# Patient Record
Sex: MALE | Race: WHITE | Employment: OTHER | URBAN - METROPOLITAN AREA
[De-identification: names, ages, dates, MRNs, and addresses within clinical notes are randomized per-mention and may not be internally consistent; named-entity substitution may affect disease eponyms.]

---

## 2017-03-03 ENCOUNTER — OFFICE VISIT (OUTPATIENT)
Dept: FAMILY MEDICINE CLINIC | Age: 67
End: 2017-03-03

## 2017-03-03 VITALS
SYSTOLIC BLOOD PRESSURE: 110 MMHG | WEIGHT: 113 LBS | HEIGHT: 68 IN | TEMPERATURE: 98.1 F | DIASTOLIC BLOOD PRESSURE: 64 MMHG | OXYGEN SATURATION: 95 % | RESPIRATION RATE: 16 BRPM | HEART RATE: 62 BPM | BODY MASS INDEX: 17.13 KG/M2

## 2017-03-03 DIAGNOSIS — M54.41 CHRONIC MIDLINE LOW BACK PAIN WITH RIGHT-SIDED SCIATICA: ICD-10-CM

## 2017-03-03 DIAGNOSIS — G89.29 CHRONIC MIDLINE LOW BACK PAIN WITH RIGHT-SIDED SCIATICA: ICD-10-CM

## 2017-03-03 DIAGNOSIS — M54.2 NECK PAIN: Primary | ICD-10-CM

## 2017-03-03 PROCEDURE — 99213 OFFICE O/P EST LOW 20 MIN: CPT | Performed by: FAMILY MEDICINE

## 2017-03-03 PROCEDURE — 3288F FALL RISK ASSESSMENT DOCD: CPT | Performed by: FAMILY MEDICINE

## 2017-03-03 PROCEDURE — G8510 SCR DEP NEG, NO PLAN REQD: HCPCS | Performed by: FAMILY MEDICINE

## 2017-03-03 RX ORDER — DICLOFENAC SODIUM 75 MG/1
75 TABLET, DELAYED RELEASE ORAL 2 TIMES DAILY WITH MEALS
Qty: 60 TABLET | Refills: 1 | Status: SHIPPED | OUTPATIENT
Start: 2017-03-03 | End: 2017-11-29

## 2017-03-03 RX ORDER — M-VIT,TX,IRON,MINS/CALC/FOLIC 27MG-0.4MG
1 TABLET ORAL DAILY
COMMUNITY

## 2017-03-03 RX ORDER — TIZANIDINE HYDROCHLORIDE 4 MG/1
4 CAPSULE, GELATIN COATED ORAL 3 TIMES DAILY PRN
Qty: 30 CAPSULE | Refills: 2 | Status: SHIPPED | OUTPATIENT
Start: 2017-03-03 | End: 2017-03-06 | Stop reason: ALTCHOICE

## 2017-03-03 ASSESSMENT — PATIENT HEALTH QUESTIONNAIRE - PHQ9
SUM OF ALL RESPONSES TO PHQ9 QUESTIONS 1 & 2: 0
2. FEELING DOWN, DEPRESSED OR HOPELESS: 0
1. LITTLE INTEREST OR PLEASURE IN DOING THINGS: 0
SUM OF ALL RESPONSES TO PHQ QUESTIONS 1-9: 0

## 2017-03-04 ENCOUNTER — HOSPITAL ENCOUNTER (OUTPATIENT)
Dept: OTHER | Age: 67
Discharge: HOME | End: 2017-03-04
Attending: FAMILY MEDICINE | Admitting: FAMILY MEDICINE

## 2017-03-04 DIAGNOSIS — M54.41 CHRONIC MIDLINE LOW BACK PAIN WITH RIGHT-SIDED SCIATICA: ICD-10-CM

## 2017-03-04 DIAGNOSIS — M54.2 NECK PAIN: ICD-10-CM

## 2017-03-04 DIAGNOSIS — G89.29 CHRONIC MIDLINE LOW BACK PAIN WITH RIGHT-SIDED SCIATICA: ICD-10-CM

## 2017-03-06 ENCOUNTER — TELEPHONE (OUTPATIENT)
Dept: FAMILY MEDICINE CLINIC | Age: 67
End: 2017-03-06

## 2017-03-06 RX ORDER — TIZANIDINE 4 MG/1
4 TABLET ORAL EVERY 8 HOURS PRN
Qty: 40 TABLET | Refills: 2 | Status: SHIPPED | OUTPATIENT
Start: 2017-03-06 | End: 2017-11-29

## 2017-04-27 ENCOUNTER — TELEPHONE (OUTPATIENT)
Dept: FAMILY MEDICINE CLINIC | Age: 67
End: 2017-04-27

## 2017-11-29 ENCOUNTER — OFFICE VISIT (OUTPATIENT)
Dept: FAMILY MEDICINE CLINIC | Age: 67
End: 2017-11-29

## 2017-11-29 ENCOUNTER — TELEPHONE (OUTPATIENT)
Dept: FAMILY MEDICINE CLINIC | Age: 67
End: 2017-11-29

## 2017-11-29 VITALS
OXYGEN SATURATION: 93 % | SYSTOLIC BLOOD PRESSURE: 102 MMHG | RESPIRATION RATE: 16 BRPM | HEART RATE: 74 BPM | BODY MASS INDEX: 17.87 KG/M2 | DIASTOLIC BLOOD PRESSURE: 80 MMHG | WEIGHT: 111.2 LBS | HEIGHT: 66 IN

## 2017-11-29 DIAGNOSIS — Z23 FLU VACCINE NEED: ICD-10-CM

## 2017-11-29 DIAGNOSIS — Z72.0 TOBACCO USE: ICD-10-CM

## 2017-11-29 DIAGNOSIS — R35.1 NOCTURIA: ICD-10-CM

## 2017-11-29 DIAGNOSIS — Z12.11 COLON CANCER SCREENING: ICD-10-CM

## 2017-11-29 DIAGNOSIS — D23.30 DERMOID CYST OF FACE: Primary | ICD-10-CM

## 2017-11-29 DIAGNOSIS — L08.89 OTHER SPECIFIED LOCAL INFECTIONS OF THE SKIN AND SUBCUTANEOUS TISSUE: ICD-10-CM

## 2017-11-29 DIAGNOSIS — Z23 NEED FOR PNEUMOCOCCAL VACCINE: ICD-10-CM

## 2017-11-29 DIAGNOSIS — Z12.5 PROSTATE CANCER SCREENING: ICD-10-CM

## 2017-11-29 DIAGNOSIS — J44.1 OBSTRUCTIVE CHRONIC BRONCHITIS WITH EXACERBATION (HCC): ICD-10-CM

## 2017-11-29 DIAGNOSIS — J01.90 ACUTE SINUSITIS, RECURRENCE NOT SPECIFIED, UNSPECIFIED LOCATION: ICD-10-CM

## 2017-11-29 DIAGNOSIS — Z13.29 THYROID DISORDER SCREENING: ICD-10-CM

## 2017-11-29 DIAGNOSIS — Z13.220 LIPID SCREENING: ICD-10-CM

## 2017-11-29 DIAGNOSIS — J44.9 CHRONIC OBSTRUCTIVE PULMONARY DISEASE, UNSPECIFIED COPD TYPE (HCC): ICD-10-CM

## 2017-11-29 LAB
A/G RATIO: 2.1 (ref 1.1–2.2)
ALBUMIN SERPL-MCNC: 4.6 G/DL (ref 3.4–5)
ALP BLD-CCNC: 55 U/L (ref 40–129)
ALT SERPL-CCNC: 15 U/L (ref 10–40)
ANION GAP SERPL CALCULATED.3IONS-SCNC: 16 MMOL/L (ref 3–16)
AST SERPL-CCNC: 29 U/L (ref 15–37)
BASOPHILS ABSOLUTE: 0.1 K/UL (ref 0–0.2)
BASOPHILS RELATIVE PERCENT: 1 %
BILIRUB SERPL-MCNC: 0.4 MG/DL (ref 0–1)
BUN BLDV-MCNC: 8 MG/DL (ref 7–20)
CALCIUM SERPL-MCNC: 9.8 MG/DL (ref 8.3–10.6)
CHLORIDE BLD-SCNC: 94 MMOL/L (ref 99–110)
CHOLESTEROL, TOTAL: 166 MG/DL (ref 0–199)
CO2: 29 MMOL/L (ref 21–32)
CREAT SERPL-MCNC: <0.5 MG/DL (ref 0.8–1.3)
EOSINOPHILS ABSOLUTE: 0.2 K/UL (ref 0–0.6)
EOSINOPHILS RELATIVE PERCENT: 3.5 %
GFR AFRICAN AMERICAN: >60
GFR NON-AFRICAN AMERICAN: >60
GLOBULIN: 2.2 G/DL
GLUCOSE BLD-MCNC: 69 MG/DL (ref 70–99)
HCT VFR BLD CALC: 41.2 % (ref 40.5–52.5)
HDLC SERPL-MCNC: 87 MG/DL (ref 40–60)
HEMOGLOBIN: 13.8 G/DL (ref 13.5–17.5)
LDL CHOLESTEROL CALCULATED: 69 MG/DL
LYMPHOCYTES ABSOLUTE: 1.5 K/UL (ref 1–5.1)
LYMPHOCYTES RELATIVE PERCENT: 23.5 %
MCH RBC QN AUTO: 33.1 PG (ref 26–34)
MCHC RBC AUTO-ENTMCNC: 33.5 G/DL (ref 31–36)
MCV RBC AUTO: 98.8 FL (ref 80–100)
MONOCYTES ABSOLUTE: 0.5 K/UL (ref 0–1.3)
MONOCYTES RELATIVE PERCENT: 7.3 %
NEUTROPHILS ABSOLUTE: 4.2 K/UL (ref 1.7–7.7)
NEUTROPHILS RELATIVE PERCENT: 64.7 %
PDW BLD-RTO: 14.9 % (ref 12.4–15.4)
PLATELET # BLD: 495 K/UL (ref 135–450)
PMV BLD AUTO: 8.9 FL (ref 5–10.5)
POTASSIUM SERPL-SCNC: 4.8 MMOL/L (ref 3.5–5.1)
PROSTATE SPECIFIC ANTIGEN: 0.21 NG/ML (ref 0–4)
RBC # BLD: 4.17 M/UL (ref 4.2–5.9)
SODIUM BLD-SCNC: 139 MMOL/L (ref 136–145)
TOTAL PROTEIN: 6.8 G/DL (ref 6.4–8.2)
TRIGL SERPL-MCNC: 49 MG/DL (ref 0–150)
TSH REFLEX: 2.41 UIU/ML (ref 0.27–4.2)
VLDLC SERPL CALC-MCNC: 10 MG/DL
WBC # BLD: 6.5 K/UL (ref 4–11)

## 2017-11-29 PROCEDURE — G8926 SPIRO NO PERF OR DOC: HCPCS | Performed by: NURSE PRACTITIONER

## 2017-11-29 PROCEDURE — G8484 FLU IMMUNIZE NO ADMIN: HCPCS | Performed by: NURSE PRACTITIONER

## 2017-11-29 PROCEDURE — G8419 CALC BMI OUT NRM PARAM NOF/U: HCPCS | Performed by: NURSE PRACTITIONER

## 2017-11-29 PROCEDURE — 90662 IIV NO PRSV INCREASED AG IM: CPT | Performed by: NURSE PRACTITIONER

## 2017-11-29 PROCEDURE — 3023F SPIROM DOC REV: CPT | Performed by: NURSE PRACTITIONER

## 2017-11-29 PROCEDURE — G0008 ADMIN INFLUENZA VIRUS VAC: HCPCS | Performed by: NURSE PRACTITIONER

## 2017-11-29 PROCEDURE — G8427 DOCREV CUR MEDS BY ELIG CLIN: HCPCS | Performed by: NURSE PRACTITIONER

## 2017-11-29 PROCEDURE — 3017F COLORECTAL CA SCREEN DOC REV: CPT | Performed by: NURSE PRACTITIONER

## 2017-11-29 PROCEDURE — 1123F ACP DISCUSS/DSCN MKR DOCD: CPT | Performed by: NURSE PRACTITIONER

## 2017-11-29 PROCEDURE — 4040F PNEUMOC VAC/ADMIN/RCVD: CPT | Performed by: NURSE PRACTITIONER

## 2017-11-29 PROCEDURE — G0009 ADMIN PNEUMOCOCCAL VACCINE: HCPCS | Performed by: NURSE PRACTITIONER

## 2017-11-29 PROCEDURE — 4004F PT TOBACCO SCREEN RCVD TLK: CPT | Performed by: NURSE PRACTITIONER

## 2017-11-29 PROCEDURE — 90670 PCV13 VACCINE IM: CPT | Performed by: NURSE PRACTITIONER

## 2017-11-29 PROCEDURE — 99213 OFFICE O/P EST LOW 20 MIN: CPT | Performed by: NURSE PRACTITIONER

## 2017-11-29 ASSESSMENT — ENCOUNTER SYMPTOMS
BACK PAIN: 0
CHEST TIGHTNESS: 0
RHINORRHEA: 1
SHORTNESS OF BREATH: 0
NAUSEA: 0
CONSTIPATION: 0

## 2017-11-29 NOTE — TELEPHONE ENCOUNTER
[phone] (home)    Patient left office before his ear irrigation was done. Called patient to let him know that he could come back to office today or per Hayden Primes any other day to have it completed.

## 2017-11-29 NOTE — PROGRESS NOTES
Subjective:      Patient ID: Carlota Landa is a 77 y.o. male. HPI     To establish care at this practice. Previous PCP Dr. Alfredo Braun, retired. Having ongoing nasal congestion during cold weather. Lessens when lies down. Clear. Mass left face draining foul substance at times. Review of Systems   Constitutional: Negative for appetite change, chills and fever. HENT: Positive for rhinorrhea. Respiratory: Negative for chest tightness and shortness of breath. Cardiovascular: Negative for chest pain. Gastrointestinal: Negative for constipation and nausea. Musculoskeletal: Negative for arthralgias, back pain and gait problem. Neurological: Negative for dizziness and headaches. Psychiatric/Behavioral: Negative. Objective:   Physical Exam   Constitutional: He is oriented to person, place, and time. He appears well-developed and well-nourished. No distress. HENT:   Head: Normocephalic and atraumatic. Nose: Mucosal edema and rhinorrhea present. Right ear cerumen impaction. Large soft mass left pre auricular area, mild redness but not appearance of infection. Neck: Normal range of motion. Neck supple. Cardiovascular: Normal rate, regular rhythm and normal heart sounds. Pulmonary/Chest: Effort normal and breath sounds normal. No respiratory distress. Abdominal: Soft. Bowel sounds are normal.   Musculoskeletal: Normal range of motion. Lymphadenopathy:     He has no cervical adenopathy. Neurological: He is alert and oriented to person, place, and time. Skin: Skin is warm and dry. Psychiatric: He has a normal mood and affect. Assessment:      1. Allergic rhinitis  2. Cyst left face  3. Cerumen impaction right  4. Tobacco abuse  5. COPD-stable      Plan:      1. Sandra Prescott was seen today for new patient.     Diagnoses and all orders for this visit:    Screening for AAA (abdominal aortic aneurysm)    Colon cancer screening  -     POCT Fecal Immunochemical Test (FIT);

## 2017-11-30 ENCOUNTER — TELEPHONE (OUTPATIENT)
Dept: FAMILY MEDICINE CLINIC | Age: 67
End: 2017-11-30

## 2017-12-01 NOTE — TELEPHONE ENCOUNTER
[facility] Authorization # [id number]  Valid 12/1/17  Expires 12/31/17  25 Visits    Scanned to chart  Copy mailed to patient

## 2017-12-19 ENCOUNTER — SURG/PROC ORDERS (OUTPATIENT)
Dept: SURGERY | Age: 67
End: 2017-12-19

## 2017-12-19 ENCOUNTER — INITIAL CONSULT (OUTPATIENT)
Dept: SURGERY | Age: 67
End: 2017-12-19

## 2017-12-19 VITALS
HEIGHT: 66 IN | DIASTOLIC BLOOD PRESSURE: 70 MMHG | WEIGHT: 113 LBS | SYSTOLIC BLOOD PRESSURE: 120 MMHG | BODY MASS INDEX: 18.16 KG/M2

## 2017-12-19 DIAGNOSIS — L72.3 SEBACEOUS CYST: Primary | ICD-10-CM

## 2017-12-19 PROCEDURE — G8484 FLU IMMUNIZE NO ADMIN: HCPCS | Performed by: SURGERY

## 2017-12-19 PROCEDURE — G8419 CALC BMI OUT NRM PARAM NOF/U: HCPCS | Performed by: SURGERY

## 2017-12-19 PROCEDURE — 1123F ACP DISCUSS/DSCN MKR DOCD: CPT | Performed by: SURGERY

## 2017-12-19 PROCEDURE — 4040F PNEUMOC VAC/ADMIN/RCVD: CPT | Performed by: SURGERY

## 2017-12-19 PROCEDURE — 3017F COLORECTAL CA SCREEN DOC REV: CPT | Performed by: SURGERY

## 2017-12-19 PROCEDURE — 4004F PT TOBACCO SCREEN RCVD TLK: CPT | Performed by: SURGERY

## 2017-12-19 PROCEDURE — 99202 OFFICE O/P NEW SF 15 MIN: CPT | Performed by: SURGERY

## 2017-12-19 PROCEDURE — G8427 DOCREV CUR MEDS BY ELIG CLIN: HCPCS | Performed by: SURGERY

## 2017-12-19 RX ORDER — SODIUM CHLORIDE 0.9 % (FLUSH) 0.9 %
10 SYRINGE (ML) INJECTION PRN
Status: CANCELLED | OUTPATIENT
Start: 2017-12-19

## 2017-12-19 RX ORDER — SODIUM CHLORIDE 0.9 % (FLUSH) 0.9 %
10 SYRINGE (ML) INJECTION EVERY 12 HOURS SCHEDULED
Status: CANCELLED | OUTPATIENT
Start: 2017-12-19

## 2017-12-19 NOTE — PROGRESS NOTES
New Patient Via Tony Wagner MD    [address], [address] [facility]  [place] [address] Toshia Bee   YOB: 1950    Date of Visit:  12/19/2017    Zehra Patel CNP    Chief Complaint: Cyst left face    HPI:  Patient presents for evaluation of a cyst on his left face. He states he's had it for years. It has been slowly growing. It has not been infected, but will occasionally drain some foul-smelling material.  It does not hurt    Allergies   Allergen Reactions    Vicodin [Hydrocodone-Acetaminophen] Other (See Comments)     Hallucinations. Outpatient Prescriptions Marked as Taking for the 12/19/17 encounter (Initial consult) with Garry Alexandra MD   Medication Sig Dispense Refill    Multiple Vitamins-Minerals (THERAPEUTIC MULTIVITAMIN-MINERALS) tablet Take 1 tablet by mouth daily      aspirin 81 MG tablet          Past Medical History:   Diagnosis Date    Arthritis     Chronic back pain     Wears glasses      Past Surgical History:   Procedure Laterality Date    CARPAL TUNNEL RELEASE Right 2007    DENTAL SURGERY  1997    ALL TEETH PULLED    EYE SURGERY Right 1995    detached retina    FINGER SURGERY  5/24/11    duputrens release right ring finger inclluding proxinal interphanageal open CTR    FINGER SURGERY Right 1957    contusion finger # 3     Family History   Problem Relation Age of Onset    Diabetes Mother     Cancer Sister      pancreatic    Emphysema Father      Social History     Social History    Marital status:      Spouse name: Rui Antoine Number of children: 2    Years of education: 15     Occupational History     [address]     Social History Main Topics    Smoking status: Current Every Day Smoker     Packs/day: 1.00     Types: Cigarettes     Start date: 11/29/1976    Smokeless tobacco: Never Used      Comment: Advised to quit.  Had one cig for the past 5 days.  Alcohol use 7.2 oz/week     12 Cans of beer per week    Drug use: No    Sexual activity: Not Currently     Other Topics Concern    Not on file     Social History Narrative    No narrative on file          Vitals:    12/19/17 0852   BP: 120/70   Weight: 113 lb (51.3 kg)   Height: 5' 5.75\" (1.67 m)     Body mass index is 18.38 kg/m². Wt Readings from Last 3 Encounters:   12/19/17 113 lb (51.3 kg)   11/29/17 111 lb 3.2 oz (50.4 kg)   03/03/17 113 lb (51.3 kg)     BP Readings from Last 3 Encounters:   12/19/17 120/70   11/29/17 102/80   03/03/17 110/64        ROS:  As per HPI, otherwise reviewed ×10 systems and negative    PE:  Constitutional:  Well developed, well nourished, no acute distress, non-toxic appearance   Eyes:  PERRL, conjunctiva normal   HENT:  Atraumatic, external ears normal, nose normal. Neck- normal range of motion, no tenderness, supple   Respiratory:  No respiratory distress, normal breath sounds, no rales, no wheezing   Cardiovascular:  Normal rate, normal rhythm  Integument:  Left face with a 3 cm soft mobile mass just anterior to the left ear consistent with a sebaceous cyst  Lymphatic:  No lymphadenopathy noted   Neurologic:  Alert & oriented x 3, no focal deficits noted   Psychiatric:  Speech and behavior appropriate       DATA:  Not applicable      Assessment:  1. Sebaceous cyst        Plan: Excision of sebaceous cyst. I explained the procedure including risks, benefits, and alternatives. Questions were answered and the patient agrees to proceed.

## 2017-12-26 NOTE — PRE-PROCEDURE INSTRUCTIONS

## 2017-12-29 ENCOUNTER — HOSPITAL ENCOUNTER (OUTPATIENT)
Dept: SURGERY | Age: 67
Discharge: HOME | End: 2017-12-29
Attending: SURGERY | Admitting: SURGERY

## 2017-12-29 VITALS
WEIGHT: 115 LBS | TEMPERATURE: 98.7 F | HEART RATE: 71 BPM | DIASTOLIC BLOOD PRESSURE: 69 MMHG | SYSTOLIC BLOOD PRESSURE: 111 MMHG | OXYGEN SATURATION: 96 % | RESPIRATION RATE: 18 BRPM | HEIGHT: 68 IN | BODY MASS INDEX: 17.43 KG/M2

## 2017-12-29 DIAGNOSIS — L72.3 SEBACEOUS CYST: Primary | ICD-10-CM

## 2017-12-29 PROCEDURE — 12052 INTMD RPR FACE/MM 2.6-5.0 CM: CPT | Performed by: SURGERY

## 2017-12-29 PROCEDURE — 11444 EXC FACE-MM B9+MARG 3.1-4 CM: CPT | Performed by: SURGERY

## 2017-12-29 RX ORDER — OXYCODONE HYDROCHLORIDE AND ACETAMINOPHEN 5; 325 MG/1; MG/1
1 TABLET ORAL PRN
Status: ACTIVE | OUTPATIENT
Start: 2017-12-29 | End: 2017-12-29

## 2017-12-29 RX ORDER — LIDOCAINE HYDROCHLORIDE 10 MG/ML
1 INJECTION, SOLUTION EPIDURAL; INFILTRATION; INTRACAUDAL; PERINEURAL
Status: COMPLETED | OUTPATIENT
Start: 2017-12-29 | End: 2017-12-29

## 2017-12-29 RX ORDER — MEPERIDINE HYDROCHLORIDE 25 MG/ML
12.5 INJECTION INTRAMUSCULAR; INTRAVENOUS; SUBCUTANEOUS EVERY 5 MIN PRN
Status: DISCONTINUED | OUTPATIENT
Start: 2017-12-29 | End: 2017-12-30 | Stop reason: HOSPADM

## 2017-12-29 RX ORDER — OXYCODONE HYDROCHLORIDE AND ACETAMINOPHEN 5; 325 MG/1; MG/1
2 TABLET ORAL PRN
Status: ACTIVE | OUTPATIENT
Start: 2017-12-29 | End: 2017-12-29

## 2017-12-29 RX ORDER — ONDANSETRON 2 MG/ML
4 INJECTION INTRAMUSCULAR; INTRAVENOUS EVERY 30 MIN PRN
Status: DISCONTINUED | OUTPATIENT
Start: 2017-12-29 | End: 2017-12-30 | Stop reason: HOSPADM

## 2017-12-29 RX ORDER — SODIUM CHLORIDE 0.9 % (FLUSH) 0.9 %
10 SYRINGE (ML) INJECTION PRN
Status: DISCONTINUED | OUTPATIENT
Start: 2017-12-29 | End: 2017-12-30 | Stop reason: HOSPADM

## 2017-12-29 RX ORDER — SODIUM CHLORIDE 0.9 % (FLUSH) 0.9 %
10 SYRINGE (ML) INJECTION EVERY 12 HOURS SCHEDULED
Status: DISCONTINUED | OUTPATIENT
Start: 2017-12-29 | End: 2017-12-29 | Stop reason: SDUPTHER

## 2017-12-29 RX ORDER — HYDROMORPHONE HCL 110MG/55ML
0.5 PATIENT CONTROLLED ANALGESIA SYRINGE INTRAVENOUS EVERY 10 MIN PRN
Status: DISCONTINUED | OUTPATIENT
Start: 2017-12-29 | End: 2017-12-30 | Stop reason: HOSPADM

## 2017-12-29 RX ORDER — IPRATROPIUM BROMIDE AND ALBUTEROL SULFATE 2.5; .5 MG/3ML; MG/3ML
1 SOLUTION RESPIRATORY (INHALATION) ONCE
Status: COMPLETED | OUTPATIENT
Start: 2017-12-29 | End: 2017-12-29

## 2017-12-29 RX ORDER — SODIUM CHLORIDE, SODIUM LACTATE, POTASSIUM CHLORIDE, CALCIUM CHLORIDE 600; 310; 30; 20 MG/100ML; MG/100ML; MG/100ML; MG/100ML
INJECTION, SOLUTION INTRAVENOUS CONTINUOUS
Status: DISCONTINUED | OUTPATIENT
Start: 2017-12-29 | End: 2017-12-30 | Stop reason: HOSPADM

## 2017-12-29 RX ORDER — SODIUM CHLORIDE 0.9 % (FLUSH) 0.9 %
10 SYRINGE (ML) INJECTION PRN
Status: DISCONTINUED | OUTPATIENT
Start: 2017-12-29 | End: 2017-12-29 | Stop reason: SDUPTHER

## 2017-12-29 RX ORDER — HYDROMORPHONE HCL 110MG/55ML
0.5 PATIENT CONTROLLED ANALGESIA SYRINGE INTRAVENOUS EVERY 5 MIN PRN
Status: DISCONTINUED | OUTPATIENT
Start: 2017-12-29 | End: 2017-12-30 | Stop reason: HOSPADM

## 2017-12-29 RX ORDER — LABETALOL HYDROCHLORIDE 5 MG/ML
5 INJECTION, SOLUTION INTRAVENOUS
Status: DISCONTINUED | OUTPATIENT
Start: 2017-12-29 | End: 2017-12-30 | Stop reason: HOSPADM

## 2017-12-29 RX ORDER — HYDRALAZINE HYDROCHLORIDE 20 MG/ML
5 INJECTION INTRAMUSCULAR; INTRAVENOUS EVERY 30 MIN PRN
Status: DISCONTINUED | OUTPATIENT
Start: 2017-12-29 | End: 2017-12-30 | Stop reason: HOSPADM

## 2017-12-29 RX ORDER — OXYCODONE HYDROCHLORIDE 5 MG/1
5 TABLET ORAL EVERY 6 HOURS PRN
Qty: 20 TABLET | Refills: 0 | Status: SHIPPED | OUTPATIENT
Start: 2017-12-29 | End: 2018-01-03

## 2017-12-29 RX ORDER — DIPHENHYDRAMINE HYDROCHLORIDE 50 MG/ML
6.25 INJECTION INTRAMUSCULAR; INTRAVENOUS
Status: ACTIVE | OUTPATIENT
Start: 2017-12-29 | End: 2017-12-29

## 2017-12-29 RX ORDER — SODIUM CHLORIDE 0.9 % (FLUSH) 0.9 %
10 SYRINGE (ML) INJECTION EVERY 12 HOURS SCHEDULED
Status: DISCONTINUED | OUTPATIENT
Start: 2017-12-29 | End: 2017-12-30 | Stop reason: HOSPADM

## 2017-12-29 RX ADMIN — LIDOCAINE HYDROCHLORIDE 1 ML: 10 INJECTION, SOLUTION EPIDURAL; INFILTRATION; INTRACAUDAL; PERINEURAL at 09:56

## 2017-12-29 RX ADMIN — IPRATROPIUM BROMIDE AND ALBUTEROL SULFATE 1 AMPULE: 2.5; .5 SOLUTION RESPIRATORY (INHALATION) at 09:54

## 2017-12-29 RX ADMIN — SODIUM CHLORIDE, SODIUM LACTATE, POTASSIUM CHLORIDE, CALCIUM CHLORIDE: 600; 310; 30; 20 INJECTION, SOLUTION INTRAVENOUS at 09:56

## 2017-12-29 ASSESSMENT — PAIN SCALES - GENERAL: PAINLEVEL_OUTOF10: 0

## 2017-12-29 ASSESSMENT — LIFESTYLE VARIABLES: SMOKING_STATUS: 1

## 2017-12-29 NOTE — OP NOTE
[address]                  [address] [place] [address]                                 OPERATIVE REPORT    PATIENT NAME: Jannet Nash                      :        1950  MED REC NO:   [id number]                          ROOM:  ACCOUNT NO:   [de-identified]                          ADMIT DATE: 2017  PROVIDER:     Ashia Mckeon MD    DATE OF PROCEDURE:  2017    PREOPERATIVE DIAGNOSIS:  Large sebaceous cyst, left face. POSTOPERATIVE DIAGNOSIS:  Large sebaceous cyst, left face. PROCEDURE:  Excision of large sebaceous cyst, left face with layered  closure. ANESTHESIA:  Local with MAC. SURGEON:  Ashia Mckeon MD    INDICATIONS:  The patient is a 26-year-old gentleman with an enlarging  sebaceous cyst on his left face. He presents for excision. OPERATIVE SUMMARY:  After preoperative evaluation, the patient was brought  in the operating suite and placed in a comfortable supine position on the  operating room table. Monitoring equipment was attached and he was given  intravenous sedation per Anesthesia. His left face was sterilely prepped  and draped and anesthetized with local anesthetic. The 3.7 cm cyst was  excised via a 1.5 x 4.5 cm ellipse of skin. Bleeding was controlled with  electrocautery and the lesion was excised all the way down to the parotid gland. The incision was closed with interrupted sutures of 3-0 Vicryl in a  subcutaneous fascia. The skin was closed with a running subcuticular  suture of 4-0 Vicryl. Benzoin, Steri-Strips, and dry sterile dressings  were applied. All sponge, needle, and instrument counts were correct at  the end of the case. The patient tolerated the procedure well and was  taken to the recovery area in stable condition.         Wild Mayo MD    D: 2017 11:59:08       T: 2017 12:00:24     [email or web address]  Job#: [id number]     Doc#: [id number]    CC:  Joselin Aviles CNP

## 2017-12-29 NOTE — PROGRESS NOTES
Pt in bed. Pre admission completed. Side rails up x 2. IV started. Pt denies any needs. Call light within reach. Family at bedside.

## 2017-12-29 NOTE — PROGRESS NOTES
Pt is being discharged to home. Discharge instructions and scripts gone over and given to pt/wife. Pt/wife denies any questions or concerns at this time. IV removed. Pt is in stable condition.

## 2017-12-30 NOTE — ANESTHESIA POST-OP
Postoperative Anesthesia Note    Name:    Bharathi Wells  MRN:      [id number]    No data found. LABS:    CBC  Lab Results   Component Value Date/Time    WBC 6.5 11/29/2017 02:16 PM    HGB 13.8 11/29/2017 02:16 PM    HCT 41.2 11/29/2017 02:16 PM     (H) 11/29/2017 02:16 PM     RENAL  Lab Results   Component Value Date/Time     11/29/2017 02:16 PM    K 4.8 11/29/2017 02:16 PM    CL 94 (L) 11/29/2017 02:16 PM    CO2 29 11/29/2017 02:16 PM    BUN 8 11/29/2017 02:16 PM    CREATININE <0.5 (L) 11/29/2017 02:16 PM    GLUCOSE 69 (L) 11/29/2017 02:16 PM     COAGS  No results found for: PROTIME, INR, APTT    Intake & Output:  No intake/output data recorded. Nausea & Vomiting:  No    Level of Consciousness:  Awake    Pain Assessment:  Adequate analgesia    Anesthesia Complications:  No apparent anesthetic complications    SUMMARY      Vital signs stable  OK to discharge from Stage I post anesthesia care.   Care transferred from Anesthesiology department on discharge from perioperative area

## 2018-01-09 ENCOUNTER — OFFICE VISIT (OUTPATIENT)
Dept: SURGERY | Age: 68
End: 2018-01-09

## 2018-01-09 VITALS
BODY MASS INDEX: 16.82 KG/M2 | DIASTOLIC BLOOD PRESSURE: 70 MMHG | WEIGHT: 111 LBS | HEIGHT: 68 IN | SYSTOLIC BLOOD PRESSURE: 110 MMHG

## 2018-01-09 DIAGNOSIS — Z98.890 POST-OPERATIVE STATE: Primary | ICD-10-CM

## 2018-01-09 PROCEDURE — 99024 POSTOP FOLLOW-UP VISIT: CPT | Performed by: SURGERY

## 2018-01-09 NOTE — PROGRESS NOTES
[facility] GENERAL SURGERY      S:   Patient presents s/p left facial cyst excision. He reports doing well. O:   Comfortable         Incision site healing well. A:   S/P left facial cyst excision    P:   Follow up as needed.

## 2018-04-04 ENCOUNTER — OFFICE VISIT (OUTPATIENT)
Dept: FAMILY MEDICINE CLINIC | Age: 68
End: 2018-04-04

## 2018-04-04 VITALS
RESPIRATION RATE: 15 BRPM | DIASTOLIC BLOOD PRESSURE: 82 MMHG | HEART RATE: 84 BPM | HEIGHT: 68 IN | BODY MASS INDEX: 17.72 KG/M2 | OXYGEN SATURATION: 97 % | WEIGHT: 116.9 LBS | SYSTOLIC BLOOD PRESSURE: 128 MMHG

## 2018-04-04 DIAGNOSIS — J44.9 CHRONIC OBSTRUCTIVE PULMONARY DISEASE, UNSPECIFIED COPD TYPE (HCC): ICD-10-CM

## 2018-04-04 DIAGNOSIS — M89.9 DISORDER OF BONE: ICD-10-CM

## 2018-04-04 DIAGNOSIS — J44.9 OBSTRUCTIVE CHRONIC BRONCHITIS WITHOUT EXACERBATION (HCC): ICD-10-CM

## 2018-04-04 DIAGNOSIS — G89.29 CHRONIC MIDLINE LOW BACK PAIN WITH RIGHT-SIDED SCIATICA: Primary | ICD-10-CM

## 2018-04-04 DIAGNOSIS — M54.41 CHRONIC MIDLINE LOW BACK PAIN WITH RIGHT-SIDED SCIATICA: Primary | ICD-10-CM

## 2018-04-04 PROCEDURE — 99213 OFFICE O/P EST LOW 20 MIN: CPT | Performed by: NURSE PRACTITIONER

## 2018-04-04 PROCEDURE — G8419 CALC BMI OUT NRM PARAM NOF/U: HCPCS | Performed by: NURSE PRACTITIONER

## 2018-04-04 PROCEDURE — 4040F PNEUMOC VAC/ADMIN/RCVD: CPT | Performed by: NURSE PRACTITIONER

## 2018-04-04 PROCEDURE — 3023F SPIROM DOC REV: CPT | Performed by: NURSE PRACTITIONER

## 2018-04-04 PROCEDURE — G8427 DOCREV CUR MEDS BY ELIG CLIN: HCPCS | Performed by: NURSE PRACTITIONER

## 2018-04-04 PROCEDURE — 1123F ACP DISCUSS/DSCN MKR DOCD: CPT | Performed by: NURSE PRACTITIONER

## 2018-04-04 PROCEDURE — G8926 SPIRO NO PERF OR DOC: HCPCS | Performed by: NURSE PRACTITIONER

## 2018-04-04 PROCEDURE — 4004F PT TOBACCO SCREEN RCVD TLK: CPT | Performed by: NURSE PRACTITIONER

## 2018-04-04 PROCEDURE — 3017F COLORECTAL CA SCREEN DOC REV: CPT | Performed by: NURSE PRACTITIONER

## 2018-04-04 RX ORDER — METHYLPREDNISOLONE 4 MG/1
TABLET ORAL
Qty: 1 KIT | Refills: 0 | Status: SHIPPED | OUTPATIENT
Start: 2018-04-04 | End: 2018-04-10

## 2018-04-04 RX ORDER — DICLOFENAC SODIUM 75 MG/1
75 TABLET, DELAYED RELEASE ORAL 2 TIMES DAILY
Qty: 30 TABLET | Refills: 0 | Status: SHIPPED | OUTPATIENT
Start: 2018-04-04 | End: 2018-05-01

## 2018-04-04 ASSESSMENT — PATIENT HEALTH QUESTIONNAIRE - PHQ9
SUM OF ALL RESPONSES TO PHQ9 QUESTIONS 1 & 2: 0
SUM OF ALL RESPONSES TO PHQ QUESTIONS 1-9: 0
2. FEELING DOWN, DEPRESSED OR HOPELESS: 0
1. LITTLE INTEREST OR PLEASURE IN DOING THINGS: 0

## 2018-04-04 ASSESSMENT — ENCOUNTER SYMPTOMS
BACK PAIN: 1
CHEST TIGHTNESS: 0
NAUSEA: 0
CONSTIPATION: 0

## 2018-04-08 PROBLEM — D23.30 DERMOID CYST OF FACE: Status: RESOLVED | Noted: 2017-11-29 | Resolved: 2018-04-08

## 2018-04-20 ENCOUNTER — HOSPITAL ENCOUNTER (OUTPATIENT)
Dept: GENERAL RADIOLOGY | Age: 68
Discharge: HOME | End: 2018-04-20
Attending: NURSE PRACTITIONER | Admitting: NURSE PRACTITIONER

## 2018-04-20 DIAGNOSIS — M54.41 CHRONIC MIDLINE LOW BACK PAIN WITH RIGHT-SIDED SCIATICA: ICD-10-CM

## 2018-04-20 DIAGNOSIS — M89.9 DISORDER OF BONE: ICD-10-CM

## 2018-04-20 DIAGNOSIS — G89.29 CHRONIC MIDLINE LOW BACK PAIN WITH RIGHT-SIDED SCIATICA: ICD-10-CM

## 2018-04-25 DIAGNOSIS — M81.0 AGE-RELATED OSTEOPOROSIS WITHOUT CURRENT PATHOLOGICAL FRACTURE: ICD-10-CM

## 2018-04-25 DIAGNOSIS — M81.0 AGE-RELATED OSTEOPOROSIS WITHOUT CURRENT PATHOLOGICAL FRACTURE: Primary | ICD-10-CM

## 2018-04-25 RX ORDER — ALENDRONATE SODIUM 70 MG/1
70 TABLET ORAL
Qty: 4 TABLET | Refills: 3 | Status: SHIPPED | OUTPATIENT
Start: 2018-04-25 | End: 2018-05-01 | Stop reason: SDUPTHER

## 2018-05-01 ENCOUNTER — OFFICE VISIT (OUTPATIENT)
Dept: FAMILY MEDICINE CLINIC | Age: 68
End: 2018-05-01

## 2018-05-01 VITALS
SYSTOLIC BLOOD PRESSURE: 116 MMHG | OXYGEN SATURATION: 94 % | HEART RATE: 84 BPM | RESPIRATION RATE: 18 BRPM | DIASTOLIC BLOOD PRESSURE: 74 MMHG | WEIGHT: 115 LBS | BODY MASS INDEX: 17.49 KG/M2

## 2018-05-01 DIAGNOSIS — J44.9 CHRONIC OBSTRUCTIVE PULMONARY DISEASE, UNSPECIFIED COPD TYPE (HCC): ICD-10-CM

## 2018-05-01 DIAGNOSIS — J30.9 ALLERGIC RHINITIS, UNSPECIFIED CHRONICITY, UNSPECIFIED SEASONALITY, UNSPECIFIED TRIGGER: ICD-10-CM

## 2018-05-01 DIAGNOSIS — G89.29 CHRONIC MIDLINE LOW BACK PAIN WITH RIGHT-SIDED SCIATICA: ICD-10-CM

## 2018-05-01 DIAGNOSIS — M54.41 CHRONIC MIDLINE LOW BACK PAIN WITH RIGHT-SIDED SCIATICA: ICD-10-CM

## 2018-05-01 DIAGNOSIS — M81.0 AGE-RELATED OSTEOPOROSIS WITHOUT CURRENT PATHOLOGICAL FRACTURE: ICD-10-CM

## 2018-05-01 DIAGNOSIS — F17.210 CIGARETTE NICOTINE DEPENDENCE WITHOUT COMPLICATION: ICD-10-CM

## 2018-05-01 PROCEDURE — 99214 OFFICE O/P EST MOD 30 MIN: CPT | Performed by: NURSE PRACTITIONER

## 2018-05-01 PROCEDURE — 4040F PNEUMOC VAC/ADMIN/RCVD: CPT | Performed by: NURSE PRACTITIONER

## 2018-05-01 PROCEDURE — 4004F PT TOBACCO SCREEN RCVD TLK: CPT | Performed by: NURSE PRACTITIONER

## 2018-05-01 PROCEDURE — G8926 SPIRO NO PERF OR DOC: HCPCS | Performed by: NURSE PRACTITIONER

## 2018-05-01 PROCEDURE — G8419 CALC BMI OUT NRM PARAM NOF/U: HCPCS | Performed by: NURSE PRACTITIONER

## 2018-05-01 PROCEDURE — 3017F COLORECTAL CA SCREEN DOC REV: CPT | Performed by: NURSE PRACTITIONER

## 2018-05-01 PROCEDURE — G8427 DOCREV CUR MEDS BY ELIG CLIN: HCPCS | Performed by: NURSE PRACTITIONER

## 2018-05-01 PROCEDURE — 1123F ACP DISCUSS/DSCN MKR DOCD: CPT | Performed by: NURSE PRACTITIONER

## 2018-05-01 PROCEDURE — 3023F SPIROM DOC REV: CPT | Performed by: NURSE PRACTITIONER

## 2018-05-01 RX ORDER — ALENDRONATE SODIUM 70 MG/1
70 TABLET ORAL
Qty: 4 TABLET | Refills: 3 | Status: SHIPPED | OUTPATIENT
Start: 2018-05-01 | End: 2018-12-06 | Stop reason: SDUPTHER

## 2018-05-01 RX ORDER — MELOXICAM 15 MG/1
15 TABLET ORAL DAILY
Qty: 30 TABLET | Refills: 3 | Status: SHIPPED | OUTPATIENT
Start: 2018-05-01 | End: 2019-03-27 | Stop reason: SDUPTHER

## 2018-05-01 RX ORDER — GABAPENTIN 300 MG/1
300 CAPSULE ORAL NIGHTLY
Qty: 30 CAPSULE | Refills: 0 | Status: SHIPPED | OUTPATIENT
Start: 2018-05-01 | End: 2018-12-06

## 2018-05-01 RX ORDER — ALBUTEROL SULFATE 90 UG/1
2 AEROSOL, METERED RESPIRATORY (INHALATION) EVERY 6 HOURS PRN
Qty: 1 INHALER | Refills: 5 | Status: SHIPPED | OUTPATIENT
Start: 2018-05-01 | End: 2018-12-06 | Stop reason: SDUPTHER

## 2018-05-04 ENCOUNTER — HOSPITAL ENCOUNTER (OUTPATIENT)
Dept: OTHER | Age: 68
Discharge: HOME | End: 2018-05-04
Attending: NURSE PRACTITIONER | Admitting: NURSE PRACTITIONER

## 2018-05-04 DIAGNOSIS — M54.41 CHRONIC MIDLINE LOW BACK PAIN WITH RIGHT-SIDED SCIATICA: ICD-10-CM

## 2018-05-04 DIAGNOSIS — G89.29 CHRONIC MIDLINE LOW BACK PAIN WITH RIGHT-SIDED SCIATICA: ICD-10-CM

## 2018-05-05 ASSESSMENT — ENCOUNTER SYMPTOMS
SHORTNESS OF BREATH: 0
DIARRHEA: 0
COUGH: 0
ABDOMINAL PAIN: 0
SORE THROAT: 0
SPUTUM PRODUCTION: 0
HEMOPTYSIS: 0
BACK PAIN: 1
CONSTIPATION: 0
WHEEZING: 0

## 2018-05-10 ENCOUNTER — TELEPHONE (OUTPATIENT)
Dept: FAMILY MEDICINE CLINIC | Age: 68
End: 2018-05-10

## 2018-05-10 DIAGNOSIS — G89.29 CHRONIC MIDLINE LOW BACK PAIN WITH RIGHT-SIDED SCIATICA: Primary | ICD-10-CM

## 2018-05-10 DIAGNOSIS — M54.41 CHRONIC MIDLINE LOW BACK PAIN WITH RIGHT-SIDED SCIATICA: Primary | ICD-10-CM

## 2018-05-31 ENCOUNTER — OFFICE VISIT (OUTPATIENT)
Dept: FAMILY MEDICINE CLINIC | Age: 68
End: 2018-05-31

## 2018-05-31 VITALS
SYSTOLIC BLOOD PRESSURE: 130 MMHG | OXYGEN SATURATION: 90 % | WEIGHT: 111 LBS | HEART RATE: 91 BPM | DIASTOLIC BLOOD PRESSURE: 90 MMHG | BODY MASS INDEX: 16.88 KG/M2 | TEMPERATURE: 98 F

## 2018-05-31 DIAGNOSIS — J44.1 COPD WITH ACUTE EXACERBATION (HCC): Primary | ICD-10-CM

## 2018-05-31 PROBLEM — J13 CAP (COMMUNITY ACQUIRED PNEUMONIA) DUE TO PNEUMOCOCCUS (HCC): Status: ACTIVE | Noted: 2018-05-31

## 2018-05-31 PROCEDURE — 99213 OFFICE O/P EST LOW 20 MIN: CPT | Performed by: PHYSICIAN ASSISTANT

## 2018-05-31 ASSESSMENT — ENCOUNTER SYMPTOMS
SINUS PAIN: 0
NAUSEA: 0
RHINORRHEA: 0
WHEEZING: 1
SHORTNESS OF BREATH: 1
VOMITING: 0
SORE THROAT: 0
EYE ITCHING: 0
SINUS PRESSURE: 0
EYE PAIN: 0
COUGH: 1
CHEST TIGHTNESS: 0

## 2018-06-01 PROBLEM — J44.1 COPD WITH ACUTE EXACERBATION (HCC): Status: ACTIVE | Noted: 2018-06-01

## 2018-06-01 PROBLEM — J96.01 ACUTE RESPIRATORY FAILURE WITH HYPOXIA (HCC): Status: ACTIVE | Noted: 2018-06-01

## 2018-06-04 ENCOUNTER — TELEPHONE (OUTPATIENT)
Dept: PULMONOLOGY | Age: 68
End: 2018-06-04

## 2018-06-25 ENCOUNTER — CARE COORDINATION (OUTPATIENT)
Dept: CASE MANAGEMENT | Age: 68
End: 2018-06-25

## 2018-06-25 DIAGNOSIS — J96.02 ACUTE RESPIRATORY FAILURE WITH HYPOXIA AND HYPERCAPNIA (HCC): Primary | ICD-10-CM

## 2018-06-25 DIAGNOSIS — J96.01 ACUTE RESPIRATORY FAILURE WITH HYPOXIA AND HYPERCAPNIA (HCC): Primary | ICD-10-CM

## 2018-06-25 PROCEDURE — 1111F DSCHRG MED/CURRENT MED MERGE: CPT | Performed by: NURSE PRACTITIONER

## 2018-06-27 ENCOUNTER — OFFICE VISIT (OUTPATIENT)
Dept: PULMONOLOGY | Age: 68
End: 2018-06-27

## 2018-06-27 VITALS
DIASTOLIC BLOOD PRESSURE: 84 MMHG | HEART RATE: 59 BPM | HEIGHT: 68 IN | TEMPERATURE: 98.6 F | BODY MASS INDEX: 16.82 KG/M2 | WEIGHT: 111 LBS | OXYGEN SATURATION: 97 % | SYSTOLIC BLOOD PRESSURE: 124 MMHG | RESPIRATION RATE: 18 BRPM

## 2018-06-27 DIAGNOSIS — R09.02 HYPOXEMIA REQUIRING SUPPLEMENTAL OXYGEN: ICD-10-CM

## 2018-06-27 DIAGNOSIS — Z99.81 HYPOXEMIA REQUIRING SUPPLEMENTAL OXYGEN: ICD-10-CM

## 2018-06-27 DIAGNOSIS — J44.9 COPD SUGGESTED BY INITIAL EVALUATION (HCC): Primary | ICD-10-CM

## 2018-06-27 DIAGNOSIS — Z72.0 TOBACCO USE: ICD-10-CM

## 2018-06-27 PROCEDURE — G8427 DOCREV CUR MEDS BY ELIG CLIN: HCPCS | Performed by: INTERNAL MEDICINE

## 2018-06-27 PROCEDURE — 1111F DSCHRG MED/CURRENT MED MERGE: CPT | Performed by: INTERNAL MEDICINE

## 2018-06-27 PROCEDURE — 1123F ACP DISCUSS/DSCN MKR DOCD: CPT | Performed by: INTERNAL MEDICINE

## 2018-06-27 PROCEDURE — 1036F TOBACCO NON-USER: CPT | Performed by: INTERNAL MEDICINE

## 2018-06-27 PROCEDURE — 99214 OFFICE O/P EST MOD 30 MIN: CPT | Performed by: INTERNAL MEDICINE

## 2018-06-27 PROCEDURE — G8419 CALC BMI OUT NRM PARAM NOF/U: HCPCS | Performed by: INTERNAL MEDICINE

## 2018-06-27 PROCEDURE — 3017F COLORECTAL CA SCREEN DOC REV: CPT | Performed by: INTERNAL MEDICINE

## 2018-06-27 PROCEDURE — G8926 SPIRO NO PERF OR DOC: HCPCS | Performed by: INTERNAL MEDICINE

## 2018-06-27 PROCEDURE — 3023F SPIROM DOC REV: CPT | Performed by: INTERNAL MEDICINE

## 2018-06-27 PROCEDURE — 4040F PNEUMOC VAC/ADMIN/RCVD: CPT | Performed by: INTERNAL MEDICINE

## 2018-07-18 ENCOUNTER — HOSPITAL ENCOUNTER (OUTPATIENT)
Dept: PULMONOLOGY | Age: 68
Discharge: HOME | End: 2018-07-18
Payer: MEDICARE

## 2018-07-18 DIAGNOSIS — J44.9 CHRONIC OBSTRUCTIVE PULMONARY DISEASE (HCC): ICD-10-CM

## 2018-07-18 PROCEDURE — 94664 DEMO&/EVAL PT USE INHALER: CPT

## 2018-07-18 PROCEDURE — 94010 BREATHING CAPACITY TEST: CPT

## 2018-07-18 PROCEDURE — 94618 PULMONARY STRESS TESTING: CPT

## 2018-07-18 PROCEDURE — 94726 PLETHYSMOGRAPHY LUNG VOLUMES: CPT

## 2018-07-18 PROCEDURE — 94640 AIRWAY INHALATION TREATMENT: CPT

## 2018-07-18 PROCEDURE — 6360000002 HC RX W HCPCS: Performed by: INTERNAL MEDICINE

## 2018-07-18 PROCEDURE — 94729 DIFFUSING CAPACITY: CPT

## 2018-07-18 RX ORDER — ALBUTEROL SULFATE 2.5 MG/3ML
2.5 SOLUTION RESPIRATORY (INHALATION) ONCE
Status: COMPLETED | OUTPATIENT
Start: 2018-07-18 | End: 2018-07-18

## 2018-07-18 RX ADMIN — ALBUTEROL SULFATE 2.5 MG: 2.5 SOLUTION RESPIRATORY (INHALATION) at 10:27

## 2018-07-23 ENCOUNTER — OFFICE VISIT (OUTPATIENT)
Dept: PULMONOLOGY | Age: 68
End: 2018-07-23

## 2018-07-23 VITALS
TEMPERATURE: 98.1 F | HEIGHT: 68 IN | BODY MASS INDEX: 15.91 KG/M2 | SYSTOLIC BLOOD PRESSURE: 112 MMHG | DIASTOLIC BLOOD PRESSURE: 70 MMHG | HEART RATE: 90 BPM | RESPIRATION RATE: 18 BRPM | OXYGEN SATURATION: 96 % | WEIGHT: 105 LBS

## 2018-07-23 DIAGNOSIS — R09.02 HYPOXEMIA REQUIRING SUPPLEMENTAL OXYGEN: ICD-10-CM

## 2018-07-23 DIAGNOSIS — Z72.0 TOBACCO ABUSE: ICD-10-CM

## 2018-07-23 DIAGNOSIS — J44.9 COPD, VERY SEVERE (HCC): Primary | ICD-10-CM

## 2018-07-23 DIAGNOSIS — Z99.81 HYPOXEMIA REQUIRING SUPPLEMENTAL OXYGEN: ICD-10-CM

## 2018-07-23 DIAGNOSIS — J44.1 COPD EXACERBATION (HCC): ICD-10-CM

## 2018-07-23 PROCEDURE — 99214 OFFICE O/P EST MOD 30 MIN: CPT | Performed by: INTERNAL MEDICINE

## 2018-07-23 PROCEDURE — 3017F COLORECTAL CA SCREEN DOC REV: CPT | Performed by: INTERNAL MEDICINE

## 2018-07-23 PROCEDURE — 4040F PNEUMOC VAC/ADMIN/RCVD: CPT | Performed by: INTERNAL MEDICINE

## 2018-07-23 PROCEDURE — G8926 SPIRO NO PERF OR DOC: HCPCS | Performed by: INTERNAL MEDICINE

## 2018-07-23 PROCEDURE — 1101F PT FALLS ASSESS-DOCD LE1/YR: CPT | Performed by: INTERNAL MEDICINE

## 2018-07-23 PROCEDURE — 3023F SPIROM DOC REV: CPT | Performed by: INTERNAL MEDICINE

## 2018-07-23 PROCEDURE — 1111F DSCHRG MED/CURRENT MED MERGE: CPT | Performed by: INTERNAL MEDICINE

## 2018-07-23 PROCEDURE — G8427 DOCREV CUR MEDS BY ELIG CLIN: HCPCS | Performed by: INTERNAL MEDICINE

## 2018-07-23 PROCEDURE — 1123F ACP DISCUSS/DSCN MKR DOCD: CPT | Performed by: INTERNAL MEDICINE

## 2018-07-23 PROCEDURE — 1036F TOBACCO NON-USER: CPT | Performed by: INTERNAL MEDICINE

## 2018-07-23 PROCEDURE — G8419 CALC BMI OUT NRM PARAM NOF/U: HCPCS | Performed by: INTERNAL MEDICINE

## 2018-07-23 RX ORDER — DOXYCYCLINE HYCLATE 100 MG
100 TABLET ORAL 2 TIMES DAILY
Qty: 20 TABLET | Refills: 0 | Status: SHIPPED | OUTPATIENT
Start: 2018-07-23 | End: 2018-08-02

## 2018-07-23 RX ORDER — PREDNISONE 10 MG/1
TABLET ORAL
Qty: 30 TABLET | Refills: 0 | Status: SHIPPED | OUTPATIENT
Start: 2018-07-23 | End: 2018-08-04

## 2018-07-23 NOTE — PROGRESS NOTES
daily, Disp: , Rfl:         Objective:   PHYSICAL EXAM:        VITALS:  /70   Pulse 90   Temp 98.1 °F (36.7 °C) (Oral)   Resp 18   Ht 5' 8\" (1.727 m)   Wt 105 lb (47.6 kg)   SpO2 96% Comment: 2LPM  BMI 15.97 kg/m²     Gen: In no acute distress. Alert. Cachectic. Eyes: PERRL. No sclera icterus. No conjunctival injection. ENT: No ocular or auricular discharge. Oropharynx clear. Neck: Trachea midline. Normal thyroid. Resp: No accessory muscle use. No crackles. Bilateral wheezes. No rhonchi. No dullness on percussion. CV: Regular rate. Regular rhythm. No murmur or rub. Normal S1 and S2. No edema  GI: Abdomen non-tender. Non-distended. No masses. No organomegaly. Normal bowel sounds. No hernia. Skin: Warm and dry. No nodules on exposed extremities. No rash on exposed extremities. Lymph: No cervical LAD. No supraclavicular LAD. M/S: No cyanosis. No synovitis or joint deformity. No clubbing. Neuro: Cranial nerves are grossly intact. Moving all extremities. Motor and sensation grossly intact. Psych: Oriented x 3. No anxiety or agitation. DATA:    LABS:        PFTs 7/18/18  FVC 0.93 (22%) FEV1 0.41 (13%) FEV1/FVC ratio  44%  TLC 8.50 (127%) DLCO 7.2 (25%) + BD  6mwt 560 feet, low sat 84%; required 5L with exertion       IMAGING:      I personally reviewed and interpreted the following :     Chest CT(dated 6/22/18): No evidence of a pulmonary embolus. Apical predominant emphysema.  Diffuse bronchial wall thickening consistent  with bronchitis.       Assessment:   - COPD- very severeWith acute exacerbation  - Tobacco abuse  - Hypoxemia requiring Supplemental oxygen  -Protein calorie malnutrition      Plan:   - Prednisone taper and doxycycline ×10 days  - Inhaled bronchodilators - duonebs and albuterol mdi for nowt  - supplemental oxygen with exertion 5L  - handicap placard letter  - Avoid tobacco use  - We discussed the poor prognosis associated with his advanced disease      The

## 2018-08-23 ENCOUNTER — TELEPHONE (OUTPATIENT)
Dept: PULMONOLOGY | Age: 68
End: 2018-08-23

## 2018-08-23 DIAGNOSIS — J44.9 CHRONIC OBSTRUCTIVE PULMONARY DISEASE, UNSPECIFIED COPD TYPE (HCC): Primary | ICD-10-CM

## 2018-08-23 NOTE — TELEPHONE ENCOUNTER
[facility] is requesting order for 5 Liters Oxygen. Okay to send order per request?  Order is pending for review. Assessment:OV 7/23/18   - COPD- very severeWith acute exacerbation  - Tobacco abuse  - Hypoxemia requiring Supplemental oxygen  -Protein calorie malnutrition        Plan:   - Prednisone taper and doxycycline ×10 days  - Inhaled bronchodilators - duonebs and albuterol mdi for nowt  - supplemental oxygen with exertion 5L  - handicap placard letter  - Avoid tobacco use  - We discussed the poor prognosis associated with his advanced disease        The information above included the review and summarization of old records. The history was obtained from these old records and from the patient directly. Risks, benefits and alternatives to the management plan were discussed with the patient.

## 2018-08-31 ENCOUNTER — TELEPHONE (OUTPATIENT)
Dept: PULMONOLOGY | Age: 68
End: 2018-08-31

## 2018-09-20 ENCOUNTER — OFFICE VISIT (OUTPATIENT)
Dept: PULMONOLOGY | Age: 68
End: 2018-09-20

## 2018-09-20 VITALS
HEART RATE: 88 BPM | RESPIRATION RATE: 16 BRPM | OXYGEN SATURATION: 96 % | SYSTOLIC BLOOD PRESSURE: 124 MMHG | DIASTOLIC BLOOD PRESSURE: 68 MMHG | HEIGHT: 67 IN | BODY MASS INDEX: 17.27 KG/M2 | WEIGHT: 110 LBS | TEMPERATURE: 98.7 F

## 2018-09-20 DIAGNOSIS — Z72.0 TOBACCO USE: ICD-10-CM

## 2018-09-20 DIAGNOSIS — J44.9 COPD, VERY SEVERE (HCC): Primary | ICD-10-CM

## 2018-09-20 DIAGNOSIS — J96.11 CHRONIC RESPIRATORY FAILURE WITH HYPOXIA (HCC): ICD-10-CM

## 2018-09-20 DIAGNOSIS — Z72.0 TOBACCO ABUSE: ICD-10-CM

## 2018-09-20 DIAGNOSIS — E43 SEVERE PROTEIN-CALORIE MALNUTRITION (HCC): ICD-10-CM

## 2018-09-20 PROBLEM — E46 PROTEIN CALORIE MALNUTRITION (HCC): Status: ACTIVE | Noted: 2018-09-20

## 2018-09-20 PROCEDURE — 1036F TOBACCO NON-USER: CPT | Performed by: INTERNAL MEDICINE

## 2018-09-20 PROCEDURE — G8427 DOCREV CUR MEDS BY ELIG CLIN: HCPCS | Performed by: INTERNAL MEDICINE

## 2018-09-20 PROCEDURE — 1101F PT FALLS ASSESS-DOCD LE1/YR: CPT | Performed by: INTERNAL MEDICINE

## 2018-09-20 PROCEDURE — G8419 CALC BMI OUT NRM PARAM NOF/U: HCPCS | Performed by: INTERNAL MEDICINE

## 2018-09-20 PROCEDURE — 1123F ACP DISCUSS/DSCN MKR DOCD: CPT | Performed by: INTERNAL MEDICINE

## 2018-09-20 PROCEDURE — 99214 OFFICE O/P EST MOD 30 MIN: CPT | Performed by: INTERNAL MEDICINE

## 2018-09-20 PROCEDURE — 4040F PNEUMOC VAC/ADMIN/RCVD: CPT | Performed by: INTERNAL MEDICINE

## 2018-09-20 PROCEDURE — G8926 SPIRO NO PERF OR DOC: HCPCS | Performed by: INTERNAL MEDICINE

## 2018-09-20 PROCEDURE — 3017F COLORECTAL CA SCREEN DOC REV: CPT | Performed by: INTERNAL MEDICINE

## 2018-09-20 PROCEDURE — 3023F SPIROM DOC REV: CPT | Performed by: INTERNAL MEDICINE

## 2018-09-20 RX ORDER — PREDNISONE 10 MG/1
10 TABLET ORAL DAILY
Qty: 30 TABLET | Refills: 0 | Status: SHIPPED | OUTPATIENT
Start: 2018-09-20 | End: 2018-10-20

## 2018-09-20 NOTE — PROGRESS NOTES
[facility]    Outpatient Follow Up Note    CHIEF COMPLAINT : shortness of breath     HPI:  The patient is a 68-year-old man with past medical history of tobacco abuse who is admitted with acute respiratory failure and a COPD exacerbation to [facility] on 6/18. Since last clinic visit, the patient reports that dyspnea persists. He improved with the last prednisone taper and antibiotics but continues to feel poorly. He says he has not smoked in the last several weeks. There are no changes to past medical history, family history, social history or review of systems(except as noted in the history section) since prior note (all reviewed with patient). Current Medications:    Current Outpatient Prescriptions:     guaiFENesin (MUCINEX) 600 MG extended release tablet, Take 1 tablet by mouth 2 times daily, Disp: 30 tablet, Rfl: 1    ipratropium-albuterol (DUONEB) 0.5-2.5 (3) MG/3ML SOLN nebulizer solution, Inhale 3 mLs into the lungs every 4 hours, Disp: 360 mL, Rfl: 0    ipratropium-albuterol (DUONEB) 0.5-2.5 (3) MG/3ML SOLN nebulizer solution, Inhale 3 mLs into the lungs every 4 hours as needed for Shortness of Breath, Disp: 360 mL, Rfl: 3    albuterol sulfate HFA (PROAIR HFA) 108 (90 Base) MCG/ACT inhaler, Inhale 2 puffs into the lungs every 6 hours as needed for Wheezing, Disp: 1 Inhaler, Rfl: 5    Multiple Vitamins-Minerals (THERAPEUTIC MULTIVITAMIN-MINERALS) tablet, Take 1 tablet by mouth daily, Disp: , Rfl:     aspirin 81 MG tablet, Take 81 mg by mouth daily , Disp: , Rfl:     meloxicam (MOBIC) 15 MG tablet, Take 1 tablet by mouth daily , take with food for pain, Disp: 30 tablet, Rfl: 3    gabapentin (NEURONTIN) 300 MG capsule, Take 1 capsule by mouth nightly for 30 days. ., Disp: 30 capsule, Rfl: 0    alendronate (FOSAMAX) 70 MG tablet, Take 1 tablet by mouth every 7 days Take 30 minutes before eating, other medication.  Remain upright after taking.,

## 2018-10-22 NOTE — PLAN OF CARE
Pre-Operative:  1. Patient/Caregiver identifies - states name and date of birth. 2. The patient is free from signs and symptoms of injury. 3. The patient receives appropriate medication(s), safely administered during the Perioperative period. 4. The patient is free from signs and symptoms of infection. 5. The patient has wound / tissue perfusion. 6. The patients's fluid, electrolyte, and acid-base balances are established preoperatively. 7. The patient's pulmonary function is established preoperatively. 8. The patient's cardiovascular status is established preoperatively. 9. The patient / caregiver demonstrates knowledge of nutritional management related to the operative or other invasive procedure. 10. The patient/caregiver demonstrates knowledge of medication management. 11. The patient/caregiver demonstrates knowledge of pain management. 12. The patient participates in the rehabilitation process as applicable. 13. The patient/caregiver participates in decisions affection his or her Perioperative plan of care. 14. The patient's care is consistent with the individualized Perioperative plan of care. 15. The patient's right to privacy is maintained. 16. The patient is the recipient of competent and ethical care within legal standards of practice. 17. The patient's value system, lifestyle, ethnicity, and culture are considered, respected, and incorporated in the Perioperative plan of care and understands special services available. 18. The patient demonstrates and/or reports adequate pain control throughout the the Perioperative period. 19. The patient's neurological status is established preoperatively. 20. The patient/caregiver demonstrates knowledge of the expected responses to the operative or invasive procedure. 21. Patient/Caregiver has reduced anxiety. Interventions- Familiarize with environment and equipment.   22. Patient/Caregiver verbalizes understanding of Phase I and Phase II process. 23. Patient pain level is established preoperatively using age appropriate pain scale. 24. The patient will move to fall risk upon sedation- during and through the recovery phase. Interventions- orient the patient to the environment, especially the location of the bathroom; provide treaded socks/non-skid footwear; demonstrate and teach back use of the nurse's call system; instruct the patient to call for help before getting out of bed; lock all movable equipment before transferring patient; keep bed in lowest position possible.  25. Other:  Yenifer Gilliland RN no

## 2018-10-26 ENCOUNTER — OFFICE VISIT (OUTPATIENT)
Dept: PULMONOLOGY | Age: 68
End: 2018-10-26
Payer: MEDICARE

## 2018-10-26 VITALS
HEIGHT: 68 IN | RESPIRATION RATE: 18 BRPM | DIASTOLIC BLOOD PRESSURE: 70 MMHG | SYSTOLIC BLOOD PRESSURE: 114 MMHG | WEIGHT: 112 LBS | TEMPERATURE: 98.9 F | HEART RATE: 67 BPM | OXYGEN SATURATION: 95 % | BODY MASS INDEX: 16.97 KG/M2

## 2018-10-26 DIAGNOSIS — J44.9 COPD, VERY SEVERE (HCC): Primary | ICD-10-CM

## 2018-10-26 DIAGNOSIS — Z23 NEED FOR INFLUENZA VACCINATION: ICD-10-CM

## 2018-10-26 DIAGNOSIS — E43 SEVERE PROTEIN-CALORIE MALNUTRITION (HCC): ICD-10-CM

## 2018-10-26 DIAGNOSIS — J96.11 CHRONIC RESPIRATORY FAILURE WITH HYPOXIA (HCC): ICD-10-CM

## 2018-10-26 PROCEDURE — G8419 CALC BMI OUT NRM PARAM NOF/U: HCPCS | Performed by: INTERNAL MEDICINE

## 2018-10-26 PROCEDURE — G0008 ADMIN INFLUENZA VIRUS VAC: HCPCS | Performed by: INTERNAL MEDICINE

## 2018-10-26 PROCEDURE — 1036F TOBACCO NON-USER: CPT | Performed by: INTERNAL MEDICINE

## 2018-10-26 PROCEDURE — 1123F ACP DISCUSS/DSCN MKR DOCD: CPT | Performed by: INTERNAL MEDICINE

## 2018-10-26 PROCEDURE — G8482 FLU IMMUNIZE ORDER/ADMIN: HCPCS | Performed by: INTERNAL MEDICINE

## 2018-10-26 PROCEDURE — 1101F PT FALLS ASSESS-DOCD LE1/YR: CPT | Performed by: INTERNAL MEDICINE

## 2018-10-26 PROCEDURE — 4040F PNEUMOC VAC/ADMIN/RCVD: CPT | Performed by: INTERNAL MEDICINE

## 2018-10-26 PROCEDURE — 90662 IIV NO PRSV INCREASED AG IM: CPT | Performed by: INTERNAL MEDICINE

## 2018-10-26 PROCEDURE — G8926 SPIRO NO PERF OR DOC: HCPCS | Performed by: INTERNAL MEDICINE

## 2018-10-26 PROCEDURE — 3017F COLORECTAL CA SCREEN DOC REV: CPT | Performed by: INTERNAL MEDICINE

## 2018-10-26 PROCEDURE — G8427 DOCREV CUR MEDS BY ELIG CLIN: HCPCS | Performed by: INTERNAL MEDICINE

## 2018-10-26 PROCEDURE — 3023F SPIROM DOC REV: CPT | Performed by: INTERNAL MEDICINE

## 2018-10-26 PROCEDURE — 99214 OFFICE O/P EST MOD 30 MIN: CPT | Performed by: INTERNAL MEDICINE

## 2018-10-26 NOTE — PATIENT INSTRUCTIONS
Vaccine Information Statement    Influenza (Flu) Vaccine (Inactivated or Recombinant): What you need to know    Many Vaccine Information Statements are available in Frisian and other languages. See www.immunize.org/vis  Hojas de Información Sobre Vacunas están disponibles en Español y en muchos otros idiomas. Visite www.immunize.org/vis    1. Why get vaccinated? Influenza (flu) is a contagious disease that spreads around the [place] every year, usually between October and May. Flu is caused by influenza viruses, and is spread mainly by coughing, sneezing, and close contact. Anyone can get flu. Flu strikes suddenly and can last several days. Symptoms vary by age, but can include:   fever/chills   sore throat   muscle aches   fatigue   cough   headache    runny or stuffy nose    Flu can also lead to pneumonia and blood infections, and cause diarrhea and seizures in children. If you have a medical condition, such as heart or lung disease, flu can make it worse. Flu is more dangerous for some people. Infants and young children, people 72years of age and older, pregnant women, and people with certain health conditions or a weakened immune system are at greatest risk. Each year thousands of people in the [facility] die from flu, and many more are hospitalized. Flu vaccine can:   keep you from getting flu,   make flu less severe if you do get it, and   keep you from spreading flu to your family and other people. 2. Inactivated and recombinant flu vaccines    A dose of flu vaccine is recommended every flu season. Children 6 months through 6years of age may need two doses during the same flu season. Everyone else needs only one dose each flu season.        Some inactivated flu vaccines contain a very small amount of a mercury-based preservative called thimerosal. Studies have not shown thimerosal in vaccines to be harmful, but flu vaccines that do not contain thimerosal are available. There is no live flu virus in flu shots. They cannot cause the flu. There are many flu viruses, and they are always changing. Each year a new flu vaccine is made to protect against three or four viruses that are likely to cause disease in the upcoming flu season. But even when the vaccine doesnt exactly match these viruses, it may still provide some protection    Flu vaccine cannot prevent:   flu that is caused by a virus not covered by the vaccine, or   illnesses that look like flu but are not. It takes about 2 weeks for protection to develop after vaccination, and protection lasts through the flu season. 3. Some people should not get this vaccine    Tell the person who is giving you the vaccine:     If you have any severe, life-threatening allergies. If you ever had a life-threatening allergic reaction after a dose of flu vaccine, or have a severe allergy to any part of this vaccine, you may be advised not to get vaccinated. Most, but not all, types of flu vaccine contain a small amount of egg protein.  If you ever had Guillain-Barré Syndrome (also called GBS). Some people with a history of GBS should not get this vaccine. This should be discussed with your doctor.  If you are not feeling well. It is usually okay to get flu vaccine when you have a mild illness, but you might be asked to come back when you feel better. 4. Risks of a vaccine reaction    With any medicine, including vaccines, there is a chance of reactions. These are usually mild and go away on their own, but serious reactions are also possible. Most people who get a flu shot do not have any problems with it.      Minor problems following a flu shot include:    soreness, redness, or swelling where the shot was given     hoarseness   sore, red or itchy eyes   cough   fever   aches   headache   itching   fatigue  If these problems occur, they usually begin soon after the shot and last 1 or 2 days. More serious problems following a flu shot can include the following:     There may be a small increased risk of Guillain-Barré Syndrome (GBS) after inactivated flu vaccine. This risk has been estimated at 1 or 2 additional cases per million people vaccinated. This is much lower than the risk of severe complications from flu, which can be prevented by flu vaccine.  Young children who get the flu shot along with pneumococcal vaccine (PCV13) and/or DTaP vaccine at the same time might be slightly more likely to have a seizure caused by fever. Ask your doctor for more information. Tell your doctor if a child who is getting flu vaccine has ever had a seizure. Problems that could happen after any injected vaccine:      People sometimes faint after a medical procedure, including vaccination. Sitting or lying down for about 15 minutes can help prevent fainting, and injuries caused by a fall. Tell your doctor if you feel dizzy, or have vision changes or ringing in the ears.  Some people get severe pain in the shoulder and have difficulty moving the arm where a shot was given. This happens very rarely.  Any medication can cause a severe allergic reaction. Such reactions from a vaccine are very rare, estimated at about 1 in a million doses, and would happen within a few minutes to a few hours after the vaccination. As with any medicine, there is a very remote chance of a vaccine causing a serious injury or death. The safety of vaccines is always being monitored. For more information, visit: www.cdc.gov/vaccinesafety/    5. What if there is a serious reaction? What should I look for?  Look for anything that concerns you, such as signs of a severe allergic reaction, very high fever, or unusual behavior.     Signs of a severe allergic reaction can include hives, swelling of the face and throat, difficulty breathing, a fast heartbeat, dizziness, and weakness - usually within a few

## 2018-11-27 ENCOUNTER — HOSPITAL ENCOUNTER (INPATIENT)
Age: 68
LOS: 3 days | Discharge: HOME | DRG: 193 | End: 2018-11-30
Attending: EMERGENCY MEDICINE | Admitting: INTERNAL MEDICINE
Payer: MEDICARE

## 2018-11-27 ENCOUNTER — APPOINTMENT (OUTPATIENT)
Dept: GENERAL RADIOLOGY | Age: 68
DRG: 193 | End: 2018-11-27
Payer: MEDICARE

## 2018-11-27 DIAGNOSIS — J96.21 ACUTE ON CHRONIC RESPIRATORY FAILURE WITH HYPOXIA AND HYPERCAPNIA (HCC): Primary | ICD-10-CM

## 2018-11-27 DIAGNOSIS — J44.1 CHRONIC OBSTRUCTIVE PULMONARY DISEASE WITH ACUTE EXACERBATION (HCC): ICD-10-CM

## 2018-11-27 DIAGNOSIS — J96.22 ACUTE ON CHRONIC RESPIRATORY FAILURE WITH HYPOXIA AND HYPERCAPNIA (HCC): Primary | ICD-10-CM

## 2018-11-27 PROBLEM — J96.00 ACUTE RESPIRATORY FAILURE (HCC): Status: ACTIVE | Noted: 2018-11-27

## 2018-11-27 LAB
ANION GAP SERPL CALCULATED.3IONS-SCNC: 8 MMOL/L (ref 3–16)
BASE EXCESS ARTERIAL: 5.6 MMOL/L (ref -3–3)
BASE EXCESS ARTERIAL: 6.2 MMOL/L (ref -3–3)
BASE EXCESS ARTERIAL: 7.4 MMOL/L (ref -3–3)
BASE EXCESS ARTERIAL: 7.9 MMOL/L (ref -3–3)
BASOPHILS ABSOLUTE: 0.1 K/UL (ref 0–0.2)
BASOPHILS RELATIVE PERCENT: 0.7 %
BUN BLDV-MCNC: 5 MG/DL (ref 7–20)
CALCIUM SERPL-MCNC: 9.8 MG/DL (ref 8.3–10.6)
CARBOXYHEMOGLOBIN ARTERIAL: 0.3 % (ref 0–1.5)
CARBOXYHEMOGLOBIN ARTERIAL: 0.6 % (ref 0–1.5)
CARBOXYHEMOGLOBIN ARTERIAL: 0.9 % (ref 0–1.5)
CARBOXYHEMOGLOBIN ARTERIAL: 1.1 % (ref 0–1.5)
CHLORIDE BLD-SCNC: 88 MMOL/L (ref 99–110)
CO2: 36 MMOL/L (ref 21–32)
CREAT SERPL-MCNC: <0.5 MG/DL (ref 0.8–1.3)
D DIMER: 449 NG/ML DDU (ref 0–229)
EKG ATRIAL RATE: 101 BPM
EKG ATRIAL RATE: 97 BPM
EKG DIAGNOSIS: NORMAL
EKG DIAGNOSIS: NORMAL
EKG P AXIS: 96 DEGREES
EKG P-R INTERVAL: 134 MS
EKG Q-T INTERVAL: 348 MS
EKG Q-T INTERVAL: 368 MS
EKG QRS DURATION: 82 MS
EKG QRS DURATION: 96 MS
EKG QTC CALCULATION (BAZETT): 432 MS
EKG QTC CALCULATION (BAZETT): 477 MS
EKG R AXIS: 86 DEGREES
EKG R AXIS: 91 DEGREES
EKG T AXIS: 84 DEGREES
EKG T AXIS: 90 DEGREES
EKG VENTRICULAR RATE: 101 BPM
EKG VENTRICULAR RATE: 93 BPM
EOSINOPHILS ABSOLUTE: 1.3 K/UL (ref 0–0.6)
EOSINOPHILS RELATIVE PERCENT: 9.8 %
GFR AFRICAN AMERICAN: >60
GFR NON-AFRICAN AMERICAN: >60
GLUCOSE BLD-MCNC: 152 MG/DL (ref 70–99)
GLUCOSE BLD-MCNC: 165 MG/DL (ref 70–99)
GLUCOSE BLD-MCNC: 293 MG/DL (ref 70–99)
HCO3 ARTERIAL: 35.6 MMOL/L (ref 21–29)
HCO3 ARTERIAL: 36.6 MMOL/L (ref 21–29)
HCO3 ARTERIAL: 37 MMOL/L (ref 21–29)
HCO3 ARTERIAL: 37.9 MMOL/L (ref 21–29)
HCT VFR BLD CALC: 39.3 % (ref 40.5–52.5)
HEMOGLOBIN, ART, EXTENDED: 11.1 G/DL (ref 13.5–17.5)
HEMOGLOBIN, ART, EXTENDED: 11.8 G/DL (ref 13.5–17.5)
HEMOGLOBIN, ART, EXTENDED: 11.9 G/DL (ref 13.5–17.5)
HEMOGLOBIN, ART, EXTENDED: 12.1 G/DL (ref 13.5–17.5)
HEMOGLOBIN: 12.4 G/DL (ref 13.5–17.5)
INR BLD: 1.09 (ref 0.86–1.14)
LACTIC ACID: 0.7 MMOL/L (ref 0.4–2)
LACTIC ACID: 3.3 MMOL/L (ref 0.4–2)
LYMPHOCYTES ABSOLUTE: 2.3 K/UL (ref 1–5.1)
LYMPHOCYTES RELATIVE PERCENT: 16.5 %
MCH RBC QN AUTO: 29.1 PG (ref 26–34)
MCHC RBC AUTO-ENTMCNC: 31.4 G/DL (ref 31–36)
MCV RBC AUTO: 92.7 FL (ref 80–100)
METHEMOGLOBIN ARTERIAL: 0.4 %
METHEMOGLOBIN ARTERIAL: 0.6 %
MONOCYTES ABSOLUTE: 0.9 K/UL (ref 0–1.3)
MONOCYTES RELATIVE PERCENT: 6.9 %
NEUTROPHILS ABSOLUTE: 9.1 K/UL (ref 1.7–7.7)
NEUTROPHILS RELATIVE PERCENT: 66.1 %
O2 CONTENT ARTERIAL: 15 ML/DL
O2 CONTENT ARTERIAL: 16 ML/DL
O2 CONTENT ARTERIAL: 16 ML/DL
O2 CONTENT ARTERIAL: 18 ML/DL
O2 SAT, ARTERIAL: 94.1 %
O2 SAT, ARTERIAL: 96.8 %
O2 SAT, ARTERIAL: 97.2 %
O2 SAT, ARTERIAL: 99.3 %
O2 THERAPY: ABNORMAL
PCO2 ARTERIAL: 100.4 MMHG (ref 35–45)
PCO2 ARTERIAL: 75.6 MMHG (ref 35–45)
PCO2 ARTERIAL: 82.3 MMHG (ref 35–45)
PCO2 ARTERIAL: 93 MMHG (ref 35–45)
PDW BLD-RTO: 16 % (ref 12.4–15.4)
PERFORMED ON: ABNORMAL
PERFORMED ON: ABNORMAL
PH ARTERIAL: 7.18 (ref 7.35–7.45)
PH ARTERIAL: 7.23 (ref 7.35–7.45)
PH ARTERIAL: 7.25 (ref 7.35–7.45)
PH ARTERIAL: 7.3 (ref 7.35–7.45)
PLATELET # BLD: 844 K/UL (ref 135–450)
PMV BLD AUTO: 8.4 FL (ref 5–10.5)
PO2 ARTERIAL: 101.7 MMHG (ref 75–108)
PO2 ARTERIAL: 112 MMHG (ref 75–108)
PO2 ARTERIAL: 248.3 MMHG (ref 75–108)
PO2 ARTERIAL: 86.8 MMHG (ref 75–108)
POTASSIUM SERPL-SCNC: 4.7 MMOL/L (ref 3.5–5.1)
PRO-BNP: 955 PG/ML (ref 0–124)
PROTHROMBIN TIME: 12.4 SEC (ref 9.8–13)
RBC # BLD: 4.24 M/UL (ref 4.2–5.9)
SODIUM BLD-SCNC: 132 MMOL/L (ref 136–145)
TCO2 ARTERIAL: 38.1 MMOL/L
TCO2 ARTERIAL: 38.9 MMOL/L
TCO2 ARTERIAL: 40.1 MMOL/L
TCO2 ARTERIAL: 40.8 MMOL/L
TROPONIN: <0.01 NG/ML
WBC # BLD: 13.7 K/UL (ref 4–11)

## 2018-11-27 PROCEDURE — C1751 CATH, INF, PER/CENT/MIDLINE: HCPCS

## 2018-11-27 PROCEDURE — 2580000003 HC RX 258

## 2018-11-27 PROCEDURE — 2700000000 HC OXYGEN THERAPY PER DAY

## 2018-11-27 PROCEDURE — 99291 CRITICAL CARE FIRST HOUR: CPT | Performed by: INTERNAL MEDICINE

## 2018-11-27 PROCEDURE — 2500000003 HC RX 250 WO HCPCS: Performed by: EMERGENCY MEDICINE

## 2018-11-27 PROCEDURE — 94664 DEMO&/EVAL PT USE INHALER: CPT

## 2018-11-27 PROCEDURE — 6360000002 HC RX W HCPCS: Performed by: INTERNAL MEDICINE

## 2018-11-27 PROCEDURE — 36592 COLLECT BLOOD FROM PICC: CPT

## 2018-11-27 PROCEDURE — 2580000003 HC RX 258: Performed by: EMERGENCY MEDICINE

## 2018-11-27 PROCEDURE — 6370000000 HC RX 637 (ALT 250 FOR IP)

## 2018-11-27 PROCEDURE — 87205 SMEAR GRAM STAIN: CPT

## 2018-11-27 PROCEDURE — 93005 ELECTROCARDIOGRAM TRACING: CPT | Performed by: EMERGENCY MEDICINE

## 2018-11-27 PROCEDURE — 83605 ASSAY OF LACTIC ACID: CPT

## 2018-11-27 PROCEDURE — 94640 AIRWAY INHALATION TREATMENT: CPT

## 2018-11-27 PROCEDURE — 85379 FIBRIN DEGRADATION QUANT: CPT

## 2018-11-27 PROCEDURE — 05H333Z INSERTION OF INFUSION DEVICE INTO RIGHT INNOMINATE VEIN, PERCUTANEOUS APPROACH: ICD-10-PCS | Performed by: INTERNAL MEDICINE

## 2018-11-27 PROCEDURE — 36415 COLL VENOUS BLD VENIPUNCTURE: CPT

## 2018-11-27 PROCEDURE — 4500000026 HC ED CRITICAL CARE PROCEDURE

## 2018-11-27 PROCEDURE — 2500000003 HC RX 250 WO HCPCS: Performed by: INTERNAL MEDICINE

## 2018-11-27 PROCEDURE — 2000000000 HC ICU R&B

## 2018-11-27 PROCEDURE — 85025 COMPLETE CBC W/AUTO DIFF WBC: CPT

## 2018-11-27 PROCEDURE — 93010 ELECTROCARDIOGRAM REPORT: CPT | Performed by: INTERNAL MEDICINE

## 2018-11-27 PROCEDURE — 85610 PROTHROMBIN TIME: CPT

## 2018-11-27 PROCEDURE — 36600 WITHDRAWAL OF ARTERIAL BLOOD: CPT

## 2018-11-27 PROCEDURE — 6370000000 HC RX 637 (ALT 250 FOR IP): Performed by: INTERNAL MEDICINE

## 2018-11-27 PROCEDURE — 82803 BLOOD GASES ANY COMBINATION: CPT

## 2018-11-27 PROCEDURE — 96361 HYDRATE IV INFUSION ADD-ON: CPT

## 2018-11-27 PROCEDURE — 83036 HEMOGLOBIN GLYCOSYLATED A1C: CPT

## 2018-11-27 PROCEDURE — 36569 INSJ PICC 5 YR+ W/O IMAGING: CPT

## 2018-11-27 PROCEDURE — 87070 CULTURE OTHR SPECIMN AEROBIC: CPT

## 2018-11-27 PROCEDURE — 94762 N-INVAS EAR/PLS OXIMTRY CONT: CPT

## 2018-11-27 PROCEDURE — 96374 THER/PROPH/DIAG INJ IV PUSH: CPT

## 2018-11-27 PROCEDURE — 76937 US GUIDE VASCULAR ACCESS: CPT

## 2018-11-27 PROCEDURE — 6370000000 HC RX 637 (ALT 250 FOR IP): Performed by: EMERGENCY MEDICINE

## 2018-11-27 PROCEDURE — 84484 ASSAY OF TROPONIN QUANT: CPT

## 2018-11-27 PROCEDURE — 2580000003 HC RX 258: Performed by: INTERNAL MEDICINE

## 2018-11-27 PROCEDURE — 71045 X-RAY EXAM CHEST 1 VIEW: CPT

## 2018-11-27 PROCEDURE — 83880 ASSAY OF NATRIURETIC PEPTIDE: CPT

## 2018-11-27 PROCEDURE — 80048 BASIC METABOLIC PNL TOTAL CA: CPT

## 2018-11-27 PROCEDURE — 99291 CRITICAL CARE FIRST HOUR: CPT

## 2018-11-27 RX ORDER — IPRATROPIUM BROMIDE AND ALBUTEROL SULFATE 2.5; .5 MG/3ML; MG/3ML
1 SOLUTION RESPIRATORY (INHALATION) EVERY 4 HOURS PRN
Status: DISCONTINUED | OUTPATIENT
Start: 2018-11-27 | End: 2018-11-27

## 2018-11-27 RX ORDER — GUAIFENESIN 600 MG/1
600 TABLET, EXTENDED RELEASE ORAL 2 TIMES DAILY
Status: DISCONTINUED | OUTPATIENT
Start: 2018-11-27 | End: 2018-11-30 | Stop reason: HOSPADM

## 2018-11-27 RX ORDER — LIDOCAINE HYDROCHLORIDE 10 MG/ML
5 INJECTION, SOLUTION EPIDURAL; INFILTRATION; INTRACAUDAL; PERINEURAL ONCE
Status: DISCONTINUED | OUTPATIENT
Start: 2018-11-27 | End: 2018-11-29

## 2018-11-27 RX ORDER — ONDANSETRON 2 MG/ML
4 INJECTION INTRAMUSCULAR; INTRAVENOUS EVERY 6 HOURS PRN
Status: DISCONTINUED | OUTPATIENT
Start: 2018-11-27 | End: 2018-11-30 | Stop reason: HOSPADM

## 2018-11-27 RX ORDER — METHYLPREDNISOLONE SODIUM SUCCINATE 40 MG/ML
40 INJECTION, POWDER, LYOPHILIZED, FOR SOLUTION INTRAMUSCULAR; INTRAVENOUS EVERY 8 HOURS
Status: DISCONTINUED | OUTPATIENT
Start: 2018-11-27 | End: 2018-11-28

## 2018-11-27 RX ORDER — GABAPENTIN 300 MG/1
300 CAPSULE ORAL NIGHTLY
Status: DISCONTINUED | OUTPATIENT
Start: 2018-11-27 | End: 2018-11-30 | Stop reason: HOSPADM

## 2018-11-27 RX ORDER — SODIUM CHLORIDE 0.9 % (FLUSH) 0.9 %
10 SYRINGE (ML) INJECTION EVERY 12 HOURS SCHEDULED
Status: DISCONTINUED | OUTPATIENT
Start: 2018-11-27 | End: 2018-11-30 | Stop reason: HOSPADM

## 2018-11-27 RX ORDER — M-VIT,TX,IRON,MINS/CALC/FOLIC 27MG-0.4MG
1 TABLET ORAL DAILY
Status: DISCONTINUED | OUTPATIENT
Start: 2018-11-27 | End: 2018-11-30 | Stop reason: HOSPADM

## 2018-11-27 RX ORDER — DEXTROSE MONOHYDRATE 50 MG/ML
100 INJECTION, SOLUTION INTRAVENOUS PRN
Status: DISCONTINUED | OUTPATIENT
Start: 2018-11-27 | End: 2018-11-30 | Stop reason: HOSPADM

## 2018-11-27 RX ORDER — NICOTINE POLACRILEX 4 MG
15 LOZENGE BUCCAL PRN
Status: DISCONTINUED | OUTPATIENT
Start: 2018-11-27 | End: 2018-11-30 | Stop reason: HOSPADM

## 2018-11-27 RX ORDER — IPRATROPIUM BROMIDE AND ALBUTEROL SULFATE 2.5; .5 MG/3ML; MG/3ML
1 SOLUTION RESPIRATORY (INHALATION) EVERY 4 HOURS
Status: DISCONTINUED | OUTPATIENT
Start: 2018-11-27 | End: 2018-11-30 | Stop reason: HOSPADM

## 2018-11-27 RX ORDER — SODIUM CHLORIDE 9 MG/ML
INJECTION, SOLUTION INTRAVENOUS
Status: COMPLETED
Start: 2018-11-27 | End: 2018-11-27

## 2018-11-27 RX ORDER — DIMETHICONE, OXYBENZONE, AND PADIMATE O 2; 2.5; 6.6 G/100G; G/100G; G/100G
STICK TOPICAL
Status: COMPLETED
Start: 2018-11-27 | End: 2018-11-27

## 2018-11-27 RX ORDER — SODIUM CHLORIDE 0.9 % (FLUSH) 0.9 %
10 SYRINGE (ML) INJECTION PRN
Status: DISCONTINUED | OUTPATIENT
Start: 2018-11-27 | End: 2018-11-30 | Stop reason: HOSPADM

## 2018-11-27 RX ORDER — DEXTROSE MONOHYDRATE 25 G/50ML
12.5 INJECTION, SOLUTION INTRAVENOUS PRN
Status: DISCONTINUED | OUTPATIENT
Start: 2018-11-27 | End: 2018-11-30 | Stop reason: HOSPADM

## 2018-11-27 RX ORDER — 0.9 % SODIUM CHLORIDE 0.9 %
30 INTRAVENOUS SOLUTION INTRAVENOUS ONCE
Status: COMPLETED | OUTPATIENT
Start: 2018-11-27 | End: 2018-11-27

## 2018-11-27 RX ORDER — LEVOFLOXACIN 5 MG/ML
500 INJECTION, SOLUTION INTRAVENOUS EVERY 24 HOURS
Status: DISCONTINUED | OUTPATIENT
Start: 2018-11-27 | End: 2018-11-29

## 2018-11-27 RX ORDER — ASPIRIN 81 MG/1
81 TABLET, CHEWABLE ORAL DAILY
Status: DISCONTINUED | OUTPATIENT
Start: 2018-11-27 | End: 2018-11-30 | Stop reason: HOSPADM

## 2018-11-27 RX ORDER — METHYLPREDNISOLONE SODIUM SUCCINATE 40 MG/ML
40 INJECTION, POWDER, LYOPHILIZED, FOR SOLUTION INTRAMUSCULAR; INTRAVENOUS EVERY 12 HOURS
Status: DISCONTINUED | OUTPATIENT
Start: 2018-11-28 | End: 2018-11-27

## 2018-11-27 RX ORDER — IPRATROPIUM BROMIDE AND ALBUTEROL SULFATE 2.5; .5 MG/3ML; MG/3ML
1 SOLUTION RESPIRATORY (INHALATION) ONCE
Status: COMPLETED | OUTPATIENT
Start: 2018-11-27 | End: 2018-11-27

## 2018-11-27 RX ADMIN — GUAIFENESIN 600 MG: 600 TABLET, EXTENDED RELEASE ORAL at 14:23

## 2018-11-27 RX ADMIN — LEVOFLOXACIN 500 MG: 5 INJECTION, SOLUTION INTRAVENOUS at 19:50

## 2018-11-27 RX ADMIN — IPRATROPIUM BROMIDE AND ALBUTEROL SULFATE 3 ML: .5; 3 SOLUTION RESPIRATORY (INHALATION) at 19:39

## 2018-11-27 RX ADMIN — Medication 10 ML: at 21:22

## 2018-11-27 RX ADMIN — Medication: at 20:33

## 2018-11-27 RX ADMIN — IPRATROPIUM BROMIDE AND ALBUTEROL SULFATE 3 ML: .5; 3 SOLUTION RESPIRATORY (INHALATION) at 14:30

## 2018-11-27 RX ADMIN — DOXYCYCLINE 100 MG: 100 INJECTION, POWDER, LYOPHILIZED, FOR SOLUTION INTRAVENOUS at 14:23

## 2018-11-27 RX ADMIN — MUPIROCIN: 20 OINTMENT TOPICAL at 21:24

## 2018-11-27 RX ADMIN — SODIUM CHLORIDE 250 ML: 9 INJECTION, SOLUTION INTRAVENOUS at 19:50

## 2018-11-27 RX ADMIN — ASPIRIN 81 MG 81 MG: 81 TABLET ORAL at 14:23

## 2018-11-27 RX ADMIN — SODIUM CHLORIDE 1566 ML: 9 INJECTION, SOLUTION INTRAVENOUS at 09:59

## 2018-11-27 RX ADMIN — METHYLPREDNISOLONE SODIUM SUCCINATE 40 MG: 40 INJECTION, POWDER, FOR SOLUTION INTRAMUSCULAR; INTRAVENOUS at 14:23

## 2018-11-27 RX ADMIN — IPRATROPIUM BROMIDE AND ALBUTEROL SULFATE 1 AMPULE: .5; 3 SOLUTION RESPIRATORY (INHALATION) at 09:12

## 2018-11-27 RX ADMIN — SODIUM BICARBONATE 50 MEQ: 84 INJECTION, SOLUTION INTRAVENOUS at 09:59

## 2018-11-27 RX ADMIN — MULTIPLE VITAMINS W/ MINERALS TAB 1 TABLET: TAB at 14:23

## 2018-11-27 RX ADMIN — GUAIFENESIN 600 MG: 600 TABLET, EXTENDED RELEASE ORAL at 21:22

## 2018-11-27 RX ADMIN — GABAPENTIN 300 MG: 300 CAPSULE ORAL at 21:22

## 2018-11-27 RX ADMIN — ENOXAPARIN SODIUM 40 MG: 40 INJECTION SUBCUTANEOUS at 14:24

## 2018-11-27 RX ADMIN — METHYLPREDNISOLONE SODIUM SUCCINATE 40 MG: 40 INJECTION, POWDER, FOR SOLUTION INTRAMUSCULAR; INTRAVENOUS at 21:22

## 2018-11-27 RX ADMIN — IPRATROPIUM BROMIDE AND ALBUTEROL SULFATE 3 ML: .5; 3 SOLUTION RESPIRATORY (INHALATION) at 23:20

## 2018-11-27 NOTE — PROGRESS NOTES
Prevention mepilex dressing applied to bridge of nose and other facial bony prominences upon BiPAP/CPAP initiation. Consulted RN regarding the assessment of skin integrity.

## 2018-11-27 NOTE — H&P
CARDIAC ENZYMES  Recent Labs      11/27/18   0857  11/27/18   1535   TROPONINI  <0.01  <0.01       U/A:  No results found for: NITRITE, COLORU, WBCUA, RBCUA, MUCUS, BACTERIA, CLARITYU, SPECGRAV, LEUKOCYTESUR, BLOODU, GLUCOSEU, AMORPHOUS    ABG    Lab Results   Component Value Date    JEB0IQG 37.9 11/27/2018    BEART 7.4 11/27/2018    X1URFVHG 94.1 11/27/2018    PHART 7.228 11/27/2018    QZN2UVE 93.0 11/27/2018    PO2ART 86.8 11/27/2018    NAW2SYK 40.8 11/27/2018           Active Hospital Problems    Diagnosis Date Noted    Acute respiratory failure ([address] [J96.00] 11/27/2018    Abnormal chest x-ray [R93.89]     Chronic hypoxemic respiratory failure (HCC) [J96.11]     Lactic acidosis [E87.2]     Hyperglycemia [R73.9]          PHYSICIANS CERTIFICATION:    I certify that Arun Kelley is expected to be hospitalized for more than 2 midnights based on the following assessment and plan:      ASSESSMENT/PLAN:      Acute on Chronic Hypercapnic Resp Failure in setting of severe and likely end stage COPD. Oxygenation was actually normal on ABG, but pt with severe hypercapnia   PH 7.184, pCO2 100.4 on admit. Cont BiPAP - pt requested to come off for a break - I do not think he will sustain on his own - if any signs of clinical decompensation - back on BiPAP. Pulm CC eval   May need vent support, albeit appears to be improved on BiPAP. Increase solumedrol to q8hrs dosing. Cont IBD nebulizer. Iv Levaquin. Left Lung PNA organism unspecified. ?let lung peripheral opacity   Levaquin. Pulm eval.   Consider additional imaging pending progress. Hyperglycemia due to steroid. A1C 7.1 - consistent with DM  Not on meds for this PTA. Cont Insulin coverage ISS. DVT Prophylaxis: lovenox  Diet: DIET GENERAL;  Code Status: Full Code    Dispo - ICU. >30min of cc time. Prognosis guarded.         Ainsley Rose MD    Thank you SILVER HUNTER APRN - MONAE for the opportunity to be

## 2018-11-27 NOTE — CONSULTS
40 mg Intravenous Q12H    insulin lispro  0-12 Units Subcutaneous TID WC    insulin lispro  0-6 Units Subcutaneous Nightly    doxycycline (VIBRAMYCIN) IV  100 mg Intravenous Q12H     Continuous Infusions:   dextrose       PRN Meds:  ipratropium-albuterol, sodium chloride flush, magnesium hydroxide, ondansetron, glucose, dextrose, glucagon (rDNA), dextrose    ALLERGIES:  Patient is allergic to vicodin [hydrocodone-acetaminophen]. REVIEW OF SYSTEMS:  Constitutional: Negative for fever  HENT: Negative for sore throat  Eyes: Negative for redness   Respiratory: + dyspnea, cough  Cardiovascular: + Chest tightness   Gastrointestinal: Negative for vomiting, diarrhea   Genitourinary: Negative for hematuria   Musculoskeletal: Negative for arthralgias   Skin: Negative for rash  Neurological: Negative for syncope  Hematological: Negative for adenopathy  Psychiatric/Behavorial: + anxiety    PHYSICAL EXAM:  Blood pressure (!) 149/104, pulse 99, temperature 96.7 °F (35.9 °C), temperature source Axillary, resp. rate 21, height 5' 7\" (1.702 m), weight 111 lb 1.8 oz (50.4 kg), SpO2 100 %.' on BiPAP 16/8 FiO2 40%  Gen: + distress. Ill-appearing  Eyes: PERRL. No sclera icterus. No conjunctival injection. ENT: No discharge. Pharynx clear. Neck: Trachea midline. No obvious mass. Resp: + accessory muscle use. No crackles. Bilateral wheezes. No rhonchi. No dullness on percussion. CV: Tachycardia. Regular rhythm. No murmur or rub. No edema. Peripheral pulses are 2+. Capillary refill is less than 3 seconds. GI: Non-tender. Non-distended. No hernia. Skin: Warm and dry. No nodule on exposed extremities. Lymph: No cervical LAD. No supraclavicular LAD. M/S: No cyanosis. No joint deformity. No clubbing. Neuro: Awake. Alert. Moves all four extremities. Psych: Oriented x 3.  + anxiety.      LABS:  CBC:   Recent Labs      11/27/18   0857   WBC  13.7*   HGB  12.4*   HCT  39.3*   MCV  92.7   PLT  844*     BMP:   Recent

## 2018-11-27 NOTE — ED PROVIDER NOTES
12 Cans of beer per week      Comment: weekly    Drug use: No    Sexual activity: Not Currently     Other Topics Concern    None     Social History Narrative    None       Medications/Allergies     Previous Medications    ALBUTEROL SULFATE HFA (PROAIR HFA) 108 (90 BASE) MCG/ACT INHALER    Inhale 2 puffs into the lungs every 6 hours as needed for Wheezing    ALENDRONATE (FOSAMAX) 70 MG TABLET    Take 1 tablet by mouth every 7 days Take 30 minutes before eating, other medication. Remain upright after taking. ASPIRIN 81 MG TABLET    Take 81 mg by mouth daily     GABAPENTIN (NEURONTIN) 300 MG CAPSULE    Take 1 capsule by mouth nightly for 30 days. .    GUAIFENESIN (MUCINEX) 600 MG EXTENDED RELEASE TABLET    Take 1 tablet by mouth 2 times daily    IPRATROPIUM-ALBUTEROL (DUONEB) 0.5-2.5 (3) MG/3ML SOLN NEBULIZER SOLUTION    Inhale 3 mLs into the lungs every 4 hours as needed for Shortness of Breath    MELOXICAM (MOBIC) 15 MG TABLET    Take 1 tablet by mouth daily , take with food for pain    MULTIPLE VITAMINS-MINERALS (THERAPEUTIC MULTIVITAMIN-MINERALS) TABLET    Take 1 tablet by mouth daily     Allergies   Allergen Reactions    Vicodin [Hydrocodone-Acetaminophen] Other (See Comments)     Hallucinations. Physical Exam       ED Triage Vitals   BP Temp Temp Source Pulse Resp SpO2 Height Weight   11/27/18 0910 11/27/18 0955 11/27/18 0955 11/27/18 0910 11/27/18 0910 11/27/18 0910 11/27/18 0906 11/27/18 0906   117/71 97.5 °F (36.4 °C) Oral 101 24 100 % 5' 7\" (1.702 m) 115 lb (52.2 kg)     GENERAL APPEARANCE: Awake , Speaking in 2-3 word sentences. O2 sats in the upper 80s on I percent nonrebreather. Color slightly gray throughout. HEAD: Normocephalic. Atraumatic. EYES: Sclera anicteric. ENT: Tolerates saliva. No trismus. NECK: Supple. Trachea midline. CARDIO: RRR. Radial pulse 2+. LUNGS: Respirations fever, his history inspiratory wheezes throughout with poor to fair air movement. ABDOMEN: Soft.

## 2018-11-27 NOTE — ED NOTES
1010- Consult placed to hospitalist Dr. Michael Bartholomew for Dr. Anthony Gibbons. 80- Dr. Michael Bartholomew returned page and spoke to Dr. Anthony Gibbons.       Shasta Ennis  11/27/18 1036

## 2018-11-27 NOTE — PROGRESS NOTES
I moved the patient up to ICU on a 100% NRB mask. Evh0=390%. Once in ICU I placed the patient back on the Bipap on 16/8 on 40% Fio2. The patient appears comfortable at this time.

## 2018-11-27 NOTE — PROGRESS NOTES
ABG drawn from the right radial artery X 1 attempt(s). Sight prepped per policy and procedure. Modified Tin's test positive. Pressure held to sight after procedure for five minutes. No hematoma present. Pulse present after procedure.   Patient on 100% Fio2

## 2018-11-28 ENCOUNTER — APPOINTMENT (OUTPATIENT)
Dept: CT IMAGING | Age: 68
DRG: 193 | End: 2018-11-28
Payer: MEDICARE

## 2018-11-28 PROBLEM — J96.21 ACUTE ON CHRONIC RESPIRATORY FAILURE WITH HYPOXIA AND HYPERCAPNIA (HCC): Status: ACTIVE | Noted: 2018-11-27

## 2018-11-28 PROBLEM — J96.22 ACUTE ON CHRONIC RESPIRATORY FAILURE WITH HYPOXIA AND HYPERCAPNIA (HCC): Status: ACTIVE | Noted: 2018-11-27

## 2018-11-28 PROBLEM — J18.9 COMMUNITY ACQUIRED PNEUMONIA OF LEFT UPPER LOBE OF LUNG: Status: ACTIVE | Noted: 2018-05-31

## 2018-11-28 LAB
A/G RATIO: 1.4 (ref 1.1–2.2)
ALBUMIN SERPL-MCNC: 3.7 G/DL (ref 3.4–5)
ALP BLD-CCNC: 101 U/L (ref 40–129)
ALT SERPL-CCNC: 8 U/L (ref 10–40)
ANION GAP SERPL CALCULATED.3IONS-SCNC: 12 MMOL/L (ref 3–16)
AST SERPL-CCNC: 14 U/L (ref 15–37)
BASE EXCESS ARTERIAL: 7.6 MMOL/L (ref -3–3)
BASOPHILS ABSOLUTE: 0 K/UL (ref 0–0.2)
BASOPHILS RELATIVE PERCENT: 0.4 %
BILIRUB SERPL-MCNC: <0.2 MG/DL (ref 0–1)
BUN BLDV-MCNC: 7 MG/DL (ref 7–20)
CALCIUM SERPL-MCNC: 9.2 MG/DL (ref 8.3–10.6)
CARBOXYHEMOGLOBIN ARTERIAL: 0.7 % (ref 0–1.5)
CHLORIDE BLD-SCNC: 91 MMOL/L (ref 99–110)
CO2: 34 MMOL/L (ref 21–32)
CREAT SERPL-MCNC: <0.5 MG/DL (ref 0.8–1.3)
EOSINOPHILS ABSOLUTE: 0 K/UL (ref 0–0.6)
EOSINOPHILS RELATIVE PERCENT: 0 %
ESTIMATED AVERAGE GLUCOSE: 151.3 MG/DL
GFR AFRICAN AMERICAN: >60
GFR NON-AFRICAN AMERICAN: >60
GLOBULIN: 2.7 G/DL
GLUCOSE BLD-MCNC: 142 MG/DL (ref 70–99)
GLUCOSE BLD-MCNC: 171 MG/DL (ref 70–99)
GLUCOSE BLD-MCNC: 186 MG/DL (ref 70–99)
GLUCOSE BLD-MCNC: 218 MG/DL (ref 70–99)
GLUCOSE BLD-MCNC: 232 MG/DL (ref 70–99)
HBA1C MFR BLD: 6.9 %
HCO3 ARTERIAL: 33.2 MMOL/L (ref 21–29)
HCT VFR BLD CALC: 34.2 % (ref 40.5–52.5)
HEMOGLOBIN, ART, EXTENDED: 11.3 G/DL (ref 13.5–17.5)
HEMOGLOBIN: 11 G/DL (ref 13.5–17.5)
LYMPHOCYTES ABSOLUTE: 0.6 K/UL (ref 1–5.1)
LYMPHOCYTES RELATIVE PERCENT: 7.8 %
MCH RBC QN AUTO: 29.3 PG (ref 26–34)
MCHC RBC AUTO-ENTMCNC: 32.1 G/DL (ref 31–36)
MCV RBC AUTO: 91.5 FL (ref 80–100)
METHEMOGLOBIN ARTERIAL: 0.6 %
MONOCYTES ABSOLUTE: 0.2 K/UL (ref 0–1.3)
MONOCYTES RELATIVE PERCENT: 3 %
NEUTROPHILS ABSOLUTE: 7.1 K/UL (ref 1.7–7.7)
NEUTROPHILS RELATIVE PERCENT: 88.8 %
O2 CONTENT ARTERIAL: 16 ML/DL
O2 SAT, ARTERIAL: 96.7 %
O2 THERAPY: ABNORMAL
PCO2 ARTERIAL: 51.4 MMHG (ref 35–45)
PDW BLD-RTO: 15.9 % (ref 12.4–15.4)
PERFORMED ON: ABNORMAL
PH ARTERIAL: 7.43 (ref 7.35–7.45)
PLATELET # BLD: 746 K/UL (ref 135–450)
PMV BLD AUTO: 7.5 FL (ref 5–10.5)
PO2 ARTERIAL: 86.7 MMHG (ref 75–108)
POTASSIUM REFLEX MAGNESIUM: 4.3 MMOL/L (ref 3.5–5.1)
RBC # BLD: 3.74 M/UL (ref 4.2–5.9)
SODIUM BLD-SCNC: 137 MMOL/L (ref 136–145)
TCO2 ARTERIAL: 34.8 MMOL/L
TOTAL PROTEIN: 6.4 G/DL (ref 6.4–8.2)
TROPONIN: <0.01 NG/ML
WBC # BLD: 8 K/UL (ref 4–11)

## 2018-11-28 PROCEDURE — 6360000004 HC RX CONTRAST MEDICATION: Performed by: INTERNAL MEDICINE

## 2018-11-28 PROCEDURE — 94762 N-INVAS EAR/PLS OXIMTRY CONT: CPT

## 2018-11-28 PROCEDURE — 94660 CPAP INITIATION&MGMT: CPT

## 2018-11-28 PROCEDURE — 85025 COMPLETE CBC W/AUTO DIFF WBC: CPT

## 2018-11-28 PROCEDURE — 84484 ASSAY OF TROPONIN QUANT: CPT

## 2018-11-28 PROCEDURE — 36592 COLLECT BLOOD FROM PICC: CPT

## 2018-11-28 PROCEDURE — 94640 AIRWAY INHALATION TREATMENT: CPT

## 2018-11-28 PROCEDURE — 2700000000 HC OXYGEN THERAPY PER DAY

## 2018-11-28 PROCEDURE — 82803 BLOOD GASES ANY COMBINATION: CPT

## 2018-11-28 PROCEDURE — 2000000000 HC ICU R&B

## 2018-11-28 PROCEDURE — 80053 COMPREHEN METABOLIC PANEL: CPT

## 2018-11-28 PROCEDURE — 2580000003 HC RX 258: Performed by: INTERNAL MEDICINE

## 2018-11-28 PROCEDURE — 6360000002 HC RX W HCPCS: Performed by: INTERNAL MEDICINE

## 2018-11-28 PROCEDURE — 6370000000 HC RX 637 (ALT 250 FOR IP): Performed by: INTERNAL MEDICINE

## 2018-11-28 PROCEDURE — 71260 CT THORAX DX C+: CPT

## 2018-11-28 PROCEDURE — 99291 CRITICAL CARE FIRST HOUR: CPT | Performed by: INTERNAL MEDICINE

## 2018-11-28 PROCEDURE — 36600 WITHDRAWAL OF ARTERIAL BLOOD: CPT

## 2018-11-28 PROCEDURE — 99232 SBSQ HOSP IP/OBS MODERATE 35: CPT | Performed by: INTERNAL MEDICINE

## 2018-11-28 RX ORDER — METHYLPREDNISOLONE SODIUM SUCCINATE 40 MG/ML
40 INJECTION, POWDER, LYOPHILIZED, FOR SOLUTION INTRAMUSCULAR; INTRAVENOUS EVERY 12 HOURS
Status: DISCONTINUED | OUTPATIENT
Start: 2018-11-28 | End: 2018-11-30 | Stop reason: HOSPADM

## 2018-11-28 RX ADMIN — INSULIN LISPRO 2 UNITS: 100 INJECTION, SOLUTION INTRAVENOUS; SUBCUTANEOUS at 09:21

## 2018-11-28 RX ADMIN — IOPAMIDOL 85 ML: 755 INJECTION, SOLUTION INTRAVENOUS at 13:57

## 2018-11-28 RX ADMIN — MUPIROCIN: 20 OINTMENT TOPICAL at 09:21

## 2018-11-28 RX ADMIN — Medication 10 ML: at 23:25

## 2018-11-28 RX ADMIN — GUAIFENESIN 600 MG: 600 TABLET, EXTENDED RELEASE ORAL at 09:20

## 2018-11-28 RX ADMIN — MULTIPLE VITAMINS W/ MINERALS TAB 1 TABLET: TAB at 09:20

## 2018-11-28 RX ADMIN — GUAIFENESIN 600 MG: 600 TABLET, EXTENDED RELEASE ORAL at 20:11

## 2018-11-28 RX ADMIN — IPRATROPIUM BROMIDE AND ALBUTEROL SULFATE 3 ML: .5; 3 SOLUTION RESPIRATORY (INHALATION) at 07:10

## 2018-11-28 RX ADMIN — IPRATROPIUM BROMIDE AND ALBUTEROL SULFATE 3 ML: .5; 3 SOLUTION RESPIRATORY (INHALATION) at 15:14

## 2018-11-28 RX ADMIN — LEVOFLOXACIN 500 MG: 5 INJECTION, SOLUTION INTRAVENOUS at 17:28

## 2018-11-28 RX ADMIN — IPRATROPIUM BROMIDE AND ALBUTEROL SULFATE 3 ML: .5; 3 SOLUTION RESPIRATORY (INHALATION) at 19:24

## 2018-11-28 RX ADMIN — METHYLPREDNISOLONE SODIUM SUCCINATE 40 MG: 40 INJECTION, POWDER, FOR SOLUTION INTRAMUSCULAR; INTRAVENOUS at 17:28

## 2018-11-28 RX ADMIN — IPRATROPIUM BROMIDE AND ALBUTEROL SULFATE 3 ML: .5; 3 SOLUTION RESPIRATORY (INHALATION) at 03:21

## 2018-11-28 RX ADMIN — Medication 10 ML: at 09:19

## 2018-11-28 RX ADMIN — ASPIRIN 81 MG 81 MG: 81 TABLET ORAL at 09:20

## 2018-11-28 RX ADMIN — METHYLPREDNISOLONE SODIUM SUCCINATE 40 MG: 40 INJECTION, POWDER, FOR SOLUTION INTRAMUSCULAR; INTRAVENOUS at 05:44

## 2018-11-28 RX ADMIN — MUPIROCIN: 20 OINTMENT TOPICAL at 20:11

## 2018-11-28 RX ADMIN — IPRATROPIUM BROMIDE AND ALBUTEROL SULFATE 3 ML: .5; 3 SOLUTION RESPIRATORY (INHALATION) at 23:20

## 2018-11-28 RX ADMIN — Medication 10 ML: at 09:20

## 2018-11-28 RX ADMIN — INSULIN LISPRO 2 UNITS: 100 INJECTION, SOLUTION INTRAVENOUS; SUBCUTANEOUS at 20:12

## 2018-11-28 RX ADMIN — INSULIN LISPRO 4 UNITS: 100 INJECTION, SOLUTION INTRAVENOUS; SUBCUTANEOUS at 13:19

## 2018-11-28 RX ADMIN — ENOXAPARIN SODIUM 40 MG: 40 INJECTION SUBCUTANEOUS at 09:20

## 2018-11-28 RX ADMIN — INSULIN LISPRO 2 UNITS: 100 INJECTION, SOLUTION INTRAVENOUS; SUBCUTANEOUS at 17:29

## 2018-11-28 RX ADMIN — GABAPENTIN 300 MG: 300 CAPSULE ORAL at 20:11

## 2018-11-28 NOTE — PROGRESS NOTES
ABG drawn x 1 attempt(s) from radial artery. Patient had positive modified Tin's Test.  Patient was on 8 (LPM/Fio2) oxygen per high flow  (device). Pressure held x 5 minutes. No bleeding or bruising noted at puncture site. Patient tolerated procedure well.

## 2018-11-28 NOTE — PROGRESS NOTES
IM Progress Note    Admit Date:  11/27/2018  1    Interval history:  Severe copd with acute resp failure    Subjective:  Mr. Naima Leyva seen off bipap this am , on 6 l oxygen      Objective:   BP 96/67   Pulse 92   Temp 98.8 °F (37.1 °C) (Oral)   Resp 12   Ht 5' 7\" (1.702 m)   Wt 111 lb 1.8 oz (50.4 kg)   SpO2 95%   BMI 17.40 kg/m²     Intake/Output Summary (Last 24 hours) at 11/28/18 4738  Last data filed at 11/28/18 7800   Gross per 24 hour   Intake              335 ml   Output              650 ml   Net             -315 ml       Physical Exam:      General:  Elderly male Awake, alert and oriented. Appears to be not in any distress  Mucous Membranes:  Pink , anicteric  Neck: No JVD, no carotid bruit, no thyromegaly  Chest:  Diminished in bases with scattered wheeze  Cardiovascular:  RRR S1S2 heard, no murmurs or gallops  Abdomen:  Soft, undistended, non tender, no organomegaly, BS present  Extremities: No edema or cyanosis.  Distal pulses well felt  Neurological : grossly normal      Medications:   Scheduled Medications:    aspirin  81 mg Oral Daily    therapeutic multivitamin-minerals  1 tablet Oral Daily    gabapentin  300 mg Oral Nightly    guaiFENesin  600 mg Oral BID    sodium chloride flush  10 mL Intravenous 2 times per day    enoxaparin  40 mg Subcutaneous Daily    insulin lispro  0-12 Units Subcutaneous TID WC    insulin lispro  0-6 Units Subcutaneous Nightly    methylPREDNISolone  40 mg Intravenous Q8H    ipratropium-albuterol  1 vial Inhalation Q4H    levofloxacin  500 mg Intravenous Q24H    mupirocin   Nasal BID    lidocaine 1 % injection  5 mL Intradermal Once    sodium chloride flush  10 mL Intravenous 2 times per day     I   dextrose       sodium chloride flush, magnesium hydroxide, ondansetron, glucose, dextrose, glucagon (rDNA), dextrose, sodium chloride flush    Lab Data:  Recent Labs      11/27/18   0857  11/28/18   0551   WBC  13.7*  8.0   HGB  12.4*  11.0*   HCT  39.3*  34.2* able  See below. pulm consulted    . COpd exacerbation   On IV steroids, HHN  Iv Levaquin for CAP         Left Lung PNA organism   Left sided pna per imaging   Levaquin. Sputum cx  Pulm eval.       Hyperglycemia due to steroid. A1C 7.1 - consistent with DM  Not on meds for this PTA. Start metformin, diabetic diet  Cont Insulin coverage ISS.              DVT Prophylaxis: lovenox  Diet: DIET GENERAL;  Code Status: Full Code       Josh Issa MD 11/28/2018 7:23 AM

## 2018-11-28 NOTE — PROGRESS NOTES
11/28/18 0800   Oxygen Therapy/Pulse Ox   Resp 16   SpO2 96 %   $ABG $Yes   Tin's Test #1 Pos   Site #1 Right Radial   Site Prepped #1 Yes   Number of Attempts #1 1   Pressure Held #1 Yes   Complications #1 None   Post-procedure #1 Standard   How Tolerated?  Tolerated well

## 2018-11-28 NOTE — PROGRESS NOTES
Shift assessment completed, see flow sheets. Pt resting in bed comfortably. No s/s of distress. All vital signs stable at this time. PICC and PIV WNL. Pt wearing 8L HF O2 with sat in the upper 90s, displays no s/s of being SOB or struggling to breath today. Awaiting MD & care team rounds.   Lilia Villa RN

## 2018-11-28 NOTE — PROGRESS NOTES
breaths per minute. Therapy will be held for heart rate (HR) greater than 140 beats per minute, pending direction from physician. 3. Bronchodilators will be administered via Metered Dose Inhaler (MDI) with spacer when the following criteria are met:  a. Alert and cooperative     b. HR < 140 bpm  c. RR < 30 bpm                d. Can demonstrate a 2-3 second inspiratory hold  4. Bronchodilators will be administered via Hand Held Nebulizer IVONNE [facility]) to patients when ANY of the following criteria are met  a. Incognizant or uncooperative          b. Patients treated with HHN at Home        c. Unable to demonstrate proper use of MDI with spacer     d. RR > 30 bpm   5. Bronchodilators will be delivered via Metered Dose Inhaler (MDI), HHN, Aerogen to intubated patients on mechanical ventilation. 6. Inhalation medication orders will be delivered and/or substituted as outlined below. Aerosolized Medications Ordering and Administration Guidelines:    1. All Medications will be ordered by a physician, and their frequency and/or modality will be adjusted as defined by the patients Respiratory Severity Index (RSI) score. 2. If the patient does not have documented COPD, consider discontinuing anticholinergics when RSI is less than 9.  3. If the bronchospasm worsens (increased RSI), then the bronchodilator frequency can be increased to a maximum of every 4 hours. If greater than every 4 hours is required, the physician will be contacted. 4. If the bronchospasm improves, the frequency of the bronchodilator can be decreased, based on the patient's RSI, but not less than home treatment regimen frequency. 5. Bronchodilator(s) will be discontinued if patient has a RSI less than 9 and has received no scheduled or as needed treatment for 72  Hrs. Patients Ordered on a Mucolytic Agent:    1. Must always be administered with a bronchodilator.     2. Discontinue if patient experiences worsened bronchospasm, or secretions have lessened

## 2018-11-28 NOTE — PROGRESS NOTES
Shift change, bedside report given to [address]. Pt exhibits no s/s of distress. Call light in reach. Care has been transferred at this time.

## 2018-11-29 LAB
BASOPHILS ABSOLUTE: 0 K/UL (ref 0–0.2)
BASOPHILS RELATIVE PERCENT: 0.2 %
EOSINOPHILS ABSOLUTE: 0 K/UL (ref 0–0.6)
EOSINOPHILS RELATIVE PERCENT: 0.1 %
GLUCOSE BLD-MCNC: 146 MG/DL (ref 70–99)
GLUCOSE BLD-MCNC: 209 MG/DL (ref 70–99)
GLUCOSE BLD-MCNC: 240 MG/DL (ref 70–99)
GLUCOSE BLD-MCNC: 274 MG/DL (ref 70–99)
HCT VFR BLD CALC: 34 % (ref 40.5–52.5)
HEMOGLOBIN: 11.1 G/DL (ref 13.5–17.5)
LYMPHOCYTES ABSOLUTE: 0.4 K/UL (ref 1–5.1)
LYMPHOCYTES RELATIVE PERCENT: 2.6 %
MAGNESIUM: 2 MG/DL (ref 1.8–2.4)
MCH RBC QN AUTO: 29.4 PG (ref 26–34)
MCHC RBC AUTO-ENTMCNC: 32.7 G/DL (ref 31–36)
MCV RBC AUTO: 89.9 FL (ref 80–100)
MONOCYTES ABSOLUTE: 0.5 K/UL (ref 0–1.3)
MONOCYTES RELATIVE PERCENT: 3.3 %
NEUTROPHILS ABSOLUTE: 12.8 K/UL (ref 1.7–7.7)
NEUTROPHILS RELATIVE PERCENT: 93.8 %
PDW BLD-RTO: 15.9 % (ref 12.4–15.4)
PERFORMED ON: ABNORMAL
PHOSPHORUS: 2.1 MG/DL (ref 2.5–4.9)
PLATELET # BLD: 752 K/UL (ref 135–450)
PMV BLD AUTO: 7.7 FL (ref 5–10.5)
RBC # BLD: 3.78 M/UL (ref 4.2–5.9)
WBC # BLD: 13.7 K/UL (ref 4–11)

## 2018-11-29 PROCEDURE — 99232 SBSQ HOSP IP/OBS MODERATE 35: CPT | Performed by: INTERNAL MEDICINE

## 2018-11-29 PROCEDURE — 6370000000 HC RX 637 (ALT 250 FOR IP): Performed by: INTERNAL MEDICINE

## 2018-11-29 PROCEDURE — 99233 SBSQ HOSP IP/OBS HIGH 50: CPT | Performed by: INTERNAL MEDICINE

## 2018-11-29 PROCEDURE — 94761 N-INVAS EAR/PLS OXIMETRY MLT: CPT

## 2018-11-29 PROCEDURE — 2580000003 HC RX 258: Performed by: INTERNAL MEDICINE

## 2018-11-29 PROCEDURE — 85025 COMPLETE CBC W/AUTO DIFF WBC: CPT

## 2018-11-29 PROCEDURE — 84100 ASSAY OF PHOSPHORUS: CPT

## 2018-11-29 PROCEDURE — 83735 ASSAY OF MAGNESIUM: CPT

## 2018-11-29 PROCEDURE — 94660 CPAP INITIATION&MGMT: CPT

## 2018-11-29 PROCEDURE — 94664 DEMO&/EVAL PT USE INHALER: CPT

## 2018-11-29 PROCEDURE — 6360000002 HC RX W HCPCS: Performed by: INTERNAL MEDICINE

## 2018-11-29 PROCEDURE — 94640 AIRWAY INHALATION TREATMENT: CPT

## 2018-11-29 PROCEDURE — 94667 MNPJ CHEST WALL 1ST: CPT

## 2018-11-29 PROCEDURE — 2060000000 HC ICU INTERMEDIATE R&B

## 2018-11-29 PROCEDURE — 2700000000 HC OXYGEN THERAPY PER DAY

## 2018-11-29 PROCEDURE — 94668 MNPJ CHEST WALL SBSQ: CPT

## 2018-11-29 RX ORDER — LEVOFLOXACIN 500 MG/1
500 TABLET, FILM COATED ORAL DAILY
Status: DISCONTINUED | OUTPATIENT
Start: 2018-11-29 | End: 2018-11-30 | Stop reason: HOSPADM

## 2018-11-29 RX ORDER — FLUTICASONE PROPIONATE 50 MCG
2 SPRAY, SUSPENSION (ML) NASAL DAILY
Status: DISCONTINUED | OUTPATIENT
Start: 2018-11-29 | End: 2018-11-30 | Stop reason: HOSPADM

## 2018-11-29 RX ADMIN — ASPIRIN 81 MG 81 MG: 81 TABLET ORAL at 09:11

## 2018-11-29 RX ADMIN — GUAIFENESIN 600 MG: 600 TABLET, EXTENDED RELEASE ORAL at 21:44

## 2018-11-29 RX ADMIN — ENOXAPARIN SODIUM 40 MG: 40 INJECTION SUBCUTANEOUS at 09:11

## 2018-11-29 RX ADMIN — Medication 10 ML: at 21:44

## 2018-11-29 RX ADMIN — IPRATROPIUM BROMIDE AND ALBUTEROL SULFATE 3 ML: .5; 3 SOLUTION RESPIRATORY (INHALATION) at 14:54

## 2018-11-29 RX ADMIN — FLUTICASONE PROPIONATE 2 SPRAY: 50 SPRAY, METERED NASAL at 16:15

## 2018-11-29 RX ADMIN — GUAIFENESIN 600 MG: 600 TABLET, EXTENDED RELEASE ORAL at 09:11

## 2018-11-29 RX ADMIN — MULTIPLE VITAMINS W/ MINERALS TAB 1 TABLET: TAB at 09:11

## 2018-11-29 RX ADMIN — IPRATROPIUM BROMIDE AND ALBUTEROL SULFATE 3 ML: .5; 3 SOLUTION RESPIRATORY (INHALATION) at 03:13

## 2018-11-29 RX ADMIN — LEVOFLOXACIN 500 MG: 500 TABLET, FILM COATED ORAL at 12:26

## 2018-11-29 RX ADMIN — IPRATROPIUM BROMIDE AND ALBUTEROL SULFATE 3 ML: .5; 3 SOLUTION RESPIRATORY (INHALATION) at 23:00

## 2018-11-29 RX ADMIN — INSULIN LISPRO 3 UNITS: 100 INJECTION, SOLUTION INTRAVENOUS; SUBCUTANEOUS at 21:45

## 2018-11-29 RX ADMIN — INSULIN LISPRO 4 UNITS: 100 INJECTION, SOLUTION INTRAVENOUS; SUBCUTANEOUS at 17:11

## 2018-11-29 RX ADMIN — METHYLPREDNISOLONE SODIUM SUCCINATE 40 MG: 40 INJECTION, POWDER, FOR SOLUTION INTRAMUSCULAR; INTRAVENOUS at 06:28

## 2018-11-29 RX ADMIN — GABAPENTIN 300 MG: 300 CAPSULE ORAL at 21:44

## 2018-11-29 RX ADMIN — Medication 10 ML: at 09:12

## 2018-11-29 RX ADMIN — IPRATROPIUM BROMIDE AND ALBUTEROL SULFATE 3 ML: .5; 3 SOLUTION RESPIRATORY (INHALATION) at 18:59

## 2018-11-29 RX ADMIN — Medication 10 ML: at 09:11

## 2018-11-29 RX ADMIN — IPRATROPIUM BROMIDE AND ALBUTEROL SULFATE 3 ML: .5; 3 SOLUTION RESPIRATORY (INHALATION) at 07:00

## 2018-11-29 RX ADMIN — METHYLPREDNISOLONE SODIUM SUCCINATE 40 MG: 40 INJECTION, POWDER, FOR SOLUTION INTRAMUSCULAR; INTRAVENOUS at 19:11

## 2018-11-29 RX ADMIN — IPRATROPIUM BROMIDE AND ALBUTEROL SULFATE 3 ML: .5; 3 SOLUTION RESPIRATORY (INHALATION) at 11:14

## 2018-11-29 NOTE — PROGRESS NOTES
4 Eyes Skin Assessment     The patient is being assess for   Transfer to New Unit    I agree that 2 RN's have performed a thorough Head to Toe Skin Assessment on the patient. ALL assessment sites listed below have been assessed. Areas assessed by both nurses:   [x]   Head, Face, and Ears   [x]   Shoulders, Back, and Chest, Abdomen  [x]   Arms, Elbows, and Hands   [x]   Coccyx, Sacrum, and Ischium  [x]   Legs, Feet, and Heels        Dry Flaky Heels, Mole to mid back. **SHARE this note so that the co-signing nurse is able to place an eSignature**    Co-signer eSignature: {Esignature:[id number]}    Does the Patient have Skin Breakdown?   No          Andrew Prevention initiated:  No   Wound Care Orders initiated:  No      [facility] nurse consulted for Pressure Injury (Stage 3,4, Unstageable, DTI, NWPT, Complex wounds)and New or Established Ostomies:  No      Primary Nurse eSignature: Electronically signed by Janey Bhardwaj RN on 11/29/18 at 4:09 PM

## 2018-11-29 NOTE — PROGRESS NOTES
11/29/18 1010 called outpt special procedures in regards to Dr. Ethel Figueroa wants a bronchoscopy tomorrow 11/30/18. Mary Kate stated about 11:00 11/30/18.  Leta Higginbotham

## 2018-11-29 NOTE — PROGRESS NOTES
Shift assessment completed, see flow sheets. Pt is A&Ox4. All vital signs stable. 3L NC at this time, remains at home O2 setting. Pt to receive all AM meds. Awaiting MD & care team for rounding. Pt denies any needs at this time. Call light is within reach.   Alo Perez RN

## 2018-11-29 NOTE — PROGRESS NOTES
Placed on  BiPAP - improved now on 3 L oxygen, wean as able   pulm consulted       COpd exacerbation   On IV steroids, HHN  Iv Levaquin for CAP   Wean steroids        Left Lung PNA organism   Left sided pna per imaging   Levaquin. Sputum cx pending        Hyperglycemia due to steroid. A1C 7.1 - consistent with DM  Not on meds for this PTA. Start metformin, diabetic diet  Cont Insulin coverage ISS.              DVT Prophylaxis: lovenox  Diet: Diab  Code Status: Full Code     Out of bed,   Ambulate  PCU     Gisel Bowser MD 11/29/2018 7:25 AM

## 2018-11-29 NOTE — PLAN OF CARE
Problem: Gas Exchange - Impaired:  Goal: Levels of oxygenation will improve  Levels of oxygenation will improve   Outcome: Ongoing      Problem: Safety:  Goal: Free from accidental physical injury  Free from accidental physical injury   Outcome: Ongoing      Problem: Daily Care:  Goal: Daily care needs are met  Daily care needs are met   Outcome: Ongoing      Problem: Skin Integrity:  Goal: Skin integrity will stabilize  Skin integrity will stabilize   Outcome: Ongoing      Problem: Risk for Impaired Skin Integrity  Goal: Tissue integrity - skin and mucous membranes  Structural intactness and normal physiological function of skin and  mucous membranes.    Outcome: Ongoing

## 2018-11-30 VITALS
TEMPERATURE: 97.4 F | HEART RATE: 88 BPM | DIASTOLIC BLOOD PRESSURE: 70 MMHG | BODY MASS INDEX: 17.61 KG/M2 | WEIGHT: 112.19 LBS | RESPIRATION RATE: 17 BRPM | HEIGHT: 67 IN | OXYGEN SATURATION: 94 % | SYSTOLIC BLOOD PRESSURE: 105 MMHG

## 2018-11-30 LAB
ANION GAP SERPL CALCULATED.3IONS-SCNC: 9 MMOL/L (ref 3–16)
BASE EXCESS ARTERIAL: 9.6 MMOL/L (ref -3–3)
BASOPHILS ABSOLUTE: 0.1 K/UL (ref 0–0.2)
BASOPHILS RELATIVE PERCENT: 0.6 %
BUN BLDV-MCNC: 9 MG/DL (ref 7–20)
CALCIUM SERPL-MCNC: 9.1 MG/DL (ref 8.3–10.6)
CARBOXYHEMOGLOBIN ARTERIAL: 1 % (ref 0–1.5)
CHLORIDE BLD-SCNC: 94 MMOL/L (ref 99–110)
CO2: 35 MMOL/L (ref 21–32)
CREAT SERPL-MCNC: <0.5 MG/DL (ref 0.8–1.3)
EOSINOPHILS ABSOLUTE: 0 K/UL (ref 0–0.6)
EOSINOPHILS RELATIVE PERCENT: 0 %
GFR AFRICAN AMERICAN: >60
GFR NON-AFRICAN AMERICAN: >60
GLUCOSE BLD-MCNC: 154 MG/DL (ref 70–99)
GLUCOSE BLD-MCNC: 199 MG/DL (ref 70–99)
GLUCOSE BLD-MCNC: 204 MG/DL (ref 70–99)
HCO3 ARTERIAL: 34.7 MMOL/L (ref 21–29)
HCT VFR BLD CALC: 34.2 % (ref 40.5–52.5)
HEMOGLOBIN, ART, EXTENDED: 11.7 G/DL (ref 13.5–17.5)
HEMOGLOBIN: 11.5 G/DL (ref 13.5–17.5)
LYMPHOCYTES ABSOLUTE: 0.8 K/UL (ref 1–5.1)
LYMPHOCYTES RELATIVE PERCENT: 6.1 %
MAGNESIUM: 2 MG/DL (ref 1.8–2.4)
MCH RBC QN AUTO: 30.2 PG (ref 26–34)
MCHC RBC AUTO-ENTMCNC: 33.6 G/DL (ref 31–36)
MCV RBC AUTO: 90 FL (ref 80–100)
METHEMOGLOBIN ARTERIAL: 0.5 %
MONOCYTES ABSOLUTE: 1 K/UL (ref 0–1.3)
MONOCYTES RELATIVE PERCENT: 7.8 %
NEUTROPHILS ABSOLUTE: 11.2 K/UL (ref 1.7–7.7)
NEUTROPHILS RELATIVE PERCENT: 85.5 %
O2 CONTENT ARTERIAL: 16 ML/DL
O2 SAT, ARTERIAL: 96.9 %
O2 THERAPY: ABNORMAL
PCO2 ARTERIAL: 49.7 MMHG (ref 35–45)
PDW BLD-RTO: 16 % (ref 12.4–15.4)
PERFORMED ON: ABNORMAL
PERFORMED ON: ABNORMAL
PH ARTERIAL: 7.46 (ref 7.35–7.45)
PHOSPHORUS: 3.1 MG/DL (ref 2.5–4.9)
PLATELET # BLD: 761 K/UL (ref 135–450)
PMV BLD AUTO: 8.2 FL (ref 5–10.5)
PO2 ARTERIAL: 86.3 MMHG (ref 75–108)
POTASSIUM SERPL-SCNC: 3.9 MMOL/L (ref 3.5–5.1)
RBC # BLD: 3.8 M/UL (ref 4.2–5.9)
SODIUM BLD-SCNC: 138 MMOL/L (ref 136–145)
TCO2 ARTERIAL: 36.2 MMOL/L
WBC # BLD: 13.1 K/UL (ref 4–11)

## 2018-11-30 PROCEDURE — 82803 BLOOD GASES ANY COMBINATION: CPT

## 2018-11-30 PROCEDURE — 6360000002 HC RX W HCPCS: Performed by: INTERNAL MEDICINE

## 2018-11-30 PROCEDURE — 6370000000 HC RX 637 (ALT 250 FOR IP): Performed by: INTERNAL MEDICINE

## 2018-11-30 PROCEDURE — 2709999900 HC NON-CHARGEABLE SUPPLY: Performed by: INTERNAL MEDICINE

## 2018-11-30 PROCEDURE — 94668 MNPJ CHEST WALL SBSQ: CPT

## 2018-11-30 PROCEDURE — 87206 SMEAR FLUORESCENT/ACID STAI: CPT

## 2018-11-30 PROCEDURE — 31624 DX BRONCHOSCOPE/LAVAGE: CPT | Performed by: INTERNAL MEDICINE

## 2018-11-30 PROCEDURE — 0B9G8ZX DRAINAGE OF LEFT UPPER LUNG LOBE, VIA NATURAL OR ARTIFICIAL OPENING ENDOSCOPIC, DIAGNOSTIC: ICD-10-PCS | Performed by: INTERNAL MEDICINE

## 2018-11-30 PROCEDURE — 85025 COMPLETE CBC W/AUTO DIFF WBC: CPT

## 2018-11-30 PROCEDURE — 7100000011 HC PHASE II RECOVERY - ADDTL 15 MIN: Performed by: INTERNAL MEDICINE

## 2018-11-30 PROCEDURE — 2580000003 HC RX 258: Performed by: INTERNAL MEDICINE

## 2018-11-30 PROCEDURE — 99152 MOD SED SAME PHYS/QHP 5/>YRS: CPT | Performed by: INTERNAL MEDICINE

## 2018-11-30 PROCEDURE — 87070 CULTURE OTHR SPECIMN AEROBIC: CPT

## 2018-11-30 PROCEDURE — 31645 BRNCHSC W/THER ASPIR 1ST: CPT | Performed by: INTERNAL MEDICINE

## 2018-11-30 PROCEDURE — 84100 ASSAY OF PHOSPHORUS: CPT

## 2018-11-30 PROCEDURE — 94660 CPAP INITIATION&MGMT: CPT

## 2018-11-30 PROCEDURE — 88305 TISSUE EXAM BY PATHOLOGIST: CPT

## 2018-11-30 PROCEDURE — 2700000000 HC OXYGEN THERAPY PER DAY

## 2018-11-30 PROCEDURE — 7100000010 HC PHASE II RECOVERY - FIRST 15 MIN: Performed by: INTERNAL MEDICINE

## 2018-11-30 PROCEDURE — 99232 SBSQ HOSP IP/OBS MODERATE 35: CPT | Performed by: INTERNAL MEDICINE

## 2018-11-30 PROCEDURE — 87205 SMEAR GRAM STAIN: CPT

## 2018-11-30 PROCEDURE — 83735 ASSAY OF MAGNESIUM: CPT

## 2018-11-30 PROCEDURE — 87015 SPECIMEN INFECT AGNT CONCNTJ: CPT

## 2018-11-30 PROCEDURE — 88112 CYTOPATH CELL ENHANCE TECH: CPT

## 2018-11-30 PROCEDURE — 94640 AIRWAY INHALATION TREATMENT: CPT

## 2018-11-30 PROCEDURE — 94761 N-INVAS EAR/PLS OXIMETRY MLT: CPT

## 2018-11-30 PROCEDURE — 87116 MYCOBACTERIA CULTURE: CPT

## 2018-11-30 PROCEDURE — 3609010800 HC BRONCHOSCOPY ALVEOLAR LAVAGE: Performed by: INTERNAL MEDICINE

## 2018-11-30 PROCEDURE — 87102 FUNGUS ISOLATION CULTURE: CPT

## 2018-11-30 PROCEDURE — 99238 HOSP IP/OBS DSCHRG MGMT 30/<: CPT | Performed by: INTERNAL MEDICINE

## 2018-11-30 PROCEDURE — 36600 WITHDRAWAL OF ARTERIAL BLOOD: CPT

## 2018-11-30 PROCEDURE — 80048 BASIC METABOLIC PNL TOTAL CA: CPT

## 2018-11-30 RX ORDER — FENTANYL CITRATE 50 UG/ML
INJECTION, SOLUTION INTRAMUSCULAR; INTRAVENOUS PRN
Status: DISCONTINUED | OUTPATIENT
Start: 2018-11-30 | End: 2018-11-30 | Stop reason: HOSPADM

## 2018-11-30 RX ORDER — SODIUM CHLORIDE, SODIUM LACTATE, POTASSIUM CHLORIDE, CALCIUM CHLORIDE 600; 310; 30; 20 MG/100ML; MG/100ML; MG/100ML; MG/100ML
INJECTION, SOLUTION INTRAVENOUS CONTINUOUS PRN
Status: DISCONTINUED | OUTPATIENT
Start: 2018-11-30 | End: 2018-11-30 | Stop reason: HOSPADM

## 2018-11-30 RX ORDER — LEVOFLOXACIN 500 MG/1
500 TABLET, FILM COATED ORAL DAILY
Qty: 5 TABLET | Refills: 0 | Status: SHIPPED | OUTPATIENT
Start: 2018-12-01 | End: 2018-12-06 | Stop reason: CLARIF

## 2018-11-30 RX ORDER — PREDNISONE 10 MG/1
TABLET ORAL
Qty: 18 TABLET | Refills: 0 | Status: SHIPPED | OUTPATIENT
Start: 2018-11-30 | End: 2019-02-18

## 2018-11-30 RX ORDER — MIDAZOLAM HYDROCHLORIDE 5 MG/ML
INJECTION INTRAMUSCULAR; INTRAVENOUS PRN
Status: DISCONTINUED | OUTPATIENT
Start: 2018-11-30 | End: 2018-11-30 | Stop reason: HOSPADM

## 2018-11-30 RX ADMIN — LEVOFLOXACIN 500 MG: 500 TABLET, FILM COATED ORAL at 14:03

## 2018-11-30 RX ADMIN — Medication 10 ML: at 09:11

## 2018-11-30 RX ADMIN — ASPIRIN 81 MG 81 MG: 81 TABLET ORAL at 14:03

## 2018-11-30 RX ADMIN — IPRATROPIUM BROMIDE AND ALBUTEROL SULFATE 3 ML: .5; 3 SOLUTION RESPIRATORY (INHALATION) at 02:56

## 2018-11-30 RX ADMIN — MULTIPLE VITAMINS W/ MINERALS TAB 1 TABLET: TAB at 14:03

## 2018-11-30 RX ADMIN — ENOXAPARIN SODIUM 40 MG: 40 INJECTION SUBCUTANEOUS at 14:03

## 2018-11-30 RX ADMIN — IPRATROPIUM BROMIDE AND ALBUTEROL SULFATE 3 ML: .5; 3 SOLUTION RESPIRATORY (INHALATION) at 15:22

## 2018-11-30 RX ADMIN — Medication 10 ML: at 11:50

## 2018-11-30 RX ADMIN — FLUTICASONE PROPIONATE 2 SPRAY: 50 SPRAY, METERED NASAL at 09:11

## 2018-11-30 RX ADMIN — ONDANSETRON 4 MG: 2 INJECTION INTRAMUSCULAR; INTRAVENOUS at 11:33

## 2018-11-30 RX ADMIN — IPRATROPIUM BROMIDE AND ALBUTEROL SULFATE 3 ML: .5; 3 SOLUTION RESPIRATORY (INHALATION) at 07:11

## 2018-11-30 RX ADMIN — INSULIN LISPRO 2 UNITS: 100 INJECTION, SOLUTION INTRAVENOUS; SUBCUTANEOUS at 09:11

## 2018-11-30 RX ADMIN — GUAIFENESIN 600 MG: 600 TABLET, EXTENDED RELEASE ORAL at 14:03

## 2018-11-30 RX ADMIN — METHYLPREDNISOLONE SODIUM SUCCINATE 40 MG: 40 INJECTION, POWDER, FOR SOLUTION INTRAMUSCULAR; INTRAVENOUS at 06:11

## 2018-11-30 ASSESSMENT — PAIN - FUNCTIONAL ASSESSMENT: PAIN_FUNCTIONAL_ASSESSMENT: 0-10

## 2018-11-30 ASSESSMENT — PAIN SCALES - GENERAL
PAINLEVEL_OUTOF10: 0

## 2018-11-30 NOTE — PROCEDURES
PROCEDURE:  Surveillance/diagnostic bronchoscopy     The risks and benefits as well as alternatives to the procedure have been discussed with the patient and or family. The patient and or next of kin understands and agrees to proceed. DESCRIPTION OF PROCEDURE: A time out was taken. Type of sedation used: Moderate Sedation:   Conscious sedation with 2 mg versed and 50 mcg fentanyl. Patient was monitored continuously 1:1 throughout the entire procedure while sedation was administered    The scope was passed with ease via the mouth. A complete airway inspection was performed. Large bilateral lower lobe mucous plug with other scattered small mucous plugs in the rest of the airways. Saline injection followed by therapeutic aspiration were performed throughout. No endobronchial lesions were identified after cleaning the airways. Washings were obtained throughout the airways. A Bronchoalveolar lavage was obtained from the left upper lobe apical posterior segment headache with good return. The patient tolerated the procedure well. Estimated blood loss was less than 5 ml. Recovery will be per endoscopy protocol. FOLLOW UP:  Cultures and cytology in  three to five days.

## 2018-11-30 NOTE — PROGRESS NOTES
complete airway inspection was performed. Large bilateral lower lobe mucous plug with other scattered small mucous plugs in the rest of the airways. Saline injection followed by therapeutic aspiration were performed throughout. No endobronchial lesions were identified after cleaning the airways. Assessment & Plan:      Acute on Chronic Hypercapnic Resp Failure   in setting of severe  COPD and pneumonia     Oxygenation was actually normal on ABG, but pt with severe hypercapnia   PH 7.184, pCO2 100.4 on admit. Placed on  BiPAP - improved now on 3 L oxygen  Had bronch today for excessive mucous plugs  S.p therapeutic aspiration and doing well  Dc home with abx and prednisone taper  F/w BAL by pulm       COpd exacerbation   On IV steroids, HHN  Iv Levaquin for CAP   Wean steroids        Left Lung PNA organism   Left sided pna per imaging   Levaquin. BAL done today and pending  Pt advised to use more duonebs rather than rescue inhaler and   Add mucinex for severe mucous plugs  Given acapella to mobilize secretions      Hyperglycemia due to steroid. A1C 7.1 - consistent with DM  Not on meds for this PTA. Start metformin, diabetic diet  Cont Insulin coverage ISS.           Dc home    Yasmine Kelly MD 11/30/2018 7:28 AM

## 2018-11-30 NOTE — PROGRESS NOTES
Discharge instructions reviewed with verbal aknowledgement of understanding. , transport called for discharge.

## 2018-12-01 ENCOUNTER — CARE COORDINATION (OUTPATIENT)
Dept: CASE MANAGEMENT | Age: 68
End: 2018-12-01

## 2018-12-01 LAB
CULTURE, RESPIRATORY: NORMAL
GRAM STAIN RESULT: NORMAL

## 2018-12-01 NOTE — CARE COORDINATION
[facility] Transitions Initial Follow Up Call    Call within 2 business days of discharge: Yes    Patient: Chris Ibrahim Patient : 1950   MRN: [id number]  Reason for Admission: COPD  Discharge Date: 18 RARS: Readmission Risk Score: 15     Spoke with: patient Gerardo [place]: Saulo Richardson    Non-face-to-face services provided:  Obtained and reviewed discharge summary and/or continuity of care documents    Care Transitions 24 Hour Call    Do you have any ongoing symptoms?:  No  Do you have a copy of your discharge instructions?:  Yes  Do you have all of your prescriptions and are they filled?:  Yes  Have you been contacted by a [address]?:  No  Have you scheduled your follow up appointment?:  No  Were you discharged with any Home Care or Post Acute Services:  Yes  Post Acute Services:  Home Health (Comment: [facility])  Patient Home Equipment:  Nebulizer, Oxygen  Do you have support at home?:  Partner/Spouse/SO  Do you feel like you have everything you need to keep you well at home?:  Yes  Are you an active caregiver in your home?:  No  Care Transitions Interventions     Call was brief as patient was at pharmacy picking up medications but he stated he was feeling good. Said he hasn't had any problems at all breathing. Said 02 sats have been 98% with 02 on, said mucus has been breaking up real good and he's been able to cough it up. Bry Borrego he would call Monday to schedule appt with PCP-told him he needed to be seen within 7 days. Unable to do complete med review d/t patient not being at home. Told him I had spoken with [facility] and they said someone would be calling him today to schedule his [facility] visit. Denies any needs and is agreeable to f/u calls. Gave reminder that if they have any questions/concerns at anytime, they can always call PCP/specialist as they always have an MD on call .       Follow Up  Future Appointments  Date Time Provider Alex Duong   3/4/2019 1:00 PM Lakisha Balderas

## 2018-12-02 LAB
CULTURE, RESPIRATORY: NORMAL
CULTURE, RESPIRATORY: NORMAL
GRAM STAIN RESULT: NORMAL
GRAM STAIN RESULT: NORMAL

## 2018-12-04 ENCOUNTER — CARE COORDINATION (OUTPATIENT)
Dept: CASE MANAGEMENT | Age: 68
End: 2018-12-04

## 2018-12-06 ENCOUNTER — OFFICE VISIT (OUTPATIENT)
Dept: FAMILY MEDICINE CLINIC | Age: 68
End: 2018-12-06
Payer: MEDICARE

## 2018-12-06 VITALS
OXYGEN SATURATION: 96 % | WEIGHT: 151 LBS | SYSTOLIC BLOOD PRESSURE: 124 MMHG | HEART RATE: 94 BPM | HEIGHT: 67 IN | BODY MASS INDEX: 23.7 KG/M2 | DIASTOLIC BLOOD PRESSURE: 70 MMHG | RESPIRATION RATE: 21 BRPM

## 2018-12-06 DIAGNOSIS — M81.0 AGE-RELATED OSTEOPOROSIS WITHOUT CURRENT PATHOLOGICAL FRACTURE: ICD-10-CM

## 2018-12-06 DIAGNOSIS — J44.1 CHRONIC OBSTRUCTIVE PULMONARY DISEASE WITH ACUTE EXACERBATION (HCC): ICD-10-CM

## 2018-12-06 DIAGNOSIS — J18.9 COMMUNITY ACQUIRED PNEUMONIA OF LEFT UPPER LOBE OF LUNG: ICD-10-CM

## 2018-12-06 DIAGNOSIS — J96.21 ACUTE ON CHRONIC RESPIRATORY FAILURE WITH HYPOXIA AND HYPERCAPNIA (HCC): ICD-10-CM

## 2018-12-06 DIAGNOSIS — J96.22 ACUTE ON CHRONIC RESPIRATORY FAILURE WITH HYPOXIA AND HYPERCAPNIA (HCC): ICD-10-CM

## 2018-12-06 DIAGNOSIS — J44.9 COPD, VERY SEVERE (HCC): ICD-10-CM

## 2018-12-06 DIAGNOSIS — Z23 NEED FOR PNEUMOCOCCAL VACCINE: ICD-10-CM

## 2018-12-06 DIAGNOSIS — E11.9 DIABETES MELLITUS, NEW ONSET (HCC): Primary | ICD-10-CM

## 2018-12-06 PROCEDURE — G0009 ADMIN PNEUMOCOCCAL VACCINE: HCPCS | Performed by: NURSE PRACTITIONER

## 2018-12-06 PROCEDURE — 82044 UR ALBUMIN SEMIQUANTITATIVE: CPT | Performed by: NURSE PRACTITIONER

## 2018-12-06 PROCEDURE — 1111F DSCHRG MED/CURRENT MED MERGE: CPT | Performed by: NURSE PRACTITIONER

## 2018-12-06 PROCEDURE — 90732 PPSV23 VACC 2 YRS+ SUBQ/IM: CPT | Performed by: NURSE PRACTITIONER

## 2018-12-06 PROCEDURE — 99495 TRANSJ CARE MGMT MOD F2F 14D: CPT | Performed by: NURSE PRACTITIONER

## 2018-12-06 RX ORDER — ALBUTEROL SULFATE 90 UG/1
2 AEROSOL, METERED RESPIRATORY (INHALATION) EVERY 6 HOURS PRN
Qty: 1 INHALER | Refills: 5 | Status: SHIPPED | OUTPATIENT
Start: 2018-12-06 | End: 2019-04-16 | Stop reason: SDUPTHER

## 2018-12-06 RX ORDER — ALENDRONATE SODIUM 70 MG/1
70 TABLET ORAL
Qty: 4 TABLET | Refills: 5 | Status: SHIPPED | OUTPATIENT
Start: 2018-12-06 | End: 2019-03-04

## 2018-12-06 NOTE — PROGRESS NOTES
noted- A&P. Neg for wheezing. Resp E&E at rest. Tachypnea with exertion. Cardiovascular: normal rate, normal S1 and S2, no gallops, intact distal pulses and no carotid bruits  Abdomen: soft, non-tender, non-distended, normal bowel sounds, no masses or organomegaly  Extremities: no edema  Musculoskeletal: normal range of motion, no joint swelling, deformity or tenderness and Limited ROM generalized, independent in ambulation. Assessment/Plan:  1. Zuleyka Arteaga was seen today for follow-up from hospital.    Diagnoses and all orders for this visit:    Diabetes mellitus, new onset ([facility]  -     POCT microalbumin  -     Diabetic Foot Exam  -     FL DISCHARGE MEDS RECONCILED W/ CURRENT OUTPATIENT MED LIST    Age-related osteoporosis without current pathological fracture  -     alendronate (FOSAMAX) 70 MG tablet; Take 1 tablet by mouth every 7 days Take 30 minutes before eating, other medication. Remain upright after taking.  -     FL DISCHARGE MEDS RECONCILED W/ CURRENT OUTPATIENT MED LIST    COPD, very severe ([facility]  -     albuterol sulfate HFA (PROAIR HFA) 108 (90 Base) MCG/ACT inhaler; Inhale 2 puffs into the lungs every 6 hours as needed for Wheezing  -     FL DISCHARGE MEDS RECONCILED W/ CURRENT OUTPATIENT MED LIST    Chronic obstructive pulmonary disease with acute exacerbation (HCC)  -     FL DISCHARGE MEDS RECONCILED W/ CURRENT OUTPATIENT MED LIST    Need for pneumococcal vaccine  -     Pneumococcal polysaccharide vaccine 23-valent greater than or equal to 3yo subcutaneous/IM  -     FL DISCHARGE MEDS RECONCILED W/ CURRENT OUTPATIENT MED LIST    Acute on chronic respiratory failure with hypoxia and hypercapnia (HCC)    Community acquired pneumonia of left upper lobe of lung ([facility]      Discussed need for ongoing mucinex, HHN to avoid mucus thickening, exacerbation of lung disease. Verbalized understanding.      Medical Decision Making: moderate complexity

## 2018-12-10 ENCOUNTER — CARE COORDINATION (OUTPATIENT)
Dept: CASE MANAGEMENT | Age: 68
End: 2018-12-10

## 2018-12-10 NOTE — CARE COORDINATION
[place] [address] Transitions Follow Up Call    12/10/2018    Patient: Judah Nichols  Patient : 1950   MRN: [id number]  Reason for Admission: respiratory failure 2/2 end stage COPD  Discharge Date: 18 RARS: Readmission Risk Score: 15     Unable to reach. Unable to leave message. Voice mailbox not set up.      Future Appointments  Date Time Provider Alex Hullisti   3/4/2019 1:00 PM Venessa Narvaez [facility]       Lawyer Lucita RN

## 2018-12-30 LAB
FUNGUS (MYCOLOGY) CULTURE: ABNORMAL
FUNGUS (MYCOLOGY) CULTURE: ABNORMAL
FUNGUS STAIN: ABNORMAL
ORGANISM: ABNORMAL

## 2018-12-31 LAB
FUNGUS (MYCOLOGY) CULTURE: NORMAL
FUNGUS STAIN: NORMAL

## 2019-01-01 ENCOUNTER — OFFICE VISIT (OUTPATIENT)
Dept: ORTHOPEDIC SURGERY | Age: 69
End: 2019-01-01
Payer: MEDICARE

## 2019-01-01 ENCOUNTER — APPOINTMENT (OUTPATIENT)
Dept: GENERAL RADIOLOGY | Age: 69
DRG: 871 | End: 2019-01-01
Payer: MEDICARE

## 2019-01-01 ENCOUNTER — TELEPHONE (OUTPATIENT)
Dept: OTHER | Facility: CLINIC | Age: 69
End: 2019-01-01

## 2019-01-01 ENCOUNTER — TELEPHONE (OUTPATIENT)
Dept: PULMONOLOGY | Age: 69
End: 2019-01-01

## 2019-01-01 ENCOUNTER — OFFICE VISIT (OUTPATIENT)
Dept: FAMILY MEDICINE CLINIC | Age: 69
End: 2019-01-01
Payer: MEDICARE

## 2019-01-01 ENCOUNTER — CARE COORDINATION (OUTPATIENT)
Dept: CASE MANAGEMENT | Age: 69
End: 2019-01-01

## 2019-01-01 ENCOUNTER — APPOINTMENT (OUTPATIENT)
Dept: CT IMAGING | Age: 69
DRG: 871 | End: 2019-01-01
Payer: MEDICARE

## 2019-01-01 ENCOUNTER — HOSPITAL ENCOUNTER (OUTPATIENT)
Dept: CT IMAGING | Age: 69
Discharge: HOME | End: 2019-09-23
Payer: MEDICARE

## 2019-01-01 ENCOUNTER — HOSPITAL ENCOUNTER (EMERGENCY)
Age: 69
Discharge: HOME | End: 2019-07-01
Payer: MEDICARE

## 2019-01-01 ENCOUNTER — TELEPHONE (OUTPATIENT)
Dept: FAMILY MEDICINE CLINIC | Age: 69
End: 2019-01-01

## 2019-01-01 ENCOUNTER — HOSPITAL ENCOUNTER (OUTPATIENT)
Dept: PULMONOLOGY | Age: 69
Discharge: HOME | End: 2019-07-19
Payer: MEDICARE

## 2019-01-01 ENCOUNTER — OFFICE VISIT (OUTPATIENT)
Dept: PULMONOLOGY | Age: 69
End: 2019-01-01
Payer: MEDICARE

## 2019-01-01 ENCOUNTER — HOSPITAL ENCOUNTER (OUTPATIENT)
Age: 69
Discharge: HOME | End: 2019-09-27
Payer: MEDICARE

## 2019-01-01 ENCOUNTER — APPOINTMENT (OUTPATIENT)
Dept: GENERAL RADIOLOGY | Age: 69
End: 2019-01-01
Payer: MEDICARE

## 2019-01-01 ENCOUNTER — HOSPITAL ENCOUNTER (INPATIENT)
Age: 69
LOS: 6 days | Discharge: HOME | DRG: 871 | End: 2019-06-20
Attending: EMERGENCY MEDICINE | Admitting: INTERNAL MEDICINE
Payer: MEDICARE

## 2019-01-01 VITALS
HEIGHT: 68 IN | RESPIRATION RATE: 18 BRPM | BODY MASS INDEX: 17.58 KG/M2 | WEIGHT: 116 LBS | TEMPERATURE: 97.9 F | SYSTOLIC BLOOD PRESSURE: 112 MMHG | OXYGEN SATURATION: 96 % | DIASTOLIC BLOOD PRESSURE: 62 MMHG | HEART RATE: 65 BPM

## 2019-01-01 VITALS
WEIGHT: 110 LBS | OXYGEN SATURATION: 96 % | DIASTOLIC BLOOD PRESSURE: 64 MMHG | HEIGHT: 67 IN | RESPIRATION RATE: 18 BRPM | HEART RATE: 60 BPM | SYSTOLIC BLOOD PRESSURE: 98 MMHG | BODY MASS INDEX: 17.27 KG/M2

## 2019-01-01 VITALS
DIASTOLIC BLOOD PRESSURE: 64 MMHG | OXYGEN SATURATION: 83 % | TEMPERATURE: 98.5 F | HEART RATE: 90 BPM | BODY MASS INDEX: 18.01 KG/M2 | RESPIRATION RATE: 20 BRPM | WEIGHT: 115 LBS | SYSTOLIC BLOOD PRESSURE: 104 MMHG

## 2019-01-01 VITALS
HEIGHT: 68 IN | SYSTOLIC BLOOD PRESSURE: 100 MMHG | BODY MASS INDEX: 17.22 KG/M2 | WEIGHT: 113.6 LBS | HEART RATE: 71 BPM | RESPIRATION RATE: 14 BRPM | OXYGEN SATURATION: 97 % | DIASTOLIC BLOOD PRESSURE: 62 MMHG | TEMPERATURE: 98.8 F

## 2019-01-01 VITALS
TEMPERATURE: 98.6 F | HEIGHT: 68 IN | OXYGEN SATURATION: 95 % | SYSTOLIC BLOOD PRESSURE: 104 MMHG | BODY MASS INDEX: 17.28 KG/M2 | RESPIRATION RATE: 16 BRPM | HEART RATE: 75 BPM | DIASTOLIC BLOOD PRESSURE: 68 MMHG | WEIGHT: 114 LBS

## 2019-01-01 VITALS
WEIGHT: 123.3 LBS | HEIGHT: 67 IN | SYSTOLIC BLOOD PRESSURE: 98 MMHG | OXYGEN SATURATION: 95 % | BODY MASS INDEX: 19.35 KG/M2 | HEART RATE: 87 BPM | DIASTOLIC BLOOD PRESSURE: 50 MMHG | TEMPERATURE: 98.1 F | RESPIRATION RATE: 16 BRPM

## 2019-01-01 VITALS
HEART RATE: 79 BPM | WEIGHT: 111.8 LBS | OXYGEN SATURATION: 97 % | SYSTOLIC BLOOD PRESSURE: 102 MMHG | HEIGHT: 68 IN | TEMPERATURE: 98.2 F | DIASTOLIC BLOOD PRESSURE: 58 MMHG | BODY MASS INDEX: 16.94 KG/M2 | RESPIRATION RATE: 18 BRPM

## 2019-01-01 VITALS
WEIGHT: 115.4 LBS | OXYGEN SATURATION: 95 % | SYSTOLIC BLOOD PRESSURE: 132 MMHG | HEART RATE: 90 BPM | DIASTOLIC BLOOD PRESSURE: 50 MMHG | RESPIRATION RATE: 16 BRPM | TEMPERATURE: 97.7 F | BODY MASS INDEX: 17.49 KG/M2 | HEIGHT: 68 IN

## 2019-01-01 VITALS
WEIGHT: 113.98 LBS | SYSTOLIC BLOOD PRESSURE: 60 MMHG | HEART RATE: 91 BPM | BODY MASS INDEX: 17.27 KG/M2 | DIASTOLIC BLOOD PRESSURE: 49 MMHG | HEIGHT: 68 IN

## 2019-01-01 VITALS
TEMPERATURE: 98 F | OXYGEN SATURATION: 95 % | HEART RATE: 83 BPM | WEIGHT: 114 LBS | HEIGHT: 68 IN | BODY MASS INDEX: 17.28 KG/M2 | RESPIRATION RATE: 16 BRPM | DIASTOLIC BLOOD PRESSURE: 61 MMHG | SYSTOLIC BLOOD PRESSURE: 97 MMHG

## 2019-01-01 VITALS
RESPIRATION RATE: 20 BRPM | HEART RATE: 69 BPM | SYSTOLIC BLOOD PRESSURE: 108 MMHG | HEIGHT: 68 IN | WEIGHT: 113 LBS | TEMPERATURE: 98.7 F | BODY MASS INDEX: 17.13 KG/M2 | OXYGEN SATURATION: 94 % | DIASTOLIC BLOOD PRESSURE: 58 MMHG

## 2019-01-01 DIAGNOSIS — M54.41 CHRONIC MIDLINE LOW BACK PAIN WITH RIGHT-SIDED SCIATICA: ICD-10-CM

## 2019-01-01 DIAGNOSIS — G89.29 CHRONIC MIDLINE LOW BACK PAIN WITH RIGHT-SIDED SCIATICA: ICD-10-CM

## 2019-01-01 DIAGNOSIS — J44.1 COPD EXACERBATION (HCC): Primary | ICD-10-CM

## 2019-01-01 DIAGNOSIS — R91.8 MASS OF LEFT LUNG: ICD-10-CM

## 2019-01-01 DIAGNOSIS — J96.11 CHRONIC HYPOXEMIC RESPIRATORY FAILURE (HCC): ICD-10-CM

## 2019-01-01 DIAGNOSIS — D75.839 THROMBOCYTOSIS: ICD-10-CM

## 2019-01-01 DIAGNOSIS — J44.9 COPD, VERY SEVERE (HCC): ICD-10-CM

## 2019-01-01 DIAGNOSIS — R91.1 LUNG NODULE: ICD-10-CM

## 2019-01-01 DIAGNOSIS — J18.9 PNEUMONIA DUE TO ORGANISM: ICD-10-CM

## 2019-01-01 DIAGNOSIS — J44.9 CHRONIC OBSTRUCTIVE PULMONARY DISEASE, UNSPECIFIED COPD TYPE (HCC): ICD-10-CM

## 2019-01-01 DIAGNOSIS — S80.02XA CONTUSION OF LEFT KNEE, INITIAL ENCOUNTER: Primary | ICD-10-CM

## 2019-01-01 DIAGNOSIS — I49.9 IRREGULAR HEART BEAT: ICD-10-CM

## 2019-01-01 DIAGNOSIS — G89.29 CHRONIC MIDLINE LOW BACK PAIN WITHOUT SCIATICA: ICD-10-CM

## 2019-01-01 DIAGNOSIS — E11.9 TYPE 2 DIABETES MELLITUS WITHOUT COMPLICATION, WITHOUT LONG-TERM CURRENT USE OF INSULIN (HCC): Primary | ICD-10-CM

## 2019-01-01 DIAGNOSIS — E11.9 DIABETES MELLITUS, NEW ONSET (HCC): Primary | ICD-10-CM

## 2019-01-01 DIAGNOSIS — J44.9 COPD, VERY SEVERE (HCC): Primary | ICD-10-CM

## 2019-01-01 DIAGNOSIS — R93.89 ABNORMAL CT OF THE CHEST: ICD-10-CM

## 2019-01-01 DIAGNOSIS — J15.1 PNEUMONIA OF LEFT UPPER LOBE DUE TO PSEUDOMONAS SPECIES (HCC): ICD-10-CM

## 2019-01-01 DIAGNOSIS — Z12.11 COLON CANCER SCREENING: ICD-10-CM

## 2019-01-01 DIAGNOSIS — E43 SEVERE PROTEIN-CALORIE MALNUTRITION (HCC): ICD-10-CM

## 2019-01-01 DIAGNOSIS — Z23 FLU VACCINE NEED: ICD-10-CM

## 2019-01-01 DIAGNOSIS — R06.02 SOB (SHORTNESS OF BREATH): ICD-10-CM

## 2019-01-01 DIAGNOSIS — E78.2 MIXED HYPERLIPIDEMIA: ICD-10-CM

## 2019-01-01 DIAGNOSIS — R09.02 HYPOXEMIA: ICD-10-CM

## 2019-01-01 DIAGNOSIS — M81.0 AGE-RELATED OSTEOPOROSIS WITHOUT CURRENT PATHOLOGICAL FRACTURE: ICD-10-CM

## 2019-01-01 DIAGNOSIS — R06.02 SHORTNESS OF BREATH: ICD-10-CM

## 2019-01-01 DIAGNOSIS — M25.562 LEFT KNEE PAIN, UNSPECIFIED CHRONICITY: Primary | ICD-10-CM

## 2019-01-01 DIAGNOSIS — J40 BRONCHITIS: ICD-10-CM

## 2019-01-01 DIAGNOSIS — Z00.00 ROUTINE GENERAL MEDICAL EXAMINATION AT A HEALTH CARE FACILITY: ICD-10-CM

## 2019-01-01 DIAGNOSIS — M54.50 CHRONIC MIDLINE LOW BACK PAIN WITHOUT SCIATICA: ICD-10-CM

## 2019-01-01 LAB
A/G RATIO: 1.2 (ref 1.1–2.2)
ALBUMIN SERPL-MCNC: 2.6 G/DL (ref 3.4–5)
ALBUMIN SERPL-MCNC: 3.6 G/DL (ref 3.4–5)
ALP BLD-CCNC: 118 U/L (ref 40–129)
ALT SERPL-CCNC: 7 U/L (ref 10–40)
ANION GAP SERPL CALCULATED.3IONS-SCNC: 10 MMOL/L (ref 3–16)
ANION GAP SERPL CALCULATED.3IONS-SCNC: 11 MMOL/L (ref 3–16)
ANION GAP SERPL CALCULATED.3IONS-SCNC: 16 MMOL/L (ref 3–16)
ANION GAP SERPL CALCULATED.3IONS-SCNC: 8 MMOL/L (ref 3–16)
AST SERPL-CCNC: 16 U/L (ref 15–37)
BANDED NEUTROPHILS RELATIVE PERCENT: 5 % (ref 0–7)
BASOPHILS ABSOLUTE: 0 K/UL (ref 0–0.2)
BASOPHILS ABSOLUTE: 0 K/UL (ref 0–0.2)
BASOPHILS ABSOLUTE: 0.1 K/UL (ref 0–0.2)
BASOPHILS ABSOLUTE: 0.1 K/UL (ref 0–0.2)
BASOPHILS ABSOLUTE: 0.2 K/UL (ref 0–0.2)
BASOPHILS RELATIVE PERCENT: 0 %
BASOPHILS RELATIVE PERCENT: 0.1 %
BASOPHILS RELATIVE PERCENT: 0.6 %
BASOPHILS RELATIVE PERCENT: 0.6 %
BASOPHILS RELATIVE PERCENT: 0.8 %
BASOPHILS RELATIVE PERCENT: 1 %
BASOPHILS RELATIVE PERCENT: 1.1 %
BILIRUB SERPL-MCNC: 0.3 MG/DL (ref 0–1)
BLOOD CULTURE, ROUTINE: NORMAL
BLOOD CULTURE, ROUTINE: NORMAL
BUN BLDV-MCNC: 11 MG/DL (ref 7–20)
BUN BLDV-MCNC: 13 MG/DL (ref 7–20)
BUN BLDV-MCNC: 7 MG/DL (ref 7–20)
BUN BLDV-MCNC: 8 MG/DL (ref 7–20)
BUN BLDV-MCNC: 8 MG/DL (ref 7–20)
CALCIUM IONIZED: 1.1 MMOL/L (ref 1.12–1.32)
CALCIUM SERPL-MCNC: 10.1 MG/DL (ref 8.3–10.6)
CALCIUM SERPL-MCNC: 7.1 MG/DL (ref 8.3–10.6)
CALCIUM SERPL-MCNC: 8.6 MG/DL (ref 8.3–10.6)
CALCIUM SERPL-MCNC: 8.6 MG/DL (ref 8.3–10.6)
CALCIUM SERPL-MCNC: 8.8 MG/DL (ref 8.3–10.6)
CALCIUM SERPL-MCNC: 9.4 MG/DL (ref 8.3–10.6)
CHLORIDE BLD-SCNC: 103 MMOL/L (ref 99–110)
CHLORIDE BLD-SCNC: 87 MMOL/L (ref 99–110)
CHLORIDE BLD-SCNC: 93 MMOL/L (ref 99–110)
CHLORIDE BLD-SCNC: 94 MMOL/L (ref 99–110)
CHLORIDE BLD-SCNC: 97 MMOL/L (ref 99–110)
CHLORIDE BLD-SCNC: 97 MMOL/L (ref 99–110)
CHLORIDE BLD-SCNC: 98 MMOL/L (ref 99–110)
CHLORIDE BLD-SCNC: 99 MMOL/L (ref 99–110)
CO2: 23 MMOL/L (ref 21–32)
CO2: 25 MMOL/L (ref 21–32)
CO2: 26 MMOL/L (ref 21–32)
CO2: 27 MMOL/L (ref 21–32)
CO2: 28 MMOL/L (ref 21–32)
CO2: 29 MMOL/L (ref 21–32)
CO2: 31 MMOL/L (ref 21–32)
CO2: 31 MMOL/L (ref 21–32)
CREAT SERPL-MCNC: <0.5 MG/DL (ref 0.8–1.3)
CREATININE URINE POCT: NORMAL
CULTURE, RESPIRATORY: ABNORMAL
CULTURE, RESPIRATORY: NORMAL
EOSINOPHILS ABSOLUTE: 0 K/UL (ref 0–0.6)
EOSINOPHILS ABSOLUTE: 0.1 K/UL (ref 0–0.6)
EOSINOPHILS ABSOLUTE: 0.2 K/UL (ref 0–0.6)
EOSINOPHILS ABSOLUTE: 0.3 K/UL (ref 0–0.6)
EOSINOPHILS ABSOLUTE: 0.4 K/UL (ref 0–0.6)
EOSINOPHILS ABSOLUTE: 0.9 K/UL (ref 0–0.6)
EOSINOPHILS ABSOLUTE: 1.2 K/UL (ref 0–0.6)
EOSINOPHILS RELATIVE PERCENT: 0 %
EOSINOPHILS RELATIVE PERCENT: 0.2 %
EOSINOPHILS RELATIVE PERCENT: 1 %
EOSINOPHILS RELATIVE PERCENT: 1.3 %
EOSINOPHILS RELATIVE PERCENT: 2.2 %
EOSINOPHILS RELATIVE PERCENT: 4.7 %
EOSINOPHILS RELATIVE PERCENT: 6.7 %
ESTIMATED AVERAGE GLUCOSE: 139.9 MG/DL
FERRITIN: 759.4 NG/ML (ref 30–400)
FOLATE: 13.9 NG/ML (ref 4.78–24.2)
GFR AFRICAN AMERICAN: >60
GFR NON-AFRICAN AMERICAN: >60
GLOBULIN: 3 G/DL
GLUCOSE BLD-MCNC: 105 MG/DL (ref 70–99)
GLUCOSE BLD-MCNC: 107 MG/DL (ref 70–99)
GLUCOSE BLD-MCNC: 107 MG/DL (ref 70–99)
GLUCOSE BLD-MCNC: 124 MG/DL (ref 70–99)
GLUCOSE BLD-MCNC: 126 MG/DL (ref 70–99)
GLUCOSE BLD-MCNC: 129 MG/DL (ref 70–99)
GLUCOSE BLD-MCNC: 133 MG/DL (ref 70–99)
GLUCOSE BLD-MCNC: 139 MG/DL (ref 70–99)
GLUCOSE BLD-MCNC: 148 MG/DL (ref 70–99)
GLUCOSE BLD-MCNC: 164 MG/DL (ref 70–99)
GLUCOSE BLD-MCNC: 192 MG/DL (ref 70–99)
GLUCOSE BLD-MCNC: 200 MG/DL (ref 70–99)
GLUCOSE BLD-MCNC: 203 MG/DL (ref 70–99)
GLUCOSE BLD-MCNC: 203 MG/DL (ref 70–99)
GLUCOSE BLD-MCNC: 212 MG/DL (ref 70–99)
GLUCOSE BLD-MCNC: 213 MG/DL (ref 70–99)
GLUCOSE BLD-MCNC: 217 MG/DL (ref 70–99)
GLUCOSE BLD-MCNC: 223 MG/DL (ref 70–99)
GLUCOSE BLD-MCNC: 235 MG/DL (ref 70–99)
GLUCOSE BLD-MCNC: 239 MG/DL (ref 70–99)
GLUCOSE BLD-MCNC: 241 MG/DL (ref 70–99)
GLUCOSE BLD-MCNC: 243 MG/DL (ref 70–99)
GLUCOSE BLD-MCNC: 245 MG/DL (ref 70–99)
GLUCOSE BLD-MCNC: 252 MG/DL (ref 70–99)
GLUCOSE BLD-MCNC: 253 MG/DL (ref 70–99)
GLUCOSE BLD-MCNC: 262 MG/DL (ref 70–99)
GLUCOSE BLD-MCNC: 271 MG/DL (ref 70–99)
GLUCOSE BLD-MCNC: 281 MG/DL (ref 70–99)
GLUCOSE BLD-MCNC: 294 MG/DL (ref 70–99)
GLUCOSE BLD-MCNC: 314 MG/DL (ref 70–99)
GLUCOSE BLD-MCNC: 314 MG/DL (ref 70–99)
GLUCOSE BLD-MCNC: 317 MG/DL (ref 70–99)
GLUCOSE BLD-MCNC: 321 MG/DL (ref 70–99)
GLUCOSE BLD-MCNC: 96 MG/DL (ref 70–99)
GRAM STAIN RESULT: ABNORMAL
GRAM STAIN RESULT: NORMAL
HAPTOGLOBIN: 339 MG/DL (ref 30–200)
HBA1C MFR BLD: 5.7 %
HBA1C MFR BLD: 6.2 %
HBA1C MFR BLD: 6.5 %
HCT VFR BLD CALC: 31.5 % (ref 40.5–52.5)
HCT VFR BLD CALC: 31.7 % (ref 40.5–52.5)
HCT VFR BLD CALC: 31.7 % (ref 40.5–52.5)
HCT VFR BLD CALC: 32.6 % (ref 40.5–52.5)
HCT VFR BLD CALC: 33 % (ref 40.5–52.5)
HCT VFR BLD CALC: 33.3 % (ref 40.5–52.5)
HCT VFR BLD CALC: 33.6 % (ref 40.5–52.5)
HCT VFR BLD CALC: 36 % (ref 40.5–52.5)
HEMATOLOGY PATH CONSULT: NO
HEMATOLOGY PATH CONSULT: NORMAL
HEMATOLOGY PATH CONSULT: YES
HEMOGLOBIN: 10.1 G/DL (ref 13.5–17.5)
HEMOGLOBIN: 10.2 G/DL (ref 13.5–17.5)
HEMOGLOBIN: 10.2 G/DL (ref 13.5–17.5)
HEMOGLOBIN: 10.5 G/DL (ref 13.5–17.5)
HEMOGLOBIN: 10.6 G/DL (ref 13.5–17.5)
HEMOGLOBIN: 10.8 G/DL (ref 13.5–17.5)
HEMOGLOBIN: 11.8 G/DL (ref 13.5–17.5)
IMMATURE RETIC FRACT: 0.4 (ref 0.21–0.37)
IRON SATURATION: 11 % (ref 20–50)
IRON: 20 UG/DL (ref 59–158)
JAK2 V617F MUTATION: 18 %
L. PNEUMOPHILA SEROGP 1 UR AG: NORMAL
LACTATE DEHYDROGENASE: 481 U/L (ref 100–190)
LACTIC ACID: 0.9 MMOL/L (ref 0.4–2)
LACTIC ACID: 1 MMOL/L (ref 0.4–2)
LACTIC ACID: 3.2 MMOL/L (ref 0.4–2)
LYMPHOCYTES ABSOLUTE: 0.5 K/UL (ref 1–5.1)
LYMPHOCYTES ABSOLUTE: 0.5 K/UL (ref 1–5.1)
LYMPHOCYTES ABSOLUTE: 1.4 K/UL (ref 1–5.1)
LYMPHOCYTES ABSOLUTE: 1.5 K/UL (ref 1–5.1)
LYMPHOCYTES ABSOLUTE: 1.7 K/UL (ref 1–5.1)
LYMPHOCYTES ABSOLUTE: 1.9 K/UL (ref 1–5.1)
LYMPHOCYTES ABSOLUTE: 1.9 K/UL (ref 1–5.1)
LYMPHOCYTES RELATIVE PERCENT: 10.2 %
LYMPHOCYTES RELATIVE PERCENT: 10.7 %
LYMPHOCYTES RELATIVE PERCENT: 2 %
LYMPHOCYTES RELATIVE PERCENT: 2.4 %
LYMPHOCYTES RELATIVE PERCENT: 6 %
LYMPHOCYTES RELATIVE PERCENT: 8.2 %
LYMPHOCYTES RELATIVE PERCENT: 9.3 %
MAGNESIUM: 1.9 MG/DL (ref 1.8–2.4)
MAGNESIUM: 2.7 MG/DL (ref 1.8–2.4)
MCH RBC QN AUTO: 28.5 PG (ref 26–34)
MCH RBC QN AUTO: 28.5 PG (ref 26–34)
MCH RBC QN AUTO: 28.6 PG (ref 26–34)
MCH RBC QN AUTO: 28.8 PG (ref 26–34)
MCH RBC QN AUTO: 28.9 PG (ref 26–34)
MCH RBC QN AUTO: 28.9 PG (ref 26–34)
MCH RBC QN AUTO: 29.2 PG (ref 26–34)
MCHC RBC AUTO-ENTMCNC: 31.9 G/DL (ref 31–36)
MCHC RBC AUTO-ENTMCNC: 32 G/DL (ref 31–36)
MCHC RBC AUTO-ENTMCNC: 32.1 G/DL (ref 31–36)
MCHC RBC AUTO-ENTMCNC: 32.3 G/DL (ref 31–36)
MCHC RBC AUTO-ENTMCNC: 32.4 G/DL (ref 31–36)
MCHC RBC AUTO-ENTMCNC: 32.6 G/DL (ref 31–36)
MCHC RBC AUTO-ENTMCNC: 32.8 G/DL (ref 31–36)
MCV RBC AUTO: 88.3 FL (ref 80–100)
MCV RBC AUTO: 88.7 FL (ref 80–100)
MCV RBC AUTO: 89.1 FL (ref 80–100)
MCV RBC AUTO: 90.1 FL (ref 80–100)
MCV RBC AUTO: 90.3 FL (ref 80–100)
MICROALBUMIN/CREAT 24H UR: NORMAL MG/G{CREAT}
MICROALBUMIN/CREAT UR-RTO: 30
MONOCYTES ABSOLUTE: 0.6 K/UL (ref 0–1.3)
MONOCYTES ABSOLUTE: 1.1 K/UL (ref 0–1.3)
MONOCYTES ABSOLUTE: 1.1 K/UL (ref 0–1.3)
MONOCYTES ABSOLUTE: 1.2 K/UL (ref 0–1.3)
MONOCYTES ABSOLUTE: 1.3 K/UL (ref 0–1.3)
MONOCYTES ABSOLUTE: 1.4 K/UL (ref 0–1.3)
MONOCYTES ABSOLUTE: 1.5 K/UL (ref 0–1.3)
MONOCYTES RELATIVE PERCENT: 2.7 %
MONOCYTES RELATIVE PERCENT: 5 %
MONOCYTES RELATIVE PERCENT: 5.8 %
MONOCYTES RELATIVE PERCENT: 6 %
MONOCYTES RELATIVE PERCENT: 6.1 %
MONOCYTES RELATIVE PERCENT: 7.3 %
MONOCYTES RELATIVE PERCENT: 7.9 %
NEUTROPHILS ABSOLUTE: 12.8 K/UL (ref 1.7–7.7)
NEUTROPHILS ABSOLUTE: 13.5 K/UL (ref 1.7–7.7)
NEUTROPHILS ABSOLUTE: 14.5 K/UL (ref 1.7–7.7)
NEUTROPHILS ABSOLUTE: 16.6 K/UL (ref 1.7–7.7)
NEUTROPHILS ABSOLUTE: 19.7 K/UL (ref 1.7–7.7)
NEUTROPHILS ABSOLUTE: 20.5 K/UL (ref 1.7–7.7)
NEUTROPHILS ABSOLUTE: 24.2 K/UL (ref 1.7–7.7)
NEUTROPHILS RELATIVE PERCENT: 75.5 %
NEUTROPHILS RELATIVE PERCENT: 78.4 %
NEUTROPHILS RELATIVE PERCENT: 80.9 %
NEUTROPHILS RELATIVE PERCENT: 81.5 %
NEUTROPHILS RELATIVE PERCENT: 87 %
NEUTROPHILS RELATIVE PERCENT: 87.9 %
NEUTROPHILS RELATIVE PERCENT: 93.9 %
ORGANISM: ABNORMAL
ORGANISM: ABNORMAL
PDW BLD-RTO: 16.2 % (ref 12.4–15.4)
PDW BLD-RTO: 16.3 % (ref 12.4–15.4)
PDW BLD-RTO: 16.4 % (ref 12.4–15.4)
PDW BLD-RTO: 16.4 % (ref 12.4–15.4)
PDW BLD-RTO: 16.5 % (ref 12.4–15.4)
PDW BLD-RTO: 16.7 % (ref 12.4–15.4)
PDW BLD-RTO: 17 % (ref 12.4–15.4)
PERFORMED ON: ABNORMAL
PERFORMED ON: NORMAL
PH VENOUS: 7.4 (ref 7.35–7.45)
PHOSPHORUS: 2.1 MG/DL (ref 2.5–4.9)
PLATELET # BLD: 1041 K/UL (ref 135–450)
PLATELET # BLD: 1245 K/UL (ref 135–450)
PLATELET # BLD: 1257 K/UL (ref 135–450)
PLATELET # BLD: 1280 K/UL (ref 135–450)
PLATELET # BLD: 1296 K/UL (ref 135–450)
PLATELET # BLD: 1306 K/UL (ref 135–450)
PLATELET # BLD: 1344 K/UL (ref 135–450)
PLATELET SLIDE REVIEW: ABNORMAL
PLATELET SLIDE REVIEW: ABNORMAL
PMV BLD AUTO: 7 FL (ref 5–10.5)
PMV BLD AUTO: 7.2 FL (ref 5–10.5)
PMV BLD AUTO: 7.3 FL (ref 5–10.5)
PMV BLD AUTO: 7.4 FL (ref 5–10.5)
PMV BLD AUTO: 7.4 FL (ref 5–10.5)
PMV BLD AUTO: 7.5 FL (ref 5–10.5)
PMV BLD AUTO: 7.6 FL (ref 5–10.5)
POTASSIUM REFLEX MAGNESIUM: 3.2 MMOL/L (ref 3.5–5.1)
POTASSIUM REFLEX MAGNESIUM: 3.6 MMOL/L (ref 3.5–5.1)
POTASSIUM REFLEX MAGNESIUM: 3.6 MMOL/L (ref 3.5–5.1)
POTASSIUM REFLEX MAGNESIUM: 3.7 MMOL/L (ref 3.5–5.1)
POTASSIUM REFLEX MAGNESIUM: 3.7 MMOL/L (ref 3.5–5.1)
POTASSIUM REFLEX MAGNESIUM: 4 MMOL/L (ref 3.5–5.1)
POTASSIUM SERPL-SCNC: 3.8 MMOL/L (ref 3.5–5.1)
POTASSIUM SERPL-SCNC: 4.8 MMOL/L (ref 3.5–5.1)
RBC # BLD: 3.49 M/UL (ref 4.2–5.9)
RBC # BLD: 3.52 M/UL (ref 4.2–5.9)
RBC # BLD: 3.57 M/UL (ref 4.2–5.9)
RBC # BLD: 3.67 M/UL (ref 4.2–5.9)
RBC # BLD: 3.7 M/UL (ref 4.2–5.9)
RBC # BLD: 3.79 M/UL (ref 4.2–5.9)
RBC # BLD: 4.08 M/UL (ref 4.2–5.9)
RETICULOCYTE ABSOLUTE COUNT: 0.05 M/UL
RETICULOCYTE COUNT PCT: 1.44 % (ref 0.5–2.18)
SLIDE REVIEW: ABNORMAL
SLIDE REVIEW: ABNORMAL
SODIUM BLD-SCNC: 130 MMOL/L (ref 136–145)
SODIUM BLD-SCNC: 131 MMOL/L (ref 136–145)
SODIUM BLD-SCNC: 131 MMOL/L (ref 136–145)
SODIUM BLD-SCNC: 132 MMOL/L (ref 136–145)
SODIUM BLD-SCNC: 134 MMOL/L (ref 136–145)
SODIUM BLD-SCNC: 136 MMOL/L (ref 136–145)
SODIUM BLD-SCNC: 137 MMOL/L (ref 136–145)
SODIUM BLD-SCNC: 138 MMOL/L (ref 136–145)
SOLUBLE TRANSFERRIN RECEPT: 2.3 MG/L (ref 2.2–5)
STREP PNEUMONIAE ANTIGEN, URINE: NORMAL
TOTAL IRON BINDING CAPACITY: 185 UG/DL (ref 260–445)
TOTAL PROTEIN: 6.6 G/DL (ref 6.4–8.2)
VACUOLATED NEUTROPHILS: PRESENT
VANCOMYCIN TROUGH: 6 UG/ML (ref 10–20)
VITAMIN B-12: 394 PG/ML (ref 211–911)
WBC # BLD: 15.8 K/UL (ref 4–11)
WBC # BLD: 17.8 K/UL (ref 4–11)
WBC # BLD: 18.5 K/UL (ref 4–11)
WBC # BLD: 20.3 K/UL (ref 4–11)
WBC # BLD: 20.9 K/UL (ref 4–11)
WBC # BLD: 23.3 K/UL (ref 4–11)
WBC # BLD: 26.3 K/UL (ref 4–11)

## 2019-01-01 PROCEDURE — 99233 SBSQ HOSP IP/OBS HIGH 50: CPT | Performed by: INTERNAL MEDICINE

## 2019-01-01 PROCEDURE — 2580000003 HC RX 258: Performed by: PHYSICIAN ASSISTANT

## 2019-01-01 PROCEDURE — 4500000025 HC ED LEVEL 5 PROCEDURE

## 2019-01-01 PROCEDURE — 96365 THER/PROPH/DIAG IV INF INIT: CPT

## 2019-01-01 PROCEDURE — 6370000000 HC RX 637 (ALT 250 FOR IP): Performed by: EMERGENCY MEDICINE

## 2019-01-01 PROCEDURE — 80048 BASIC METABOLIC PNL TOTAL CA: CPT

## 2019-01-01 PROCEDURE — 83735 ASSAY OF MAGNESIUM: CPT

## 2019-01-01 PROCEDURE — 82044 UR ALBUMIN SEMIQUANTITATIVE: CPT | Performed by: NURSE PRACTITIONER

## 2019-01-01 PROCEDURE — 2580000003 HC RX 258: Performed by: INTERNAL MEDICINE

## 2019-01-01 PROCEDURE — 94761 N-INVAS EAR/PLS OXIMETRY MLT: CPT

## 2019-01-01 PROCEDURE — 6370000000 HC RX 637 (ALT 250 FOR IP): Performed by: INTERNAL MEDICINE

## 2019-01-01 PROCEDURE — 1123F ACP DISCUSS/DSCN MKR DOCD: CPT | Performed by: NURSE PRACTITIONER

## 2019-01-01 PROCEDURE — 3023F SPIROM DOC REV: CPT | Performed by: NURSE PRACTITIONER

## 2019-01-01 PROCEDURE — G8419 CALC BMI OUT NRM PARAM NOF/U: HCPCS | Performed by: ORTHOPAEDIC SURGERY

## 2019-01-01 PROCEDURE — 1111F DSCHRG MED/CURRENT MED MERGE: CPT | Performed by: NURSE PRACTITIONER

## 2019-01-01 PROCEDURE — 6360000002 HC RX W HCPCS: Performed by: INTERNAL MEDICINE

## 2019-01-01 PROCEDURE — G8419 CALC BMI OUT NRM PARAM NOF/U: HCPCS | Performed by: NURSE PRACTITIONER

## 2019-01-01 PROCEDURE — 4040F PNEUMOC VAC/ADMIN/RCVD: CPT | Performed by: NURSE PRACTITIONER

## 2019-01-01 PROCEDURE — 97165 OT EVAL LOW COMPLEX 30 MIN: CPT

## 2019-01-01 PROCEDURE — 94669 MECHANICAL CHEST WALL OSCILL: CPT

## 2019-01-01 PROCEDURE — 3044F HG A1C LEVEL LT 7.0%: CPT | Performed by: NURSE PRACTITIONER

## 2019-01-01 PROCEDURE — 6360000002 HC RX W HCPCS: Performed by: EMERGENCY MEDICINE

## 2019-01-01 PROCEDURE — 97535 SELF CARE MNGMENT TRAINING: CPT

## 2019-01-01 PROCEDURE — 87205 SMEAR GRAM STAIN: CPT

## 2019-01-01 PROCEDURE — 94640 AIRWAY INHALATION TREATMENT: CPT

## 2019-01-01 PROCEDURE — 6370000000 HC RX 637 (ALT 250 FOR IP): Performed by: PHYSICIAN ASSISTANT

## 2019-01-01 PROCEDURE — 99283 EMERGENCY DEPT VISIT LOW MDM: CPT

## 2019-01-01 PROCEDURE — 87070 CULTURE OTHR SPECIMN AEROBIC: CPT

## 2019-01-01 PROCEDURE — 92526 ORAL FUNCTION THERAPY: CPT

## 2019-01-01 PROCEDURE — 2700000000 HC OXYGEN THERAPY PER DAY

## 2019-01-01 PROCEDURE — 1036F TOBACCO NON-USER: CPT | Performed by: NURSE PRACTITIONER

## 2019-01-01 PROCEDURE — 82607 VITAMIN B-12: CPT

## 2019-01-01 PROCEDURE — 96367 TX/PROPH/DG ADDL SEQ IV INF: CPT

## 2019-01-01 PROCEDURE — 97530 THERAPEUTIC ACTIVITIES: CPT

## 2019-01-01 PROCEDURE — G8427 DOCREV CUR MEDS BY ELIG CLIN: HCPCS | Performed by: NURSE PRACTITIONER

## 2019-01-01 PROCEDURE — 4040F PNEUMOC VAC/ADMIN/RCVD: CPT | Performed by: INTERNAL MEDICINE

## 2019-01-01 PROCEDURE — 36415 COLL VENOUS BLD VENIPUNCTURE: CPT

## 2019-01-01 PROCEDURE — 1036F TOBACCO NON-USER: CPT | Performed by: INTERNAL MEDICINE

## 2019-01-01 PROCEDURE — G8926 SPIRO NO PERF OR DOC: HCPCS | Performed by: INTERNAL MEDICINE

## 2019-01-01 PROCEDURE — 6360000002 HC RX W HCPCS: Performed by: PHYSICIAN ASSISTANT

## 2019-01-01 PROCEDURE — G8926 SPIRO NO PERF OR DOC: HCPCS | Performed by: NURSE PRACTITIONER

## 2019-01-01 PROCEDURE — 1123F ACP DISCUSS/DSCN MKR DOCD: CPT | Performed by: INTERNAL MEDICINE

## 2019-01-01 PROCEDURE — 3017F COLORECTAL CA SCREEN DOC REV: CPT | Performed by: INTERNAL MEDICINE

## 2019-01-01 PROCEDURE — 80202 ASSAY OF VANCOMYCIN: CPT

## 2019-01-01 PROCEDURE — 87449 NOS EACH ORGANISM AG IA: CPT

## 2019-01-01 PROCEDURE — G0438 PPPS, INITIAL VISIT: HCPCS | Performed by: NURSE PRACTITIONER

## 2019-01-01 PROCEDURE — 99214 OFFICE O/P EST MOD 30 MIN: CPT | Performed by: INTERNAL MEDICINE

## 2019-01-01 PROCEDURE — 2000000000 HC ICU R&B

## 2019-01-01 PROCEDURE — 85025 COMPLETE CBC W/AUTO DIFF WBC: CPT

## 2019-01-01 PROCEDURE — 97116 GAIT TRAINING THERAPY: CPT

## 2019-01-01 PROCEDURE — 99285 EMERGENCY DEPT VISIT HI MDM: CPT

## 2019-01-01 PROCEDURE — 83605 ASSAY OF LACTIC ACID: CPT

## 2019-01-01 PROCEDURE — 71045 X-RAY EXAM CHEST 1 VIEW: CPT

## 2019-01-01 PROCEDURE — 94618 PULMONARY STRESS TESTING: CPT

## 2019-01-01 PROCEDURE — 99214 OFFICE O/P EST MOD 30 MIN: CPT | Performed by: NURSE PRACTITIONER

## 2019-01-01 PROCEDURE — 99213 OFFICE O/P EST LOW 20 MIN: CPT | Performed by: NURSE PRACTITIONER

## 2019-01-01 PROCEDURE — 82746 ASSAY OF FOLIC ACID SERUM: CPT

## 2019-01-01 PROCEDURE — G0008 ADMIN INFLUENZA VIRUS VAC: HCPCS | Performed by: NURSE PRACTITIONER

## 2019-01-01 PROCEDURE — 80069 RENAL FUNCTION PANEL: CPT

## 2019-01-01 PROCEDURE — G8427 DOCREV CUR MEDS BY ELIG CLIN: HCPCS | Performed by: ORTHOPAEDIC SURGERY

## 2019-01-01 PROCEDURE — 93000 ELECTROCARDIOGRAM COMPLETE: CPT | Performed by: NURSE PRACTITIONER

## 2019-01-01 PROCEDURE — 2022F DILAT RTA XM EVC RTNOPTHY: CPT | Performed by: NURSE PRACTITIONER

## 2019-01-01 PROCEDURE — 87040 BLOOD CULTURE FOR BACTERIA: CPT

## 2019-01-01 PROCEDURE — 2580000003 HC RX 258

## 2019-01-01 PROCEDURE — 3017F COLORECTAL CA SCREEN DOC REV: CPT | Performed by: NURSE PRACTITIONER

## 2019-01-01 PROCEDURE — 83540 ASSAY OF IRON: CPT

## 2019-01-01 PROCEDURE — 96366 THER/PROPH/DIAG IV INF ADDON: CPT

## 2019-01-01 PROCEDURE — 99232 SBSQ HOSP IP/OBS MODERATE 35: CPT | Performed by: INTERNAL MEDICINE

## 2019-01-01 PROCEDURE — 83010 ASSAY OF HAPTOGLOBIN QUANT: CPT

## 2019-01-01 PROCEDURE — G8482 FLU IMMUNIZE ORDER/ADMIN: HCPCS | Performed by: NURSE PRACTITIONER

## 2019-01-01 PROCEDURE — 83036 HEMOGLOBIN GLYCOSYLATED A1C: CPT

## 2019-01-01 PROCEDURE — 83550 IRON BINDING TEST: CPT

## 2019-01-01 PROCEDURE — 71250 CT THORAX DX C-: CPT

## 2019-01-01 PROCEDURE — 83036 HEMOGLOBIN GLYCOSYLATED A1C: CPT | Performed by: NURSE PRACTITIONER

## 2019-01-01 PROCEDURE — 92610 EVALUATE SWALLOWING FUNCTION: CPT

## 2019-01-01 PROCEDURE — 73560 X-RAY EXAM OF KNEE 1 OR 2: CPT

## 2019-01-01 PROCEDURE — 90653 IIV ADJUVANT VACCINE IM: CPT | Performed by: NURSE PRACTITIONER

## 2019-01-01 PROCEDURE — 85045 AUTOMATED RETICULOCYTE COUNT: CPT

## 2019-01-01 PROCEDURE — 81270 JAK2 GENE: CPT

## 2019-01-01 PROCEDURE — 82330 ASSAY OF CALCIUM: CPT

## 2019-01-01 PROCEDURE — 80053 COMPREHEN METABOLIC PANEL: CPT

## 2019-01-01 PROCEDURE — G8419 CALC BMI OUT NRM PARAM NOF/U: HCPCS | Performed by: INTERNAL MEDICINE

## 2019-01-01 PROCEDURE — G8427 DOCREV CUR MEDS BY ELIG CLIN: HCPCS | Performed by: INTERNAL MEDICINE

## 2019-01-01 PROCEDURE — 87077 CULTURE AEROBIC IDENTIFY: CPT

## 2019-01-01 PROCEDURE — 83615 LACTATE (LD) (LDH) ENZYME: CPT

## 2019-01-01 PROCEDURE — 97162 PT EVAL MOD COMPLEX 30 MIN: CPT

## 2019-01-01 PROCEDURE — 2500000003 HC RX 250 WO HCPCS: Performed by: INTERNAL MEDICINE

## 2019-01-01 PROCEDURE — 1200000000 HC SEMI PRIVATE

## 2019-01-01 PROCEDURE — 84238 ASSAY NONENDOCRINE RECEPTOR: CPT

## 2019-01-01 PROCEDURE — 82728 ASSAY OF FERRITIN: CPT

## 2019-01-01 PROCEDURE — 2580000003 HC RX 258: Performed by: EMERGENCY MEDICINE

## 2019-01-01 PROCEDURE — 6370000000 HC RX 637 (ALT 250 FOR IP)

## 2019-01-01 PROCEDURE — 96368 THER/DIAG CONCURRENT INF: CPT

## 2019-01-01 PROCEDURE — 99291 CRITICAL CARE FIRST HOUR: CPT | Performed by: INTERNAL MEDICINE

## 2019-01-01 PROCEDURE — 99203 OFFICE O/P NEW LOW 30 MIN: CPT | Performed by: ORTHOPAEDIC SURGERY

## 2019-01-01 PROCEDURE — 3023F SPIROM DOC REV: CPT | Performed by: INTERNAL MEDICINE

## 2019-01-01 PROCEDURE — 99239 HOSP IP/OBS DSCHRG MGMT >30: CPT | Performed by: INTERNAL MEDICINE

## 2019-01-01 PROCEDURE — 87186 SC STD MICRODIL/AGAR DIL: CPT

## 2019-01-01 PROCEDURE — 02HV33Z INSERTION OF INFUSION DEVICE INTO SUPERIOR VENA CAVA, PERCUTANEOUS APPROACH: ICD-10-PCS | Performed by: EMERGENCY MEDICINE

## 2019-01-01 PROCEDURE — 94640 AIRWAY INHALATION TREATMENT: CPT | Performed by: NURSE PRACTITIONER

## 2019-01-01 RX ORDER — METHYLPREDNISOLONE SODIUM SUCCINATE 40 MG/ML
40 INJECTION, POWDER, LYOPHILIZED, FOR SOLUTION INTRAMUSCULAR; INTRAVENOUS DAILY
Status: DISCONTINUED | OUTPATIENT
Start: 2019-01-01 | End: 2019-01-01 | Stop reason: HOSPADM

## 2019-01-01 RX ORDER — IPRATROPIUM BROMIDE AND ALBUTEROL SULFATE 2.5; .5 MG/3ML; MG/3ML
SOLUTION RESPIRATORY (INHALATION)
Qty: 360 ML | Refills: 3 | Status: SHIPPED | OUTPATIENT
Start: 2019-01-01 | End: 2020-01-01 | Stop reason: SDUPTHER

## 2019-01-01 RX ORDER — IPRATROPIUM BROMIDE AND ALBUTEROL SULFATE 2.5; .5 MG/3ML; MG/3ML
1 SOLUTION RESPIRATORY (INHALATION)
Status: DISCONTINUED | OUTPATIENT
Start: 2019-01-01 | End: 2019-01-01

## 2019-01-01 RX ORDER — MELOXICAM 15 MG/1
15 TABLET ORAL DAILY
Qty: 30 TABLET | Refills: 5 | Status: SHIPPED | OUTPATIENT
Start: 2019-01-01 | End: 2020-01-01

## 2019-01-01 RX ORDER — GUAIFENESIN 600 MG/1
600 TABLET, EXTENDED RELEASE ORAL 2 TIMES DAILY
Status: DISCONTINUED | OUTPATIENT
Start: 2019-01-01 | End: 2019-01-01 | Stop reason: HOSPADM

## 2019-01-01 RX ORDER — DEXTROSE MONOHYDRATE 25 G/50ML
12.5 INJECTION, SOLUTION INTRAVENOUS PRN
Status: DISCONTINUED | OUTPATIENT
Start: 2019-01-01 | End: 2019-01-01 | Stop reason: HOSPADM

## 2019-01-01 RX ORDER — HYDROXYUREA 500 MG/1
CAPSULE ORAL
COMMUNITY
Start: 2019-01-01

## 2019-01-01 RX ORDER — ACETAMINOPHEN 325 MG/1
650 TABLET ORAL EVERY 4 HOURS PRN
Status: DISCONTINUED | OUTPATIENT
Start: 2019-01-01 | End: 2019-01-01 | Stop reason: HOSPADM

## 2019-01-01 RX ORDER — TRAMADOL HYDROCHLORIDE 50 MG/1
50 TABLET ORAL EVERY 8 HOURS PRN
Qty: 40 TABLET | Refills: 0 | Status: SHIPPED | OUTPATIENT
Start: 2019-01-01 | End: 2019-01-01 | Stop reason: SDUPTHER

## 2019-01-01 RX ORDER — ONDANSETRON 2 MG/ML
4 INJECTION INTRAMUSCULAR; INTRAVENOUS EVERY 6 HOURS PRN
Status: DISCONTINUED | OUTPATIENT
Start: 2019-01-01 | End: 2019-01-01 | Stop reason: HOSPADM

## 2019-01-01 RX ORDER — IPRATROPIUM BROMIDE AND ALBUTEROL SULFATE 2.5; .5 MG/3ML; MG/3ML
SOLUTION RESPIRATORY (INHALATION)
Refills: 1 | OUTPATIENT
Start: 2019-01-01

## 2019-01-01 RX ORDER — 0.9 % SODIUM CHLORIDE 0.9 %
250 INTRAVENOUS SOLUTION INTRAVENOUS PRN
Status: DISCONTINUED | OUTPATIENT
Start: 2019-01-01 | End: 2019-01-01

## 2019-01-01 RX ORDER — TRAMADOL HYDROCHLORIDE 50 MG/1
TABLET ORAL
Qty: 28 TABLET | Refills: 0 | Status: SHIPPED | OUTPATIENT
Start: 2019-01-01 | End: 2019-01-01 | Stop reason: SDUPTHER

## 2019-01-01 RX ORDER — PREDNISONE 20 MG/1
60 TABLET ORAL ONCE
Status: COMPLETED | OUTPATIENT
Start: 2019-01-01 | End: 2019-01-01

## 2019-01-01 RX ORDER — 0.9 % SODIUM CHLORIDE 0.9 %
1000 INTRAVENOUS SOLUTION INTRAVENOUS ONCE
Status: COMPLETED | OUTPATIENT
Start: 2019-01-01 | End: 2019-01-01

## 2019-01-01 RX ORDER — CALCIUM GLUCONATE 94 MG/ML
1 INJECTION, SOLUTION INTRAVENOUS ONCE
Status: DISCONTINUED | OUTPATIENT
Start: 2019-01-01 | End: 2019-01-01 | Stop reason: SDUPTHER

## 2019-01-01 RX ORDER — TRAMADOL HYDROCHLORIDE 50 MG/1
50 TABLET ORAL EVERY 6 HOURS PRN
Qty: 30 TABLET | Refills: 0 | Status: SHIPPED | OUTPATIENT
Start: 2019-01-01 | End: 2019-01-01 | Stop reason: SDUPTHER

## 2019-01-01 RX ORDER — IPRATROPIUM BROMIDE AND ALBUTEROL SULFATE 2.5; .5 MG/3ML; MG/3ML
1 SOLUTION RESPIRATORY (INHALATION) EVERY 4 HOURS
Status: DISCONTINUED | OUTPATIENT
Start: 2019-01-01 | End: 2019-01-01

## 2019-01-01 RX ORDER — LANCETS 30 GAUGE
1 EACH MISCELLANEOUS DAILY
Qty: 50 EACH | Refills: 3 | Status: SHIPPED | OUTPATIENT
Start: 2019-01-01 | End: 2021-01-16

## 2019-01-01 RX ORDER — SODIUM CHLORIDE 9 MG/ML
INJECTION, SOLUTION INTRAVENOUS CONTINUOUS
Status: DISCONTINUED | OUTPATIENT
Start: 2019-01-01 | End: 2019-01-01 | Stop reason: HOSPADM

## 2019-01-01 RX ORDER — CIPROFLOXACIN 750 MG/1
750 TABLET, FILM COATED ORAL 2 TIMES DAILY
Qty: 10 TABLET | Refills: 0 | Status: SHIPPED | OUTPATIENT
Start: 2019-01-01 | End: 2019-01-01

## 2019-01-01 RX ORDER — DEXTROSE MONOHYDRATE 50 MG/ML
100 INJECTION, SOLUTION INTRAVENOUS PRN
Status: DISCONTINUED | OUTPATIENT
Start: 2019-01-01 | End: 2019-01-01 | Stop reason: HOSPADM

## 2019-01-01 RX ORDER — NICOTINE POLACRILEX 4 MG
15 LOZENGE BUCCAL PRN
Status: DISCONTINUED | OUTPATIENT
Start: 2019-01-01 | End: 2019-01-01 | Stop reason: HOSPADM

## 2019-01-01 RX ORDER — CALCIUM GLUCONATE 94 MG/ML
1 INJECTION, SOLUTION INTRAVENOUS ONCE
Status: COMPLETED | OUTPATIENT
Start: 2019-01-01 | End: 2019-01-01

## 2019-01-01 RX ORDER — SODIUM CHLORIDE 9 MG/ML
INJECTION, SOLUTION INTRAVENOUS
Status: COMPLETED
Start: 2019-01-01 | End: 2019-01-01

## 2019-01-01 RX ORDER — OXYMETAZOLINE HYDROCHLORIDE 0.05 G/100ML
2 SPRAY NASAL 2 TIMES DAILY
Status: COMPLETED | OUTPATIENT
Start: 2019-01-01 | End: 2019-01-01

## 2019-01-01 RX ORDER — MAGNESIUM SULFATE IN WATER 40 MG/ML
2 INJECTION, SOLUTION INTRAVENOUS ONCE
Status: COMPLETED | OUTPATIENT
Start: 2019-01-01 | End: 2019-01-01

## 2019-01-01 RX ORDER — TRAMADOL HYDROCHLORIDE 50 MG/1
50 TABLET ORAL EVERY 6 HOURS PRN
Qty: 28 TABLET | Refills: 0 | Status: SHIPPED | OUTPATIENT
Start: 2019-01-01 | End: 2019-01-01 | Stop reason: SDUPTHER

## 2019-01-01 RX ORDER — 0.9 % SODIUM CHLORIDE 0.9 %
250 INTRAVENOUS SOLUTION INTRAVENOUS ONCE
Status: COMPLETED | OUTPATIENT
Start: 2019-01-01 | End: 2019-01-01

## 2019-01-01 RX ORDER — FUROSEMIDE 10 MG/ML
20 INJECTION INTRAMUSCULAR; INTRAVENOUS ONCE
Status: COMPLETED | OUTPATIENT
Start: 2019-01-01 | End: 2019-01-01

## 2019-01-01 RX ORDER — IPRATROPIUM BROMIDE AND ALBUTEROL SULFATE 2.5; .5 MG/3ML; MG/3ML
1 SOLUTION RESPIRATORY (INHALATION)
Status: DISCONTINUED | OUTPATIENT
Start: 2019-01-01 | End: 2019-01-01 | Stop reason: HOSPADM

## 2019-01-01 RX ORDER — SODIUM CHLORIDE 9 MG/ML
INJECTION, SOLUTION INTRAVENOUS CONTINUOUS
Status: DISCONTINUED | OUTPATIENT
Start: 2019-01-01 | End: 2019-01-01

## 2019-01-01 RX ORDER — SODIUM CHLORIDE 9 MG/ML
INJECTION, SOLUTION INTRAVENOUS CONTINUOUS
Status: DISCONTINUED | OUTPATIENT
Start: 2019-01-01 | End: 2019-01-01 | Stop reason: ALTCHOICE

## 2019-01-01 RX ORDER — IPRATROPIUM BROMIDE AND ALBUTEROL SULFATE 2.5; .5 MG/3ML; MG/3ML
1 SOLUTION RESPIRATORY (INHALATION) ONCE
Status: COMPLETED | OUTPATIENT
Start: 2019-01-01 | End: 2019-01-01

## 2019-01-01 RX ORDER — AZITHROMYCIN 250 MG/1
500 TABLET, FILM COATED ORAL DAILY
Status: COMPLETED | OUTPATIENT
Start: 2019-01-01 | End: 2019-01-01

## 2019-01-01 RX ORDER — ACETAMINOPHEN 500 MG
1000 TABLET ORAL ONCE
Status: COMPLETED | OUTPATIENT
Start: 2019-01-01 | End: 2019-01-01

## 2019-01-01 RX ORDER — CALCIUM GLUCONATE 94 MG/ML
1 INJECTION, SOLUTION INTRAVENOUS ONCE
Status: DISCONTINUED | OUTPATIENT
Start: 2019-01-01 | End: 2019-01-01

## 2019-01-01 RX ORDER — POTASSIUM CHLORIDE 750 MG/1
40 TABLET, EXTENDED RELEASE ORAL PRN
Status: DISCONTINUED | OUTPATIENT
Start: 2019-01-01 | End: 2019-01-01 | Stop reason: HOSPADM

## 2019-01-01 RX ORDER — LEVOFLOXACIN 500 MG/1
500 TABLET, FILM COATED ORAL DAILY
Qty: 14 TABLET | Refills: 0 | Status: SHIPPED | OUTPATIENT
Start: 2019-01-01 | End: 2019-01-01

## 2019-01-01 RX ORDER — SODIUM CHLORIDE 0.9 % (FLUSH) 0.9 %
10 SYRINGE (ML) INJECTION PRN
Status: DISCONTINUED | OUTPATIENT
Start: 2019-01-01 | End: 2019-01-01 | Stop reason: HOSPADM

## 2019-01-01 RX ORDER — SODIUM CHLORIDE 0.9 % (FLUSH) 0.9 %
10 SYRINGE (ML) INJECTION EVERY 12 HOURS SCHEDULED
Status: DISCONTINUED | OUTPATIENT
Start: 2019-01-01 | End: 2019-01-01 | Stop reason: HOSPADM

## 2019-01-01 RX ORDER — 0.9 % SODIUM CHLORIDE 0.9 %
500 INTRAVENOUS SOLUTION INTRAVENOUS ONCE
Status: COMPLETED | OUTPATIENT
Start: 2019-01-01 | End: 2019-01-01

## 2019-01-01 RX ORDER — LEVOFLOXACIN 500 MG/1
500 TABLET, FILM COATED ORAL DAILY
Qty: 7 TABLET | Refills: 0 | Status: SHIPPED | OUTPATIENT
Start: 2019-01-01 | End: 2019-01-01

## 2019-01-01 RX ORDER — POTASSIUM CHLORIDE 7.45 MG/ML
10 INJECTION INTRAVENOUS PRN
Status: DISCONTINUED | OUTPATIENT
Start: 2019-01-01 | End: 2019-01-01 | Stop reason: HOSPADM

## 2019-01-01 RX ORDER — FLUTICASONE PROPIONATE 50 MCG
2 SPRAY, SUSPENSION (ML) NASAL DAILY
Status: DISCONTINUED | OUTPATIENT
Start: 2019-01-01 | End: 2019-01-01 | Stop reason: HOSPADM

## 2019-01-01 RX ORDER — TRAMADOL HYDROCHLORIDE 50 MG/1
50 TABLET ORAL EVERY 8 HOURS PRN
Qty: 40 TABLET | Refills: 0 | Status: SHIPPED | OUTPATIENT
Start: 2019-01-01 | End: 2020-01-01

## 2019-01-01 RX ORDER — ASPIRIN 81 MG/1
81 TABLET, CHEWABLE ORAL DAILY
Status: DISCONTINUED | OUTPATIENT
Start: 2019-01-01 | End: 2019-01-01 | Stop reason: HOSPADM

## 2019-01-01 RX ORDER — PREDNISONE 10 MG/1
TABLET ORAL
Qty: 30 TABLET | Refills: 0 | Status: SHIPPED | OUTPATIENT
Start: 2019-01-01 | End: 2019-01-01 | Stop reason: ALTCHOICE

## 2019-01-01 RX ORDER — POTASSIUM CHLORIDE 750 MG/1
40 TABLET, EXTENDED RELEASE ORAL
Status: COMPLETED | OUTPATIENT
Start: 2019-01-01 | End: 2019-01-01

## 2019-01-01 RX ORDER — TRAMADOL HYDROCHLORIDE 50 MG/1
50 TABLET ORAL EVERY 6 HOURS PRN
Qty: 28 TABLET | Refills: 0 | Status: CANCELLED | OUTPATIENT
Start: 2019-01-01 | End: 2019-01-01

## 2019-01-01 RX ORDER — ACETAMINOPHEN 500 MG
TABLET ORAL
Status: COMPLETED
Start: 2019-01-01 | End: 2019-01-01

## 2019-01-01 RX ADMIN — PIPERACILLIN SODIUM AND TAZOBACTAM SODIUM 3.38 G: 3; .375 INJECTION, POWDER, LYOPHILIZED, FOR SOLUTION INTRAVENOUS at 10:24

## 2019-01-01 RX ADMIN — INSULIN LISPRO 2 UNITS: 100 INJECTION, SOLUTION INTRAVENOUS; SUBCUTANEOUS at 20:34

## 2019-01-01 RX ADMIN — AZITHROMYCIN MONOHYDRATE 500 MG: 250 TABLET ORAL at 10:43

## 2019-01-01 RX ADMIN — IPRATROPIUM BROMIDE AND ALBUTEROL SULFATE 1 AMPULE: .5; 3 SOLUTION RESPIRATORY (INHALATION) at 16:01

## 2019-01-01 RX ADMIN — ACETAMINOPHEN 650 MG: 325 TABLET ORAL at 20:28

## 2019-01-01 RX ADMIN — PIPERACILLIN SODIUM AND TAZOBACTAM SODIUM 3.38 G: 3; .375 INJECTION, POWDER, LYOPHILIZED, FOR SOLUTION INTRAVENOUS at 11:09

## 2019-01-01 RX ADMIN — INSULIN LISPRO 3 UNITS: 100 INJECTION, SOLUTION INTRAVENOUS; SUBCUTANEOUS at 10:52

## 2019-01-01 RX ADMIN — IPRATROPIUM BROMIDE AND ALBUTEROL SULFATE 1 AMPULE: .5; 3 SOLUTION RESPIRATORY (INHALATION) at 14:40

## 2019-01-01 RX ADMIN — OXYMETAZOLINE HYDROCHLORIDE 2 SPRAY: 5 SPRAY NASAL at 08:06

## 2019-01-01 RX ADMIN — Medication 10 ML: at 20:31

## 2019-01-01 RX ADMIN — PIPERACILLIN SODIUM AND TAZOBACTAM SODIUM 3.38 G: 3; .375 INJECTION, POWDER, LYOPHILIZED, FOR SOLUTION INTRAVENOUS at 02:40

## 2019-01-01 RX ADMIN — ASPIRIN 81 MG 81 MG: 81 TABLET ORAL at 08:52

## 2019-01-01 RX ADMIN — SODIUM CHLORIDE 1000 ML: 9 INJECTION, SOLUTION INTRAVENOUS at 15:32

## 2019-01-01 RX ADMIN — OXYMETAZOLINE HYDROCHLORIDE 2 SPRAY: 5 SPRAY NASAL at 08:09

## 2019-01-01 RX ADMIN — PIPERACILLIN SODIUM AND TAZOBACTAM SODIUM 3.38 G: 3; .375 INJECTION, POWDER, LYOPHILIZED, FOR SOLUTION INTRAVENOUS at 03:17

## 2019-01-01 RX ADMIN — Medication 3 DROP: at 21:35

## 2019-01-01 RX ADMIN — VANCOMYCIN HYDROCHLORIDE 500 MG: 500 INJECTION, POWDER, LYOPHILIZED, FOR SOLUTION INTRAVENOUS at 20:39

## 2019-01-01 RX ADMIN — IPRATROPIUM BROMIDE AND ALBUTEROL SULFATE 1 AMPULE: .5; 3 SOLUTION RESPIRATORY (INHALATION) at 18:55

## 2019-01-01 RX ADMIN — PIPERACILLIN SODIUM AND TAZOBACTAM SODIUM 3.38 G: 3; .375 INJECTION, POWDER, LYOPHILIZED, FOR SOLUTION INTRAVENOUS at 03:03

## 2019-01-01 RX ADMIN — PIPERACILLIN SODIUM AND TAZOBACTAM SODIUM 3.38 G: 3; .375 INJECTION, POWDER, LYOPHILIZED, FOR SOLUTION INTRAVENOUS at 03:08

## 2019-01-01 RX ADMIN — Medication 10 ML: at 20:19

## 2019-01-01 RX ADMIN — FLUTICASONE PROPIONATE 2 SPRAY: 50 SPRAY, METERED NASAL at 08:43

## 2019-01-01 RX ADMIN — SODIUM CHLORIDE: 9 INJECTION, SOLUTION INTRAVENOUS at 05:47

## 2019-01-01 RX ADMIN — Medication 3 DROP: at 08:43

## 2019-01-01 RX ADMIN — IPRATROPIUM BROMIDE AND ALBUTEROL SULFATE 1 AMPULE: .5; 3 SOLUTION RESPIRATORY (INHALATION) at 19:04

## 2019-01-01 RX ADMIN — ENOXAPARIN SODIUM 40 MG: 40 INJECTION SUBCUTANEOUS at 08:41

## 2019-01-01 RX ADMIN — INSULIN LISPRO 9 UNITS: 100 INJECTION, SOLUTION INTRAVENOUS; SUBCUTANEOUS at 16:17

## 2019-01-01 RX ADMIN — PIPERACILLIN SODIUM AND TAZOBACTAM SODIUM 3.38 G: 3; .375 INJECTION, POWDER, LYOPHILIZED, FOR SOLUTION INTRAVENOUS at 10:29

## 2019-01-01 RX ADMIN — IPRATROPIUM BROMIDE AND ALBUTEROL SULFATE 1 AMPULE: .5; 3 SOLUTION RESPIRATORY (INHALATION) at 07:26

## 2019-01-01 RX ADMIN — IPRATROPIUM BROMIDE AND ALBUTEROL SULFATE 1 AMPULE: .5; 3 SOLUTION RESPIRATORY (INHALATION) at 11:33

## 2019-01-01 RX ADMIN — Medication 3 DROP: at 09:05

## 2019-01-01 RX ADMIN — Medication 3 DROP: at 08:06

## 2019-01-01 RX ADMIN — GUAIFENESIN 600 MG: 600 TABLET, EXTENDED RELEASE ORAL at 21:33

## 2019-01-01 RX ADMIN — PREDNISONE 60 MG: 20 TABLET ORAL at 14:21

## 2019-01-01 RX ADMIN — SODIUM CHLORIDE: 9 INJECTION, SOLUTION INTRAVENOUS at 16:56

## 2019-01-01 RX ADMIN — IPRATROPIUM BROMIDE AND ALBUTEROL SULFATE 1 AMPULE: .5; 3 SOLUTION RESPIRATORY (INHALATION) at 06:50

## 2019-01-01 RX ADMIN — IPRATROPIUM BROMIDE AND ALBUTEROL SULFATE 1 AMPULE: .5; 3 SOLUTION RESPIRATORY (INHALATION) at 07:33

## 2019-01-01 RX ADMIN — MUPIROCIN: 20 OINTMENT TOPICAL at 20:00

## 2019-01-01 RX ADMIN — PIPERACILLIN SODIUM AND TAZOBACTAM SODIUM 3.38 G: 3; .375 INJECTION, POWDER, LYOPHILIZED, FOR SOLUTION INTRAVENOUS at 20:01

## 2019-01-01 RX ADMIN — VANCOMYCIN HYDROCHLORIDE 500 MG: 500 INJECTION, POWDER, LYOPHILIZED, FOR SOLUTION INTRAVENOUS at 20:30

## 2019-01-01 RX ADMIN — Medication 3 DROP: at 08:12

## 2019-01-01 RX ADMIN — AZITHROMYCIN DIHYDRATE 500 MG: 500 INJECTION, POWDER, LYOPHILIZED, FOR SOLUTION INTRAVENOUS at 16:49

## 2019-01-01 RX ADMIN — METHYLPREDNISOLONE SODIUM SUCCINATE 40 MG: 40 INJECTION, POWDER, FOR SOLUTION INTRAMUSCULAR; INTRAVENOUS at 21:07

## 2019-01-01 RX ADMIN — Medication 1000 MG: at 15:39

## 2019-01-01 RX ADMIN — OXYMETAZOLINE HYDROCHLORIDE 2 SPRAY: 5 SPRAY NASAL at 21:34

## 2019-01-01 RX ADMIN — INSULIN LISPRO 12 UNITS: 100 INJECTION, SOLUTION INTRAVENOUS; SUBCUTANEOUS at 11:41

## 2019-01-01 RX ADMIN — SODIUM CHLORIDE: 9 INJECTION, SOLUTION INTRAVENOUS at 20:28

## 2019-01-01 RX ADMIN — Medication 10 ML: at 08:41

## 2019-01-01 RX ADMIN — OXYMETAZOLINE HYDROCHLORIDE 2 SPRAY: 5 SPRAY NASAL at 11:22

## 2019-01-01 RX ADMIN — MAGNESIUM SULFATE HEPTAHYDRATE 2 G: 40 INJECTION, SOLUTION INTRAVENOUS at 14:22

## 2019-01-01 RX ADMIN — METHYLPREDNISOLONE SODIUM SUCCINATE 40 MG: 40 INJECTION, POWDER, FOR SOLUTION INTRAMUSCULAR; INTRAVENOUS at 07:03

## 2019-01-01 RX ADMIN — GUAIFENESIN 600 MG: 600 TABLET, EXTENDED RELEASE ORAL at 20:19

## 2019-01-01 RX ADMIN — INSULIN LISPRO 4 UNITS: 100 INJECTION, SOLUTION INTRAVENOUS; SUBCUTANEOUS at 21:07

## 2019-01-01 RX ADMIN — METHYLPREDNISOLONE SODIUM SUCCINATE 40 MG: 40 INJECTION, POWDER, FOR SOLUTION INTRAMUSCULAR; INTRAVENOUS at 06:57

## 2019-01-01 RX ADMIN — AZITHROMYCIN MONOHYDRATE 500 MG: 250 TABLET ORAL at 09:05

## 2019-01-01 RX ADMIN — IPRATROPIUM BROMIDE AND ALBUTEROL SULFATE 1 AMPULE: .5; 3 SOLUTION RESPIRATORY (INHALATION) at 18:51

## 2019-01-01 RX ADMIN — GUAIFENESIN 600 MG: 600 TABLET, EXTENDED RELEASE ORAL at 09:30

## 2019-01-01 RX ADMIN — INSULIN LISPRO 9 UNITS: 100 INJECTION, SOLUTION INTRAVENOUS; SUBCUTANEOUS at 11:14

## 2019-01-01 RX ADMIN — PIPERACILLIN SODIUM AND TAZOBACTAM SODIUM 3.38 G: 3; .375 INJECTION, POWDER, LYOPHILIZED, FOR SOLUTION INTRAVENOUS at 02:37

## 2019-01-01 RX ADMIN — IPRATROPIUM BROMIDE AND ALBUTEROL SULFATE 1 AMPULE: .5; 3 SOLUTION RESPIRATORY (INHALATION) at 11:09

## 2019-01-01 RX ADMIN — MUPIROCIN: 20 OINTMENT TOPICAL at 08:07

## 2019-01-01 RX ADMIN — PIPERACILLIN SODIUM AND TAZOBACTAM SODIUM 3.38 G: 3; .375 INJECTION, POWDER, LYOPHILIZED, FOR SOLUTION INTRAVENOUS at 11:14

## 2019-01-01 RX ADMIN — Medication 3 DROP: at 20:19

## 2019-01-01 RX ADMIN — PIPERACILLIN SODIUM AND TAZOBACTAM SODIUM 3.38 G: 3; .375 INJECTION, POWDER, LYOPHILIZED, FOR SOLUTION INTRAVENOUS at 02:31

## 2019-01-01 RX ADMIN — SODIUM CHLORIDE: 9 INJECTION, SOLUTION INTRAVENOUS at 19:03

## 2019-01-01 RX ADMIN — Medication 3 DROP: at 11:23

## 2019-01-01 RX ADMIN — AZITHROMYCIN MONOHYDRATE 500 MG: 250 TABLET ORAL at 10:26

## 2019-01-01 RX ADMIN — INSULIN LISPRO 5 UNITS: 100 INJECTION, SOLUTION INTRAVENOUS; SUBCUTANEOUS at 21:33

## 2019-01-01 RX ADMIN — METHYLPREDNISOLONE SODIUM SUCCINATE 40 MG: 40 INJECTION, POWDER, FOR SOLUTION INTRAMUSCULAR; INTRAVENOUS at 06:41

## 2019-01-01 RX ADMIN — ASPIRIN 81 MG 81 MG: 81 TABLET ORAL at 08:41

## 2019-01-01 RX ADMIN — MUPIROCIN: 20 OINTMENT TOPICAL at 20:19

## 2019-01-01 RX ADMIN — CALCIUM GLUCONATE 1 G: 98 INJECTION, SOLUTION INTRAVENOUS at 08:52

## 2019-01-01 RX ADMIN — ACETAMINOPHEN 650 MG: 325 TABLET ORAL at 03:09

## 2019-01-01 RX ADMIN — PIPERACILLIN SODIUM AND TAZOBACTAM SODIUM 3.38 G: 3; .375 INJECTION, POWDER, LYOPHILIZED, FOR SOLUTION INTRAVENOUS at 16:54

## 2019-01-01 RX ADMIN — IPRATROPIUM BROMIDE AND ALBUTEROL SULFATE 1 AMPULE: .5; 3 SOLUTION RESPIRATORY (INHALATION) at 10:44

## 2019-01-01 RX ADMIN — FLUTICASONE PROPIONATE 2 SPRAY: 50 SPRAY, METERED NASAL at 08:06

## 2019-01-01 RX ADMIN — FLUTICASONE PROPIONATE 2 SPRAY: 50 SPRAY, METERED NASAL at 08:12

## 2019-01-01 RX ADMIN — GUAIFENESIN 600 MG: 600 TABLET, EXTENDED RELEASE ORAL at 09:02

## 2019-01-01 RX ADMIN — FLUTICASONE PROPIONATE 2 SPRAY: 50 SPRAY, METERED NASAL at 11:22

## 2019-01-01 RX ADMIN — PIPERACILLIN SODIUM AND TAZOBACTAM SODIUM 3.38 G: 3; .375 INJECTION, POWDER, LYOPHILIZED, FOR SOLUTION INTRAVENOUS at 10:45

## 2019-01-01 RX ADMIN — IPRATROPIUM BROMIDE AND ALBUTEROL SULFATE 1 AMPULE: 2.5; .5 SOLUTION RESPIRATORY (INHALATION) at 12:09

## 2019-01-01 RX ADMIN — IPRATROPIUM BROMIDE AND ALBUTEROL SULFATE 1 AMPULE: .5; 3 SOLUTION RESPIRATORY (INHALATION) at 23:20

## 2019-01-01 RX ADMIN — ENOXAPARIN SODIUM 40 MG: 40 INJECTION SUBCUTANEOUS at 08:40

## 2019-01-01 RX ADMIN — SODIUM CHLORIDE: 9 INJECTION, SOLUTION INTRAVENOUS at 14:08

## 2019-01-01 RX ADMIN — GUAIFENESIN 600 MG: 600 TABLET, EXTENDED RELEASE ORAL at 08:41

## 2019-01-01 RX ADMIN — GUAIFENESIN 600 MG: 600 TABLET, EXTENDED RELEASE ORAL at 08:54

## 2019-01-01 RX ADMIN — METFORMIN HYDROCHLORIDE 500 MG: 500 TABLET ORAL at 08:41

## 2019-01-01 RX ADMIN — PIPERACILLIN SODIUM AND TAZOBACTAM SODIUM 3.38 G: 3; .375 INJECTION, POWDER, LYOPHILIZED, FOR SOLUTION INTRAVENOUS at 17:48

## 2019-01-01 RX ADMIN — VANCOMYCIN HYDROCHLORIDE 500 MG: 500 INJECTION, POWDER, LYOPHILIZED, FOR SOLUTION INTRAVENOUS at 21:09

## 2019-01-01 RX ADMIN — Medication 3 DROP: at 20:08

## 2019-01-01 RX ADMIN — Medication 10 ML: at 08:54

## 2019-01-01 RX ADMIN — ACETAMINOPHEN 1000 MG: 500 TABLET ORAL at 15:39

## 2019-01-01 RX ADMIN — IPRATROPIUM BROMIDE AND ALBUTEROL SULFATE 1 AMPULE: .5; 3 SOLUTION RESPIRATORY (INHALATION) at 19:45

## 2019-01-01 RX ADMIN — PIPERACILLIN SODIUM AND TAZOBACTAM SODIUM 3.38 G: 3; .375 INJECTION, POWDER, LYOPHILIZED, FOR SOLUTION INTRAVENOUS at 19:04

## 2019-01-01 RX ADMIN — IPRATROPIUM BROMIDE AND ALBUTEROL SULFATE 1 AMPULE: .5; 3 SOLUTION RESPIRATORY (INHALATION) at 00:24

## 2019-01-01 RX ADMIN — INSULIN LISPRO 6 UNITS: 100 INJECTION, SOLUTION INTRAVENOUS; SUBCUTANEOUS at 08:47

## 2019-01-01 RX ADMIN — IPRATROPIUM BROMIDE AND ALBUTEROL SULFATE 1 AMPULE: .5; 3 SOLUTION RESPIRATORY (INHALATION) at 15:45

## 2019-01-01 RX ADMIN — IPRATROPIUM BROMIDE AND ALBUTEROL SULFATE 1 AMPULE: .5; 3 SOLUTION RESPIRATORY (INHALATION) at 06:40

## 2019-01-01 RX ADMIN — IPRATROPIUM BROMIDE AND ALBUTEROL SULFATE 1 AMPULE: .5; 3 SOLUTION RESPIRATORY (INHALATION) at 16:10

## 2019-01-01 RX ADMIN — GUAIFENESIN 600 MG: 600 TABLET, EXTENDED RELEASE ORAL at 21:07

## 2019-01-01 RX ADMIN — INSULIN LISPRO 6 UNITS: 100 INJECTION, SOLUTION INTRAVENOUS; SUBCUTANEOUS at 16:54

## 2019-01-01 RX ADMIN — SODIUM CHLORIDE 500 ML: 9 INJECTION, SOLUTION INTRAVENOUS at 17:49

## 2019-01-01 RX ADMIN — ENOXAPARIN SODIUM 40 MG: 40 INJECTION SUBCUTANEOUS at 08:11

## 2019-01-01 RX ADMIN — SODIUM CHLORIDE: 9 INJECTION, SOLUTION INTRAVENOUS at 00:49

## 2019-01-01 RX ADMIN — IPRATROPIUM BROMIDE AND ALBUTEROL SULFATE 1 AMPULE: .5; 3 SOLUTION RESPIRATORY (INHALATION) at 07:17

## 2019-01-01 RX ADMIN — INSULIN LISPRO 9 UNITS: 100 INJECTION, SOLUTION INTRAVENOUS; SUBCUTANEOUS at 11:25

## 2019-01-01 RX ADMIN — POTASSIUM PHOSPHATE, MONOBASIC AND POTASSIUM PHOSPHATE, DIBASIC 15 MMOL: 224; 236 INJECTION, SOLUTION INTRAVENOUS at 16:36

## 2019-01-01 RX ADMIN — POTASSIUM CHLORIDE 40 MEQ: 10 TABLET, EXTENDED RELEASE ORAL at 08:53

## 2019-01-01 RX ADMIN — CEFTRIAXONE SODIUM 1 G: 1 INJECTION, POWDER, FOR SOLUTION INTRAMUSCULAR; INTRAVENOUS at 15:09

## 2019-01-01 RX ADMIN — OXYMETAZOLINE HYDROCHLORIDE 2 SPRAY: 5 SPRAY NASAL at 20:09

## 2019-01-01 RX ADMIN — IPRATROPIUM BROMIDE AND ALBUTEROL SULFATE 1 AMPULE: .5; 3 SOLUTION RESPIRATORY (INHALATION) at 23:32

## 2019-01-01 RX ADMIN — VANCOMYCIN HYDROCHLORIDE 500 MG: 500 INJECTION, POWDER, LYOPHILIZED, FOR SOLUTION INTRAVENOUS at 08:21

## 2019-01-01 RX ADMIN — Medication 10 ML: at 09:02

## 2019-01-01 RX ADMIN — FLUTICASONE PROPIONATE 2 SPRAY: 50 SPRAY, METERED NASAL at 08:09

## 2019-01-01 RX ADMIN — IPRATROPIUM BROMIDE AND ALBUTEROL SULFATE 1 AMPULE: .5; 3 SOLUTION RESPIRATORY (INHALATION) at 23:15

## 2019-01-01 RX ADMIN — ASPIRIN 81 MG 81 MG: 81 TABLET ORAL at 09:02

## 2019-01-01 RX ADMIN — ACETAMINOPHEN 650 MG: 325 TABLET ORAL at 02:39

## 2019-01-01 RX ADMIN — OXYMETAZOLINE HYDROCHLORIDE 2 SPRAY: 5 SPRAY NASAL at 20:47

## 2019-01-01 RX ADMIN — IPRATROPIUM BROMIDE AND ALBUTEROL SULFATE 1 AMPULE: .5; 3 SOLUTION RESPIRATORY (INHALATION) at 04:12

## 2019-01-01 RX ADMIN — SODIUM CHLORIDE 250 ML: 9 INJECTION, SOLUTION INTRAVENOUS at 14:50

## 2019-01-01 RX ADMIN — SODIUM CHLORIDE: 9 INJECTION, SOLUTION INTRAVENOUS at 13:15

## 2019-01-01 RX ADMIN — INSULIN LISPRO 6 UNITS: 100 INJECTION, SOLUTION INTRAVENOUS; SUBCUTANEOUS at 16:29

## 2019-01-01 RX ADMIN — OXYMETAZOLINE HYDROCHLORIDE 2 SPRAY: 5 SPRAY NASAL at 08:12

## 2019-01-01 RX ADMIN — INSULIN LISPRO 3 UNITS: 100 INJECTION, SOLUTION INTRAVENOUS; SUBCUTANEOUS at 20:19

## 2019-01-01 RX ADMIN — PIPERACILLIN SODIUM AND TAZOBACTAM SODIUM 3.38 G: 3; .375 INJECTION, POWDER, LYOPHILIZED, FOR SOLUTION INTRAVENOUS at 10:25

## 2019-01-01 RX ADMIN — ACETAMINOPHEN 650 MG: 325 TABLET ORAL at 00:48

## 2019-01-01 RX ADMIN — IPRATROPIUM BROMIDE AND ALBUTEROL SULFATE 1 AMPULE: .5; 3 SOLUTION RESPIRATORY (INHALATION) at 19:10

## 2019-01-01 RX ADMIN — METHYLPREDNISOLONE SODIUM SUCCINATE 40 MG: 40 INJECTION, POWDER, FOR SOLUTION INTRAMUSCULAR; INTRAVENOUS at 08:06

## 2019-01-01 RX ADMIN — IPRATROPIUM BROMIDE AND ALBUTEROL SULFATE 1 AMPULE: .5; 3 SOLUTION RESPIRATORY (INHALATION) at 11:02

## 2019-01-01 RX ADMIN — ENOXAPARIN SODIUM 40 MG: 40 INJECTION SUBCUTANEOUS at 08:53

## 2019-01-01 RX ADMIN — ENOXAPARIN SODIUM 40 MG: 40 INJECTION SUBCUTANEOUS at 09:03

## 2019-01-01 RX ADMIN — POTASSIUM CHLORIDE 40 MEQ: 10 TABLET, EXTENDED RELEASE ORAL at 10:26

## 2019-01-01 RX ADMIN — IPRATROPIUM BROMIDE AND ALBUTEROL SULFATE 1 AMPULE: .5; 3 SOLUTION RESPIRATORY (INHALATION) at 23:00

## 2019-01-01 RX ADMIN — GUAIFENESIN 600 MG: 600 TABLET, EXTENDED RELEASE ORAL at 08:06

## 2019-01-01 RX ADMIN — CALCIUM GLUCONATE 1 G: 98 INJECTION, SOLUTION INTRAVENOUS at 15:40

## 2019-01-01 RX ADMIN — IPRATROPIUM BROMIDE AND ALBUTEROL SULFATE 1 AMPULE: .5; 3 SOLUTION RESPIRATORY (INHALATION) at 11:06

## 2019-01-01 RX ADMIN — Medication 10 ML: at 08:11

## 2019-01-01 RX ADMIN — IPRATROPIUM BROMIDE AND ALBUTEROL SULFATE 1 AMPULE: .5; 3 SOLUTION RESPIRATORY (INHALATION) at 23:03

## 2019-01-01 RX ADMIN — PIPERACILLIN SODIUM AND TAZOBACTAM SODIUM 3.38 G: 3; .375 INJECTION, POWDER, LYOPHILIZED, FOR SOLUTION INTRAVENOUS at 18:45

## 2019-01-01 RX ADMIN — INSULIN LISPRO 6 UNITS: 100 INJECTION, SOLUTION INTRAVENOUS; SUBCUTANEOUS at 11:16

## 2019-01-01 RX ADMIN — Medication 3 DROP: at 20:47

## 2019-01-01 RX ADMIN — INSULIN LISPRO 3 UNITS: 100 INJECTION, SOLUTION INTRAVENOUS; SUBCUTANEOUS at 20:11

## 2019-01-01 RX ADMIN — INSULIN LISPRO 9 UNITS: 100 INJECTION, SOLUTION INTRAVENOUS; SUBCUTANEOUS at 16:20

## 2019-01-01 RX ADMIN — GUAIFENESIN 600 MG: 600 TABLET, EXTENDED RELEASE ORAL at 20:31

## 2019-01-01 RX ADMIN — MUPIROCIN: 20 OINTMENT TOPICAL at 08:42

## 2019-01-01 RX ADMIN — IPRATROPIUM BROMIDE AND ALBUTEROL SULFATE 1 AMPULE: .5; 3 SOLUTION RESPIRATORY (INHALATION) at 02:54

## 2019-01-01 RX ADMIN — MAGNESIUM SULFATE HEPTAHYDRATE 2 G: 40 INJECTION, SOLUTION INTRAVENOUS at 08:48

## 2019-01-01 RX ADMIN — IPRATROPIUM BROMIDE AND ALBUTEROL SULFATE 1 AMPULE: .5; 3 SOLUTION RESPIRATORY (INHALATION) at 15:20

## 2019-01-01 RX ADMIN — PIPERACILLIN SODIUM AND TAZOBACTAM SODIUM 3.38 G: 3; .375 INJECTION, POWDER, LYOPHILIZED, FOR SOLUTION INTRAVENOUS at 18:25

## 2019-01-01 RX ADMIN — Medication 10 ML: at 08:07

## 2019-01-01 RX ADMIN — INSULIN LISPRO 12 UNITS: 100 INJECTION, SOLUTION INTRAVENOUS; SUBCUTANEOUS at 12:14

## 2019-01-01 RX ADMIN — IPRATROPIUM BROMIDE AND ALBUTEROL SULFATE 1 AMPULE: .5; 3 SOLUTION RESPIRATORY (INHALATION) at 14:21

## 2019-01-01 RX ADMIN — Medication 10 ML: at 20:35

## 2019-01-01 RX ADMIN — ACETAMINOPHEN 650 MG: 325 TABLET ORAL at 13:27

## 2019-01-01 RX ADMIN — METHYLPREDNISOLONE SODIUM SUCCINATE 40 MG: 40 INJECTION, POWDER, FOR SOLUTION INTRAMUSCULAR; INTRAVENOUS at 05:44

## 2019-01-01 RX ADMIN — GUAIFENESIN 600 MG: 600 TABLET, EXTENDED RELEASE ORAL at 20:01

## 2019-01-01 RX ADMIN — IPRATROPIUM BROMIDE AND ALBUTEROL SULFATE 1 AMPULE: .5; 3 SOLUTION RESPIRATORY (INHALATION) at 11:07

## 2019-01-01 RX ADMIN — ACETAMINOPHEN 650 MG: 325 TABLET ORAL at 20:33

## 2019-01-01 RX ADMIN — VANCOMYCIN HYDROCHLORIDE 500 MG: 500 INJECTION, POWDER, LYOPHILIZED, FOR SOLUTION INTRAVENOUS at 08:52

## 2019-01-01 RX ADMIN — ASPIRIN 81 MG 81 MG: 81 TABLET ORAL at 08:11

## 2019-01-01 RX ADMIN — SODIUM CHLORIDE 250 ML: 9 INJECTION, SOLUTION INTRAVENOUS at 19:04

## 2019-01-01 RX ADMIN — FUROSEMIDE 20 MG: 10 INJECTION, SOLUTION INTRAMUSCULAR; INTRAVENOUS at 14:35

## 2019-01-01 RX ADMIN — ENOXAPARIN SODIUM 40 MG: 40 INJECTION SUBCUTANEOUS at 08:20

## 2019-01-01 RX ADMIN — IPRATROPIUM BROMIDE AND ALBUTEROL SULFATE 1 AMPULE: .5; 3 SOLUTION RESPIRATORY (INHALATION) at 07:39

## 2019-01-01 RX ADMIN — METFORMIN HYDROCHLORIDE 500 MG: 500 TABLET ORAL at 16:25

## 2019-01-01 RX ADMIN — METFORMIN HYDROCHLORIDE 500 MG: 500 TABLET ORAL at 16:54

## 2019-01-01 RX ADMIN — SODIUM CHLORIDE: 9 INJECTION, SOLUTION INTRAVENOUS at 03:08

## 2019-01-01 RX ADMIN — Medication 3 DROP: at 08:10

## 2019-01-01 RX ADMIN — Medication 10 ML: at 08:22

## 2019-01-01 RX ADMIN — INSULIN LISPRO 6 UNITS: 100 INJECTION, SOLUTION INTRAVENOUS; SUBCUTANEOUS at 18:40

## 2019-01-01 RX ADMIN — ASPIRIN 81 MG 81 MG: 81 TABLET ORAL at 08:06

## 2019-01-01 RX ADMIN — SODIUM CHLORIDE 1000 ML: 9 INJECTION, SOLUTION INTRAVENOUS at 14:21

## 2019-01-01 RX ADMIN — ASPIRIN 81 MG 81 MG: 81 TABLET ORAL at 08:23

## 2019-01-01 RX ADMIN — Medication 10 ML: at 20:01

## 2019-01-01 RX ADMIN — GUAIFENESIN 600 MG: 600 TABLET, EXTENDED RELEASE ORAL at 08:11

## 2019-01-01 RX ADMIN — INSULIN LISPRO 3 UNITS: 100 INJECTION, SOLUTION INTRAVENOUS; SUBCUTANEOUS at 20:43

## 2019-01-01 RX ADMIN — METFORMIN HYDROCHLORIDE 500 MG: 500 TABLET ORAL at 09:02

## 2019-01-01 SDOH — HEALTH STABILITY: MENTAL HEALTH: HOW OFTEN DO YOU HAVE A DRINK CONTAINING ALCOHOL?: 4 OR MORE TIMES A WEEK

## 2019-01-01 SDOH — HEALTH STABILITY: MENTAL HEALTH: HOW MANY STANDARD DRINKS CONTAINING ALCOHOL DO YOU HAVE ON A TYPICAL DAY?: 3 OR 4

## 2019-01-01 ASSESSMENT — PAIN DESCRIPTION - FREQUENCY
FREQUENCY: CONTINUOUS

## 2019-01-01 ASSESSMENT — LIFESTYLE VARIABLES
HOW OFTEN DURING THE LAST YEAR HAVE YOU FOUND THAT YOU WERE NOT ABLE TO STOP DRINKING ONCE YOU HAD STARTED: 0
AUDIT TOTAL SCORE: 4
HOW MANY STANDARD DRINKS CONTAINING ALCOHOL DO YOU HAVE ON A TYPICAL DAY: 0
HOW OFTEN DO YOU HAVE A DRINK CONTAINING ALCOHOL: 4
HOW OFTEN DURING THE LAST YEAR HAVE YOU NEEDED AN ALCOHOLIC DRINK FIRST THING IN THE MORNING TO GET YOURSELF GOING AFTER A NIGHT OF HEAVY DRINKING: 0
HOW OFTEN DURING THE LAST YEAR HAVE YOU HAD A FEELING OF GUILT OR REMORSE AFTER DRINKING: 0
HAS A RELATIVE, FRIEND, DOCTOR, OR ANOTHER HEALTH PROFESSIONAL EXPRESSED CONCERN ABOUT YOUR DRINKING OR SUGGESTED YOU CUT DOWN: 0
HOW OFTEN DO YOU HAVE SIX OR MORE DRINKS ON ONE OCCASION: 0
HAVE YOU OR SOMEONE ELSE BEEN INJURED AS A RESULT OF YOUR DRINKING: 0
AUDIT-C TOTAL SCORE: 4
HOW OFTEN DURING THE LAST YEAR HAVE YOU BEEN UNABLE TO REMEMBER WHAT HAPPENED THE NIGHT BEFORE BECAUSE YOU HAD BEEN DRINKING: 0
HOW OFTEN DURING THE LAST YEAR HAVE YOU FAILED TO DO WHAT WAS NORMALLY EXPECTED FROM YOU BECAUSE OF DRINKING: 0

## 2019-01-01 ASSESSMENT — PAIN SCALES - GENERAL
PAINLEVEL_OUTOF10: 0
PAINLEVEL_OUTOF10: 7
PAINLEVEL_OUTOF10: 4
PAINLEVEL_OUTOF10: 7
PAINLEVEL_OUTOF10: 6
PAINLEVEL_OUTOF10: 0
PAINLEVEL_OUTOF10: 4
PAINLEVEL_OUTOF10: 7
PAINLEVEL_OUTOF10: 0
PAINLEVEL_OUTOF10: 5
PAINLEVEL_OUTOF10: 0
PAINLEVEL_OUTOF10: 5
PAINLEVEL_OUTOF10: 0
PAINLEVEL_OUTOF10: 5
PAINLEVEL_OUTOF10: 2
PAINLEVEL_OUTOF10: 8

## 2019-01-01 ASSESSMENT — PATIENT HEALTH QUESTIONNAIRE - PHQ9
SUM OF ALL RESPONSES TO PHQ QUESTIONS 1-9: 0
SUM OF ALL RESPONSES TO PHQ QUESTIONS 1-9: 0

## 2019-01-01 ASSESSMENT — ENCOUNTER SYMPTOMS
CHEST TIGHTNESS: 0
COUGH: 0
GASTROINTESTINAL NEGATIVE: 1
BACK PAIN: 1
SHORTNESS OF BREATH: 1
BACK PAIN: 1
CHEST TIGHTNESS: 0
CONSTIPATION: 0
BACK PAIN: 1
CHEST TIGHTNESS: 0
NAUSEA: 0
NAUSEA: 0
COUGH: 0
BACK PAIN: 1
NAUSEA: 0
COUGH: 1
COUGH: 0
CONSTIPATION: 0
NAUSEA: 0
SHORTNESS OF BREATH: 1
SHORTNESS OF BREATH: 1
BACK PAIN: 0
CHEST TIGHTNESS: 1
SHORTNESS OF BREATH: 1
SHORTNESS OF BREATH: 1
COUGH: 0
VOMITING: 0
CHEST TIGHTNESS: 0

## 2019-01-01 ASSESSMENT — PAIN DESCRIPTION - PROGRESSION
CLINICAL_PROGRESSION: NOT CHANGED

## 2019-01-01 ASSESSMENT — PAIN DESCRIPTION - DESCRIPTORS
DESCRIPTORS: ACHING
DESCRIPTORS: CONSTANT;CRAMPING
DESCRIPTORS: ACHING
DESCRIPTORS: ACHING

## 2019-01-01 ASSESSMENT — PAIN DESCRIPTION - LOCATION
LOCATION: BACK
LOCATION: KNEE
LOCATION: BACK

## 2019-01-01 ASSESSMENT — PAIN DESCRIPTION - PAIN TYPE
TYPE: CHRONIC PAIN
TYPE: CHRONIC PAIN
TYPE: ACUTE PAIN
TYPE: CHRONIC PAIN

## 2019-01-01 ASSESSMENT — PAIN DESCRIPTION - ORIENTATION
ORIENTATION: MID;LOWER

## 2019-01-01 ASSESSMENT — PAIN DESCRIPTION - ONSET
ONSET: ON-GOING

## 2019-01-01 ASSESSMENT — PAIN - FUNCTIONAL ASSESSMENT
PAIN_FUNCTIONAL_ASSESSMENT: PREVENTS OR INTERFERES SOME ACTIVE ACTIVITIES AND ADLS
PAIN_FUNCTIONAL_ASSESSMENT: PREVENTS OR INTERFERES SOME ACTIVE ACTIVITIES AND ADLS
PAIN_FUNCTIONAL_ASSESSMENT: PREVENTS OR INTERFERES WITH MANY ACTIVE NOT PASSIVE ACTIVITIES
PAIN_FUNCTIONAL_ASSESSMENT: PREVENTS OR INTERFERES WITH ALL ACTIVE AND SOME PASSIVE ACTIVITIES

## 2019-01-15 LAB
AFB CULTURE (MYCOBACTERIA): NORMAL
AFB CULTURE (MYCOBACTERIA): NORMAL
AFB SMEAR: NORMAL
AFB SMEAR: NORMAL

## 2019-02-18 ENCOUNTER — OFFICE VISIT (OUTPATIENT)
Dept: FAMILY MEDICINE CLINIC | Age: 69
End: 2019-02-18
Payer: MEDICARE

## 2019-02-18 VITALS
RESPIRATION RATE: 18 BRPM | SYSTOLIC BLOOD PRESSURE: 112 MMHG | DIASTOLIC BLOOD PRESSURE: 50 MMHG | OXYGEN SATURATION: 95 % | HEIGHT: 67 IN | BODY MASS INDEX: 18.21 KG/M2 | WEIGHT: 116 LBS | TEMPERATURE: 99.8 F | HEART RATE: 101 BPM

## 2019-02-18 DIAGNOSIS — R06.2 WHEEZING: ICD-10-CM

## 2019-02-18 DIAGNOSIS — J96.11 CHRONIC HYPOXEMIC RESPIRATORY FAILURE (HCC): ICD-10-CM

## 2019-02-18 DIAGNOSIS — J40 BRONCHITIS: ICD-10-CM

## 2019-02-18 DIAGNOSIS — J44.9 COPD, VERY SEVERE (HCC): Primary | ICD-10-CM

## 2019-02-18 DIAGNOSIS — R05.9 COUGH: ICD-10-CM

## 2019-02-18 PROCEDURE — 1101F PT FALLS ASSESS-DOCD LE1/YR: CPT | Performed by: NURSE PRACTITIONER

## 2019-02-18 PROCEDURE — G8427 DOCREV CUR MEDS BY ELIG CLIN: HCPCS | Performed by: NURSE PRACTITIONER

## 2019-02-18 PROCEDURE — 1036F TOBACCO NON-USER: CPT | Performed by: NURSE PRACTITIONER

## 2019-02-18 PROCEDURE — 1123F ACP DISCUSS/DSCN MKR DOCD: CPT | Performed by: NURSE PRACTITIONER

## 2019-02-18 PROCEDURE — G8926 SPIRO NO PERF OR DOC: HCPCS | Performed by: NURSE PRACTITIONER

## 2019-02-18 PROCEDURE — G8482 FLU IMMUNIZE ORDER/ADMIN: HCPCS | Performed by: NURSE PRACTITIONER

## 2019-02-18 PROCEDURE — 3023F SPIROM DOC REV: CPT | Performed by: NURSE PRACTITIONER

## 2019-02-18 PROCEDURE — 3017F COLORECTAL CA SCREEN DOC REV: CPT | Performed by: NURSE PRACTITIONER

## 2019-02-18 PROCEDURE — 4040F PNEUMOC VAC/ADMIN/RCVD: CPT | Performed by: NURSE PRACTITIONER

## 2019-02-18 PROCEDURE — 99213 OFFICE O/P EST LOW 20 MIN: CPT | Performed by: NURSE PRACTITIONER

## 2019-02-18 PROCEDURE — G8419 CALC BMI OUT NRM PARAM NOF/U: HCPCS | Performed by: NURSE PRACTITIONER

## 2019-02-18 RX ORDER — DOXYCYCLINE HYCLATE 100 MG/1
100 CAPSULE ORAL 2 TIMES DAILY
Qty: 14 CAPSULE | Refills: 0 | Status: SHIPPED | OUTPATIENT
Start: 2019-02-18 | End: 2019-02-25

## 2019-02-18 RX ORDER — PREDNISONE 10 MG/1
TABLET ORAL
Qty: 35 TABLET | Refills: 0 | Status: SHIPPED | OUTPATIENT
Start: 2019-02-18 | End: 2019-03-27

## 2019-02-18 ASSESSMENT — ENCOUNTER SYMPTOMS
BACK PAIN: 0
SHORTNESS OF BREATH: 1
RHINORRHEA: 1
COUGH: 1
CHEST TIGHTNESS: 0
NAUSEA: 0
CONSTIPATION: 0

## 2019-02-18 ASSESSMENT — PATIENT HEALTH QUESTIONNAIRE - PHQ9
SUM OF ALL RESPONSES TO PHQ9 QUESTIONS 1 & 2: 0
1. LITTLE INTEREST OR PLEASURE IN DOING THINGS: 0
SUM OF ALL RESPONSES TO PHQ QUESTIONS 1-9: 0
2. FEELING DOWN, DEPRESSED OR HOPELESS: 0
SUM OF ALL RESPONSES TO PHQ QUESTIONS 1-9: 0

## 2019-02-25 PROBLEM — J96.21 ACUTE ON CHRONIC RESPIRATORY FAILURE WITH HYPOXIA AND HYPERCAPNIA (HCC): Status: RESOLVED | Noted: 2018-11-27 | Resolved: 2019-02-25

## 2019-02-25 PROBLEM — J96.01 ACUTE RESPIRATORY FAILURE WITH HYPOXIA (HCC): Status: RESOLVED | Noted: 2018-06-01 | Resolved: 2019-02-25

## 2019-02-25 PROBLEM — J96.22 ACUTE ON CHRONIC RESPIRATORY FAILURE WITH HYPOXIA AND HYPERCAPNIA (HCC): Status: RESOLVED | Noted: 2018-11-27 | Resolved: 2019-02-25

## 2019-03-04 ENCOUNTER — OFFICE VISIT (OUTPATIENT)
Dept: PULMONOLOGY | Age: 69
End: 2019-03-04
Payer: MEDICARE

## 2019-03-04 VITALS
TEMPERATURE: 98.8 F | SYSTOLIC BLOOD PRESSURE: 102 MMHG | DIASTOLIC BLOOD PRESSURE: 60 MMHG | BODY MASS INDEX: 18.83 KG/M2 | OXYGEN SATURATION: 97 % | HEIGHT: 67 IN | RESPIRATION RATE: 20 BRPM | HEART RATE: 86 BPM | WEIGHT: 120 LBS

## 2019-03-04 DIAGNOSIS — R91.1 PULMONARY NODULE: ICD-10-CM

## 2019-03-04 DIAGNOSIS — J44.9 COPD, VERY SEVERE (HCC): Primary | ICD-10-CM

## 2019-03-04 DIAGNOSIS — E43 SEVERE PROTEIN-CALORIE MALNUTRITION (HCC): ICD-10-CM

## 2019-03-04 DIAGNOSIS — R91.1 LUNG NODULE: ICD-10-CM

## 2019-03-04 DIAGNOSIS — Z72.0 TOBACCO USE: ICD-10-CM

## 2019-03-04 DIAGNOSIS — J96.11 CHRONIC HYPOXEMIC RESPIRATORY FAILURE (HCC): ICD-10-CM

## 2019-03-04 PROCEDURE — 4040F PNEUMOC VAC/ADMIN/RCVD: CPT | Performed by: INTERNAL MEDICINE

## 2019-03-04 PROCEDURE — G8427 DOCREV CUR MEDS BY ELIG CLIN: HCPCS | Performed by: INTERNAL MEDICINE

## 2019-03-04 PROCEDURE — G8420 CALC BMI NORM PARAMETERS: HCPCS | Performed by: INTERNAL MEDICINE

## 2019-03-04 PROCEDURE — G8926 SPIRO NO PERF OR DOC: HCPCS | Performed by: INTERNAL MEDICINE

## 2019-03-04 PROCEDURE — 3023F SPIROM DOC REV: CPT | Performed by: INTERNAL MEDICINE

## 2019-03-04 PROCEDURE — 1036F TOBACCO NON-USER: CPT | Performed by: INTERNAL MEDICINE

## 2019-03-04 PROCEDURE — 3017F COLORECTAL CA SCREEN DOC REV: CPT | Performed by: INTERNAL MEDICINE

## 2019-03-04 PROCEDURE — 1101F PT FALLS ASSESS-DOCD LE1/YR: CPT | Performed by: INTERNAL MEDICINE

## 2019-03-04 PROCEDURE — G8482 FLU IMMUNIZE ORDER/ADMIN: HCPCS | Performed by: INTERNAL MEDICINE

## 2019-03-04 PROCEDURE — 1123F ACP DISCUSS/DSCN MKR DOCD: CPT | Performed by: INTERNAL MEDICINE

## 2019-03-04 PROCEDURE — 99214 OFFICE O/P EST MOD 30 MIN: CPT | Performed by: INTERNAL MEDICINE

## 2019-03-27 ENCOUNTER — OFFICE VISIT (OUTPATIENT)
Dept: FAMILY MEDICINE CLINIC | Age: 69
End: 2019-03-27
Payer: MEDICARE

## 2019-03-27 VITALS
OXYGEN SATURATION: 95 % | HEART RATE: 84 BPM | BODY MASS INDEX: 18.27 KG/M2 | TEMPERATURE: 98.7 F | WEIGHT: 116.4 LBS | DIASTOLIC BLOOD PRESSURE: 60 MMHG | SYSTOLIC BLOOD PRESSURE: 114 MMHG | HEIGHT: 67 IN

## 2019-03-27 DIAGNOSIS — R06.2 WHEEZING: ICD-10-CM

## 2019-03-27 DIAGNOSIS — J44.1 COPD EXACERBATION (HCC): Primary | ICD-10-CM

## 2019-03-27 PROCEDURE — G8419 CALC BMI OUT NRM PARAM NOF/U: HCPCS | Performed by: PHYSICIAN ASSISTANT

## 2019-03-27 PROCEDURE — 1036F TOBACCO NON-USER: CPT | Performed by: PHYSICIAN ASSISTANT

## 2019-03-27 PROCEDURE — G8482 FLU IMMUNIZE ORDER/ADMIN: HCPCS | Performed by: PHYSICIAN ASSISTANT

## 2019-03-27 PROCEDURE — G8427 DOCREV CUR MEDS BY ELIG CLIN: HCPCS | Performed by: PHYSICIAN ASSISTANT

## 2019-03-27 PROCEDURE — 3017F COLORECTAL CA SCREEN DOC REV: CPT | Performed by: PHYSICIAN ASSISTANT

## 2019-03-27 PROCEDURE — 3023F SPIROM DOC REV: CPT | Performed by: PHYSICIAN ASSISTANT

## 2019-03-27 PROCEDURE — G8926 SPIRO NO PERF OR DOC: HCPCS | Performed by: PHYSICIAN ASSISTANT

## 2019-03-27 PROCEDURE — 1123F ACP DISCUSS/DSCN MKR DOCD: CPT | Performed by: PHYSICIAN ASSISTANT

## 2019-03-27 PROCEDURE — 99214 OFFICE O/P EST MOD 30 MIN: CPT | Performed by: PHYSICIAN ASSISTANT

## 2019-03-27 PROCEDURE — 4040F PNEUMOC VAC/ADMIN/RCVD: CPT | Performed by: PHYSICIAN ASSISTANT

## 2019-03-27 RX ORDER — PREDNISONE 10 MG/1
40 TABLET ORAL DAILY
Qty: 20 TABLET | Refills: 0 | Status: SHIPPED | OUTPATIENT
Start: 2019-03-27 | End: 2019-04-01

## 2019-03-27 RX ORDER — AZITHROMYCIN 250 MG/1
TABLET, FILM COATED ORAL
Qty: 1 PACKET | Refills: 0 | Status: SHIPPED | OUTPATIENT
Start: 2019-03-27 | End: 2019-04-04 | Stop reason: ALTCHOICE

## 2019-03-27 RX ORDER — MELOXICAM 15 MG/1
15 TABLET ORAL DAILY
Qty: 30 TABLET | Refills: 3 | Status: SHIPPED | OUTPATIENT
Start: 2019-03-27 | End: 2019-01-01 | Stop reason: SDUPTHER

## 2019-03-27 ASSESSMENT — ENCOUNTER SYMPTOMS
CONSTIPATION: 0
DIARRHEA: 0
VOMITING: 0
SORE THROAT: 1
COUGH: 1
NAUSEA: 0
ABDOMINAL PAIN: 0
CHEST TIGHTNESS: 1
RHINORRHEA: 0
SHORTNESS OF BREATH: 1
WHEEZING: 1

## 2019-04-03 ENCOUNTER — HOSPITAL ENCOUNTER (OUTPATIENT)
Dept: CT IMAGING | Age: 69
Discharge: HOME | End: 2019-04-03
Payer: MEDICARE

## 2019-04-03 DIAGNOSIS — R91.1 PULMONARY NODULE: ICD-10-CM

## 2019-04-03 DIAGNOSIS — R91.1 LUNG NODULE: ICD-10-CM

## 2019-04-03 PROCEDURE — 71250 CT THORAX DX C-: CPT

## 2019-04-04 ENCOUNTER — TELEPHONE (OUTPATIENT)
Dept: PULMONOLOGY | Age: 69
End: 2019-04-04

## 2019-04-04 ENCOUNTER — OFFICE VISIT (OUTPATIENT)
Dept: PULMONOLOGY | Age: 69
End: 2019-04-04
Payer: MEDICARE

## 2019-04-04 VITALS
WEIGHT: 119.6 LBS | SYSTOLIC BLOOD PRESSURE: 104 MMHG | DIASTOLIC BLOOD PRESSURE: 68 MMHG | OXYGEN SATURATION: 92 % | RESPIRATION RATE: 22 BRPM | HEIGHT: 68 IN | TEMPERATURE: 98.7 F | HEART RATE: 88 BPM | BODY MASS INDEX: 18.13 KG/M2

## 2019-04-04 DIAGNOSIS — J44.9 COPD, VERY SEVERE (HCC): ICD-10-CM

## 2019-04-04 DIAGNOSIS — R93.89 ABNORMAL CT OF THE CHEST: Primary | ICD-10-CM

## 2019-04-04 DIAGNOSIS — R91.1 PULMONARY NODULE: ICD-10-CM

## 2019-04-04 DIAGNOSIS — J96.11 CHRONIC HYPOXEMIC RESPIRATORY FAILURE (HCC): ICD-10-CM

## 2019-04-04 PROCEDURE — G8427 DOCREV CUR MEDS BY ELIG CLIN: HCPCS | Performed by: INTERNAL MEDICINE

## 2019-04-04 PROCEDURE — G8419 CALC BMI OUT NRM PARAM NOF/U: HCPCS | Performed by: INTERNAL MEDICINE

## 2019-04-04 PROCEDURE — 99214 OFFICE O/P EST MOD 30 MIN: CPT | Performed by: INTERNAL MEDICINE

## 2019-04-04 PROCEDURE — 1036F TOBACCO NON-USER: CPT | Performed by: INTERNAL MEDICINE

## 2019-04-04 PROCEDURE — G8926 SPIRO NO PERF OR DOC: HCPCS | Performed by: INTERNAL MEDICINE

## 2019-04-04 PROCEDURE — 3017F COLORECTAL CA SCREEN DOC REV: CPT | Performed by: INTERNAL MEDICINE

## 2019-04-04 PROCEDURE — 1123F ACP DISCUSS/DSCN MKR DOCD: CPT | Performed by: INTERNAL MEDICINE

## 2019-04-04 PROCEDURE — 4040F PNEUMOC VAC/ADMIN/RCVD: CPT | Performed by: INTERNAL MEDICINE

## 2019-04-04 PROCEDURE — 3023F SPIROM DOC REV: CPT | Performed by: INTERNAL MEDICINE

## 2019-04-04 RX ORDER — DOXYCYCLINE HYCLATE 100 MG
100 TABLET ORAL 2 TIMES DAILY
Qty: 20 TABLET | Refills: 0 | Status: SHIPPED | OUTPATIENT
Start: 2019-04-04 | End: 2019-04-14

## 2019-04-04 NOTE — PATIENT INSTRUCTIONS
Doxycycline 100 mg twice daily for 10 days. Bronchoscopy has been set up for 4/10/19 arrival time 11:15 am at [facility]. (procedure time 12:15 pm)  Please enter at [facility]. Nothing to eat or drink after midnight prior to the procedure. Must have a  to bring you to and from the procedure. Hold blood thinners as instructed prior to the procedure.

## 2019-04-04 NOTE — PROGRESS NOTES
[facility] Critical [facility] Specialists    Outpatient Follow Up Note    CHIEF COMPLAINT : shortness of breath     HPI:  Presenting hx: The patient is a 19-year-old man with past medical history of tobacco abuse who was admitted with acute respiratory failure and a COPD exacerbation to [facility] in 6/18. Since last clinic visit, the patient has been having a productive cough with L sided chest discomfort over the last week. His wife has been sick. He has not smoked in 10 months. There are no changes to past medical history, family history, social history or review of systems(except as noted in the history section) since prior note (all reviewed with patient). Current Medications:    Current Outpatient Medications:     meloxicam (MOBIC) 15 MG tablet, Take 1 tablet by mouth daily , take with food for pain, Disp: 30 tablet, Rfl: 3    albuterol sulfate HFA (PROAIR HFA) 108 (90 Base) MCG/ACT inhaler, Inhale 2 puffs into the lungs every 6 hours as needed for Wheezing, Disp: 1 Inhaler, Rfl: 5    guaiFENesin (MUCINEX) 600 MG extended release tablet, Take 1 tablet by mouth 2 times daily, Disp: 30 tablet, Rfl: 1    ipratropium-albuterol (DUONEB) 0.5-2.5 (3) MG/3ML SOLN nebulizer solution, Inhale 3 mLs into the lungs every 4 hours as needed for Shortness of Breath, Disp: 360 mL, Rfl: 3    Multiple Vitamins-Minerals (THERAPEUTIC MULTIVITAMIN-MINERALS) tablet, Take 1 tablet by mouth daily, Disp: , Rfl:     aspirin 81 MG tablet, Take 81 mg by mouth daily , Disp: , Rfl:         Objective:   PHYSICAL EXAM:        VITALS:  /68 (Site: Left Upper Arm, Position: Sitting, Cuff Size: Medium Adult)   Pulse 88   Temp 98.7 °F (37.1 °C) (Oral)   Resp 22   Ht 5' 8\" (1.727 m)   Wt 119 lb 9.6 oz (54.3 kg)   SpO2 92% Comment: on 2.5 liters  BMI 18.19 kg/m²     Gen: In no acute distress. Alert. Comfortable. Eyes: PERRL. No sclera icterus. No conjunctival injection.    ENT: No ocular or auricular discharge. Oropharynx clear. Neck: Trachea midline. Normal thyroid. Resp: No accessory muscle use. No crackles. few wheezes. L sided rhonchi. No dullness on percussion. CV: Regular rate. Regular rhythm. No murmur or rub. Normal S1 and S2. No edema  GI: Abdomen non-tender. Non-distended. No masses. Skin: Warm and dry. No nodules on exposed extremities. No rash on exposed extremities. Lymph: No cervical LAD. No supraclavicular LAD. M/S: No cyanosis. No synovitis or joint deformity. No clubbing. Neuro: Cranial nerves are grossly intact. Moving all extremities. Motor and sensation grossly intact. Psych: Oriented x 3. No anxiety or agitation. DATA:    LABS:        PFTs 7/18/18  FVC 0.93 (22%) FEV1 0.41 (13%) FEV1/FVC ratio  44%  TLC 8.50 (127%) DLCO 7.2 (25%) + BD  6mwt 560 feet, low sat 84%; required 5L with exertion       IMAGING:      I personally reviewed and interpreted the following :     Chest CT 4/3/19: Lungs/pleura: Airways are patent.  No pleural fluid.  Advanced centrilobular  emphysema with lucent lungs and scattered reticular opacities.  Biapical  fibrotic changes are stable.  Chronic opacification in the left upper lobe is  unchanged since 11/28/2018.  Air bronchograms are seen through this region. Increasing patchy opacities more anteriorly in the left upper lobe are new. No underlying mass or nodule.  Stable appearance of noncalcified nodules in  the right upper lobe.  9 x 4 mm nodule is identical in size and shape on  series 3, image 44.  This does appear to have increased in size between  06/22/2018 and 11/28/2018.  A smaller adjacent nodule is also stable.  No new  pulmonary nodules are demonstrated. Chest CTA 11/28/18: No PE. Lungs/pleura:  There is moderate to severe centrilobular emphysema.  Right  apical scarring is unchanged.  There is a 0.4 cm nodule in the right upper  lobe, image 101, increased in size from the previous study. Leighton Laundry is a 0.9 x  0.4 cm nodule in

## 2019-04-09 NOTE — TELEPHONE ENCOUNTER
Pt returned call to office and was notified of change of bronch appt to 4/11/19 at 1200 pm, arrival time 1100 am.

## 2019-04-09 NOTE — TELEPHONE ENCOUNTER
Dr. Patrice Orozco has emergency and bronch 4/10/19 needs to be cancelled. Patient called with message left for patient to call back to office. Will discuss being able to r/s with Dr. Patrice Orozco and will let pt know when we can reschedule. [facility] has been notified.

## 2019-04-10 PROCEDURE — 88305 TISSUE EXAM BY PATHOLOGIST: CPT

## 2019-04-10 PROCEDURE — 88112 CYTOPATH CELL ENHANCE TECH: CPT

## 2019-04-11 ENCOUNTER — HOSPITAL ENCOUNTER (OUTPATIENT)
Age: 69
Setting detail: OUTPATIENT SURGERY
Discharge: HOME | End: 2019-04-11
Attending: INTERNAL MEDICINE | Admitting: INTERNAL MEDICINE
Payer: MEDICARE

## 2019-04-11 VITALS
HEART RATE: 89 BPM | OXYGEN SATURATION: 97 % | TEMPERATURE: 99.3 F | BODY MASS INDEX: 18.68 KG/M2 | SYSTOLIC BLOOD PRESSURE: 86 MMHG | WEIGHT: 119 LBS | HEIGHT: 67 IN | RESPIRATION RATE: 18 BRPM | DIASTOLIC BLOOD PRESSURE: 59 MMHG

## 2019-04-11 PROCEDURE — 87015 SPECIMEN INFECT AGNT CONCNTJ: CPT

## 2019-04-11 PROCEDURE — 87102 FUNGUS ISOLATION CULTURE: CPT

## 2019-04-11 PROCEDURE — 87070 CULTURE OTHR SPECIMN AEROBIC: CPT

## 2019-04-11 PROCEDURE — 7100000011 HC PHASE II RECOVERY - ADDTL 15 MIN: Performed by: INTERNAL MEDICINE

## 2019-04-11 PROCEDURE — 87206 SMEAR FLUORESCENT/ACID STAI: CPT

## 2019-04-11 PROCEDURE — 87186 SC STD MICRODIL/AGAR DIL: CPT

## 2019-04-11 PROCEDURE — 3609010800 HC BRONCHOSCOPY ALVEOLAR LAVAGE: Performed by: INTERNAL MEDICINE

## 2019-04-11 PROCEDURE — 7100000010 HC PHASE II RECOVERY - FIRST 15 MIN: Performed by: INTERNAL MEDICINE

## 2019-04-11 PROCEDURE — 99152 MOD SED SAME PHYS/QHP 5/>YRS: CPT | Performed by: INTERNAL MEDICINE

## 2019-04-11 PROCEDURE — 2500000003 HC RX 250 WO HCPCS: Performed by: INTERNAL MEDICINE

## 2019-04-11 PROCEDURE — 87116 MYCOBACTERIA CULTURE: CPT

## 2019-04-11 PROCEDURE — 6360000002 HC RX W HCPCS: Performed by: INTERNAL MEDICINE

## 2019-04-11 PROCEDURE — 87077 CULTURE AEROBIC IDENTIFY: CPT

## 2019-04-11 PROCEDURE — 2709999900 HC NON-CHARGEABLE SUPPLY: Performed by: INTERNAL MEDICINE

## 2019-04-11 PROCEDURE — 87205 SMEAR GRAM STAIN: CPT

## 2019-04-11 RX ORDER — ASCORBIC ACID 500 MG
500 TABLET ORAL DAILY
COMMUNITY

## 2019-04-11 RX ORDER — MIDAZOLAM HYDROCHLORIDE 5 MG/ML
INJECTION INTRAMUSCULAR; INTRAVENOUS PRN
Status: DISCONTINUED | OUTPATIENT
Start: 2019-04-11 | End: 2019-04-11 | Stop reason: ALTCHOICE

## 2019-04-11 RX ORDER — FENTANYL CITRATE 50 UG/ML
INJECTION, SOLUTION INTRAMUSCULAR; INTRAVENOUS PRN
Status: DISCONTINUED | OUTPATIENT
Start: 2019-04-11 | End: 2019-04-11 | Stop reason: ALTCHOICE

## 2019-04-11 RX ORDER — LIDOCAINE HYDROCHLORIDE 40 MG/ML
INJECTION, SOLUTION RETROBULBAR; TOPICAL PRN
Status: DISCONTINUED | OUTPATIENT
Start: 2019-04-11 | End: 2019-04-11 | Stop reason: ALTCHOICE

## 2019-04-11 ASSESSMENT — PAIN DESCRIPTION - DESCRIPTORS: DESCRIPTORS: ACHING;DULL

## 2019-04-11 ASSESSMENT — PAIN - FUNCTIONAL ASSESSMENT: PAIN_FUNCTIONAL_ASSESSMENT: 0-10

## 2019-04-11 NOTE — H&P
[facility] Pulmonary and Critical Care Specialists    Outpatient Follow Up Note    CHIEF COMPLAINT : shortness of breath     HPI:  Patient with persistent cough and new findings on chest CT. See office visit from 4/4 for details    There are no changes to past medical history, family history, social history or review of systems(except as noted in the history section) since prior note (all reviewed with patient). Current Medications:  No current outpatient medications on file. Objective:   PHYSICAL EXAM:        VITALS:  /64   Pulse 91   Temp 99.4 °F (37.4 °C) (Temporal)   Resp 18   Ht 5' 7\" (1.702 m)   Wt 119 lb (54 kg)   SpO2 98%   BMI 18.64 kg/m²     Gen: In no acute distress. Alert.  ncat    Eyes: PERRL. No sclera icterus. No conjunctival injection. ENT: No ocular or auricular discharge. Oropharynx clear. Neck: Trachea midline. Normal thyroid. MP level 2  Resp: No accessory muscle use. No crackles. few wheezes. L sided rhonchi. No dullness on percussion. CV: Regular rate. Regular rhythm. No murmur or rub. Normal S1 and S2. No edema  GI: Abdomen non-tender. Non-distended. No masses. Skin: Warm and dry. No nodules on exposed extremities. No rash on exposed extremities. Lymph: No cervical LAD. No supraclavicular LAD. M/S: No cyanosis. No synovitis or joint deformity. No clubbing. Neuro: Cranial nerves are grossly intact. Moving all extremities. Motor and sensation grossly intact. Psych: Oriented x 3. No anxiety or agitation. DATA:    LABS:        PFTs 7/18/18  FVC 0.93 (22%) FEV1 0.41 (13%) FEV1/FVC ratio  44%  TLC 8.50 (127%) DLCO 7.2 (25%) + BD  6mwt 560 feet, low sat 84%; required 5L with exertion       IMAGING:      I personally reviewed and interpreted the following :     Chest CT 4/3/19: Lungs/pleura: Airways are patent.  No pleural fluid.  Advanced centrilobular  emphysema with lucent lungs and scattered reticular opacities.  Biapical  fibrotic changes are stable.  Chronic opacification in the left upper lobe is  unchanged since 11/28/2018.  Air bronchograms are seen through this region. Increasing patchy opacities more anteriorly in the left upper lobe are new. No underlying mass or nodule.  Stable appearance of noncalcified nodules in  the right upper lobe.  9 x 4 mm nodule is identical in size and shape on  series 3, image 44.  This does appear to have increased in size between  06/22/2018 and 11/28/2018.  A smaller adjacent nodule is also stable.  No new  pulmonary nodules are demonstrated. Chest CTA 11/28/18: No PE. Lungs/pleura: There is moderate to severe centrilobular emphysema.  Right  apical scarring is unchanged.  There is a 0.4 cm nodule in the right upper  lobe, image 101, increased in size from the previous study. Jazzmine Anjelica is a 0.9 x  0.4 cm nodule in the right upper lobe, image 98, increased in size from the  previous study.  There is airspace consolidation in the posterior aspect of  the left upper lobe, new from the previous study.  No new or enlarging  nodules are seen within the left lung.  No pleural effusion or pneumothorax. Diffuse bronchial wall thickening is again noted and mildly increased from  the previous study.  There are multifocal filling defects in the bibasilar  bronchi. Chest CT(dated 6/22/18): No evidence of a pulmonary embolus. Apical predominant emphysema.  Diffuse bronchial wall thickening consistent  with bronchitis. ASA CLASS    II. Mild systemic disease      Sedation plan: Moderate     Post Procedure Plan   Return to same level of care   ______________________     The risks and benefits as well as alternatives to the procedure have been discussed with the patient. The patient understands and agrees to proceed. Bronchoscopy for airway inspection and BAL.

## 2019-04-11 NOTE — PROGRESS NOTES
Recovery completed,discharge instructions given to pt and spouse,verbalize understanding. Pt discharged home stable condition.

## 2019-04-11 NOTE — PROCEDURES
PROCEDURE:  BRONCHOSCOPY WITH BRONCHOALVEOLAR LAVAGE    DESCRIPTION OF PROCEDURE: A time out was taken. Type of sedation used: Moderate Sedation  Physician/patient face-to-face sedation start time: 12:05 pm  Physician/patient face-to-face sedation stop time: 12:27 pm  Total moderate sedation time in minutes: 22 minutes  Patient was monitored continuously 1:1 throughout the entire procedure while sedation was administere. Moderate sedation with 3 mg versed and 50 mcg fentanyl. The scope was passed with ease via the mouth. The vocal cords looked normal. Topical lidocaine was used on the vocal cords and inside the bronchial tree. A complete airway inspection was performed. No endobronchial lesions were identified. Washings were obtained throughout the airways. A Bronchoalveolar lavage was obtained from the left upper lobe with adequate return. Estimated blood loss <5ml. The patient tolerated the procedure well. Recovery will be per endoscopy protocol. FOLLOW UP:  Cultures and cytology in  three to five days.

## 2019-04-13 LAB
CULTURE, RESPIRATORY: NORMAL
GRAM STAIN RESULT: NORMAL

## 2019-04-14 LAB
CULTURE, RESPIRATORY: ABNORMAL
CULTURE, RESPIRATORY: ABNORMAL
GRAM STAIN RESULT: ABNORMAL
ORGANISM: ABNORMAL

## 2019-04-16 ENCOUNTER — OFFICE VISIT (OUTPATIENT)
Dept: PULMONOLOGY | Age: 69
End: 2019-04-16
Payer: MEDICARE

## 2019-04-16 VITALS
TEMPERATURE: 99 F | SYSTOLIC BLOOD PRESSURE: 80 MMHG | WEIGHT: 121.6 LBS | BODY MASS INDEX: 19.09 KG/M2 | HEIGHT: 67 IN | HEART RATE: 99 BPM | RESPIRATION RATE: 20 BRPM | DIASTOLIC BLOOD PRESSURE: 60 MMHG | OXYGEN SATURATION: 92 %

## 2019-04-16 DIAGNOSIS — R93.89 ABNORMAL CT OF THE CHEST: Primary | ICD-10-CM

## 2019-04-16 DIAGNOSIS — J96.11 CHRONIC HYPOXEMIC RESPIRATORY FAILURE (HCC): ICD-10-CM

## 2019-04-16 DIAGNOSIS — J44.9 COPD, VERY SEVERE (HCC): ICD-10-CM

## 2019-04-16 DIAGNOSIS — J15.1 PNEUMONIA OF LEFT UPPER LOBE DUE TO PSEUDOMONAS SPECIES (HCC): ICD-10-CM

## 2019-04-16 PROCEDURE — 3023F SPIROM DOC REV: CPT | Performed by: INTERNAL MEDICINE

## 2019-04-16 PROCEDURE — G8427 DOCREV CUR MEDS BY ELIG CLIN: HCPCS | Performed by: INTERNAL MEDICINE

## 2019-04-16 PROCEDURE — 4040F PNEUMOC VAC/ADMIN/RCVD: CPT | Performed by: INTERNAL MEDICINE

## 2019-04-16 PROCEDURE — 99214 OFFICE O/P EST MOD 30 MIN: CPT | Performed by: INTERNAL MEDICINE

## 2019-04-16 PROCEDURE — 1036F TOBACCO NON-USER: CPT | Performed by: INTERNAL MEDICINE

## 2019-04-16 PROCEDURE — G8926 SPIRO NO PERF OR DOC: HCPCS | Performed by: INTERNAL MEDICINE

## 2019-04-16 PROCEDURE — G8420 CALC BMI NORM PARAMETERS: HCPCS | Performed by: INTERNAL MEDICINE

## 2019-04-16 PROCEDURE — 1123F ACP DISCUSS/DSCN MKR DOCD: CPT | Performed by: INTERNAL MEDICINE

## 2019-04-16 PROCEDURE — 3017F COLORECTAL CA SCREEN DOC REV: CPT | Performed by: INTERNAL MEDICINE

## 2019-04-16 RX ORDER — CIPROFLOXACIN 500 MG/1
500 TABLET, FILM COATED ORAL 2 TIMES DAILY
Qty: 20 TABLET | Refills: 0 | Status: SHIPPED | OUTPATIENT
Start: 2019-04-16 | End: 2019-04-26

## 2019-04-16 RX ORDER — ALBUTEROL SULFATE 90 UG/1
2 AEROSOL, METERED RESPIRATORY (INHALATION) EVERY 6 HOURS PRN
Qty: 1 INHALER | Refills: 5 | Status: SHIPPED | OUTPATIENT
Start: 2019-04-16 | End: 2020-01-01 | Stop reason: SDUPTHER

## 2019-04-16 NOTE — PROGRESS NOTES
[facility] Pulmonary and Critical Care Specialists    Outpatient Follow Up Note    CHIEF COMPLAINT : shortness of breath     HPI:  Presenting hx: The patient is a 70-year-old man with past medical history of tobacco abuse who was admitted with acute respiratory failure and a COPD exacerbation to [facility] in 6/18. Since last clinic visit, the patient underwent bronchoscopy without complication. He has slight cough. There are no changes to past medical history, family history, social history or review of systems(except as noted in the history section) since prior note (all reviewed with patient). Current Medications:    Current Outpatient Medications:     vitamin D (CHOLECALCIFEROL) 1000 UNIT TABS tablet, Take 1,000 Units by mouth daily, Disp: , Rfl:     vitamin C (ASCORBIC ACID) 500 MG tablet, Take 500 mg by mouth daily, Disp: , Rfl:     OXYGEN, Inhale 3 L/min into the lungs continuous, Disp: , Rfl:     meloxicam (MOBIC) 15 MG tablet, Take 1 tablet by mouth daily , take with food for pain, Disp: 30 tablet, Rfl: 3    albuterol sulfate HFA (PROAIR HFA) 108 (90 Base) MCG/ACT inhaler, Inhale 2 puffs into the lungs every 6 hours as needed for Wheezing, Disp: 1 Inhaler, Rfl: 5    guaiFENesin (MUCINEX) 600 MG extended release tablet, Take 1 tablet by mouth 2 times daily, Disp: 30 tablet, Rfl: 1    ipratropium-albuterol (DUONEB) 0.5-2.5 (3) MG/3ML SOLN nebulizer solution, Inhale 3 mLs into the lungs every 4 hours as needed for Shortness of Breath, Disp: 360 mL, Rfl: 3    Multiple Vitamins-Minerals (THERAPEUTIC MULTIVITAMIN-MINERALS) tablet, Take 1 tablet by mouth daily, Disp: , Rfl:     aspirin 81 MG tablet, Take 81 mg by mouth daily , Disp: , Rfl:         Objective:   PHYSICAL EXAM:        VITALS:  BP 80/60   Pulse 99   Temp 99 °F (37.2 °C) (Oral)   Resp 20   Ht 5' 7\" (1.702 m)   Wt 121 lb 9.6 oz (55.2 kg)   SpO2 92% Comment: 3 liters  BMI 19.05 kg/m²     Gen: In no acute distress. Alert.  Comfortable. NCAT. Eyes: PERRL. No sclera icterus. No conjunctival injection. ENT: No ocular or auricular discharge. Oropharynx clear. Neck: Trachea midline. Normal thyroid. Resp: No accessory muscle use. No crackles. few wheezes. L sided rhonchi. No dullness on percussion. CV: Regular rate. Regular rhythm. No murmur or rub. Normal S1 and S2. No edema  GI: Abdomen non-tender. Non-distended. No masses. Skin: Warm and dry. No nodules on exposed extremities. No rash on exposed extremities. Lymph: No cervical LAD. No supraclavicular LAD. M/S: No cyanosis. No synovitis or joint deformity. No clubbing. Neuro: Cranial nerves are grossly intact. Moving all extremities. Motor and sensation grossly intact. Psych: Oriented x 3. No anxiety or agitation. DATA:    LABS:        PFTs 7/18/18  FVC 0.93 (22%) FEV1 0.41 (13%) FEV1/FVC ratio  44%  TLC 8.50 (127%) DLCO 7.2 (25%) + BD  6mwt 560 feet, low sat 84%; required 5L with exertion       BAL 4/19: cytology negative for malignancy  Pseudomonas aeruginosa (1)     Antibiotic Interpretation ARTEM Status   cefepime Sensitive <=2 mcg/mL    ciprofloxacin Sensitive <=1 mcg/mL    gentamicin Sensitive <=4 mcg/mL    meropenem Sensitive <=1 mcg/mL    piperacillin-tazobactam Sensitive <=16 mcg/mL    tobramycin Sensitive <=4 mcg/mL          IMAGING:      I personally reviewed and interpreted the following :     Chest CT 4/3/19: Lungs/pleura: Airways are patent.  No pleural fluid.  Advanced centrilobular  emphysema with lucent lungs and scattered reticular opacities.  Biapical  fibrotic changes are stable.  Chronic opacification in the left upper lobe is  unchanged since 11/28/2018.  Air bronchograms are seen through this region. Increasing patchy opacities more anteriorly in the left upper lobe are new.   No underlying mass or nodule.  Stable appearance of noncalcified nodules in  the right upper lobe.  9 x 4 mm nodule is identical in size and shape on  series 3, image 44.  This does appear to have increased in size between  06/22/2018 and 11/28/2018.  A smaller adjacent nodule is also stable.  No new  pulmonary nodules are demonstrated. Chest CTA 11/28/18: No PE. Lungs/pleura: There is moderate to severe centrilobular emphysema.  Right  apical scarring is unchanged.  There is a 0.4 cm nodule in the right upper  lobe, image 101, increased in size from the previous study. Danella Gell is a 0.9 x  0.4 cm nodule in the right upper lobe, image 98, increased in size from the  previous study.  There is airspace consolidation in the posterior aspect of  the left upper lobe, new from the previous study.  No new or enlarging  nodules are seen within the left lung.  No pleural effusion or pneumothorax. Diffuse bronchial wall thickening is again noted and mildly increased from  the previous study.  There are multifocal filling defects in the bibasilar  bronchi. Chest CT(dated 6/22/18): No evidence of a pulmonary embolus. Apical predominant emphysema.  Diffuse bronchial wall thickening consistent  with bronchitis. Assessment:   - COPD- very severe  - Tobacco abuse  - chronic respiratory failure with Hypoxemia requiring Supplemental oxygen  -Protein calorie malnutrition- severe  - lung nodule RUL 9 mm seen 11/18  - abnormal chest CT- increased consolidation in FLORA- no endobronchial lesions visualized 4/19  Pseudomonas pneumonia    Plan:   - bronchoscopy for airway inspection did not reveal any endobronchial lesions  - Cipro for 10 days  - Inhaled bronchodilators - duonebs and albuterol mdi- refill  - supplemental oxygen 3 L at rest and with exertion 5L- he is mobile with oxygen in the home  - handicap placard letter  - Avoid tobacco use- none in 9 months  - increased  Dietary caloric itake  - repeat chest CT in 6 months 10/19    The information above included the review and summarization of old records.   The history was obtained from these old records and from the patient

## 2019-04-16 NOTE — PATIENT INSTRUCTIONS
Ciprofloxacin 500 mg twice daily for 10 days. Remember to bring all pulmonary medications to your next appointment with the office. Please keep all of your future appointments scheduled by [address], [facility] Pulmonary office. Out of respect for other patients and providers, you may be asked to reschedule your appointment if you arrive later than your scheduled appointment time. Appointments cancelled less than 24hrs in advance will be considered a no show. Patients with three missed appointments within 1 year or four missed appointments within 2 years can be dismissed from the practice.

## 2019-05-01 DIAGNOSIS — J44.9 CHRONIC OBSTRUCTIVE PULMONARY DISEASE, UNSPECIFIED COPD TYPE (HCC): ICD-10-CM

## 2019-05-02 NOTE — TELEPHONE ENCOUNTER
LOV 04/16/19  Assessment:   - COPD- very severe  - Tobacco abuse  - chronic respiratory failure with Hypoxemia requiring Supplemental oxygen  -Protein calorie malnutrition- severe  - lung nodule RUL 9 mm seen 11/18  - abnormal chest CT- increased consolidation in FLORA- no endobronchial lesions visualized 4/19  Pseudomonas pneumonia     Plan:   - bronchoscopy for airway inspection did not reveal any endobronchial lesions  - Cipro for 10 days  - Inhaled bronchodilators - duonebs and albuterol mdi- refill  - supplemental oxygen 3 L at rest and with exertion 5L- he is mobile with oxygen in the home  - handicap placard letter  - Avoid tobacco use- none in 9 months  - increased  Dietary caloric itake  - repeat chest CT in 6 months 10/19     The information above included the review and summarization of old records. The history was obtained from these old records and from the patient directly.   Risks, benefits and alternatives to the management plan were discussed with the patient.

## 2019-05-03 RX ORDER — IPRATROPIUM BROMIDE AND ALBUTEROL SULFATE 2.5; .5 MG/3ML; MG/3ML
SOLUTION RESPIRATORY (INHALATION)
Qty: 360 ML | Refills: 2 | Status: SHIPPED | OUTPATIENT
Start: 2019-05-03 | End: 2019-01-01 | Stop reason: SDUPTHER

## 2019-05-09 ENCOUNTER — TELEPHONE (OUTPATIENT)
Dept: PULMONOLOGY | Age: 69
End: 2019-05-09

## 2019-05-09 DIAGNOSIS — J44.9 CHRONIC OBSTRUCTIVE PULMONARY DISEASE, UNSPECIFIED COPD TYPE (HCC): Primary | ICD-10-CM

## 2019-05-13 LAB
FUNGUS (MYCOLOGY) CULTURE: ABNORMAL
FUNGUS STAIN: ABNORMAL
FUNGUS STAIN: ABNORMAL
ORGANISM: ABNORMAL
ORGANISM: ABNORMAL

## 2019-06-14 PROBLEM — A41.9 SEPTIC SHOCK (HCC): Status: ACTIVE | Noted: 2019-01-01

## 2019-06-14 PROBLEM — D75.839 THROMBOCYTOSIS: Status: ACTIVE | Noted: 2019-01-01

## 2019-06-14 PROBLEM — R65.21 SEPTIC SHOCK (HCC): Status: ACTIVE | Noted: 2019-01-01

## 2019-06-14 NOTE — PROGRESS NOTES
Dr Shen Mensah updated on patient condition. Report given to [facility]. Care transferred.

## 2019-06-14 NOTE — ED NOTES
Per provider who inserted central line, all lines flush and blue line is the only line that will aspirate blood.      Andria Steel RN  06/14/19 [id number]

## 2019-06-14 NOTE — ED NOTES
1525 - Perfect serve sent to Dr. Zoey Horta  06/14/19 [id number] [facility].  Called back for Zoey Horta  06/14/19 [id number]

## 2019-06-14 NOTE — CONSULTS
Patient is being seen at the request of Ivan Vick for a consultation for  Septic shock    HISTORY OF PRESENT ILLNESS: 75 yo WM with COPD who presented with a 5 day history of moderately severe shortness of breath, progressively worsening, associated with myalgias, arthralgias, sweats. His symptoms are similar to prior pneumonias. He is feeling better after getting fluids and antibiotics in the emergency department      PAST MEDICAL HISTORY:  Past Medical History:   Diagnosis Date    Arthritis     Chronic back pain     COPD (chronic obstructive pulmonary disease) ([address]     Wears glasses      PAST SURGICAL HISTORY:  Past Surgical History:   Procedure Laterality Date    BRONCHOSCOPY N/A 11/30/2018    BRONCHOSCOPY ALVEOLAR LAVAGE performed by Shoaib Ferreira MD at [address] [facility] N/A 4/11/2019    BRONCHOSCOPY ALVEOLAR LAVAGE performed by Mindy Churchill MD at [address] [place] [address] Right 2007   [address]    EYE SURGERY Right 1995    detached retina    FINGER SURGERY  5/24/11    duputrens release right ring finger inclluding proxinal interphanageal open CTR    FINGER SURGERY Right 1957    contusion finger # 3    OTHER SURGICAL HISTORY  12/29/2017    excision left facial cyst       FAMILY HISTORY:  family history includes Cancer in his sister; Diabetes in his mother; Emphysema in his father. SOCIAL HISTORY:   reports that he quit smoking about a year ago. His smoking use included cigarettes. He started smoking about 42 years ago. He has a 40.00 pack-year smoking history. He has never used smokeless tobacco.    Scheduled Meds:   sodium chloride  1,000 mL Intravenous Once     Continuous Infusions:    PRN Meds:      ALLERGIES:  Patient has No Known Allergies.     REVIEW OF SYSTEMS:  Constitutional: + Chills, no fevers  HENT: Negative for sore throat  Eyes: Negative for redness   Respiratory: +for dyspnea, cough  Cardiovascular: Negative for chest pain  Gastrointestinal: Negative for vomiting, diarrhea   Genitourinary: Negative for hematuria   Musculoskeletal: + for arthralgias   Skin: Negative for rash  Neurological: Negative for syncope  Hematological: Negative for adenopathy  Psychiatric/Behavorial: Negative for anxiety    PHYSICAL EXAM:  Blood pressure (!) 79/70, pulse 120, temperature 101.4 °F (38.6 °C), temperature source Oral, resp. rate 24, height 5' 7\" (1.702 m), weight 115 lb (52.2 kg), SpO2 97 %.' on 3 L  General: ill appearing. Eyes: PERRL. No sclera icterus. No conjunctival injection. ENT: No discharge. Pharynx clear. Neck: Trachea midline. Normal thyroid. Resp: No accessory muscle use. Left lower lobe crackles. No wheezing. No rhonchi. No dullness on percussion. CV: Regular rate. Regular rhythm. No mumur or rub. No edema. Peripheral pulses are 2+. Capillary refill is less than 3 seconds. GI: Non-tender. Non-distended. No masses. No organomegaly. Normal bowel sounds. No hernia. Skin: Warm and dry. No nodule on exposed extremities. No rash on exposed extremities. Lymph: No cervical LAD. No supraclavicular LAD. M/S: No cyanosis. No joint deformity. No clubbing. Neuro:  Alert and oriented ×3. Patellar reflexes are symmetric. Psych: No agitation, no anxiety, affect is full. LABS:  CBC:   Recent Labs     06/14/19  1415   WBC 26.3*   HGB 11.8*   HCT 36.0*   MCV 88.3   PLT 1,344*     BMP:   Recent Labs     06/14/19  1415   *   K 4.8   CL 87*   CO2 28   BUN 13   CREATININE <0.5*     LIVER PROFILE:   Recent Labs     06/14/19  1415   AST 16   ALT 7*   BILITOT 0.3   ALKPHOS 118     PT/INR: No results for input(s): PROTIME, INR in the last 72 hours. APTT: No results for input(s): APTT in the last 72 hours.   UA:No results for input(s): NITRITE, COLORU, PHUR, LABCAST, WBCUA, RBCUA, MUCUS, TRICHOMONAS, YEAST, BACTERIA, CLARITYU, SPECGRAV, LEUKOCYTESUR, UROBILINOGEN, BILIRUBINUR, BLOODU, GLUCOSEU, AMORPHOUS in the last 72

## 2019-06-14 NOTE — PROGRESS NOTES
Richard Ace 76 y.o. male    Chief Complaint   Patient presents with    Cough     x 1 wk- keeps him up at night       HPI     Pt with severe COPD, recent pseudomonas pneumonia, former smoker. Pt presents with c/o fever 100.9F for 3 days, felt sweating, cough worsened, dry,   SOB worsened. Usually wears O2 3L at home, had to increased O2 to 3.5L at home. Feels allergies worsened  Treatment: Mucinex, nebulizers. Lower back aching, cannot sleep in his bed. Pastmedical, surgical, family and social history were reviewed and updated with the patient. Current Outpatient Medications:     ipratropium-albuterol (DUONEB) 0.5-2.5 (3) MG/3ML SOLN nebulizer solution, INHALE THREE MILLILITERS (ONE VIAL) VIA NEBULIZATION BY MOUTH EVERY 4 HOURS AS NEEDED FOR SHORTNESS OF BREATH, Disp: 360 mL, Rfl: 2    albuterol sulfate HFA (PROAIR HFA) 108 (90 Base) MCG/ACT inhaler, Inhale 2 puffs into the lungs every 6 hours as needed for Wheezing, Disp: 1 Inhaler, Rfl: 5    vitamin D (CHOLECALCIFEROL) 1000 UNIT TABS tablet, Take 1,000 Units by mouth daily, Disp: , Rfl:     vitamin C (ASCORBIC ACID) 500 MG tablet, Take 500 mg by mouth daily, Disp: , Rfl:     OXYGEN, Inhale 3 L/min into the lungs continuous, Disp: , Rfl:     guaiFENesin (MUCINEX) 600 MG extended release tablet, Take 1 tablet by mouth 2 times daily, Disp: 30 tablet, Rfl: 1    Multiple Vitamins-Minerals (THERAPEUTIC MULTIVITAMIN-MINERALS) tablet, Take 1 tablet by mouth daily, Disp: , Rfl:     aspirin 81 MG tablet, Take 81 mg by mouth daily , Disp: , Rfl:     meloxicam (MOBIC) 15 MG tablet, Take 1 tablet by mouth daily , take with food for pain, Disp: 30 tablet, Rfl: 3    Review of Systems   Constitutional: Positive for appetite change and fever. Respiratory: Positive for cough, chest tightness and shortness of breath. Cardiovascular: Negative. Gastrointestinal: Negative. Musculoskeletal: Positive for back pain.        Physical Exam

## 2019-06-14 NOTE — ED PROVIDER NOTES
Central Line  Date/Time: 6/14/2019 4:42 PM  Performed by: Katherin Aase, APRN - CNP  Authorized by: Patricia Borrero MD     Consent:     Consent obtained:  Verbal    Consent given by:  Patient    Risks discussed:  Arterial puncture, bleeding, infection, incorrect placement, nerve damage and pneumothorax    Alternatives discussed:  No treatment  Pre-procedure details:     Hand hygiene: Hand hygiene performed prior to insertion      Sterile barrier technique: All elements of maximal sterile technique followed      Skin preparation:  2% chlorhexidine    Skin preparation agent: Skin preparation agent completely dried prior to procedure    Anesthesia (see MAR for exact dosages): Anesthesia method:  Local infiltration    Local anesthetic:  Lidocaine 1% w/o epi  Procedure details:     Location:  R internal jugular    Site selection rationale:  Vessel easily identified on ultrasound    Patient position:  Flat    Procedural supplies:  Triple lumen    Catheter size:  7.5 Fr    Landmarks identified: yes      Ultrasound guidance: yes      Sterile ultrasound techniques: Sterile gel and sterile probe covers were used      Number of attempts:  1    Successful placement: yes    Post-procedure details:     Post-procedure:  Dressing applied and line sutured    Assessment:  No pneumothorax on x-ray and placement verified by x-ray    Patient tolerance of procedure: Tolerated well, no immediate complications  Comments:      Blood return noted through blue port. No blood return noted through white or brown ports, however they flushed easily. X-ray imaging confirms that the line is coiled in the subclavian, however it is still a central line it does not require further manipulation for the medications we wish to get the patient. This was confirmed by my attending, Dr. Mervin Herzog as well as Dr. Zayda Diez. This procedure was performed by me at the request of my attending physician. She was available for consultation as needed. Please see attending note for further history, ROS, PE, MDM, and disposition.          Makenna Lyn, VALERIE - CNP  06/14/19 4296

## 2019-06-14 NOTE — PROGRESS NOTES
Patient to unit from ED. Patient is alert and oriented. V/S stable at this time. Patient oriented to room. Will continue to monitor. 4 Eyes Admission Assessment     I agree as the admission nurse that 2 RN's have performed a thorough Head to Toe Skin Assessment on the patient. ALL assessment sites listed below have been assessed on admission. Areas assessed by both nurses: Marita Randolph RN and Tomas Rocha RN  [x]   Head, Face, and Ears   [x]   Shoulders, Back, and Chest  [x]   Arms, Elbows, and Hands   [x]   Coccyx, Sacrum, and Ischum  [x]   Legs, Feet, and Heels        Does the Patient have Skin Breakdown?   No         Andrew Prevention initiated:  No   Wound Care Orders initiated:  No      WOC nurse consulted for Pressure Injury (Stage 3,4, Unstageable, DTI, NWPT, and Complex wounds):  No      Nurse 1 eSignature: Electronically signed by Robin Mercado RN on 6/14/2019 at 6:27 PM    **SHARE this note so that the co-signing nurse is able to place an eSignature**    Nurse 2 eSignature: Electronically signed by Sherry Belcher RN on 6/14/19 at 6:31 PM

## 2019-06-14 NOTE — ED NOTES
nsg report give, pt. Remains alert and oriented.  nsg stated they would start vanc after transporting pt. To floor.       Rosario Johnson RN  06/14/19 1800

## 2019-06-14 NOTE — ED NOTES
1615 - called a/s for critical care. (Dr. Yomaira Sanderson on call)     Filiberto Sevilla  06/14/19 0524 [id number] - Dr. Yomaira Sanderson called back.       Filiberto Sevilla  06/14/19 9287

## 2019-06-14 NOTE — ED NOTES
Chief Complaint   Patient presents with    Shortness of Breath     started a week ago worse today, +fever 3lnc at home     Pt placed in room 14 in gown. Placed on cardiac monitor, ST noted. Bed in low position, call light in reach. Family at bedside. Will continue to monitor. Sanjuana Wright  06/14/19 2116

## 2019-06-14 NOTE — ED PROVIDER NOTES
Emergency Department Encounter    Patient: Chante Madrid  MRN: [id number]  : 1950  Date of Evaluation: 2019  ED Provider:  Rayne Beltran    Triage Chief Complaint:   Shortness of Breath (started a week ago worse today, +fever 3lnc at home)    Knik:  Chante Madrid is a 76 y.o. male that presents with a cough shortness of breath fevers and not feeling well. Patient states is been getting worse over the past week he does have a history of lung problems and was concerned. States he quit smoking a while ago. He does use oxygen at all times at home a year ago he was on 2 L he is currently on 3 to 3-1/2 L 24 hours a day. His cough is productive of various colored sputum anywhere from white to yellow to green.     ROS:  General:  + fevers, no chills, no weakness  Eyes:  No recent vison changes, no discharge  ENT:  No sore throat, no nasal congestion, no hearing changes  Cardiovascular:  No chest pain, no palpitations  Respiratory:  + shortness of breath, + cough, no wheezing  Gastrointestinal:  No pain, no nausea, no vomiting, no diarrhea  Musculoskeletal:  No muscle pain, no joint pain  Skin:  No rash, no pruritis, no easy bruising  Neurologic:  No speech problems, no headache, no extremity numbness, no extremity tingling, no extremity weakness  Psychiatric:  No anxiety  Genitourinary:  No dysuria, no hematuria  Endocrine:  No unexpected weight gain, no unexpected weight loss  Extremities:  no edema, no pain    Past Medical History:   Diagnosis Date    Arthritis     Chronic back pain     COPD (chronic obstructive pulmonary disease) ([facility] [address]     Wears glasses      Past Surgical History:   Procedure Laterality Date    BRONCHOSCOPY N/A 2018    BRONCHOSCOPY ALVEOLAR LAVAGE performed by Brook Valdez MD at [address] N/A 2019    BRONCHOSCOPY ALVEOLAR LAVAGE performed by Karen Mock MD at [address] [address] ALL TEETH PULLED    EYE SURGERY Right     detached retina    FINGER SURGERY  11    duputrens release right ring finger inclluding proxinal interphanageal open CTR    FINGER SURGERY Right     contusion finger # 3    OTHER SURGICAL HISTORY  2017    excision left facial cyst     Family History   Problem Relation Age of Onset    Diabetes Mother     Cancer Sister         pancreatic    Emphysema Father      Social History     Socioeconomic History    Marital status:      Spouse name: Arnaldo Gardner Number of children: 2    Years of education: 15    Highest education level: Not on file   Occupational History    Occupation:      Employer: [employer] resource strain: Not on file    Food insecurity:     Worry: Not on file     Inability: Not on file   [employer] needs:     Medical: Not on file     Non-medical: Not on file   Tobacco Use    Smoking status: Former Smoker     Packs/day: 1.00     Years: 40.00     Pack years: 40.00     Types: Cigarettes     Start date: 1976     Last attempt to quit: 2018     Years since quittin.0    Smokeless tobacco: Never Used    Tobacco comment: Advised to quit. Had one cig for the past 5 days. Substance and Sexual Activity    Alcohol use:  Yes     Alcohol/week: 7.2 oz     Types: 12 Cans of beer per week     Comment: weekly    Drug use: No    Sexual activity: Not Currently   Lifestyle    Physical activity:     Days per week: Not on file     Minutes per session: Not on file    Stress: Not on file   Relationships    Social connections:     Talks on phone: Not on file     Gets together: Not on file     Attends [employer] service: Not on file     Active member of club or organization: Not on file     Attends meetings of clubs or organizations: Not on file     Relationship status: Not on file    Intimate partner violence:     Fear of current or ex partner: Not on file     Emotionally abused: Not on file Physically abused: Not on file     Forced sexual activity: Not on file   Other Topics Concern    Not on file   Social History Narrative    Not on file     Current Facility-Administered Medications   Medication Dose Route Frequency Provider Last Rate Last Dose    magnesium sulfate 2 g in 50 mL IVPB premix  2 g Intravenous Once Paz Taylor MD 25 mL/hr at 06/14/19 1422 2 g at 06/14/19 1422    0.9 % sodium chloride bolus  1,000 mL Intravenous Once Paz Taylor MD 1,000 mL/hr at 06/14/19 1532 1,000 mL at 06/14/19 1532     Current Outpatient Medications   Medication Sig Dispense Refill    ipratropium-albuterol (DUONEB) 0.5-2.5 (3) MG/3ML SOLN nebulizer solution INHALE THREE MILLILITERS (ONE VIAL) VIA NEBULIZATION BY MOUTH EVERY 4 HOURS AS NEEDED FOR SHORTNESS OF BREATH 360 mL 2    albuterol sulfate HFA (PROAIR HFA) 108 (90 Base) MCG/ACT inhaler Inhale 2 puffs into the lungs every 6 hours as needed for Wheezing 1 Inhaler 5    vitamin D (CHOLECALCIFEROL) 1000 UNIT TABS tablet Take 1,000 Units by mouth daily      vitamin C (ASCORBIC ACID) 500 MG tablet Take 500 mg by mouth daily      OXYGEN Inhale 3 L/min into the lungs continuous      meloxicam (MOBIC) 15 MG tablet Take 1 tablet by mouth daily , take with food for pain 30 tablet 3    guaiFENesin (MUCINEX) 600 MG extended release tablet Take 1 tablet by mouth 2 times daily 30 tablet 1    Multiple Vitamins-Minerals (THERAPEUTIC MULTIVITAMIN-MINERALS) tablet Take 1 tablet by mouth daily      aspirin 81 MG tablet Take 81 mg by mouth daily        No Known Allergies    Nursing Notes Reviewed    Physical Exam:  Triage VS:    ED Triage Vitals [06/14/19 1355]   Enc Vitals Group      BP       Pulse 114      Resp 26      Temp 101.2 °F (38.4 °C)      Temp Source Temporal      SpO2 (!) 82 %      Weight 115 lb (52.2 kg)      Height 5' 7\" (1.702 m)      Head Circumference       Peak Flow       Pain Score       Pain Loc       Pain Edu? Excl. in [address]?         My Potassium 4.8 3.5 - 5.1 mmol/L    Chloride 87 (L) 99 - 110 mmol/L    CO2 28 21 - 32 mmol/L    Anion Gap 16 3 - 16    Glucose 213 (H) 70 - 99 mg/dL    BUN 13 7 - 20 mg/dL    CREATININE <0.5 (L) 0.8 - 1.3 mg/dL    GFR Non-African American >60 >60    GFR African American >60 >60    Calcium 10.1 8.3 - 10.6 mg/dL    Total Protein 6.6 6.4 - 8.2 g/dL    Alb 3.6 3.4 - 5.0 g/dL    Albumin/Globulin Ratio 1.2 1.1 - 2.2    Total Bilirubin 0.3 0.0 - 1.0 mg/dL    Alkaline Phosphatase 118 40 - 129 U/L    ALT 7 (L) 10 - 40 U/L    AST 16 15 - 37 U/L    Globulin 3.0 g/dL   Lactic Acid, Plasma   Result Value Ref Range    Lactic Acid 3.2 (H) 0.4 - 2.0 mmol/L      Radiographs (if obtained):    [x] Radiologist's Report Reviewed:  XR CHEST PORTABLE   Final Result   New extensive left-sided airspace disease suspicious for pneumonia. Some of   this has a masslike morphology and follow-up to ensure resolution is needed. Recommend follow-up chest x-ray in 2-3 weeks. CT CHEST WO CONTRAST    (Results Pending)   Results pending at time of admission    EKG (if obtained): (All EKG's are interpreted by myself in the absence of a cardiologist)    Chart review shows recent radiographs:  No results found. MDM:  28-year-old male with cough and congestion and shortness of breath. Patient does have a large extensive left-sided airspace disease suspicious for pneumonia and I am also concerned about a possible lung cancer. I have started Rocephin for his pneumonia and sent him for a CT scan for further delineation have spoken to the hospitalist service and they will admit the patient for further care. Patient is in agreement with this plan. Clinical Impression:  1. COPD exacerbation ([address]    2. Shortness of breath    3. Pneumonia due to organism    4. Mass of left lung      Disposition referral (if applicable):  No follow-up provider specified.   Disposition medications (if applicable):  New Prescriptions    No medications on file Comment: Please note this report has been produced using speech recognition software and may contain errors related to that system including errors in grammar, punctuation, and spelling, as well as words and phrases that may be inappropriate. Efforts were made to edit the dictations. Argie Phoenix Dolphus Bell, MD  06/14/19 2771

## 2019-06-14 NOTE — ED NOTES
Pt to CT at this time. Pt stable upon transport. Will continue to monitor. Sanjuana Forbes Aid  06/14/19 7477

## 2019-06-15 NOTE — PROGRESS NOTES
upper lobe pulmonary nodule.   Dr. Susanne Fagan is primary pulmonologist  · DC CVC  · Debrox for earwax  · Afrin/Flonase for sinus congestion  · Prophylaxis: Lovenox, Bactroban  · 2 [place] later today if BP remains stable

## 2019-06-15 NOTE — PROGRESS NOTES
Dr. Cassie Dutton @ bedside assessing pt. Per MD pt to have 1G calcium gluconate and prn KCL order set.

## 2019-06-15 NOTE — PROGRESS NOTES
limitations:   Not formally assessed, demonstrated functional strength to walk in room    Lower Extremity Sensation    NT    Lower Extremity Proprioception:   NT    Coordination and Tone  NT    Balance  Static Sitting:  Good    Tolerance:   Dynamic Sitting:  Good   Static Standing: Fair +   Tolerance:   Dynamic Standing: Fair     Bed Mobility   Supine to Sit:   SBA for lines  Sit to Supine:  SBA for lines  Rolling:   Not Tested  Scooting at EOB: Supervision  Scooting to HOB:  SBA    Transfer Training     Sit to stand:   CGA  Stand to sit:   CGA  Bed to toilet:  CGA with use of Other: hand held assist and pushing IV pole    Gait gait completed as indicated below  Distance:  20 ft x2  Deviations (firm surface/linoleum): fast isak, narrow NICOLASA , mildly unsteady    Assistive Device Used: Other: pushing IV pole and HHA  Level of Assist: Min A  Comment:     Stair Training deferred, pt unsafe/not appropriate to complete stairs at this time  Activity Tolerance   Pt completed therapy session with Dizziness  SpO2: 88-95% using 3 L  HR:  BP:  Seated EOB 91/66, seated on toilet post BM: 103/74; supine in bed post activity; 108/74    Positioning Needs   Pt returned to bed, call light and needs in reach    Exercises Initiated    N/A    Other  None. Patient/Family Education   Pt educated on role of inpatient PT, POC, importance of continued activity, DC recommendations and calling for assist with mobility. Assessment  Pt seen for Physical Therapy evaluation in acute care setting. Pt demonstrated decreased Activity tolerance, Balance and Strength and decreased independence with Ambulation, Bed Mobility  and Transfers. Patient is appropriate for ongoing rehab in the acute setting. Recommending home with initial 24/7 assist and home PT/OT at d/c. Goals : To be met in 3 visits:  1). Independent with LE Ex x 10 reps    To be met in 6 visits:  1). Supine to/from sit: Independent  2). Sit to/from stand: SBA  3).   Bed to chair: CGA  4). Gait: Ambulate 125'  with  SBA  and use of LRAD  5). Tolerate B LE exercises 3 sets of 10-15 reps  6). Ascend/descend  2steps with CGA with use of  and LRAD. Rehabilitation Potential: Good  Strengths for achieving goals include:   Pt motivated, PLOF, Family Support and Pt cooperative  Barriers to achieving goals include:    Weakness    Plan    To be seen 3-5 x / week  while in acute care setting for therapeutic exercises, bed mobility, transfers, progressive gait training, balance training, and family/patient education. Signature  Roya Alexander, PT #[id number]    If patient discharges from this facility prior to next visit, this note will serve as the Discharge Summary.

## 2019-06-15 NOTE — PROGRESS NOTES
Hospitalist Progress Note      PCP: SILVER HUNTER, APRN - CNP    Date of Admission: 6/14/2019    Subjective: cont to be hypotensive, feels very weak and fatigued    Medications:  Reviewed    Infusion Medications    sodium chloride 125 mL/hr at 06/15/19 1408    dextrose       Scheduled Medications    ipratropium-albuterol  1 ampule Inhalation Q4H    azithromycin  500 mg Oral Daily    insulin lispro  0-18 Units Subcutaneous TID WC    insulin lispro  0-9 Units Subcutaneous Nightly    carbamide peroxide  3 drop Both Ears BID    oxymetazoline  2 spray Each Nostril BID    fluticasone  2 spray Each Nostril Daily    potassium phosphate IVPB  15 mmol Intravenous Once    aspirin  81 mg Oral Daily    guaiFENesin  600 mg Oral BID    sodium chloride flush  10 mL Intravenous 2 times per day    enoxaparin  40 mg Subcutaneous Daily    piperacillin-tazobactam  3.375 g Intravenous Q8H    vancomycin  500 mg Intravenous Q12H    methylPREDNISolone  40 mg Intravenous Daily     PRN Meds: sodium chloride, potassium chloride **OR** potassium alternative oral replacement **OR** potassium chloride, sodium chloride flush, magnesium hydroxide, ondansetron, acetaminophen, glucose, dextrose, glucagon (rDNA), dextrose      Intake/Output Summary (Last 24 hours) at 6/15/2019 1814  Last data filed at 6/15/2019 1345  Gross per 24 hour   Intake 2818 ml   Output 1150 ml   Net 1668 ml       Physical Exam Performed:    BP 96/71   Pulse 91   Temp 97.4 °F (36.3 °C) (Oral)   Resp 30   Ht 5' 7\" (1.702 m)   Wt 115 lb 14.4 oz (52.6 kg)   SpO2 91%   BMI 18.15 kg/m²       GEN:        A&Ox3, appears unwell. HEENT:   NC/AT,EOMI, semi dry MM, no erythema/exudates or visible masses. CVS:        Normal S1,S2. RRR. Without M/G/R.   LUNG:     L lower rales. no wheezes or rhonchi. Diminished at bases. Mild increased WOB  ABD:        Soft, ND/NT, BS+ x4. Without G/R.  EXT:        2+ pulses, no c/c/e. Brisk cap refill.    PSY: Thought process intact, affect appropriate. TRACI:        CN III-XII intact, moves all 4 spontaneously, sensory grossly intact. SKIN:       No rash or lesions on visible skin. Labs:   Recent Labs     06/14/19  1415 06/15/19  0550   WBC 26.3* 20.9*   HGB 11.8* 10.2*   HCT 36.0* 31.5*   PLT 1,344* 1,041*     Recent Labs     06/14/19  1415 06/15/19  0550 06/15/19  1351   * 134* 130*   K 4.8 3.2* 3.8   CL 87* 103 94*   CO2 28 23 25   BUN 13 8 11   CREATININE <0.5* <0.5* <0.5*   CALCIUM 10.1 7.1* 8.8   PHOS  --   --  2.1*     Recent Labs     06/14/19  1415   AST 16   ALT 7*   BILITOT 0.3   ALKPHOS 118     No results for input(s): INR in the last 72 hours. No results for input(s): Ayo Monterroso in the last 72 hours. Urinalysis:    No results found for: Vicki Makos, BACTERIA, RBCUA, BLOODU, [address], [address] [place] [address]    Radiology:  XR CHEST PORTABLE   Final Result   Right jugular central line projects laterally, likely in external jugular or   subclavian vein. No pneumothorax. Continue pattern of pneumonia in the left lung. CT CHEST WO CONTRAST   Final Result   Extensive new left-sided partially consolidative pulmonary disease,   consistent with pneumonia. Follow-up to ensure resolution is recommended to   exclude an underlying mass. New ill-defined nodule of the right upper lobe. Advanced emphysema. Unchanged volume loss of the left lung with consolidative change of the left   upper lobe. However this increased on the April 2019 examination and close   follow-up is needed. XR CHEST PORTABLE   Final Result   New extensive left-sided airspace disease suspicious for pneumonia. Some of   this has a masslike morphology and follow-up to ensure resolution is needed. Recommend follow-up chest x-ray in 2-3 weeks.                  Assessment/Plan:    Active Hospital Problems    Diagnosis    Septic shock ([facility] [address] [A41.9, R65.21]    Thrombocytosis ([address] [D47.3]    COPD exacerbation ([facility] [address] [J44.1]    Pneumonia of left lower lobe due to infectious organism ([facility] [address] [J18.1]    HCAP (healthcare-associated pneumonia) [J18.9]    Chronic hypoxemic respiratory failure (HCC) [J96.11]    COPD, very severe ([facility] [address] [J44.9]         1. Septic Shock, likely related to HCAP, IV vanco and zosyn, IVF, f/u cx, admit to ICU. Intensivist c/s. Maintaining MAPs > 65 although pressure have been soft. 2. Acute on chronic respiratory failure with hypoxia, likely related to HCAP w/ probable aeCOPD component, supplemental O2 to maintain SPO2 ? 92%, continuous pulse ox. Wean as tolerated to home O2 of 3L. 3. HCAP w/ probable aeCOPD component, ABX as above, nebs, steroids. 4. Pulmonary nodule, RUL, new since April. Also, some concern for possible underlying mass in LLL PNA. Will require f/u CT. 5. Lactic acidosis - resolved  6. Thrombocytosis, 1344, has been elevated in past but not quite to this degree.   Defer to DD for hem/onc c/s.   7. Leukocytosis, 26.3-->20.9, monitor.        DVT Prophylaxis: Lovenox  Diet: DIET GENERAL; Dysphagia III Advanced  Code Status: Full Code    PT/OT Eval Status: not indicated    Dispo - monitor in ICU    Deepali Romero MD

## 2019-06-15 NOTE — PROGRESS NOTES
Speech Language Pathology  Facility/Department: [facility] ICU   CLINICAL BEDSIDE SWALLOW EVALUATION  SLP recommends dysphagia 2 solids with thin liquids. Meds whole in water. Hold PO and contact SLP if s/s of aspiration develop. SLP to follow. NAME: Wendi Laurent  : 1950  MRN: [id number]    ADMISSION DATE: 2019  ADMITTING DIAGNOSIS: has COPD, very severe ([address] Tobacco use; Chronic midline low back pain with right-sided sciatica; Age-related osteoporosis without current pathological fracture; Community acquired pneumonia of left upper lobe of lung ([address] Tracheobronchitis; Tobacco abuse; Hypoxemia requiring supplemental oxygen; Protein calorie malnutrition ([address] Abnormal chest x-ray; Chronic hypoxemic respiratory failure ([address] Lactic acidosis; Diabetes mellitus, new onset ([address] HCAP (healthcare-associated pneumonia); Abnormal CT of the chest; Pulmonary nodule; Pneumonia of left upper lobe due to Pseudomonas species ([address] Septic shock ([address] Thrombocytosis ([address] COPD exacerbation ([address] and Pneumonia of left lower lobe due to infectious organism [facility]) on their problem list.  ONSET DATE: Pt admitted to [address] [place] [address] on 2019    Recent Chest Xray/CT of Chest: (2019)  Right jugular central line projects laterally, likely in external jugular or   subclavian vein.       No pneumothorax.       Continue pattern of pneumonia in the left lung. Date of Eval: 6/15/2019  Evaluating Therapist: Philipp Dudley    Current Diet level:  Current Diet : Regular  Current Liquid Diet : Thin      Primary Complaint  Patient Complaint: The patient is a 77 y/o male whi presented to [address] [place] [address] with worsening cough, fever, and need for increased O2 supplementation. The patient typically wears 2L at baseline but required 3-3.5 in ED. The patient was dx with COPD, PNA, and a mass in lung was noted.     Pain:  Pain Assessment  Pain Assessment: 0-10  Pain Level: 0  Pain Type: Chronic pain  Pain Location: Back  Pain Descriptors: Aching    Reason for Referral  Kim Toney was referred for a bedside swallow evaluation to assess the efficiency of his swallow function, identify signs and symptoms of aspiration and make recommendations regarding safe dietary consistencies, effective compensatory strategies, and safe eating environment. Impression  Dysphagia Diagnosis: Mild to moderate oral stage dysphagia;Mild oral stage dysphagia;Mild pharyngeal stage dysphagia  Dysphagia Impression : Mild-mod oropharyngeal dysphagia. Dysphagia Outcome Severity Scale: Level 5: Mild dysphagia- Distant supervision. May need one diet consistency restricted     The patient was seen in room at bedside with RN permission. Pt on 3L of O2 via NC. The patient reports that he typically requires 2L of O2 at home. The patient has O2 sats of 97 with RR of 16. Oral phase of swallow is characterized by prolonged and effortful mastication of regular solids (greater than 12 seconds with t tsp bite). The patient reports that he has both upper and lower dentures, however, he only wears upper dentures during intake 2/2 poor fit of lower dentures s/p weight loss. SLP trialing 1/2 tsp solid with improved mastication time noted. Pharyngeal phase of swallow is characterized by wet throat clear x1 following large thin liquid bolus. SLP cueing the patient to take small sips with no further clinical s/s of asp-pen noted 3/3. The patient presents with baseline congested cough and frequent throat clearing. No notable changes is O2 sat or RR noted throughout the assessment. Hyolaryngeal elevation appears grossly WFL upon digital palpation to anterior neck. SLP recommends dysphagia 2 solids with thin liquids. Meds whole in water. Hold PO and contact SLP if s/s of aspiration develop. SLP to follow. Treatment Plan  Requires SLP Intervention: Yes  Duration/Frequency of Treatment: 1-2x/wk xLOS  D/C Recommendations:  To be determined       Recommended Diet and Intervention  Diet Solids Recommendation: Dysphagia III Advanced  Liquid Consistency Recommendation: Thin  Recommended Form of Meds: Whole with water  Recommendations: Dysphagia treatment  Therapeutic Interventions: Patient/Family education;Diet tolerance monitoring;Oral care    Compensatory Swallowing Strategies  Compensatory Swallowing Strategies:   - Alternate solids and liquids  - Small bites/sips  - Eat/Feed slowly  - Remain upright for 30-45 minutes after meals  - Upright as possible for all oral intake    Treatment/Goals  Short-term Goals  Timeframe for Short-term Goals: 5 days (6/20)  Goal 1: The patient will tolerate recommended diet with no clinical s/s of aspiration 10/10  Goal 2: The patient will demonstrate understanding of compensatory strategies, given no cues  Long-term Goals  Timeframe for Long-term Goals: 7 days (6/22)  Goal 1: The patient will tolerate least restrictive diet with no clinical signs or symptoms of aspiration to ensure optimal nutrition and hydration with reduced aspiration risks. General  Chart Reviewed: Yes  Comments: Chart reviewed prior to completion of BSE  Subjective  Subjective: The patient was seen in bed seated on EOB. No family present at time of evaluation  Behavior/Cognition: Alert; Cooperative;Pleasant mood  Respiratory Status: O2 via nasual cannula  Breath Sounds: Wet  O2 Device: Nasal cannula  Liters of Oxygen: 3 L  Communication Observation: Functional  Follows Directions: Complex  Dentition: Dentures top  Patient Positioning: Upright in bed  Baseline Vocal Quality: Wet  Volitional Cough: Wet;Strong  Prior Dysphagia History: No prior dysphagia hx. Consistencies Administered: Reg solid;Mechanical soft;Puree; Thin         Vision/Hearing  Vision  Vision: Within Functional Limits  Hearing  Hearing: Within functional limits    Oral Motor Deficits  Oral/Motor  Oral Motor:  Within functional limits    Oral Phase Dysfunction  Oral Phase  Oral Phase: Exceptions  Oral Phase Dysfunction  Impaired Mastication: Reg Solid  Oral Phase  Oral Phase - Comment: Prolonged and effortful mastication noted with regular solid. The patient reports only wearing upper dentures during intake secondary to poor fit of dentures. Indicators of Pharyngeal Phase Dysfunction   Pharyngeal Phase  Pharyngeal Phase: Exceptions  Indicators of Pharyngeal Phase Dysfunction  Cough - Delayed: Thin  Pharyngeal Phase   Pharyngeal: Delayed, congested cough noted X1 following large drink of thin liquid. Congested cough at baseline makes determining clinical s/s of aspriation difficult at bedside. With cues to take small sips, no further coughing noted with thin liquids. Prognosis  Prognosis  Prognosis for safe diet advancement: good  Individuals consulted  Consulted and agree with results and recommendations: Patient;RN    Education  Patient Education: SLP re: Role of SLP, rationale for evaluation and reason for referral, anatomic compenents of swallowing, results of evaluation, recommendations  Patient Education Response: Demonstrated understanding;Verbalizes understanding  Safety Devices in place: Yes  Type of devices: Left in bed;Nurse notified; All fall risk precautions in place      Therapy Time  SLP Individual Minutes  Time In: 0800  Time Out: 3127  Minutes: 33     SLP Total Treatment Time  Timed Code Treatment Minutes: 42503  27, SLP  6/15/2019 9:51 AM    Yelena Bailey M.A., [id number] [facility] #[id number]  Speech-Language Pathologist

## 2019-06-15 NOTE — PROGRESS NOTES
No changes to assessment noted. Pt lying in bed resting. IVFs  Infusing through  R IJ. No signs of distress noted. Will continue to monitor.

## 2019-06-15 NOTE — H&P
Hospital Medicine History & Physical      PCP: VALERIE Rogers - CNP    Date of Service: Pt seen/examined on 6/14/19 and admitted on 6/14/19 to Inpatient    Chief Complaint   Patient presents with    Shortness of Breath     started a week ago worse today, +fever 3lnc at home       History Of Present Illness: The patient is a 76 y.o. male with PMH below, presents with SOB/WATKINS, fever, diaphoresis, dry cough, increased oxygen demand. Patient reports the above symptoms worsening over the last 3 days. Patient reports that he normally wears 3 L of oxygen at home. He has had to increase to 3.5 L today. Patient reports that he was recently treated for Pseudomonas pneumonia. He was found to be mildly hypotensive in the ER. He also reports that he recently quit smoking. Past Medical History:        Diagnosis Date    Arthritis     Chronic back pain     COPD (chronic obstructive pulmonary disease) [address]     Wears glasses        Past Surgical History:        Procedure Laterality Date    BRONCHOSCOPY N/A 11/30/2018    BRONCHOSCOPY ALVEOLAR LAVAGE performed by Alfredo Prakash MD at [facility] N/A 4/11/2019    BRONCHOSCOPY ALVEOLAR LAVAGE performed by Faith Kendrick MD at [address] [place] [address] Right 2007   [address]    ALL TEETH PULLED    EYE SURGERY Right 1995    detached retina    FINGER SURGERY  5/24/11    duputrens release right ring finger inclluding proxinal interphanageal open CTR    FINGER SURGERY Right 1957    contusion finger # 3    OTHER SURGICAL HISTORY  12/29/2017    excision left facial cyst       Medications Prior to Admission:    Prior to Admission medications    Medication Sig Start Date End Date Taking?  Authorizing Provider   ipratropium-albuterol (DUONEB) 0.5-2.5 (3) MG/3ML SOLN nebulizer solution INHALE THREE MILLILITERS (ONE VIAL) VIA NEBULIZATION BY MOUTH EVERY 4 HOURS AS NEEDED FOR SHORTNESS OF BREATH 5/3/19 Yes Romina Crane MD   albuterol sulfate HFA (PROAIR HFA) 108 (90 Base) MCG/ACT inhaler Inhale 2 puffs into the lungs every 6 hours as needed for Wheezing 4/16/19  Yes Romina Crane MD   OXYGEN Inhale 3 L/min into the lungs continuous   Yes Historical Provider, MD   guaiFENesin (MUCINEX) 600 MG extended release tablet Take 1 tablet by mouth 2 times daily 6/23/18  Yes Merlin Shi MD   aspirin 81 MG tablet Take 81 mg by mouth daily  1/14/16  Yes Historical Provider, MD   vitamin D (CHOLECALCIFEROL) 1000 UNIT TABS tablet Take 1,000 Units by mouth daily    Historical Provider, MD   vitamin C (ASCORBIC ACID) 500 MG tablet Take 500 mg by mouth daily    Historical Provider, MD   meloxicam (MOBIC) 15 MG tablet Take 1 tablet by mouth daily , take with food for pain 3/27/19   [employer] Rep[employer], PA   Multiple Vitamins-Minerals (THERAPEUTIC MULTIVITAMIN-MINERALS) tablet Take 1 tablet by mouth daily    Historical Provider, MD       Allergies:  Patient has no known allergies. Social History:    TOBACCO:   reports that he quit smoking about a year ago. His smoking use included cigarettes. He started smoking about 42 years ago. He has a 40.00 pack-year smoking history. He has never used smokeless tobacco.  ETOH:   reports that he drinks about 7.2 oz of alcohol per week. Family History:  Reviewed in detail and negative for DM, Early CAD, Cancer (except as below). Positive as follows:        Problem Relation Age of Onset    Diabetes Mother     Cancer Sister         pancreatic    Emphysema Father        REVIEW OF SYSTEMS:   Pertinent positives/negatives as follows: SOB/WATKINS, fever, diaphoresis, dry cough, increased oxygen demand, and as discussed in HPI, otherwise a complete ROS performed and all other systems are negative  PHYSICAL EXAM PERFORMED:    BP 87/60   Pulse 87   Temp 98.5 °F (36.9 °C) (Oral)   Resp 20   Ht 5' 7\" (1.702 m)   Wt 115 lb (52.2 kg)   SpO2 99%   BMI 18.01 kg/m²     GEN:  A&Ox3, appears unwell. HEENT:  NC/AT,EOMI, semi dry MM, no erythema/exudates or visible masses. CVS:  Normal S1,S2. RRR. Without M/G/R.   LUNG:   L lower rales. no wheezes or rhonchi. Diminished at bases. Mild increased WOB  ABD:  Soft, ND/NT, BS+ x4. Without G/R.  EXT: 2+ pulses, no c/c/e. Brisk cap refill. PSY:  Thought process intact, affect appropriate. TRACI:  CN III-XII intact, moves all 4 spontaneously, sensory grossly intact. SKIN: No rash or lesions on visible skin. Chart review shows recent radiographs:  Ct Chest Wo Contrast    Result Date: 6/14/2019  EXAMINATION: CT OF THE CHEST WITHOUT CONTRAST 6/14/2019 3:26 pm TECHNIQUE: CT of the chest was performed without the administration of intravenous contrast. Multiplanar reformatted images are provided for review. Dose modulation, iterative reconstruction, and/or weight based adjustment of the mA/kV was utilized to reduce the radiation dose to as low as reasonably achievable. COMPARISON: CT of the chest April 3, 2019 and chest x-ray earlier today HISTORY: ORDERING SYSTEM PROVIDED HISTORY: Left-sided mass TECHNOLOGIST PROVIDED HISTORY: Ordering Physician Provided Reason for Exam: left side mass, COPD Acuity: Chronic Type of Exam: Ongoing FINDINGS: Mediastinum: Unchanged mediastinal shift to the left. No acute noncontrast findings. Left lower paratracheal lymph nodes near the ronaldo are slightly increased in size measuring up to 9 mm short axis diameter, previously 6 mm. Normal size lymph nodes elsewhere. No pericardial effusion. Lungs/pleura: There is a new small ill-defined nodule like focus of the right lung demonstrated on series 3, image 55. In the left lung there is new extensive left-sided pulmonary disease, mostly consolidative. This involves the left lower lobe as well as the left upper lobe with sparing of the lingula. There is a trace amount of left pleural fluid. No drainable pleural effusion. No pneumothorax.   Emphysema again noted Upper Abdomen: process of the right lung. New extensive left-sided airspace disease suspicious for pneumonia. Some of this has a masslike morphology and follow-up to ensure resolution is needed. Recommend follow-up chest x-ray in 2-3 weeks. CBC:  Recent Labs     06/14/19  1415   WBC 26.3*   HGB 11.8*   HCT 36.0*   PLT 1,344*      RENAL  Recent Labs     06/14/19  1415   *   K 4.8   CL 87*   CO2 28   BUN 13   CREATININE <0.5*   GLUCOSE 213*     Hemoglobin a1c:  Lab Results   Component Value Date    LABA1C 6.9 11/27/2018    LABA1C 7.1 06/22/2018    LABA1C 6.0 06/01/2018     LFT'S:  Recent Labs     06/14/19  1415   AST 16   ALT 7*   BILITOT 0.3   ALKPHOS 118     CARDIAC ENZYMES:   Lab Results   Component Value Date    PROBNP 955 (H) 11/27/2018    PROBNP 292 (H) 06/21/2018    PROBNP 379 (H) 05/31/2018     LACTIC ACID:  Recent Labs     06/14/19  1415 06/14/19  1655   LACTA 3.2* 1.0        PHYSICIAN CERTIFICATION    I certify that Sharri Jason is expected to be hospitalized for 2 midnights based on the following assessment and plan:    ASSESSMENT/PLAN:  1. Septic Shock, likely related to HCAP, IV vanco and zosyn, IVF, f/u cx, admit to ICU. Intensivist c/s. Maintaining MAPs > 65 although pressure have been soft. Monitor need for pressor. 2. Acute on chronic respiratory failure with hypoxia, likely related to HCAP w/ probable aeCOPD component, supplemental O2 to maintain SPO2 ? 92%, continuous pulse ox. Wean as tolerated to home O2 of 3L. 3. HCAP w/ probable aeCOPD component, ABX as above, nebs, steroids. 4. Pulmonary nodule, RUL, new since April. Also, some concern for possible underlying mass in LLL PNA. Will require f/u CT. 5. Lactic acidosis, 3.2, 1.0, IVF. 6. Thrombocytosis, 1344, has been elevated in past but not quite to this degree. Defer to DD for hem/onc c/s.   7. Leukocytosis, 26.3., monitor. DVT Prophylaxis: Lovenox  Diet: gen   Code Status: Full Code   PT/OT Eval Status:  Will order if needed

## 2019-06-15 NOTE — PROGRESS NOTES
WNL    Coordination and Tone  WNL    Balance  Functional Sitting Balance: WNL  Functional Standing Balance:Diminished    Bed mobility:    Supine to sit:   SBA for lines   Sit to supine:   SBA for lines   Scooting to head of bed:   SBA for lines   Scooting in sitting:  SBA for lines   Rolling:   Not Tested  Bridging:   Not Tested    Transfers:    Sit to stand:  CGA  Stand to sit:  CGA  Bed to [facility]:  Not Tested  Bed to chair:   Not Tested  Standard toilet: CGA    Activity Tolerance   Pt completed therapy session with Dizziness  (mild with movement)   BP: seated at EOB 91/66  BP: seated on toilet following BP: 103/74  BP: supine in bed: 108/74    Dressing:      UE:   Not Tested  LE:    Not Tested    Bathing:    UE:  Not Tested  LE:  Not Tested    Eating:   Independent    Toileting:  SBA    Positioning Needs: In bed, call light and needs in reach. Exercise / Activities Initiated:   N/A    Patient/Family Education:   Role of OT  Recommendations for DC    Assessment of Deficits: Pt seen for Occupational therapy evaluation in acute care setting. Pt demonstrated decreased Activity Tolerance and ADL's. Pt functioning below baseline and will likely benefit from skilled occupational therapy services to maximize safety and independence. Goal(s) : To be met in 3 Visits:  1). Upper Body dressing: SBA for lines     To be met in 5 Visits:  3). Lower Body Bathing:  SBA for lines   4). Upper Body Dressing: SBA for lines   5). Lower Body Dressing: SBA for lines   6). Pt to skip UE exs x 15 reps    Rehabilitation Potential:  Good for goals listed above. Strengths for achieving goals include: Pt motivated and Pt cooperative  Barriers to achieving goals include:  Weakness     Plan: To be seen 3-5 x/wk while in acute care setting for therapeutic exercises, bed mobility, transfers, dressing, bathing, family/patient education with adaptive equipment, breathing technique instruction.      Juan R Aviles [place], OTR/L   [id number] If patient discharges from this facility prior to next visit, this note will serve as the Discharge Summary

## 2019-06-15 NOTE — PROGRESS NOTES
Dr. Maykel Victor @ bedside assessing pt. Updated on pt CVC, labs, lines vitals, ectopy on monitor and assessment and c/o ear pain. Per MD pt CVC to be d/c.

## 2019-06-15 NOTE — PROGRESS NOTES
RESPIRATORY THERAPY ASSESSMENT    Name:  [address] [place] [address] Record Number:  [id number]  Age: 76 y.o. Gender: male  : 1950  Today's Date:  6/15/2019  Room:  [id number]    Assessment     Is the patient being admitted for a COPD or Asthma exacerbation? Yes   (If yes the patient will be seen every 4 hours for the first 24 hours and then reassessed)    Patient Admission Diagnosis      Allergies  No Known Allergies    Minimum Predicted Vital Capacity:     1006ml          Actual Vital Capacity:      Pt kept coughing              Pulmonary History:COPD and Asthma  Home Oxygen Therapy:  3L liters/min via nasal cannula  Home Respiratory Therapy:Albuterol and Budesonide/Formoterol    Current Respiratory Therapy:  Duo Nebs Q4  Treatment Type: HHN  Medications: Albuterol/Ipratropium    Respiratory Severity Index(RSI)   Patients with orders for inhalation medications, oxygen, or any therapeutic treatment modality will be placed on Respiratory Protocol. They will be assessed with the first treatment and at least every 72 hours thereafter. The following severity scale will be used to determine frequency of treatment intervention. Smoking History: Severe Exacerbation = 4    Social History  Social History     Tobacco Use    Smoking status: Former Smoker     Packs/day: 1.00     Years: 40.00     Pack years: 40.00     Types: Cigarettes     Start date: 1976     Last attempt to quit: 2018     Years since quittin.0    Smokeless tobacco: Never Used    Tobacco comment: Advised to quit. Had one cig for the past 5 days. Substance Use Topics    Alcohol use:  Yes     Alcohol/week: 7.2 oz     Types: 12 Cans of beer per week     Comment: weekly    Drug use: No       Recent Surgical History: None = 0  Past Surgical History  Past Surgical History:   Procedure Laterality Date    BRONCHOSCOPY N/A 2018    BRONCHOSCOPY ALVEOLAR LAVAGE performed by Poornima Chilel MD at [address] [place] [address] N/A 4/11/2019    BRONCHOSCOPY ALVEOLAR LAVAGE performed by Nay Severino MD at [address] [facility] Right 2007   [address]    ALL TEETH PULLED    EYE SURGERY Right 1995    detached retina    FINGER SURGERY  5/24/11    duputrens release right ring finger inclluding proxinal interphanageal open CTR    FINGER SURGERY Right 1957    contusion finger # 3    OTHER SURGICAL HISTORY  12/29/2017    excision left facial cyst       Level of Consciousness: Alert, Oriented, and Cooperative = 0    Level of Activity: Mostly sedentary, minimal walking = 2    Respiratory Pattern: Dyspnea with exertion;Irregular pattern;or RR less than 6 = 2    Breath Sounds: Absent bilaterally and/or with wheezes = 3    Sputum  Sputum Color: White,  , Sputum How Obtained: Cough on request  Cough: Strong, productive = 1    Vital Signs   BP (!) 94/58   Pulse 84   Temp 97.8 °F (36.6 °C) (Axillary)   Resp 15   Ht 5' 7\" (1.702 m)   Wt 115 lb 14.4 oz (52.6 kg)   SpO2 97%   BMI 18.15 kg/m²   SPO2 (COPD values may differ): 86-87% on room air or greater than 92% on FiO2 35- 50% = 3    Peak Flow (asthma only): not applicable = 0    RSI: 33-17 = Q4WA (every four hours while awake) and Q4hrs PRN        Plan       Goals: medication delivery, mobilize retained secretions, volume expansion and improve oxygenation    Patient/caregiver was educated on the proper method of use for Respiratory Care Devices:  Yes      Level of patient/caregiver understanding able to:   ? Verbalize understanding   ? Demonstrate understanding       ? Teach back        ? Needs reinforcement       ? No available caregiver               ? Other:     Response to education:  Excellent     Is patient being placed on Home Treatment Regimen? Yes     Does the patient have everything they need prior to discharge?   Yes     Comments: pt placed on protocol  and home regimen    Plan of Care: protocol     Electronically signed by Karla Barba RCP on 6/15/2019 at 4:13 AM    Respiratory Protocol Guidelines     1. Assessment and treatment by Respiratory Therapy will be initiated for medication and therapeutic interventions upon initiation of aerosolized medication. 2. Physician will be contacted for respiratory rate (RR) greater than 35 breaths per minute. Therapy will be held for heart rate (HR) greater than 140 beats per minute, pending direction from physician. 3. Bronchodilators will be administered via Metered Dose Inhaler (MDI) with spacer when the following criteria are met:  a. Alert and cooperative     b. HR < 140 bpm  c. RR < 30 bpm                d. Can demonstrate a 2-3 second inspiratory hold  4. Bronchodilators will be administered via Hand Held Nebulizer IVONNE [facility]) to patients when ANY of the following criteria are met  a. Incognizant or uncooperative          b. Patients treated with HHN at Home        c. Unable to demonstrate proper use of MDI with spacer     d. RR > 30 bpm   5. Bronchodilators will be delivered via Metered Dose Inhaler (MDI), HHN, Aerogen to intubated patients on mechanical ventilation. 6. Inhalation medication orders will be delivered and/or substituted as outlined below. Aerosolized Medications Ordering and Administration Guidelines:    1. All Medications will be ordered by a physician, and their frequency and/or modality will be adjusted as defined by the patients Respiratory Severity Index (RSI) score. 2. If the patient does not have documented COPD, consider discontinuing anticholinergics when RSI is less than 9.  3. If the bronchospasm worsens (increased RSI), then the bronchodilator frequency can be increased to a maximum of every 4 hours. If greater than every 4 hours is required, the physician will be contacted. 4. If the bronchospasm improves, the frequency of the bronchodilator can be decreased, based on the patient's RSI, but not less than home treatment regimen frequency.   5. Bronchodilator(s) will be discontinued if patient has a RSI less than 9 and has received no scheduled or as needed treatment for 72  Hrs. Patients Ordered on a Mucolytic Agent:    1. Must always be administered with a bronchodilator. 2. Discontinue if patient experiences worsened bronchospasm, or secretions have lessened to the point that the patient is able to clear them with a cough. Anti-inflammatory and Combination Medications:    1. If the patient lacks prior history of lung disease, is not using inhaled anti-inflammatory medication at home, and lacks wheezing by examination or by history for at least 24 hours, contact physician for possible discontinuation.

## 2019-06-16 NOTE — PROGRESS NOTES
Patient had desat to 73% when sitting on bedside to urinate, recovered with breathing tx and encouraging deep breaths through his nose.

## 2019-06-16 NOTE — PROGRESS NOTES
Up to [facility] and back to bed per patient's request. Not changes noted in assessment. Noted WATKINS when ambulating to BSC SpO2 drops to mid 80's and recovers to upper 90's with rest. Patient offers no c/o at this time. Will continue to monitor.

## 2019-06-16 NOTE — PROGRESS NOTES
Pulmonary & Critical Care Medicine ICU Progress Note    CC: 5 days shortness of breath, body aches, septic shock, pneumonia    Events of Last 24 hours:   Blood pressures were low yesterday but Levophed was not restarted. Patient was held in the ICU for monitoring  Poor sleep  Increased cough    Invasive Lines: IV: Right IJ CVC    IV:   sodium chloride 125 mL/hr at 19 0308    dextrose         Vitals:  Blood pressure 103/73, pulse 89, temperature 98.4 °F (36.9 °C), temperature source Oral, resp. rate 21, height 5' 7\" (1.702 m), weight 115 lb 9.6 oz (52.4 kg), SpO2 93 %. on 3 L  Temp  Av °F (36.7 °C)  Min: 97.4 °F (36.3 °C)  Max: 98.4 °F (36.9 °C)    Intake/Output Summary (Last 24 hours) at 2019 0659  Last data filed at 2019 0530  Gross per 24 hour   Intake 3122 ml   Output 925 ml   Net 2197 ml     EXAM:  General: Appears chronically unwell   Eyes: PERRL. No sclera icterus. No conjunctival injection. ENT: No discharge. Pharynx clear. Right ear canal almost completely occluded by wax, some wax buildup on left as well  Neck: Trachea midline. Normal thyroid. Resp: No accessory muscle use. LLL crackles. Scant wheezing. No rhonchi. No dullness on percussion. Markedly diminished breath sounds  CV: Regular rate. Regular rhythm. No mumur or rub. No edema. Peripheral pulses are 2+. Capillary refill is less than 3 seconds. GI: Non-tender. Non-distended. No masses. No organomegaly. Normal bowel sounds. No hernia. Skin: Warm and dry. No nodule on exposed extremities. No rash on exposed extremities. Lymph: No cervical LAD. No supraclavicular LAD. M/S: No cyanosis. No joint deformity. No clubbing.    Neuro: Alert and oriented x3. patellar reflexes are symmetric  Psych: No agitation, no anxiety, affect is full     Scheduled Meds:   ipratropium-albuterol  1 ampule Inhalation Q4H    azithromycin  500 mg Oral Daily    insulin lispro  0-18 Units Subcutaneous TID WC    insulin lispro  0-9 Units lobe pulmonary nodule, new versus April CT     PLAN:  · Conservative fluid strategy, d/c IVF and consider lasix  · Broad spectrum antibiotics to include Zosyn, vancomycin and azithromycin day #4. Can be narrowed to community-acquired coverage as long as no resistant organisms identified on cultures that are pending  · Monitoring of vancomycin levels to prevent toxicity. · Blood sugar control is improved after change to H-SSI - FSBS monitoring  · Solu-Medrol 40 mg daily  · Will require close interval follow-up CT imaging as an outpatient to rule out underlying left lower lobe lung mass as well as to follow-up on right upper lobe pulmonary nodule.   Dr. Pat Clement is primary pulmonologist  · Prophylaxis: Lovenox, Bactroban  · ICU today

## 2019-06-16 NOTE — PLAN OF CARE
Continues with ATBX tx for sepsis/ pneumonia dx. Lasix IVP today to help with gas exchange, oxygenation. Requiring supplemental 02, free from injury, aware of safety hazards and fall precautions.

## 2019-06-16 NOTE — PROGRESS NOTES
Up to bsc with standby assist with iv lines and cords. Patient assisted up to chair per request for c/o back pain. Patient stated that he sleeps in chair at home that nothing helps his back pain.

## 2019-06-17 NOTE — PROGRESS NOTES
Perfect serve sent out at [phone] [facility] oncology consult.  Dr. Lucina Nolan covering for Dr. Tiffanie Bass

## 2019-06-17 NOTE — PROGRESS NOTES
Shift assessment was completed (see flow sheet). Pt is A/O,complains of back pain- to address with Doctor. Pt denies other needs at this time. Respirations are even, dyspnea with exertion, with diminished sounds. Scheduled medications to follow- whole with water. Call light within reach. Bed in lowest position. Patient up to chair, uses call light appropriately. Will continue to monitor.

## 2019-06-17 NOTE — PROGRESS NOTES
Reassessment completed (see Flowsheet). All ICU lines remain intact, ICU monitoring continued- pt lung sounds diminished with expiratory wheeze. pt's blood pressures 80/60s- Dr Sofie Larson aware of low BP.  Continuing to monitor.

## 2019-06-17 NOTE — PROGRESS NOTES
Pharmacy note: Vancomycin Day #  3  WBC                BUN/SCr     Tmax   23.3                 8/<0.5         98.5    Vancomycin trough = 6.0. All doses given on time. Increase Vancomycin to 1000 mg IVPB q12h. [place] Francois D. 6/16/2019 9:10 PM

## 2019-06-17 NOTE — PROGRESS NOTES
D: Bedside report received from [facility], all current drips, treatments, skin issues, and plan of care were reviewed by both RN's, care transferred.

## 2019-06-17 NOTE — PROGRESS NOTES
Occupational Therapy    Attempted follow up with pt this afternoon. Pt cleared for therapy per RN. Pt sittign up on EOB, talking with wife. Pt Spo2 at 91% on 3L O2 at rest, and dropping to 88% briefly with conversation. Pt continues to c/o dizziness which has not subsided, and declines any movement at this time. Educated pt regarding PLB technique for use with decreased Spo2. Pt V/U. Will follow up in a.m.      No charge    Hien Delatorre, OTR/L  Lic # [id number]

## 2019-06-17 NOTE — PROGRESS NOTES
Speech Language Pathology  Facility/Department: [facility] ICU  Dysphagia Daily Treatment Note    NAME: Drea Jean  : 1950  MRN: [id number]    Patient Diagnosis(es):   Patient Active Problem List    Diagnosis Date Noted    Septic shock ([address] 2019    Thrombocytosis ([address] 2019    COPD exacerbation (HCC)     Pneumonia of left lower lobe due to infectious organism ([address]     Pneumonia of left upper lobe due to Pseudomonas species ([address] 2019    Pulmonary nodule     HCAP (healthcare-associated pneumonia)     Abnormal CT of the chest     Diabetes mellitus, new onset ([address]     Abnormal chest x-ray     Chronic hypoxemic respiratory failure (HCC)     Lactic acidosis     Protein calorie malnutrition ([address] 2018    Hypoxemia requiring supplemental oxygen 2018    Tracheobronchitis     Tobacco abuse     Community acquired pneumonia of left upper lobe of lung ([address] 2018    Age-related osteoporosis without current pathological fracture 2018    Chronic midline low back pain with right-sided sciatica 2017    Tobacco use 2016    COPD, very severe ([address] 2016     Allergies: No Known Allergies  Onset Date: 19  Current Diet Level:  Dysphagia 2 solids/thin liquids, meds whole in water    Pain:  Pain Assessment  Pain Assessment: yes, back pain  Intervention: Notified nursing    Diet Tolerance:  Patient tolerating current diet level with no consistent signs/symptoms of penetration/aspiration. P.O. Trials: Thin   x Cup sips x3   Nectar       Honey       Puree       Solid   x Cracker x1     Impressions:  Received approval from RN prior to start of session. Pt initially sleeping in bed; upon waking, pt alert, pleasant, and agreeable to session. Pt states he has been tolerating diet well. Repositioned pt to upright position and analyzed tolerance of thins and regular cracker.  Patient appeared to tolerate majority of trials well, with effective

## 2019-06-17 NOTE — PROGRESS NOTES
Dr. Shaila Hidalgo at the bedside to examine patient. Informed of Low BP and pt feeling dizzy. Per Dr Shaila Hidalgo pt is to stay in ICU. See progress note for details.

## 2019-06-17 NOTE — CONSULTS
HEMATOLOGY/ONCOLOGY CONSULTATION:     6/17/2019 10:22 AM    REASON FOR CONSULT: Abnormal CBC    PROVIDERS:  VALERIE GONZALEZ - [facility]    CHIEF COMPLAINT:     Chief Complaint   Patient presents with    Shortness of Breath     started a week ago worse today, +fever 3lnc at home       HISTORY OF PRESENT ILLNESS:     HPI:  76 WM with pmh 02 dependent COPD and chronic back pain. Pt admitted to ICU for pneumonia. Plt count in > 1000 this admission and pt anemic. Pt is on steroids. Review of labs reveal plt count has been elevated since 2017, but now higher. He has no history of blood clots. He denies B symptoms.      PAST MEDICAL HISTORY:     Past Medical History:   Diagnosis Date    Arthritis     Chronic back pain     COPD (chronic obstructive pulmonary disease) ([address]     Wears glasses        PAST SURGICAL HISTORY:        Past Surgical History:   Procedure Laterality Date    BRONCHOSCOPY N/A 11/30/2018    BRONCHOSCOPY ALVEOLAR LAVAGE performed by Collins Luo MD at [address]  N/A 4/11/2019    BRONCHOSCOPY ALVEOLAR LAVAGE performed by Janet Gutierrez MD at [address] [facility] Right 2007   [address]    ALL TEETH PULLED    EYE SURGERY Right 1995    detached retina    FINGER SURGERY  5/24/11    duputrens release right ring finger inclluding proxinal interphanageal open CTR    FINGER SURGERY Right 1957    contusion finger # 3    OTHER SURGICAL HISTORY  12/29/2017    excision left facial cyst       SOCIAL HISTORY:     Social History     Socioeconomic History    Marital status:      Spouse name: Benjy Garcia Number of children: 2    Years of education: 12    Highest education level: Not on file   Occupational History    Occupation:      Employer: [employer] resource strain: Not on file    Food insecurity:     Worry: Not on file     Inability: Not on file    Transportation needs:     Medical: Not on file Non-medical: Not on file   Tobacco Use    Smoking status: Former Smoker     Packs/day: 1.00     Years: 40.00     Pack years: 40.00     Types: Cigarettes     Start date: 1976     Last attempt to quit: 2018     Years since quittin.0    Smokeless tobacco: Never Used    Tobacco comment: Advised to quit. Had one cig for the past 5 days. Substance and Sexual Activity    Alcohol use: Yes     Alcohol/week: 7.2 oz     Types: 12 Cans of beer per week     Comment: weekly    Drug use: No    Sexual activity: Not Currently   Lifestyle    Physical activity:     Days per week: Not on file     Minutes per session: Not on file    Stress: Not on file   Relationships    Social connections:     Talks on phone: Not on file     Gets together: Not on file     Attends Religion service: Not on file     Active member of club or organization: Not on file     Attends meetings of clubs or organizations: Not on file     Relationship status: Not on file    Intimate partner violence:     Fear of current or ex partner: Not on file     Emotionally abused: Not on file     Physically abused: Not on file     Forced sexual activity: Not on file   Other Topics Concern    Not on file   Social History Narrative    Not on file       FAMILY HISTORY:     Family History   Problem Relation Age of Onset    Diabetes Mother     Cancer Sister         pancreatic    Emphysema Father        ALLERGIES:     Allergies as of 2019    (No Known Allergies)       MEDICATIONS:     No current facility-administered medications on file prior to encounter.       Current Outpatient Medications on File Prior to Encounter   Medication Sig Dispense Refill    ipratropium-albuterol (DUONEB) 0.5-2.5 (3) MG/3ML SOLN nebulizer solution INHALE THREE MILLILITERS (ONE VIAL) VIA NEBULIZATION BY MOUTH EVERY 4 HOURS AS NEEDED FOR SHORTNESS OF BREATH 360 mL 2    albuterol sulfate HFA (PROAIR HFA) 108 (90 Base) MCG/ACT inhaler Inhale 2 puffs into the lungs every 6 hours as needed for Wheezing 1 Inhaler 5    OXYGEN Inhale 3 L/min into the lungs continuous      guaiFENesin (MUCINEX) 600 MG extended release tablet Take 1 tablet by mouth 2 times daily 30 tablet 1    aspirin 81 MG tablet Take 81 mg by mouth daily       vitamin D (CHOLECALCIFEROL) 1000 UNIT TABS tablet Take 1,000 Units by mouth daily      vitamin C (ASCORBIC ACID) 500 MG tablet Take 500 mg by mouth daily      meloxicam (MOBIC) 15 MG tablet Take 1 tablet by mouth daily , take with food for pain 30 tablet 3    Multiple Vitamins-Minerals (THERAPEUTIC MULTIVITAMIN-MINERALS) tablet Take 1 tablet by mouth daily       REVIEW OF SYSTEMS:       10 point ROS completed. Pertinent positives in HPI, otherwise negative.      PHYSICAL EXAM:       Vitals:    06/17/19 0900   BP: (!) 79/57   Pulse: 98   Resp: 23   Temp:    SpO2: 96%       General appearance: alert and cooperative  Head: Normocephalic, without obvious abnormality, atraumatic  Neck: No palpable lymphadenopathy in supraclavicular or cervical chains  Lungs: Clear to auscultation bilaterally, no audible rales, wheezes or crackles  Heart: Regular rate and rhythm, S1, S2 normal  Abdomen: Soft, non-tender; bowel sounds normal; no masses,  no organomegaly  Extremities: without cyanosis, clubbing, edema or asymmetry  Skin: No jaundice, purpura or petechiae      LABS:     Lab Results   Component Value Date    WBC 15.8 (H) 06/17/2019    HGB 10.2 (L) 06/17/2019    HCT 31.7 (L) 06/17/2019    MCV 88.7 06/17/2019    PLT 1,257 (HH) 06/17/2019       Lab Results   Component Value Date    GLUCOSE 129 (H) 06/17/2019    BUN 7 06/17/2019    CREATININE <0.5 (L) 06/17/2019    K 3.6 06/17/2019    PHOS 2.1 (L) 06/15/2019       Lab Results   Component Value Date    ALKPHOS 118 06/14/2019    ALT 7 (L) 06/14/2019    AST 16 06/14/2019    BILITOT 0.3 06/14/2019    PROT 6.6 06/14/2019       IMAGING:     Ct Chest Wo Contrast  Result Date: 6/14/2019  Extensive new left-sided partially consolidative pulmonary disease, consistent with pneumonia. Follow-up to ensure resolution is recommended to exclude an underlying mass. New ill-defined nodule of the right upper lobe. Advanced emphysema. Unchanged volume loss of the left lung with consolidative change of the left upper lobe. However this increased on the April 2019 examination and close follow-up is needed. Xr Chest Portable  Result Date: 6/14/2019  Right jugular central line projects laterally, likely in external jugular or subclavian vein. No pneumothorax. Continue pattern of pneumonia in the left lung. Xr Chest Portable  Result Date: 6/14/2019  New extensive left-sided airspace disease suspicious for pneumonia. Some of this has a masslike morphology and follow-up to ensure resolution is needed. Recommend follow-up chest x-ray in 2-3 weeks.        ASSESSMENT:     Problem List Items Addressed This Visit     COPD exacerbation ([facility] - Primary    Relevant Medications    ipratropium-albuterol (DUONEB) nebulizer solution 1 ampule (Completed)    predniSONE (DELTASONE) tablet 60 mg (Completed)    guaiFENesin (MUCINEX) extended release tablet 600 mg    methylPREDNISolone sodium (SOLU-MEDROL) injection 40 mg    oxymetazoline (AFRIN) 0.05 % nasal spray 2 spray    fluticasone (FLONASE) 50 MCG/ACT nasal spray 2 spray    ipratropium-albuterol (DUONEB) nebulizer solution 1 ampule      Other Visit Diagnoses     Shortness of breath        Pneumonia due to organism        Relevant Medications    ipratropium-albuterol (DUONEB) nebulizer solution 1 ampule (Completed)    cefTRIAXone (ROCEPHIN) 1 g IVPB in 50 mL D5W minibag (Completed)    azithromycin (ZITHROMAX) 500 mg in D5W 250ml addavial (Completed)    guaiFENesin (MUCINEX) extended release tablet 600 mg    piperacillin-tazobactam (ZOSYN) 3.375 g in sodium chloride 0.9 % 100 mL IVPB extended infusion (mini-bag)    azithromycin (ZITHROMAX) tablet 500 mg    oxymetazoline (AFRIN) 0.05 % nasal spray 2 spray fluticasone (FLONASE) 50 MCG/ACT nasal spray 2 spray    ipratropium-albuterol (DUONEB) nebulizer solution 1 ampule    Mass of left lung                    PLAN:     1. Thrombocytosis  2.  Anemia    - plts have been increasing since 2017, but all checked in acute care setting  - anemia is new  - could be reactive to pneumonia/steroids  - chronic plt elevation raises possibility of MPD like ET  - iron deficiency also in ddx  - check KAYLEE-2 kinase  - check iron studies, B12, folate, LDH, hemolytic panel  - may need BMBX   - will consider empiric hydrea if continuing to increase  - pt already on ASA prophylaxis    Tonja Brandt MD

## 2019-06-17 NOTE — PROGRESS NOTES
Physical Therapy    Hold PT treatment this afternoon. Pt with low BP and (+) dizziness, discussed with RN pt not appropriate for therapy today. Will follow-up tomorrow as pt status allows. No charge.     Jacqui Kendrick, PT, DPT #[id number]

## 2019-06-17 NOTE — PROGRESS NOTES
normal          Labs:   Recent Labs     06/15/19  0550 06/16/19  0423 06/17/19  0409   WBC 20.9* 23.3* 15.8*   HGB 10.2* 10.1* 10.2*   HCT 31.5* 31.7* 31.7*   PLT 1,041* 1,245* 1,257*     Recent Labs     06/15/19  1351 06/16/19  0423 06/17/19  0409   * 131* 132*   K 3.8 4.0 3.6   CL 94* 97* 93*   CO2 25 26 31   BUN 11 8 7   CREATININE <0.5* <0.5* <0.5*   CALCIUM 8.8 8.8 8.6   PHOS 2.1*  --   --      Recent Labs     06/14/19  1415   AST 16   ALT 7*   BILITOT 0.3   ALKPHOS 118     No results for input(s): INR in the last 72 hours. No results for input(s): Katie Southerly in the last 72 hours. Urinalysis:    No results found for: Brenda Sravan, BACTERIA, RBCUA, BLOODU, [address], [address] [place] [address]    Radiology:  XR CHEST PORTABLE   Final Result   Right jugular central line projects laterally, likely in external jugular or   subclavian vein. No pneumothorax. Continue pattern of pneumonia in the left lung. CT CHEST WO CONTRAST   Final Result   Extensive new left-sided partially consolidative pulmonary disease,   consistent with pneumonia. Follow-up to ensure resolution is recommended to   exclude an underlying mass. New ill-defined nodule of the right upper lobe. Advanced emphysema. Unchanged volume loss of the left lung with consolidative change of the left   upper lobe. However this increased on the April 2019 examination and close   follow-up is needed. XR CHEST PORTABLE   Final Result   New extensive left-sided airspace disease suspicious for pneumonia. Some of   this has a masslike morphology and follow-up to ensure resolution is needed. Recommend follow-up chest x-ray in 2-3 weeks. Assessment/Plan:      1. Septic Shock, likely related to HCAP, IV vanco and zosyn, d/c IVF, f/u cx, admitted to ICU.  Intensivist c/s. Maintaining MAPs > 65 although pressure have been soft.          Improved although remains borderline BP     2.  Acute on chronic respiratory failure with hypoxia, likely related to HCAP w/ probable aeCOPD component, supplemental O2 to maintain SPO2 ? 92%, continuous pulse ox.  Wean as tolerated to home O2 of 3L.     3. HCAP w/ probable aeCOPD component, ct with left sided pna ABX as above, nebs, steroids.      4. Pulmonary nodule, RUL, new since April.  Also, some concern for possible underlying mass in LLL PNA.  Will require f/u CT. 5. Lactic acidosis - resolved    6.  Thrombocytosis, 1344, has been elevated in past but not quite to this degree.      7. Leukocytosis, 26.3-->20.9-->23.3, monitor.         DVT Prophylaxis: Lovenox  Diet: DIET GENERAL; Dysphagia III Advanced  Code Status: Full Code    PT/OT Eval Status: ordered    Dispo - cont care in ICU    Melvina Sorenson MD

## 2019-06-17 NOTE — PLAN OF CARE
Problem: Falls - Risk of:  Goal: Absence of physical injury  Description  Absence of physical injury  Outcome: Ongoing     Problem: Risk for Impaired Skin Integrity  Goal: Tissue integrity - skin and mucous membranes  Description  Structural intactness and normal physiological function of skin and  mucous membranes.   Outcome: Ongoing     Problem: Safety:  Goal: Free from accidental physical injury  Description  Free from accidental physical injury  Outcome: Ongoing     Problem: Daily Care:  Goal: Daily care needs are met  Description  Daily care needs are met  Outcome: Ongoing     Problem: Pain:  Goal: Patient's pain/discomfort is manageable  Description  Patient's pain/discomfort is manageable  Outcome: Ongoing     Problem: Discharge Planning:  Goal: Patients continuum of care needs are met  Description  Patients continuum of care needs are met  Outcome: Ongoing

## 2019-06-17 NOTE — PROGRESS NOTES
A: Assessment completed and documented, call light is in reach, discussed plan of care with patient who agreed, patient agrees to use call light before getting out of bed.

## 2019-06-17 NOTE — PROGRESS NOTES
Pulmonary Progress Note    CC: shortness of breath     Subjective:     Patient feels slightly better with less dyspnea. [facility] CVC     Intake/Output Summary (Last 24 hours) at 6/17/2019 0715  Last data filed at 6/17/2019 0240  Gross per 24 hour   Intake 1200 ml   Output 3500 ml   Net -2300 ml       Exam:   BP 93/63   Pulse 100   Temp 98.1 °F (36.7 °C) (Oral)   Resp 18   Ht 5' 7\" (1.702 m)   Wt 115 lb 9.6 oz (52.4 kg)   SpO2 90%   BMI 18.11 kg/m²  on 4L NC  Gen: No distress. Alert. Normocephalic, atraumatic. Eyes: PERRL. No sclera icterus. No conjunctival injection. ENT: No discharge. Pharynx clear. Neck: Trachea midline. Normal thyroid. Resp: No accessory muscle use. No crackles. Scattered bilateral wheezes. No rhonchi. No dullness on percussion. CV: Regular rate. Regular rhythm. No murmur or rub. No edema. GI: Non-tender. Non-distended. No masses. No organomegaly. Normal bowel sounds. No hernia. Skin: Warm and dry. No nodule on exposed extremities. No rash on exposed extremities. Lymph: No cervical LAD. No supraclavicular LAD. M/S: No cyanosis. No joint deformity. No clubbing. Neuro: Awake. Moves all extremities. CN grossly intact. Psych: Oriented x 3. No anxiety or agitation.      Scheduled Meds:   vancomycin  1,000 mg Intravenous Q12H    ipratropium-albuterol  1 ampule Inhalation Q4H While awake    azithromycin  500 mg Oral Daily    insulin lispro  0-18 Units Subcutaneous TID     insulin lispro  0-9 Units Subcutaneous Nightly    carbamide peroxide  3 drop Both Ears BID    oxymetazoline  2 spray Each Nostril BID    fluticasone  2 spray Each Nostril Daily    aspirin  81 mg Oral Daily    guaiFENesin  600 mg Oral BID    sodium chloride flush  10 mL Intravenous 2 times per day    enoxaparin  40 mg Subcutaneous Daily    piperacillin-tazobactam  3.375 g Intravenous Q8H    methylPREDNISolone  40 mg Intravenous Daily     Continuous Infusions:   dextrose       PRN Meds:  sodium

## 2019-06-17 NOTE — PROGRESS NOTES
Hospitalist Progress Note      PCP: VALERIE Etienne - CNP    Date of Admission: 6/14/2019    Subjective: still hypotensive, more SOB with minimal ambulation    Medications:  Reviewed    Infusion Medications    dextrose       Scheduled Medications    [START ON 6/17/2019] vancomycin  1,000 mg Intravenous Q12H    ipratropium-albuterol  1 ampule Inhalation Q4H    azithromycin  500 mg Oral Daily    insulin lispro  0-18 Units Subcutaneous TID WC    insulin lispro  0-9 Units Subcutaneous Nightly    carbamide peroxide  3 drop Both Ears BID    oxymetazoline  2 spray Each Nostril BID    fluticasone  2 spray Each Nostril Daily    aspirin  81 mg Oral Daily    guaiFENesin  600 mg Oral BID    sodium chloride flush  10 mL Intravenous 2 times per day    enoxaparin  40 mg Subcutaneous Daily    piperacillin-tazobactam  3.375 g Intravenous Q8H    methylPREDNISolone  40 mg Intravenous Daily     PRN Meds: sodium chloride, potassium chloride **OR** potassium alternative oral replacement **OR** potassium chloride, sodium chloride flush, magnesium hydroxide, ondansetron, acetaminophen, glucose, dextrose, glucagon (rDNA), dextrose      Intake/Output Summary (Last 24 hours) at 6/16/2019 2223  Last data filed at 6/16/2019 2140  Gross per 24 hour   Intake 2300 ml   Output 3275 ml   Net -975 ml       Physical Exam Performed:    BP 92/67   Pulse 85   Temp 98.5 °F (36.9 °C) (Oral)   Resp 18   Ht 5' 7\" (1.702 m)   Wt 115 lb 9.6 oz (52.4 kg)   SpO2 97%   BMI 18.11 kg/m²       GEN:        A&Ox3, appears unwell.   HEENT:   NC/AT,EOMI, semi dry MM, no erythema/exudates or visible masses. CVS:        Normal S1,S2.  RRR. Without M/G/R.   LUNG:     L lower rales. no wheezes or rhonchi.  Diminished at bases.  Mild increased WOB  ABD:        Soft, ND/NT, BS+ x4.  Without G/R.  EXT:        2+ pulses, no c/c/e.  Brisk cap refill. PSY:        Thought process intact, affect appropriate.   KTL:        RC III-XII intact, moves all 4 spontaneously, sensory grossly intact. SKIN:       No rash or lesions on visible skin. Labs:   Recent Labs     06/14/19  1415 06/15/19  0550 06/16/19  0423   WBC 26.3* 20.9* 23.3*   HGB 11.8* 10.2* 10.1*   HCT 36.0* 31.5* 31.7*   PLT 1,344* 1,041* 1,245*     Recent Labs     06/15/19  0550 06/15/19  1351 06/16/19  0423   * 130* 131*   K 3.2* 3.8 4.0    94* 97*   CO2 23 25 26   BUN 8 11 8   CREATININE <0.5* <0.5* <0.5*   CALCIUM 7.1* 8.8 8.8   PHOS  --  2.1*  --      Recent Labs     06/14/19  1415   AST 16   ALT 7*   BILITOT 0.3   ALKPHOS 118     No results for input(s): INR in the last 72 hours. No results for input(s): Carey Maha in the last 72 hours. Urinalysis:    No results found for: Jerri Costa, BACTERIA, RBCUA, BLOODU, [address], [address] [place] [address]    Radiology:  XR CHEST PORTABLE   Final Result   Right jugular central line projects laterally, likely in external jugular or   subclavian vein. No pneumothorax. Continue pattern of pneumonia in the left lung. CT CHEST WO CONTRAST   Final Result   Extensive new left-sided partially consolidative pulmonary disease,   consistent with pneumonia. Follow-up to ensure resolution is recommended to   exclude an underlying mass. New ill-defined nodule of the right upper lobe. Advanced emphysema. Unchanged volume loss of the left lung with consolidative change of the left   upper lobe. However this increased on the April 2019 examination and close   follow-up is needed. XR CHEST PORTABLE   Final Result   New extensive left-sided airspace disease suspicious for pneumonia. Some of   this has a masslike morphology and follow-up to ensure resolution is needed. Recommend follow-up chest x-ray in 2-3 weeks.                  Assessment/Plan:    Active Hospital Problems    Diagnosis    Septic shock ([address] [A41.9, R65.21]    Thrombocytosis ([address] [D47.3]    COPD exacerbation (HCC) [J44.1]    Pneumonia of left lower lobe due to infectious organism ([facility] [J18.1]    HCAP (healthcare-associated pneumonia) [J18.9]    Chronic hypoxemic respiratory failure (HCC) [J96.11]    COPD, very severe ([facility] [J44.9]              1. Septic Shock, likely related to HCAP, IV vanco and zosyn, d/c IVF, f/u cx, admit to ICU.  Intensivist c/s. Maintaining MAPs > 65 although pressure have been soft.       2. Acute on chronic respiratory failure with hypoxia, likely related to HCAP w/ probable aeCOPD component, supplemental O2 to maintain SPO2 ? 92%, continuous pulse ox.  Wean as tolerated to home O2 of 3L.     3. HCAP w/ probable aeCOPD component, ABX as above, nebs, steroids.    4. Pulmonary nodule, RUL, new since April.  Also, some concern for possible underlying mass in LLL PNA.  Will require f/u CT. 5. Lactic acidosis - resolved  6.  Thrombocytosis, 1344, has been elevated in past but not quite to this degree.  Defer to DD for hem/onc c/s.   7. Leukocytosis, 26.3-->20.9-->23.3, monitor.         DVT Prophylaxis: Lovenox  Diet: DIET GENERAL; Dysphagia III Advanced  Code Status: Full Code    PT/OT Eval Status: ordered    Dispo - cont care in ICU    Jeffrey Nieto MD

## 2019-06-18 NOTE — PROGRESS NOTES
ONCOLOGY FOLLOW-UP:           PROBLEM LIST:       Patient Active Problem List   Diagnosis Code    COPD, very severe ([facility] J44.9    Tobacco use Z72.0    Chronic midline low back pain with right-sided sciatica M54.41, G89.29    Age-related osteoporosis without current pathological fracture M81.0    Community acquired pneumonia of left upper lobe of lung (HCC) J18.1    Tracheobronchitis J40    Tobacco abuse Z72.0    Hypoxemia requiring supplemental oxygen R09.02, Z99.81    Protein calorie malnutrition (HCC) E46    Abnormal chest x-ray R93.89    Chronic hypoxemic respiratory failure (HCC) J96.11    Lactic acidosis E87.2    Diabetes mellitus, new onset ([facility] [facility] E11.9    HCAP (healthcare-associated pneumonia) J18.9    Abnormal CT of the chest R93.89    Pulmonary nodule R91.1    Pneumonia of left upper lobe due to Pseudomonas species ([facility]) J15.1    Septic shock ([facility]) A41.9, R65.21    Thrombocytosis ([facility]) D47.3    COPD exacerbation ([facility]) J44.1    Pneumonia due to organism J18.9    Shortness of breath R06.02       INTERVAL HISTORY:       Remains admitted in ICU. Afebrile. REVIEW OF SYSTEMS:       10 point ROS completed. Pertinent positives in HPI, otherwise negative.      PHYSICAL EXAM:       BP (!) 99/57   Pulse 82   Temp 98.4 °F (36.9 °C) (Oral)   Resp 17   Ht 5' 7\" (1.702 m)   Wt 115 lb 9.6 oz (52.4 kg)   SpO2 98%   BMI 18.11 kg/m²     General appearance: alert and cooperative  Head: Normocephalic, without obvious abnormality, atraumatic  Neck: No palpable lymphadenopathy in supraclavicular or cervical chains  Lungs: Clear to auscultation bilaterally, no audible rales, wheezes or crackles  Heart: Regular rate and rhythm, S1, S2 normal  Abdomen: Soft, non-tender; bowel sounds normal; no masses,  no organomegaly  Extremities: without cyanosis, clubbing, edema or asymmetry  Skin: No jaundice, purpura or petechiae    LABS:     CBC:   Lab Results   Component Value Date    WBC 20.3 (H) 06/18/2019 HGB 10.8 (L) 06/18/2019    HCT 33.6 (L) 06/18/2019    MCV 88.7 06/18/2019    PLT 1,306 (HH) 06/18/2019    LYMPHOPCT 8.2 06/18/2019    RBC 3.79 (L) 06/18/2019    MCH 28.5 06/18/2019    MCHC 32.1 06/18/2019    RDW 16.7 (H) 06/18/2019       BMP:   Lab Results   Component Value Date     06/18/2019    K 3.7 06/18/2019    CL 97 06/18/2019    CO2 31 06/18/2019    BUN 7 06/18/2019    CREATININE <0.5 06/18/2019    GLUCOSE 133 06/18/2019    CALCIUM 9.4 06/18/2019        Hepatic Function Panel:   Lab Results   Component Value Date    ALKPHOS 118 06/14/2019    ALT 7 06/14/2019    AST 16 06/14/2019    PROT 6.6 06/14/2019    BILITOT 0.3 06/14/2019    LABALBU 2.6 06/15/2019      Lab Results   Component Value Date    IRON 20 (L) 06/17/2019    TIBC 185 (L) 06/17/2019    FERRITIN 759.4 (H) 06/17/2019     Lab Results   Component Value Date    [id number] 095 06/17/2019     Lab Results   Component Value Date    FOLATE 13.90 06/17/2019     KAYLEE-2 - pending  Soluble transferrin receptor - pending    IMAGING:     Ct Chest Wo Contrast  Result Date: 6/14/2019  Extensive new left-sided partially consolidative pulmonary disease, consistent with pneumonia. Follow-up to ensure resolution is recommended to exclude an underlying mass. New ill-defined nodule of the right upper lobe. Advanced emphysema. Unchanged volume loss of the left lung with consolidative change of the left upper lobe. However this increased on the April 2019 examination and close follow-up is needed. Xr Chest Portable  Result Date: 6/14/2019  Right jugular central line projects laterally, likely in external jugular or subclavian vein. No pneumothorax. Continue pattern of pneumonia in the left lung. Xr Chest Portable  Result Date: 6/14/2019  New extensive left-sided airspace disease suspicious for pneumonia. Some of this has a masslike morphology and follow-up to ensure resolution is needed. Recommend follow-up chest x-ray in 2-3 weeks.        ASSESSMENT: Problem List Items Addressed This Visit     COPD exacerbation ([facility] [facility] - Primary    Relevant Medications    ipratropium-albuterol (DUONEB) nebulizer solution 1 ampule (Completed)    predniSONE (DELTASONE) tablet 60 mg (Completed)    guaiFENesin (MUCINEX) extended release tablet 600 mg    methylPREDNISolone sodium (SOLU-MEDROL) injection 40 mg    oxymetazoline (AFRIN) 0.05 % nasal spray 2 spray (Completed)    fluticasone (FLONASE) 50 MCG/ACT nasal spray 2 spray    ipratropium-albuterol (DUONEB) nebulizer solution 1 ampule    Pneumonia due to organism    Relevant Medications    ipratropium-albuterol (DUONEB) nebulizer solution 1 ampule (Completed)    cefTRIAXone (ROCEPHIN) 1 g IVPB in 50 mL D5W minibag (Completed)    azithromycin (ZITHROMAX) 500 mg in D5W 250ml addavial (Completed)    guaiFENesin (MUCINEX) extended release tablet 600 mg    piperacillin-tazobactam (ZOSYN) 3.375 g in sodium chloride 0.9 % 100 mL IVPB extended infusion (mini-bag)    azithromycin (ZITHROMAX) tablet 500 mg (Completed)    oxymetazoline (AFRIN) 0.05 % nasal spray 2 spray (Completed)    fluticasone (FLONASE) 50 MCG/ACT nasal spray 2 spray    ipratropium-albuterol (DUONEB) nebulizer solution 1 ampule    Shortness of breath      Other Visit Diagnoses     Mass of left lung                    PLAN:     1. Thrombocytosis  2.  Anemia    - plts have been increasing since 2017, but all checked in acute care setting  - anemia is new  - could be reactive to pneumonia/steroids  - chronic plt elevation raises possibility of MPD like ET  - iron deficiency also in ddx  - check KAYLEE-2 kinase - pending  - check iron studies, B12, folate, LDH, hemolytic panel - iron studies mixed, await STR  - may need BMBX   - will consider empiric hydrea if continuing to increase  - pt already on ASA prophylaxis    Saulo Luz MD

## 2019-06-18 NOTE — PROGRESS NOTES
Pulmonary Progress Note    CC: shortness of breath     Subjective:   Patient feels light headed at times. [facility] CVC     Intake/Output Summary (Last 24 hours) at 6/18/2019 0552  Last data filed at 6/18/2019 0400  Gross per 24 hour   Intake 1100 ml   Output 1950 ml   Net -850 ml       Exam:   BP 97/79   Pulse 91   Temp 97.4 °F (36.3 °C) (Axillary)   Resp 22   Ht 5' 7\" (1.702 m)   Wt 115 lb 9.6 oz (52.4 kg)   SpO2 98%   BMI 18.11 kg/m²  on 5L NC  Gen: No distress. Alert. Normocephalic, atraumatic. Eyes: PERRL. No sclera icterus. No conjunctival injection. ENT: No discharge. Pharynx clear. Neck: Trachea midline. Normal thyroid. Resp: No accessory muscle use. No crackles. Scattered bilateral wheezes. + rhonchi. No dullness on percussion. CV: Regular rate. Regular rhythm. No murmur or rub. trace edema. GI: Non-tender. Non-distended. No masses. No organomegaly. Normal bowel sounds. No hernia. Skin: Warm and dry. No nodule on exposed extremities. No rash on exposed extremities. Lymph: No cervical LAD. No supraclavicular LAD. M/S: No cyanosis. No joint deformity. No clubbing. Neuro: Awake. Moves all extremities. CN grossly intact. Psych: Oriented x 3. No anxiety or agitation.      Scheduled Meds:   mupirocin   Nasal BID    ipratropium-albuterol  1 ampule Inhalation Q4H While awake    insulin lispro  0-18 Units Subcutaneous TID     insulin lispro  0-9 Units Subcutaneous Nightly    carbamide peroxide  3 drop Both Ears BID    oxymetazoline  2 spray Each Nostril BID    fluticasone  2 spray Each Nostril Daily    aspirin  81 mg Oral Daily    guaiFENesin  600 mg Oral BID    sodium chloride flush  10 mL Intravenous 2 times per day    enoxaparin  40 mg Subcutaneous Daily    piperacillin-tazobactam  3.375 g Intravenous Q8H    methylPREDNISolone  40 mg Intravenous Daily     Continuous Infusions:   dextrose       PRN Meds:  sodium chloride, potassium chloride **OR** potassium alternative oral replacement **OR** potassium chloride, sodium chloride flush, magnesium hydroxide, ondansetron, acetaminophen, glucose, dextrose, glucagon (rDNA), dextrose    Labs:  CBC:   Recent Labs     06/16/19  0423 06/17/19  0409 06/17/19  1044 06/18/19  0406   WBC 23.3* 15.8*  --  20.3*   HGB 10.1* 10.2*  --  10.8*   HCT 31.7* 31.7* 33.3* 33.6*   MCV 90.1 88.7  --  88.7   PLT 1,245* 1,257*  --  1,306*     BMP:   Recent Labs     06/15/19  1351 06/16/19  0423 06/17/19  0409 06/18/19  0406   * 131* 132* 138   K 3.8 4.0 3.6 3.7   CL 94* 97* 93* 97*   CO2 25 26 31 31   PHOS 2.1*  --   --   --    BUN 11 8 7 7   CREATININE <0.5* <0.5* <0.5* <0.5*     LIVER PROFILE:   No results for input(s): AST, ALT, LIPASE, BILIDIR, BILITOT, ALKPHOS in the last 72 hours. Invalid input(s): AMYLASE,  ALB  PT/INR: No results for input(s): PROTIME, INR in the last 72 hours. APTT: No results for input(s): APTT in the last 72 hours. UA:No results for input(s): NITRITE, COLORU, PHUR, LABCAST, WBCUA, RBCUA, MUCUS, TRICHOMONAS, YEAST, BACTERIA, CLARITYU, SPECGRAV, LEUKOCYTESUR, UROBILINOGEN, BILIRUBINUR, BLOODU, GLUCOSEU, AMORPHOUS in the last 72 hours. Invalid input(s): KETONESU  No results for input(s): PHART, SHD8FTG, PO2ART in the last 72 hours.   Cultures:   6/14/2019 respiratory- GNR - Pseudomonas sens to zosyn  6/14/2019 Legionella and strep pneumo urine antigens - negative  6/14/2019 blood no growth to date      Films:  No new     Assessment:  · Acute pn chronic respiratory failure with hypoxemia  · LLL pneumonia- Pseudomonas  · COPD with AE  · R upper lobe nodular opacity new since 4/19  · Sepsis  · Thrombocytosis- present since 2018- worsened      Plan:  · Supplemental oxygen to maintain SaO2 >92%; wean as tolerated   · Abx:zosyn  D6 - had 5 days vanc and azithro  · SSI  · Solumedrol 40 mg daily  · Will need f/u CT in 3 months after d/c  · Hematology consult appreciated- work-up in progress for possible ET  · ICU care- lovenox and bactroban  · Ok to leave ICU

## 2019-06-18 NOTE — CARE COORDINATION
Case Management Assessment  Initial Evaluation    Date/Time of Evaluation: 6/18/2019 9:51 AM  Assessment Completed by: Junito Redd    Patient Name: iKm Toney  YOB: 1950  Diagnosis: Septic shock ([address] [A41.9, R65.21]  Date / Time: 6/14/2019  1:54 PM  Admission status/Date: 06/14/2019 IP  Chart Reviewed: Yes      Patient Interviewed: Yes   Family Interviewed:  No      Hospitalization in the last 30 days:  No    Contacts  :     Relationship to Patient:   Phone Number:    Alternate Contact:     Relationship to Patient:     Phone Number:    Met with:    Current PCP Supa Kapoor CNP    Financial  Commercial  Precert required for SNF : Y        3 night stay required -  [employer]  Support Systems: Spouse/Significant Other  Transportation: self    Meal Preparation: self and spouse    Housing  Home Environment: resides with his wife in a one story home  Steps: 2 to entry   Plans to Return to Present Housing: Yes  Other Identified Issues: none     Bg Leos  Currently active with [facility] : No  Type of Home Care Services: None  Passport/Waiver : No  :                      Phone Number:    Passport/Waiver Services: none           [facility] Provider: [employer] for home o2 and supplies  Equipment: Walker__Cane__RTS__ BSC__Shower Chair__  02_x 3l/m/nc at baseline _ HHN_x_ CPAP__  BiPap__  Hospital Bed__ W/C___ Other__________      Has Home O2 in place on admit:  Yes  Informed of need to bring portable home O2 tank on day of discharge for nursing to connect prior to leaving:   Yes  Verbalized agreement/Understanding:   Yes    Community Service Affiliation  Dialysis:  No    · Name:  · Location  · Dialysis Schedule:  · Phone:   · Fax: Outpatient PT/OT: No    Cancer Center: No     CHF Clinic: No     Pulmonary Rehab: No  Pain Clinic: No  Community Mental Health: No    Wound Clinic: No     Other:       DISCHARGE PLAN:  Chart reviewed.  Spoke with the patient at the bedside. He reports that he resides in a one story home with his spouse, plans return home following his hospital stay. He reports that he is IADL and does not anticipate any needs at this time. CM will follow. Explained Case Management role/services.

## 2019-06-18 NOTE — PROGRESS NOTES
Pt o2 at 3L via nc and o2 sat decreased to 87%. Placed patient back to 4L via nc. Will cont to monitor. Call light in reach. Pt denies needs at this time.

## 2019-06-18 NOTE — PROGRESS NOTES
Patient sitting in bed. Eating breakfast. Patient is alert and oriented. O2 at 4L via nc. Pt has dyspnea with exertion. Patient complains of lower back pain - heat pack applied. SR with PVCs noted on monitor. Pt does complain of dizziness at times last night. Pt declines to have coccyx checked under preventative mepilex at this time. Shift assessment completed, see flow sheet.

## 2019-06-18 NOTE — PROGRESS NOTES
Speech Language Pathology  Facility/Department: [facility]  Dysphagia Daily Treatment Note    NAME: Roberto Carlos Herbert  : 1950  MRN: [id number]    Patient Diagnosis(es):   Patient Active Problem List    Diagnosis Date Noted    Shortness of breath     Septic shock ([facility] 2019    Thrombocytosis ([facility] 2019    COPD exacerbation (HCC)     Pneumonia due to organism     Pneumonia of left upper lobe due to Pseudomonas species ([facility] 2019    Pulmonary nodule     HCAP (healthcare-associated pneumonia)     Abnormal CT of the chest     Diabetes mellitus, new onset ([facility]     Abnormal chest x-ray     Chronic hypoxemic respiratory failure (HCC)     Lactic acidosis     Protein calorie malnutrition ([facility] 2018    Hypoxemia requiring supplemental oxygen 2018    Tracheobronchitis     Tobacco abuse     Community acquired pneumonia of left upper lobe of lung ([facility] 2018    Age-related osteoporosis without current pathological fracture 2018    Chronic midline low back pain with right-sided sciatica 2017    Tobacco use 2016    COPD, very severe ([facility] 2016     Allergies: No Known Allergies  Onset Date: 19  Current Diet Level:  Dysphagia 2 solids/thin liquids, meds whole in water    Pain:  Pain Assessment  Pain Assessment: yes, back pain  Intervention: Notified nursing    Diet Tolerance:  Patient tolerating current diet level with no consistent signs/symptoms of penetration/aspiration. P.O. Trials: Thin   x Cup sips x3   Nectar       Honey       Puree       Solid         Impressions:  Received approval from RN prior to start of session. Pt seated in bed with HOB elevated to 90 degrees. The patient reports that he has been tolerating current diet with no coughing or choking. The patient reports that his breathing and respiratory status remain an issue for him. The patient pleasantly declined solid trials at this time, stating he wasn't hungry. The patient was agreeable to cup sips of tin liquids with no s/s of aspiration noted. Pt on 4L of O2 with sats ranging between 90 and 93. No change in O2 sat or RR with thin liquids 3/3 (RR at 18). The patient is able to recall compensatory strategies with minimal cueing provided. SLP recommends continue current diet, monitoring for consistent s/s aspiration/penetration. If s/s or decline in respiratory status arise, recommend downgrade diet and contact speech. Treatment/Goals  Short-term Goals  Timeframe for Short-term Goals: 5 days (6/20)  Goal 1: The patient will tolerate recommended diet with no clinical s/s of aspiration 10/10  06/18: Progressing. Cont. Goal (see above). Goal 2: The patient will demonstrate understanding of compensatory strategies, given no cues  06/18: Pt able to recall given min verbal cues  Long-term Goals  Timeframe for Long-term Goals: 7 days (6/22)  Goal 1: The patient will tolerate least restrictive diet with no clinical signs or symptoms of aspiration to ensure optimal nutrition and hydration with reduced aspiration risks. 06/18: Progressing;Cont goal.    Patient/Family/Caregiver Education:  Educated pt to role of SLP, purpose of visit, swallow function, diet recommendations, safe swallow strategies. Compensatory Strategies:  - Alternate solids and liquids  - Small bites/sips  - Eat/Feed slowly  - Remain upright for 30-45 minutes after meals  - Upright as possible for all oral intake      Plan:  Continued daily Dysphagia treatment with goals per current plan of care.     Treatment Time:  Dysphagia tx (2696-1823) 12 minutes    Jemima Dover M.A., [address] [facility] #[id number]  Speech-Language Pathologist

## 2019-06-18 NOTE — PROGRESS NOTES
Physical Therapy/Occupational Therapy    Hold PT/OT treatment this afternoon. Pt with standing BP 76/63 with dizziness with RN, requested to hold at this time. Will follow-up tomorrow as appropriate. No charge.     Jennifer Branch, PT, DPT #[id number]

## 2019-06-18 NOTE — PROGRESS NOTES
normal          Labs:   Recent Labs     06/16/19  0423 06/17/19  0409 06/17/19  1044 06/18/19  0406   WBC 23.3* 15.8*  --  20.3*   HGB 10.1* 10.2*  --  10.8*   HCT 31.7* 31.7* 33.3* 33.6*   PLT 1,245* 1,257*  --  1,306*     Recent Labs     06/15/19  1351 06/16/19  0423 06/17/19  0409 06/18/19  0406   * 131* 132* 138   K 3.8 4.0 3.6 3.7   CL 94* 97* 93* 97*   CO2 25 26 31 31   BUN 11 8 7 7   CREATININE <0.5* <0.5* <0.5* <0.5*   CALCIUM 8.8 8.8 8.6 9.4   PHOS 2.1*  --   --   --      No results for input(s): AST, ALT, BILIDIR, BILITOT, ALKPHOS in the last 72 hours. No results for input(s): INR in the last 72 hours. No results for input(s): [facility] [place] in the last 72 hours. Urinalysis:    No results found for: Annabelle Civil, BACTERIA, RBCUA, BLOODU, [address], [address] [place] [address]    Radiology:  XR CHEST PORTABLE   Final Result   Right jugular central line projects laterally, likely in external jugular or   subclavian vein. No pneumothorax. Continue pattern of pneumonia in the left lung. CT CHEST WO CONTRAST   Final Result   Extensive new left-sided partially consolidative pulmonary disease,   consistent with pneumonia. Follow-up to ensure resolution is recommended to   exclude an underlying mass. New ill-defined nodule of the right upper lobe. Advanced emphysema. Unchanged volume loss of the left lung with consolidative change of the left   upper lobe. However this increased on the April 2019 examination and close   follow-up is needed. XR CHEST PORTABLE   Final Result   New extensive left-sided airspace disease suspicious for pneumonia. Some of   this has a masslike morphology and follow-up to ensure resolution is needed. Recommend follow-up chest x-ray in 2-3 weeks.              Sputum - Enterobacter cloacae (1)     Antibiotic Interpretation ARTEM Status    amoxicillin-clavulanate Resistant >16/8 mcg/mL     ampicillin Resistant >16 mcg/mL     ceFAZolin Resistant >16

## 2019-06-18 NOTE — PLAN OF CARE
Problem: Falls - Risk of:  Goal: Will remain free from falls  Description  Will remain free from falls  Outcome: Met This Shift  Goal: Absence of physical injury  Description  Absence of physical injury  6/18/2019 0333 by Salazar Juarez RN  Outcome: Met This Shift  6/17/2019 1518 by Meghan Garcia RN  Outcome: Ongoing     Problem: Risk for Impaired Skin Integrity  Goal: Tissue integrity - skin and mucous membranes  Description  Structural intactness and normal physiological function of skin and  mucous membranes.   6/18/2019 0333 by Salazar Juarez RN  Outcome: Met This Shift  6/17/2019 1518 by Meghan Garcia RN  Outcome: Ongoing     Problem: Safety:  Goal: Free from accidental physical injury  Description  Free from accidental physical injury  6/18/2019 0333 by Salazar Juarez RN  Outcome: Met This Shift  6/17/2019 1518 by Meghan Garcia RN  Outcome: Ongoing  Goal: Free from intentional harm  Description  Free from intentional harm  Outcome: Met This Shift     Problem: Daily Care:  Goal: Daily care needs are met  Description  Daily care needs are met  6/18/2019 0333 by Salazar Juarez RN  Outcome: Met This Shift  6/17/2019 1518 by Meghan Garcia RN  Outcome: Ongoing     Problem: Skin Integrity:  Goal: Skin integrity will stabilize  Description  Skin integrity will stabilize  Outcome: Met This Shift     Problem: Discharge Planning:  Goal: Patients continuum of care needs are met  Description  Patients continuum of care needs are met  6/18/2019 0333 by Salazar Juarez RN  Outcome: Met This Shift  6/17/2019 1518 by Meghan Garcia RN  Outcome: Ongoing     Problem: Infection:  Goal: Will remain free from infection  Description  Will remain free from infection  Outcome: Ongoing     Problem: Pain:  Goal: Patient's pain/discomfort is manageable  Description  Patient's pain/discomfort is manageable  6/18/2019 0333 by Salazar Juarez RN  Outcome: Ongoing  6/17/2019 1518 by Meghan Garcia RN  Outcome: Ongoing

## 2019-06-18 NOTE — PROGRESS NOTES
MD perfect served regarding bps. Pt is asymptomatic at this time and denies dizziness. Will cont to monitor.

## 2019-06-18 NOTE — PROGRESS NOTES
Occupational Therapy/ Physical Therapy  Attempted OT/PT treatment and the pts BP when standing with the nursing staff was 76/63. Will hold O/T and PT at this time and the pts nurse stated she was going to give the pt fluids per the MD. Will continue to follow pt.   Leoncio Orozco OTR/L [id number]

## 2019-06-19 NOTE — PROGRESS NOTES
Pulmonary Progress Note    CC: shortness of breath     Subjective:   Patient feels a bit better. [facility] CVC     Intake/Output Summary (Last 24 hours) at 6/19/2019 0530  Last data filed at 6/19/2019 0241  Gross per 24 hour   Intake 1580 ml   Output 2770 ml   Net -1190 ml       Exam:   /72   Pulse 77   Temp 97.8 °F (36.6 °C) (Oral)   Resp 15   Ht 5' 7\" (1.702 m)   Wt 115 lb 9.6 oz (52.4 kg)   SpO2 99%   BMI 18.11 kg/m²  on 3L NC  Gen: No distress. Alert. Normocephalic, atraumatic. Eyes: PERRL. No sclera icterus. No conjunctival injection. ENT: No discharge. Pharynx clear. Neck: Trachea midline. Normal thyroid. Resp: No accessory muscle use. No crackles. few bilateral wheezes. + rhonchi. No dullness on percussion. CV: Regular rate. Regular rhythm. No murmur or rub. trace edema. GI: Non-tender. Non-distended. No masses. Skin: Warm and dry. No nodule on exposed extremities. No rash on exposed extremities. Lymph: No cervical LAD. No supraclavicular LAD. M/S: No cyanosis. No joint deformity. No clubbing. Neuro: Awake. Moves all extremities. CN grossly intact. Psych: Oriented x 3. No anxiety or agitation.      Scheduled Meds:   metFORMIN  500 mg Oral BID WC    mupirocin   Nasal BID    ipratropium-albuterol  1 ampule Inhalation Q4H While awake    insulin lispro  0-18 Units Subcutaneous TID     insulin lispro  0-9 Units Subcutaneous Nightly    carbamide peroxide  3 drop Both Ears BID    fluticasone  2 spray Each Nostril Daily    aspirin  81 mg Oral Daily    guaiFENesin  600 mg Oral BID    sodium chloride flush  10 mL Intravenous 2 times per day    enoxaparin  40 mg Subcutaneous Daily    piperacillin-tazobactam  3.375 g Intravenous Q8H    methylPREDNISolone  40 mg Intravenous Daily     Continuous Infusions:   sodium chloride 100 mL/hr at 06/18/19 2028    dextrose       PRN Meds:  sodium chloride, potassium chloride **OR** potassium alternative oral replacement **OR** potassium chloride, sodium chloride flush, magnesium hydroxide, ondansetron, acetaminophen, glucose, dextrose, glucagon (rDNA), dextrose    Labs:  CBC:   Recent Labs     06/17/19  0409 06/17/19  1044 06/18/19  0406 06/19/19  0408   WBC 15.8*  --  20.3* 18.5*   HGB 10.2*  --  10.8* 10.6*   HCT 31.7* 33.3* 33.6* 32.6*   MCV 88.7  --  88.7 88.7   PLT 1,257*  --  1,306* 1,280*     BMP:   Recent Labs     06/17/19  0409 06/18/19  0406 06/19/19  0408   * 138 137   K 3.6 3.7 3.6   CL 93* 97* 98*   CO2 31 31 29   BUN 7 7 7   CREATININE <0.5* <0.5* <0.5*     LIVER PROFILE:   No results for input(s): AST, ALT, LIPASE, BILIDIR, BILITOT, ALKPHOS in the last 72 hours. Invalid input(s): AMYLASE,  ALB  PT/INR: No results for input(s): PROTIME, INR in the last 72 hours. APTT: No results for input(s): APTT in the last 72 hours. UA:No results for input(s): NITRITE, COLORU, PHUR, LABCAST, WBCUA, RBCUA, MUCUS, TRICHOMONAS, YEAST, BACTERIA, CLARITYU, SPECGRAV, LEUKOCYTESUR, UROBILINOGEN, BILIRUBINUR, BLOODU, GLUCOSEU, AMORPHOUS in the last 72 hours. Invalid input(s): KETONESU  No results for input(s): PHART, QFT7RNI, PO2ART in the last 72 hours.   Cultures:   6/14/2019 respiratory- GNR - Pseudomonas sens to zosyn  6/14/2019 Legionella and strep pneumo urine antigens - negative  6/14/2019 blood no growth to date      Films:  No new     Assessment:  · Acute pn chronic respiratory failure with hypoxemia  · LLL pneumonia- Pseudomonas  · COPD with AE  · R upper lobe nodular opacity new since 4/19  · Sepsis  · Thrombocytosis- present since 2018- worsened      Plan:  · Supplemental oxygen to maintain SaO2 >92%; wean as tolerated   · Abx:zosyn  D7 - had 5 days vanc and azithro  · SSI  · Solumedrol 40 mg daily  · Will need f/u CT in 3 months after d/c  · Hematology consult appreciated- work-up in progress for possible ET  · ICU care- lovenox and bactroban

## 2019-06-19 NOTE — PROGRESS NOTES
Occupational Therapy Daily Treatment Note    Unit: 2 [place]  Date:  6/19/2019  Patient Name:    Day Adame  Admitting diagnosis:  Septic shock ([facility] [A41.9, R65.21]  Admit Date:  6/14/2019  Precautions/Restrictions:  fall risk, IV, bed/chair alarm and supplemental o2 (3L)      Discharge Recommendations: Home w/ 24/7 assist and home therapy  DME needs for discharge: RW       Therapy recommendations for staff:   Assist of 1 with use of rolling walker (RW) for all ambulation within room    AM-PAC Score: 21  Home Health S4 Level: Level 1- Standard       Treatment Time:  9637-3846  Treatment number:  2    Total Treatment Time:   48 minutes      Subjective:  Patient in bed and agreeable to therapy. Pain   Yes  Rating: moderate  Location:low back  Pain Medicine Status: Denies need      Bed Mobility:   Supine to Sit:  Supervision  Sit to Supine:  Not Tested  Rolling:           Not Tested  Scooting:        Supervision    Transfer Training:   Sit to stand:   SBA  Stand to sit:  SBA  Bed to Chair:  SBA, with use of RW and VC for hand placement  Bed to [facility]:   Not Tested  Standard toilet:   SBA, with use of RW and VC for hand placement    Activity Tolerance   Pt completed therapy session with No adverse symptoms  SpO2: 95%  HR: 84  BP: 112/62    ADL Training:   Upper body dressing:  Not Tested  Upper body bathing:  Not Tested  Lower body dressing:  Not Tested  Lower body bathing:  Not Tested  Toileting:   SBA urinating in stance, cues for RW use/placement  Grooming/Hygiene:  SBA washing hands at sink    Therapeutic Exercise:   N/A    Patient Education:   Role of OT  Recommendations for DC  Energy conservation techniques  Safe RW use/hand placement  Oxygen tubing management    Positioning Needs:   Up in chair, call light and needs in reach. Alarm Set    Family Present:  Yes    Assessment: Patient making progress toward goals, declined ADL as already washed up with nursing.   Fatigued after toileting, functional mobility in room/hallway, and repositioning in chair on pressure relieving cushion. GOALS  To be met in 3 Visits:  1). Upper Body dressing: SBA for lines      To be met in 5 Visits:  3). Lower Body Bathing:  SBA for lines   4). Upper Body Dressing: SBA for lines   5). Lower Body Dressing: SBA for lines   6).  Pt to skip UE exs x 15 reps    [facility] O[facility]L [facility]        If patient discharges from this facility prior to next visit, this note will serve as the Discharge Summary

## 2019-06-19 NOTE — PROGRESS NOTES
Date    WBC 18.5 (H) 06/19/2019    HGB 10.6 (L) 06/19/2019    HCT 32.6 (L) 06/19/2019    MCV 88.7 06/19/2019    PLT 1,280 (HH) 06/19/2019    LYMPHOPCT 10.2 06/19/2019    RBC 3.67 (L) 06/19/2019    MCH 28.9 06/19/2019    MCHC 32.6 06/19/2019    RDW 16.4 (H) 06/19/2019       BMP:   Lab Results   Component Value Date     06/19/2019    K 3.6 06/19/2019    CL 98 06/19/2019    CO2 29 06/19/2019    BUN 7 06/19/2019    CREATININE <0.5 06/19/2019    GLUCOSE 126 06/19/2019    CALCIUM 8.6 06/19/2019        Hepatic Function Panel:   Lab Results   Component Value Date    ALKPHOS 118 06/14/2019    ALT 7 06/14/2019    AST 16 06/14/2019    PROT 6.6 06/14/2019    BILITOT 0.3 06/14/2019    LABALBU 2.6 06/15/2019      Lab Results   Component Value Date    IRON 20 (L) 06/17/2019    TIBC 185 (L) 06/17/2019    FERRITIN 759.4 (H) 06/17/2019     Lab Results   Component Value Date    KXHFHUFO56 394 06/17/2019     Lab Results   Component Value Date    FOLATE 13.90 06/17/2019     KAYLEE-2 - pending  Soluble transferrin receptor - 2.3 (normal)    IMAGING:     Ct Chest Wo Contrast  Result Date: 6/14/2019  Extensive new left-sided partially consolidative pulmonary disease, consistent with pneumonia. Follow-up to ensure resolution is recommended to exclude an underlying mass. New ill-defined nodule of the right upper lobe. Advanced emphysema. Unchanged volume loss of the left lung with consolidative change of the left upper lobe. However this increased on the April 2019 examination and close follow-up is needed. Xr Chest Portable  Result Date: 6/14/2019  Right jugular central line projects laterally, likely in external jugular or subclavian vein. No pneumothorax. Continue pattern of pneumonia in the left lung. Xr Chest Portable  Result Date: 6/14/2019  New extensive left-sided airspace disease suspicious for pneumonia. Some of this has a masslike morphology and follow-up to ensure resolution is needed.  Recommend follow-up chest x-ray in 2-3 weeks. ASSESSMENT:     Problem List Items Addressed This Visit     COPD exacerbation ([facility] - Primary    Relevant Medications    ipratropium-albuterol (DUONEB) nebulizer solution 1 ampule (Completed)    predniSONE (DELTASONE) tablet 60 mg (Completed)    guaiFENesin (MUCINEX) extended release tablet 600 mg    methylPREDNISolone sodium (SOLU-MEDROL) injection 40 mg    oxymetazoline (AFRIN) 0.05 % nasal spray 2 spray (Completed)    fluticasone (FLONASE) 50 MCG/ACT nasal spray 2 spray    ipratropium-albuterol (DUONEB) nebulizer solution 1 ampule    Pneumonia of left lower lobe due to infectious organism (HCC)    Relevant Medications    ipratropium-albuterol (DUONEB) nebulizer solution 1 ampule (Completed)    cefTRIAXone (ROCEPHIN) 1 g IVPB in 50 mL D5W minibag (Completed)    azithromycin (ZITHROMAX) 500 mg in D5W 250ml addavial (Completed)    guaiFENesin (MUCINEX) extended release tablet 600 mg    piperacillin-tazobactam (ZOSYN) 3.375 g in sodium chloride 0.9 % 100 mL IVPB extended infusion (mini-bag)    azithromycin (ZITHROMAX) tablet 500 mg (Completed)    oxymetazoline (AFRIN) 0.05 % nasal spray 2 spray (Completed)    fluticasone (FLONASE) 50 MCG/ACT nasal spray 2 spray    ipratropium-albuterol (DUONEB) nebulizer solution 1 ampule    Shortness of breath      Other Visit Diagnoses     Mass of left lung                    PLAN:     1. Thrombocytosis  2.  Anemia    - plts have been increasing since 2017, but all checked in acute care setting  - anemia is new  - could be reactive to pneumonia/steroids  - chronic plt elevation raises possibility of MPD like ET  - iron deficiency also in ddx  - check KAYLEE-2 kinase - pending  - check iron studies, B12, folate, LDH, hemolytic panel - iron studies mixed, STR normal   - may need BMBX if KAYLEE-2 negative  - will consider empiric hydrea if continuing to increase  - pt already on ASA prophylaxis    Vickie Keene MD

## 2019-06-19 NOTE — PROGRESS NOTES
Reassessment complete, see flow sheets. Patient is alert and oriented,Pain assessed and no c/o pain and appears to be in no distress at this time. Vital signs stable, call light within reach, will continue to monitor.

## 2019-06-19 NOTE — PROGRESS NOTES
oral clearance, no overt s/s aspiration. Pt denied difficulty tolerating current diet/swallowing meds. Reviewed safe swallow strategies and recommendations. Pt expressed understanding. Cont per POC. Treatment/Goals  Short-term Goals  Timeframe for Short-term Goals: 5 days (6/20)  Goal 1: The patient will tolerate recommended diet with no clinical s/s of aspiration 10/10  06/19: Progressing. Cont. Goal (see above). Goal 2: The patient will demonstrate understanding of compensatory strategies, given no cues  06/19: Pt expressed understanding. Cont goal.  Long-term Goals  Timeframe for Long-term Goals: 7 days (6/22)  Goal 1: The patient will tolerate least restrictive diet with no clinical signs or symptoms of aspiration to ensure optimal nutrition and hydration with reduced aspiration risks. 06/18: Progressing;Cont goal.    Patient/Family/Caregiver Education:  Educated re: role of SLP, purpose of visit, swallow function, recommendations    Compensatory Strategies:  - Alternate solids and liquids  - Small bites/sips  - Eat/Feed slowly  - Remain upright for 30-45 minutes after meals  - Upright as possible for all oral intake      Plan:  Continued daily Dysphagia treatment with goals per current plan of care.     Treatment Time:  Dysphagia tx (3372-9655) 8 minutes    Olu Moore M.A., Johanna [id number]  Speech-Language Pathologist

## 2019-06-19 NOTE — PLAN OF CARE
Problem: Falls - Risk of:  Goal: Will remain free from falls  Description  Will remain free from falls  6/18/2019 2225 by Simone Sherman RN  Outcome: Ongoing  6/18/2019 0940 by Calista Morfin RN  Outcome: Ongoing  Goal: Absence of physical injury  Description  Absence of physical injury  6/18/2019 2225 by Simone Sherman RN  Outcome: Ongoing  6/18/2019 0940 by Calista Morfin RN  Outcome: Ongoing     Problem: Risk for Impaired Skin Integrity  Goal: Tissue integrity - skin and mucous membranes  Description  Structural intactness and normal physiological function of skin and  mucous membranes. 6/18/2019 2225 by Simone Sherman RN  Outcome: Ongoing  6/18/2019 0940 by Calista Morfin RN  Outcome: Ongoing     Problem: Infection:  Goal: Will remain free from infection  Description  Will remain free from infection  6/18/2019 2225 by Simone Sherman RN  Outcome: Ongoing  6/18/2019 0940 by Calista Morfin RN  Outcome: Ongoing     Problem: Safety:  Goal: Free from accidental physical injury  Description  Free from accidental physical injury  6/18/2019 2225 by Simone Sherman RN  Outcome: Ongoing  6/18/2019 0940 by Calista Morfin RN  Outcome: Ongoing  Goal: Free from intentional harm  Description  Free from intentional harm  6/18/2019 2225 by Simone Sherman RN  Outcome: Ongoing  6/18/2019 0940 by Calista Morfin RN  Outcome: Ongoing     Problem: Daily Care:  Goal: Daily care needs are met  Description  Daily care needs are met  6/18/2019 2225 by Simone Sherman RN  Outcome: Ongoing  6/18/2019 0940 by Calista Morfin RN  Outcome: Ongoing     Problem: Pain:  Description  Pain management should include both nonpharmacologic and pharmacologic interventions.   Goal: Patient's pain/discomfort is manageable  Description  Patient's pain/discomfort is manageable  6/18/2019 2225 by Simone Sherman RN  Outcome: Ongoing  6/18/2019 0940 by Calista Morfin RN  Outcome: Ongoing  Goal: Pain level will decrease  Description  Pain level will decrease  Outcome: Ongoing  Goal: Control of acute pain  Description  Control of acute pain  Outcome: Ongoing  Goal: Control of chronic pain  Description  Control of chronic pain  Outcome: Ongoing     Problem: Skin Integrity:  Goal: Skin integrity will stabilize  Description  Skin integrity will stabilize  6/18/2019 2225 by Lavinia Cullen RN  Outcome: Ongoing  6/18/2019 0940 by Theo Ferreira RN  Outcome: Ongoing     Problem: Discharge Planning:  Goal: Patients continuum of care needs are met  Description  Patients continuum of care needs are met  6/18/2019 2225 by Lavinia Cullen RN  Outcome: Ongoing  6/18/2019 0940 by Theo Ferreira RN  Outcome: Ongoing

## 2019-06-19 NOTE — PROGRESS NOTES
Assessment done Pt assisted up to commode and back to bed. Lg BM. Pt's O2 sat dropped to 88% with activity.

## 2019-06-19 NOTE — PROGRESS NOTES
Inpatient Physical Therapy Daily Treatment Note    Unit: ICU  Date:  6/19/2019  Patient Name:    Jennifer Mac  Admitting diagnosis:  Septic shock ([facility] [address] [A41.9, R65.21]  Admit Date:  6/14/2019  Precautions/Restrictions:  fall risk, IV, supplemental o2 (3L) and ICU monitoring      Discharge Recommendations: Home w/ 24/7 assist and home therapy  DME needs for discharge: RW       Therapy recommendations for staff:   Assist of 1 with use of rolling walker (RW) for all transfers or ambulation to/from bathroom    Home Health S4 Level Recommendation: Level 1 Standard  AM-PAC Mobility Score   AM-PAC Inpatient Mobility Raw Score : 21       Treatment Time:  3666-1118  Treatment number: 2  Timed Code Treatment Minutes: 29 minutes  Total Treatment Minutes:  29  minutes    Cognition    A&O x4   Able to follow 2 step commands    Subjective  Patient reclined in chair with no family present  Pt agreeable to this PT tx. Pain   No  Location:   Rating:  NA/10  Pain Medicine Status: Denies need     Bed Mobility   Supine to Sit:   Not Tested  Sit to Supine:  Not Tested  Rolling:   Not Tested  Scooting:   Not Tested    Transfer Training     Sit to stand:   Independent from chair, from low commode  Stand to sit: Independent to chair, to low commode  Bed to Commode:  Supervision with use of No AD    Gait Training gait completed as indicated below  Distance:  30' + 10' + 40 ft  Deviations (firm surface/linoleum): decreased isak   Assistive Device Used:  No AD  Level of Assist: Supervision  Comment: trial with RW 5' forward + 5' backward, pt reports improved steadiness - left in room for pt to use with staff    Stair Training deferred, pt unsafe/not appropriate to complete stairs at this time  Pt ascended/descended  stairs with N/A with N/A, and use of N/A.   Pattern: N/A    Therapeutic Exercise Eliu deferred for focus on functional mobility (disregard section)  Ankle pumps:  Quad sets:   Glut sets:   Heel slides:   SLR:   Hip

## 2019-06-19 NOTE — PLAN OF CARE
Problem: Falls - Risk of:  Goal: Will remain free from falls  Description  Will remain free from falls  Outcome: Ongoing     Problem: Risk for Impaired Skin Integrity  Goal: Tissue integrity - skin and mucous membranes  Description  Structural intactness and normal physiological function of skin and  mucous membranes.   Outcome: Ongoing     Problem: Infection:  Goal: Will remain free from infection  Description  Will remain free from infection  Outcome: Ongoing

## 2019-06-19 NOTE — PROGRESS NOTES
Hospitalist Progress Note      PCP: SILVER HUNTER, APRN - CNP    Date of Admission: 6/14/2019    Subjective: hypotension improved with IVF  remaisn on 4 L oxygen  Up in chair feels good    Medications:  Reviewed    Infusion Medications    sodium chloride 100 mL/hr at 06/19/19 0547    dextrose       Scheduled Medications    metFORMIN  500 mg Oral BID WC    mupirocin   Nasal BID    ipratropium-albuterol  1 ampule Inhalation Q4H While awake    insulin lispro  0-18 Units Subcutaneous TID WC    insulin lispro  0-9 Units Subcutaneous Nightly    carbamide peroxide  3 drop Both Ears BID    fluticasone  2 spray Each Nostril Daily    aspirin  81 mg Oral Daily    guaiFENesin  600 mg Oral BID    sodium chloride flush  10 mL Intravenous 2 times per day    enoxaparin  40 mg Subcutaneous Daily    piperacillin-tazobactam  3.375 g Intravenous Q8H    methylPREDNISolone  40 mg Intravenous Daily     PRN Meds: sodium chloride, potassium chloride **OR** potassium alternative oral replacement **OR** potassium chloride, sodium chloride flush, magnesium hydroxide, ondansetron, acetaminophen, glucose, dextrose, glucagon (rDNA), dextrose      Intake/Output Summary (Last 24 hours) at 6/19/2019 0744  Last data filed at 6/19/2019 0547  Gross per 24 hour   Intake 3160 ml   Output 2570 ml   Net 590 ml       Physical Exam Performed:    /67   Pulse 78   Temp 97.8 °F (36.6 °C) (Oral)   Resp 17   Ht 5' 7\" (1.702 m)   Wt 115 lb 8 oz (52.4 kg)   SpO2 98%   BMI 18.09 kg/m²           General: thin elderly male up in bed   Awake, alert and oriented. Appears to be not in any distress  Mucous Membranes:  Pink , anicteric  Neck: No JVD, no carotid bruit, no thyromegaly  Chest:  max air entry with diminished in bases- occasional wheeze  Cardiovascular:  RRR S1S2 heard, no murmurs or gallops  Abdomen:  Soft, undistended, non tender, no organomegaly, BS present  Extremities: No edema or cyanosis.  Distal pulses well felt  Neurological : grossly normal          Labs:   Recent Labs     06/17/19  0409 06/17/19  1044 06/18/19  0406 06/19/19  0408   WBC 15.8*  --  20.3* 18.5*   HGB 10.2*  --  10.8* 10.6*   HCT 31.7* 33.3* 33.6* 32.6*   PLT 1,257*  --  1,306* 1,280*     Recent Labs     06/17/19  0409 06/18/19  0406 06/19/19  0408   * 138 137   K 3.6 3.7 3.6   CL 93* 97* 98*   CO2 31 31 29   BUN 7 7 7   CREATININE <0.5* <0.5* <0.5*   CALCIUM 8.6 9.4 8.6     No results for input(s): AST, ALT, BILIDIR, BILITOT, ALKPHOS in the last 72 hours. No results for input(s): INR in the last 72 hours. No results for input(s): [place] in the last 72 hours. Urinalysis:    No results found for: Marla Tye, BACTERIA, RBCUA, BLOODU, [address], [address] [place] [address]    Radiology:  XR CHEST PORTABLE   Final Result   Right jugular central line projects laterally, likely in external jugular or   subclavian vein. No pneumothorax. Continue pattern of pneumonia in the left lung. CT CHEST WO CONTRAST   Final Result   Extensive new left-sided partially consolidative pulmonary disease,   consistent with pneumonia. Follow-up to ensure resolution is recommended to   exclude an underlying mass. New ill-defined nodule of the right upper lobe. Advanced emphysema. Unchanged volume loss of the left lung with consolidative change of the left   upper lobe. However this increased on the April 2019 examination and close   follow-up is needed. XR CHEST PORTABLE   Final Result   New extensive left-sided airspace disease suspicious for pneumonia. Some of   this has a masslike morphology and follow-up to ensure resolution is needed. Recommend follow-up chest x-ray in 2-3 weeks.              Sputum - Enterobacter cloacae (1)     Antibiotic Interpretation ARTEM Status    amoxicillin-clavulanate Resistant >16/8 mcg/mL     ampicillin Resistant >16 mcg/mL     ceFAZolin Resistant >16 mcg/mL     cefepime Sensitive <=2 mcg/mL cefotaxime Sensitive <=2 mcg/mL     cefTRIAXone Intermediate 2 mcg/mL     cefuroxime Resistant >16 mcg/mL     ciprofloxacin Sensitive <=1 mcg/mL     gentamicin Sensitive <=4 mcg/mL     meropenem Sensitive <=1 mcg/mL     piperacillin-tazobactam Sensitive <=16 mcg/mL     trimethoprim-sulfamethoxazole Sensitive <=2/38 mcg/mL     Pseudomonas aeruginosa (2)     Antibiotic Interpretation ARTEM Status    cefepime Sensitive <=2 mcg/mL     ciprofloxacin Sensitive <=1 mcg/mL     gentamicin Sensitive <=4 mcg/mL     meropenem Sensitive <=1 mcg/mL     piperacillin-tazobactam Sensitive <=16 mcg/mL     tobramycin Sensitive <=4 mcg/mL             Assessment/Plan:      1. Septic Shock, likely related to HCAP, IV vanco and zosyn, d/c IVF, f/u cx, admitted to ICU.  Intensivist c/s. Maintaining MAPs > 65 although pressure have been soft.          Improved BP. Continue IVF today      2. Acute on chronic respiratory failure with hypoxia, likely related to HCAP w/ probable aeCOPD component, supplemental O2 to maintain SPO2 ? 92%, continuous pulse ox.  Wean as tolerated to home O2 of 3L.     3. HCAP with  aeCOPD component, ct with left sided pna ABX as above, cultures with enterobacter and pseudomonas. Continue zosyn ,nebs, steroids.      4. Pulmonary nodule, RUL, new since April.  Also, some concern for possible underlying mass in LLL PNA.  Will require f/u CT. 5. Lactic acidosis - resolved    6. Thrombocytosis, 1344, has been elevated in past but not quite to this degree.      7. Leukocytosis, 26.3-->20.9-->23.3,>18  Monitor.   8. DM- 2, resume home metformin , ssi.  Need scripts at dc        DVT Prophylaxis: Lovenox  Diet: DIET GENERAL; Dysphagia III Advanced  Dietary Nutrition Supplements: Frozen Oral Supplement  Code Status: Full Code    PT/OT Eval Status: ordered      Melvina Sorenson MD

## 2019-06-20 PROBLEM — E11.9 TYPE 2 DIABETES MELLITUS WITHOUT COMPLICATION, WITHOUT LONG-TERM CURRENT USE OF INSULIN (HCC): Status: ACTIVE | Noted: 2019-01-01

## 2019-06-20 NOTE — PROGRESS NOTES
sodium chloride flush, magnesium hydroxide, ondansetron, acetaminophen, glucose, dextrose, glucagon (rDNA), dextrose    Labs:  CBC:   Recent Labs     06/18/19  0406 06/19/19  0408 06/20/19  0525   WBC 20.3* 18.5* 17.8*   HGB 10.8* 10.6* 10.5*   HCT 33.6* 32.6* 33.0*   MCV 88.7 88.7 89.1   PLT 1,306* 1,280* 1,296*     BMP:   Recent Labs     06/18/19  0406 06/19/19  0408 06/20/19  0525    137 136   K 3.7 3.6 3.7   CL 97* 98* 99   CO2 31 29 27   BUN 7 7 7   CREATININE <0.5* <0.5* <0.5*     LIVER PROFILE:   No results for input(s): AST, ALT, LIPASE, BILIDIR, BILITOT, ALKPHOS in the last 72 hours. Invalid input(s): AMYLASE,  ALB  PT/INR: No results for input(s): PROTIME, INR in the last 72 hours. APTT: No results for input(s): APTT in the last 72 hours. UA:No results for input(s): NITRITE, COLORU, PHUR, LABCAST, WBCUA, RBCUA, MUCUS, TRICHOMONAS, YEAST, BACTERIA, CLARITYU, SPECGRAV, LEUKOCYTESUR, UROBILINOGEN, BILIRUBINUR, BLOODU, GLUCOSEU, AMORPHOUS in the last 72 hours. Invalid input(s): KETONESU  No results for input(s): PHART, AOZ0OYZ, PO2ART in the last 72 hours.   Cultures:   6/14/2019 respiratory- GNR - Pseudomonas sens to zosyn  6/14/2019 Legionella and strep pneumo urine antigens - negative  6/14/2019 blood no growth to date      Films:  No new     Assessment:  · Acute pn chronic respiratory failure with hypoxemia  · LLL pneumonia- Pseudomonas  · COPD with AE  · R upper lobe nodular opacity new since 4/19  · Sepsis  · Thrombocytosis- present since 2018- worsened      Plan:  · Supplemental oxygen to maintain SaO2 >92%; wean as tolerated   · Abx:zosyn  D7 - had 5 days vanc and azithro- ok to go home with 5 more days of Cipro  · SSI  · Solumedrol 40 mg daily  · Will need f/u CT in 3 months after d/c  · Hematology consult appreciated- work-up in progress for possible ET  · D/c ok from my perspective

## 2019-06-20 NOTE — PROGRESS NOTES
IV removed for discharge. Discharge instructions reviewed and copy given to patient. Patient denies further questions. In stable condition, ready for discharge. Patient sitting up, eating lunch before ride arrives. Awaiting transport.

## 2019-06-20 NOTE — CARE COORDINATION
[address] Transitions Interview     2019    Patient: Yana Boyce Patient : 1950   MRN: [id number]  Reason for Admission: aeCOPD, L lung mass, PNA  RARS: Readmission Risk Score: 15         Spoke with: Marck Campa      Readmission Risk  Patient Active Problem List   Diagnosis    COPD, very severe ([facility] [address]    Tobacco use    Chronic midline low back pain with right-sided sciatica    Age-related osteoporosis without current pathological fracture    Community acquired pneumonia of left upper lobe of lung ([facility] [address]    Tracheobronchitis    Tobacco abuse    Hypoxemia requiring supplemental oxygen    Protein calorie malnutrition (HCC)    Abnormal chest x-ray    Chronic hypoxemic respiratory failure (HCC)    Lactic acidosis    Diabetes mellitus, new onset ([facility] [address]    HCAP (healthcare-associated pneumonia)    Abnormal CT of the chest    Pulmonary nodule    Pneumonia of left upper lobe due to Pseudomonas species ([facility] [address]    Septic shock (HCC)    Thrombocytosis (HCC)    COPD exacerbation ([facility] [address]    Pneumonia due to organism    Shortness of breath    Type 2 diabetes mellitus without complication, without long-term current use of insulin [facility])       Inpatient Assessment  Care Transitions Summary    Care Transitions Inpatient Review  Medication Review  Do you have all of your prescriptions and are they filled?:  Yes   Are you able to afford your medications?:  Yes  How often do you have difficulty taking your medications?:  I always take them as prescribed. Housing Review  Who do you live with?:  Partner/Spouse/SO  Are you an active caregiver in your home?:  No  Social Support  Do you have a ?:  No  Do you have a [address] [place] [address]?:  No  Durable Medical Equipment  Patient Home Equipment:  Nebulizer, Oxygen  Functional Review  Ability to seek help/take action for Emergent/Urgent situations i.e. fire, crime, inclement weather or health crisis. :  Independent  Ability handle personal hygiene needs (bathing/dressing/grooming): Independent  Ability to manage medications: Independent  Ability to prepare food:  Independent  Ability to maintain home (clean home, laundry): Independent  Ability to drive and/or has transportation:  Independent  Ability to do shopping:  Independent  Ability to manage finances: Independent  Is patient able to live independently?:  Yes  Hearing and Vision  Visual Impairment:  Visual impairment (Glasses/contacts)  Hearing Impairment:  None  Care Transitions Interventions       Plans to DC to home with spouse today by noon. Therapy rec home PT/OT. Patient requests [facility], has had them in past. Care transition notified both discharge planner Mi and [address]. Demographics confirmed and facesheet is accurate. States he can afford medications. Has 2 home pulse oximeters. Nebulizer works well and he uses regularly. Feels ready to DC to home. Care transition following.      Follow Up  Future Appointments   Date Time Provider Alex Duong   7/3/2019 11:00 AM [facility] PULMONARY FUNCTION TESTING [facility] None   7/8/2019  1:30 PM Viky Rosales MD [facility]       Health Maintenance  Health Maintenance Due   Topic Date Due    Diabetic microalbuminuria test  12/02/1968    Lipid screen  11/29/2018       Lauren Soriano RN

## 2019-06-20 NOTE — DISCHARGE SUMMARY
Name:  Emily Cm  Room:  1431/3457-44  MRN:    [id number]    Discharge Summary      This discharge summary is in conjunction with a complete physical exam done on the day of discharge. Discharging Physician: Wisam Judd       Admit: 6/14/2019  Discharge:   6/20/2019     Diagnoses this Admission    Principal Problem:    Septic shock ([address]  Active Problems:    COPD, very severe ([address]    Chronic hypoxemic respiratory failure ([address]    HCAP (healthcare-associated pneumonia)    Thrombocytosis (HCC)    COPD exacerbation ([address]    Pneumonia due to organism    Shortness of breath    Type 2 diabetes mellitus without complication, without long-term current use of insulin (HCC)  Resolved Problems:    * No resolved hospital problems. *      Procedures (Please Review Full Report for Details)    RI CVC    Consults      Pulmonary  Hem Onc    HPI:      The patient is a 76 y.o. male with PMH below, presents with SOB/WATKINS, fever, diaphoresis, dry cough, increased oxygen demand. Patient reports the above symptoms worsening over the last 3 days. Patient reports that he normally wears 3 L of oxygen at home. He has had to increase to 3.5 L today. Patient reports that he was recently treated for Pseudomonas pneumonia. He was found to be mildly hypotensive in the ER. He also reports that he recently quit smoking. BP (!) 98/50 Comment: Manual  Pulse 87   Temp 98.1 °F (36.7 °C) (Oral)   Resp 16   Ht 5' 7\" (1.702 m)   Wt 123 lb 4.8 oz (55.9 kg)   SpO2 95%   BMI 19.31 kg/m²     Physical Exam   Constitutional: He appears well-developed. No distress. HENT:   Head: Normocephalic and atraumatic. Eyes: Pupils are equal, round, and reactive to light. Conjunctivae and EOM are normal.   Neck: Normal range of motion. Neck supple. No thyromegaly present. Cardiovascular: Normal rate and regular rhythm. No murmur heard. Pulmonary/Chest: Effort normal and breath sounds normal. He has no wheezes. Diminished BS   Skin: He is not diaphoretic. Hospital Course      1. Septic Shock, likely related to HCAP, IV vanco and zosyn, d/c IVF, f/u cx, admitted to ICU.  Intensivist c/s. Maintaining MAPs > 65 although pressure have been soft.          Improved BP. Septic shock resolved.     2. Acute on chronic respiratory failure with hypoxia, likely related to HCAP w/ probable aeCOPD component, supplemental O2 to maintain SPO2 ? 92%, continuous pulse ox.  Wean as tolerated to home O2 of 3L. Acute respiratory failure resolved.     3. HCAP with  aeCOPD component, ct with left sided pna ABX as above, cultures with enterobacter and pseudomonas. Continue zosyn ,nebs, steroids.  changed to PO Cipro 750 mg po bid for five days and prednisone taper.     4. Pulmonary nodule, RUL, new since April.  Also, some concern for possible underlying mass in LLL PNA.  chest CT done and reviewed. He sees Dr Norma Feldman as OP regularly and follow up will be with him.     5. Lactic acidosis - resolved     6. Thrombocytosis, 1344, has been elevated in past but not quite to this degree. Hem Onc saw patient. He will follow up with Dr Adrianna Hills at OP.     7. Leukocytosis- improved. 8. DM type 2 on Metformin.           CBC:   Recent Labs     06/18/19  0406 06/19/19  0408 06/20/19  0525   WBC 20.3* 18.5* 17.8*   HGB 10.8* 10.6* 10.5*   HCT 33.6* 32.6* 33.0*   MCV 88.7 88.7 89.1   PLT 1,306* 1,280* 1,296*     BMP:   Recent Labs     06/18/19  0406 06/19/19  0408 06/20/19  0525    137 136   K 3.7 3.6 3.7   CL 97* 98* 99   CO2 31 29 27   BUN 7 7 7   CREATININE <0.5* <0.5* <0.5*     Micro:  Enterobacter cloacae (1)     Antibiotic Interpretation ARTEM Status    amoxicillin-clavulanate Resistant >16/8 mcg/mL     ampicillin Resistant >16 mcg/mL     ceFAZolin Resistant >16 mcg/mL     cefepime Sensitive <=2 mcg/mL     cefotaxime Sensitive <=2 mcg/mL     cefTRIAXone Intermediate 2 mcg/mL     cefuroxime Resistant >16 mcg/mL     ciprofloxacin Sensitive <=1 mcg/mL     gentamicin Sensitive <=4 mcg/mL     meropenem Sensitive <=1 mcg/mL     piperacillin-tazobactam Sensitive <=16 mcg/mL     trimethoprim-sulfamethoxazole Sensitive <=2/38 mcg/mL     Pseudomonas aeruginosa (2)     Antibiotic Interpretation ARTEM Status    cefepime Sensitive <=2 mcg/mL     ciprofloxacin Sensitive <=1 mcg/mL     gentamicin Sensitive <=4 mcg/mL     meropenem Sensitive <=1 mcg/mL     piperacillin-tazobactam Sensitive <=16 mcg/mL     tobramycin Sensitive <=4 mcg/mL         XR CHEST PORTABLE   Final Result   Right jugular central line projects laterally, likely in external jugular or   subclavian vein. No pneumothorax. Continue pattern of pneumonia in the left lung. CT CHEST WO CONTRAST   Final Result   Extensive new left-sided partially consolidative pulmonary disease,   consistent with pneumonia. Follow-up to ensure resolution is recommended to   exclude an underlying mass. New ill-defined nodule of the right upper lobe. Advanced emphysema. Unchanged volume loss of the left lung with consolidative change of the left   upper lobe. However this increased on the April 2019 examination and close   follow-up is needed. XR CHEST PORTABLE   Final Result   New extensive left-sided airspace disease suspicious for pneumonia. Some of   this has a masslike morphology and follow-up to ensure resolution is needed. Recommend follow-up chest x-ray in 2-3 weeks.                Discharge Medications     Medication List      START taking these medications    ciprofloxacin 750 MG tablet  Commonly known as:  CIPRO  Take 1 tablet by mouth 2 times daily for 5 days     metFORMIN 500 MG tablet  Commonly known as:  GLUCOPHAGE  Take 1 tablet by mouth 2 times daily (with meals)     predniSONE 10 MG tablet  Commonly known as:  DELTASONE  4 tabs for 3 days 3 tabs for 3 days 2 tabs for 3 days 1 tabs for 3 days        CONTINUE taking these medications    albuterol sulfate  (90

## 2019-06-20 NOTE — CARE COORDINATION
[facility]  Received referral regarding [facility], [facility]. services from Felicity DOTSON. Spoke with Mi PURDY. Telephone call to pt at [phone]. No answer LM. Per Josh Childs, demographics. Verified. 2:30 Received order from [facility]. Faxed referral with orders to [facility] for [facility] with SN, PT/OT.  Notified [facility] clinical team.    Electronically signed by Janie Arana RN on 6/20/2019 at 12:52 PM

## 2019-06-20 NOTE — PROGRESS NOTES
06/20/2019    HGB 10.5 (L) 06/20/2019    HCT 33.0 (L) 06/20/2019    MCV 89.1 06/20/2019    PLT 1,296 (HH) 06/20/2019    LYMPHOPCT 10.7 06/20/2019    RBC 3.70 (L) 06/20/2019    MCH 28.5 06/20/2019    MCHC 32.0 06/20/2019    RDW 17.0 (H) 06/20/2019       BMP:   Lab Results   Component Value Date     06/20/2019    K 3.7 06/20/2019    CL 99 06/20/2019    CO2 27 06/20/2019    BUN 7 06/20/2019    CREATININE <0.5 06/20/2019    GLUCOSE 107 06/20/2019    CALCIUM 8.8 06/20/2019        Hepatic Function Panel:   Lab Results   Component Value Date    ALKPHOS 118 06/14/2019    ALT 7 06/14/2019    AST 16 06/14/2019    PROT 6.6 06/14/2019    BILITOT 0.3 06/14/2019    LABALBU 2.6 06/15/2019      Lab Results   Component Value Date    IRON 20 (L) 06/17/2019    TIBC 185 (L) 06/17/2019    FERRITIN 759.4 (H) 06/17/2019     Lab Results   Component Value Date    [id number] 639 06/17/2019     Lab Results   Component Value Date    FOLATE 13.90 06/17/2019     KAYLEE-2 - pending  Soluble transferrin receptor - 2.3 (normal)    IMAGING:     Ct Chest Wo Contrast  Result Date: 6/14/2019  Extensive new left-sided partially consolidative pulmonary disease, consistent with pneumonia. Follow-up to ensure resolution is recommended to exclude an underlying mass. New ill-defined nodule of the right upper lobe. Advanced emphysema. Unchanged volume loss of the left lung with consolidative change of the left upper lobe. However this increased on the April 2019 examination and close follow-up is needed. Xr Chest Portable  Result Date: 6/14/2019  Right jugular central line projects laterally, likely in external jugular or subclavian vein. No pneumothorax. Continue pattern of pneumonia in the left lung. Xr Chest Portable  Result Date: 6/14/2019  New extensive left-sided airspace disease suspicious for pneumonia. Some of this has a masslike morphology and follow-up to ensure resolution is needed. Recommend follow-up chest x-ray in 2-3 weeks. ASSESSMENT:     Problem List Items Addressed This Visit     COPD exacerbation ([facility] [address] - Primary    Relevant Medications    ipratropium-albuterol (DUONEB) nebulizer solution 1 ampule (Completed)    predniSONE (DELTASONE) tablet 60 mg (Completed)    guaiFENesin (MUCINEX) extended release tablet 600 mg    methylPREDNISolone sodium (SOLU-MEDROL) injection 40 mg    oxymetazoline (AFRIN) 0.05 % nasal spray 2 spray (Completed)    fluticasone (FLONASE) 50 MCG/ACT nasal spray 2 spray    ipratropium-albuterol (DUONEB) nebulizer solution 1 ampule    Pneumonia due to organism    Relevant Medications    ipratropium-albuterol (DUONEB) nebulizer solution 1 ampule (Completed)    cefTRIAXone (ROCEPHIN) 1 g IVPB in 50 mL D5W minibag (Completed)    azithromycin (ZITHROMAX) 500 mg in D5W 250ml addavial (Completed)    guaiFENesin (MUCINEX) extended release tablet 600 mg    piperacillin-tazobactam (ZOSYN) 3.375 g in sodium chloride 0.9 % 100 mL IVPB extended infusion (mini-bag)    azithromycin (ZITHROMAX) tablet 500 mg (Completed)    oxymetazoline (AFRIN) 0.05 % nasal spray 2 spray (Completed)    fluticasone (FLONASE) 50 MCG/ACT nasal spray 2 spray    ipratropium-albuterol (DUONEB) nebulizer solution 1 ampule    Shortness of breath      Other Visit Diagnoses     Mass of left lung                    PLAN:     1. Thrombocytosis  2.  Anemia    - plts have been increasing since 2017, but all checked in acute care setting  - anemia is new  - could be reactive to pneumonia/steroids  - chronic plt elevation raises possibility of MPD like ET  - iron deficiency also in ddx  - check KAYLEE-2 kinase - pending  - check iron studies, B12, folate, LDH, hemolytic panel - iron studies mixed, STR normal   - may need BMBX if KAYLEE-2 negative  - will consider empiric hydrea if continuing to increase  - pt already on ASA prophylaxis    Yossi Henson MD

## 2019-06-20 NOTE — DISCHARGE INSTR - COC
assessment  S&S chronic disease exacerbation education + when to contact MD / NP  care coordination  Medication Reconciliation during 1st SN visit       PT/OT   Evaluate with goal of regaining prior level of functioning   OT to evaluate if patient has [address] needs for personal care      PCP Visit scheduled within 7 days of hospital discharge         / signature: Electronically signed by Ricky Goldberg, RN on 6/20/19 at 11:56 AM    PHYSICIAN SECTION    Prognosis: Good    Condition at Discharge: Stable    Rehab Potential (if transferring to Rehab): Good    Recommended Labs or Other Treatments After Discharge:     Physician Certification: I certify the above information and transfer of Jarrod Roland  is necessary for the continuing treatment of the diagnosis listed and that he requires [address] for less 30 days. Update Admission H&P: No change in H&P    PHYSICIAN SIGNATURE:  Electronically signed by LORRAINE Gutierrez MD/  Ricky Goldberg, RN on 6/20/19 at 11:57 AM

## 2019-06-20 NOTE — PROGRESS NOTES
Pt resting in bed. Used O2 at [facility] & sat 96-97. Pt denies c/o and denies needing anything at this time.

## 2019-06-20 NOTE — TELEPHONE ENCOUNTER
Inpatient Notes   Received: Today   Message Contents   MD Robin Cordero MA             Please arrange f/u with me in 6 weeks.

## 2019-06-20 NOTE — PLAN OF CARE
Problem: Falls - Risk of:  Goal: Will remain free from falls  Description  Will remain free from falls  6/20/2019 1049 by Claudia Sandhu RN  Outcome: Ongoing  6/20/2019 0126 by Pricila Mccall RN  Outcome: Ongoing     Problem: Risk for Impaired Skin Integrity  Goal: Tissue integrity - skin and mucous membranes  Description  Structural intactness and normal physiological function of skin and  mucous membranes.   6/20/2019 1049 by Claudia Sandhu RN  Outcome: Ongoing  6/20/2019 0126 by Pricila Mccall RN  Outcome: Ongoing     Problem: Infection:  Goal: Will remain free from infection  Description  Will remain free from infection  6/20/2019 1049 by Claudia Sandhu RN  Outcome: Ongoing  6/20/2019 0126 by Pricila Mccall RN  Outcome: Ongoing

## 2019-06-20 NOTE — PLAN OF CARE
Problem: Falls - Risk of:  Goal: Will remain free from falls  Description  Will remain free from falls  6/20/2019 0126 by Uma Foster RN  Outcome: Ongoing  6/19/2019 1614 by Shine Hall RN  Outcome: Ongoing  Goal: Absence of physical injury  Description  Absence of physical injury  Outcome: Ongoing     Problem: Risk for Impaired Skin Integrity  Goal: Tissue integrity - skin and mucous membranes  Description  Structural intactness and normal physiological function of skin and  mucous membranes.   6/20/2019 0126 by Uma Foster RN  Outcome: Ongoing  6/19/2019 1614 by Shine Hall RN  Outcome: Ongoing     Problem: Infection:  Goal: Will remain free from infection  Description  Will remain free from infection  6/20/2019 0126 by Uma Foster RN  Outcome: Ongoing  6/19/2019 1614 by Shine Hall RN  Outcome: Ongoing     Problem: Safety:  Goal: Free from accidental physical injury  Description  Free from accidental physical injury  Outcome: Ongoing  Goal: Free from intentional harm  Description  Free from intentional harm  Outcome: Ongoing     Problem: Daily Care:  Goal: Daily care needs are met  Description  Daily care needs are met  Outcome: Ongoing     Problem: Pain:  Goal: Patient's pain/discomfort is manageable  Description  Patient's pain/discomfort is manageable  Outcome: Ongoing  Goal: Pain level will decrease  Description  Pain level will decrease  Outcome: Ongoing  Goal: Control of acute pain  Description  Control of acute pain  Outcome: Ongoing  Goal: Control of chronic pain  Description  Control of chronic pain  Outcome: Ongoing     Problem: Skin Integrity:  Goal: Skin integrity will stabilize  Description  Skin integrity will stabilize  Outcome: Ongoing     Problem: Discharge Planning:  Goal: Patients continuum of care needs are met  Description  Patients continuum of care needs are met  Outcome: Ongoing

## 2019-06-20 NOTE — PROGRESS NOTES
Shift assessment complete. BP low. Will recheck manually. Patient asymptomatic. Scheduled meds given per orders. Patient c/o chronic back pain. Denies nausea. Sitting up in chair. Ate breakfast. Wants to go home today. Awaiting further orders. Call light within reach. Will continue to monitor.

## 2019-06-20 NOTE — CARE COORDINATION
DISCHARGE ORDER  Date/Time 2019 12:08 PM  Completed by: Octavia Monk, Case Management    Patient Name: Chante Madrid    : 1950  Admitting Diagnosis: Septic shock ([facility] [address] [A41.9, R65.21]  Admit Date/Time: 2019  1:54 PM    Noted discharge order. Confirmed discharge plan with patient / family (pt and wife): Yes   Discharge Plan: Reviewed chart. Role of discharge planner explained and patient and wife verbalized understanding. Discharge order is noted. Pt is being d/c'd to home today. Pt's O2 sats are 98% on 3L. Pt is active with [facility] and has a tank in the room. Pt requesting [facility] for SN/PT/OT, as he states that he has had them in the past. CM set [address] for pt and CM spoke with [facility] and she is able to accept. Orders have been placed. No further discharge needs needed or noted.

## 2019-06-21 NOTE — CARE COORDINATION
[facility] Transitions Initial Follow Up Call    Call within 2 business days of discharge: Yes    Patient: Yana Boyce Patient : 1950   MRN: [id number]  Reason for Admission: aeCOPD, PNA  Discharge Date: 19 RARS: Readmission Risk Score: 13      Last Discharge [facility]       Complaint Diagnosis Description Type Department Provider    19 Shortness of Breath COPD exacerbation ([facility] [address] . .. ED to Hosp-Admission (Discharged) (ADMITTED) [facility] [address] MD; Carey Olivier. [place]. .. Spoke with: Angel Conte (patient)    Facility: [place]    Non-face-to-face services provided:  Obtained and reviewed discharge summary and/or continuity of care documents  Education of patient/family/caregiver/guardian to support self-management-COPD zones, when to call [facility]. or MD  Assessment and support for treatment adherence and medication management-med review completed    Care Transitions 24 Hour Call    Schedule Follow Up Appointment with PCP:  Completed  Do you have any ongoing symptoms?:  Yes  Patient-reported symptoms:  Shortness of Breath  Interventions for patient-reported symptoms:  Other  Do you have a copy of your discharge instructions?:  Yes  Do you have all of your prescriptions and are they filled?:  Yes  Have you been contacted by a [facility] Pharmacist?:  No  Have you scheduled your follow up appointment?:  Yes  How are you going to get to your appointment?:  Car - family or friend to transport  Were you discharged with any Home Care or Post Acute Services:  Yes  Post Acute Services:  Home Health (Comment: [facility])  Patient Home Equipment:  Nebulizer, Oxygen  Do you have support at home?:  Partner/Spouse/SO  Do you feel like you have everything you need to keep you well at home?:  Yes  Are you an active caregiver in your home?:  No  Care Transitions Interventions  No Identified Needs       Patient reports he has all his medications and taking as prescribed.  Nebulizer is working well and using q4h. Continues with some SOB but reports feeling improved. [facility] completed care call yesterday and he was told to expect call from RN today to schedule [facility]. Instructed him if no call today to call [facility] tomorrow even on a Saturday to follow up. He states he has the folder from [facility] from their last episode and knows how to contact them. Reviewed COPD zones to report s/s to Sheyla or MD. He asks when he should follow up with PCP since he has appt with Lucas Altman soon. Per chart, [place] office to call and reschedule for 6 weeks. Notified him of this and encouraged patient to schedule with PCP by end of next week. He verbalizes understanding and states he can do this. He denies other needs/concerns at this time. Care transition following.      Follow Up  Future Appointments   Date Time Provider Alex Duong   7/3/2019 11:00 AM [facility] PULMONARY FUNCTION TESTING [facility] PFT None   7/8/2019  1:30 PM Ludin Clayton MD [facility] PULM [facility]       Jerald Cohn RN

## 2019-06-28 NOTE — PROGRESS NOTES
EVERY 4 HOURS AS NEEDED FOR SHORTNESS OF BREATH 360 mL 2    albuterol sulfate HFA (PROAIR HFA) 108 (90 Base) MCG/ACT inhaler Inhale 2 puffs into the lungs every 6 hours as needed for Wheezing 1 Inhaler 5    vitamin D (CHOLECALCIFEROL) 1000 UNIT TABS tablet Take 1,000 Units by mouth daily      vitamin C (ASCORBIC ACID) 500 MG tablet Take 500 mg by mouth daily      OXYGEN Inhale 3 L/min into the lungs continuous      meloxicam (MOBIC) 15 MG tablet Take 1 tablet by mouth daily , take with food for pain 30 tablet 3    guaiFENesin (MUCINEX) 600 MG extended release tablet Take 1 tablet by mouth 2 times daily 30 tablet 1    Multiple Vitamins-Minerals (THERAPEUTIC MULTIVITAMIN-MINERALS) tablet Take 1 tablet by mouth daily      aspirin 81 MG tablet Take 81 mg by mouth daily        No current facility-administered medications for this visit. Lab Results   Component Value Date    WBC 17.8 (H) 06/20/2019    HGB 10.5 (L) 06/20/2019    HCT 33.0 (L) 06/20/2019    MCV 89.1 06/20/2019    PLT 1,296 (HH) 06/20/2019     Lab Results   Component Value Date     06/20/2019    K 3.7 06/20/2019    CL 99 06/20/2019    CO2 27 06/20/2019    BUN 7 06/20/2019    CREATININE <0.5 (L) 06/20/2019    GLUCOSE 107 (H) 06/20/2019    CALCIUM 8.8 06/20/2019    PROT 6.6 06/14/2019    LABALBU 2.6 (L) 06/15/2019    BILITOT 0.3 06/14/2019    ALKPHOS 118 06/14/2019    AST 16 06/14/2019    ALT 7 (L) 06/14/2019    LABGLOM >60 06/20/2019    GFRAA >60 06/20/2019    AGRATIO 1.2 06/14/2019    GLOB 3.0 06/14/2019             Assessment:      1. Right lung opacity  2. Lumbar back pain  3. Osteoporosis multiple sites. 4. Thrombocytosis  5. COPD-advanced      Plan:      1. Following with pulmonary for new lung opacity right. 2. Continue meloxicam    3.  Nathanael Fuel was seen today for follow-up from hospital.    Diagnoses and all orders for this visit:    Diabetes mellitus, new onset ([address]  -     blood glucose monitor kit and supplies; by Other route daily E11.9, FSBS daily. Meter preferred by insurance. -     blood glucose test strips (ASCENSIA AUTODISC VI;ONE TOUCH ULTRA TEST VI) strip; DX: E11.9 FSBS daily. Meter preferred by insurance. -     Lancets MISC; 1 each by Does not apply route daily DX: E 11.9. FSBS daily. Meter preferred by insurance. Colon cancer screening  -     COLOGUARD; Future    Chronic midline low back pain with right-sided sciatica  -     traMADol (ULTRAM) 50 MG tablet; Take 1 tablet by mouth every 6 hours as needed for Pain for up to 7 days.     May need to consider MRI, referral to back specialist.           Clau Albright, APRN - CNP

## 2019-07-01 NOTE — ED NOTES
Discharge instructions reviewed with Mr. Luis Carrillo. He verbalized understanding. Copy of discharge instructions and prescriptions given. Mr. Luis Carrillo was discharged to home in good condition per personal vehicle, friend/family driving. He exited the ED without difficulty.         Negar Lucero RN  07/01/19 [id number]

## 2019-07-02 NOTE — PROGRESS NOTES
(1.727 m)       General Exam:   Constitutional: Patient is adequately groomed with no evidence of malnutrition  Mental Status: The patient is oriented to time, place and person. The patient's mood and affect are appropriate. Lymphatic: The lymphatic examination bilaterally reveals all areas to be without enlargement or induration. Neurological: The patient has good coordination. There is no weakness or sensory deficit. Antalgic gait:  Yes    Bilateral lower extremities are neurovascularly intact with symmetric light touch sensation and distal pulses. Right Knee Exam:  No skin lesions, erythema, or warmth. No joint effusion. No joint line tenderness. Negative Darryn. Ligaments stable. Quad tone good. ROM 0-140    Left Knee Exam:  No skin lesions, erythema, or warmth. Positive abrasion over tibial tubercle  Effusion: 0+/4  Range Of Motion:  0-140    Hyperextension Pain:    negative  Hyperflexion Pain:     positive  Darryn:      negative  Medial Joint Line Tenderness:   negative  Lateral Joint Line Tenderness: negative  Positive tenderness tibial tubercle    Anterior Drawer:   negative  Posterior Drawer:   negative  Lachman:   negative  Pivot Shift:  negative  Valgus Stress:   negative  Varus Stress:   negative      REVIEW OF IMAGING  2 views left knee PA 45 degree weightbearing and sunrise dated 7/2/2019 demonstrate no acute fracture. No dislocation. Calcification of the arterial vasculature. Minimal degenerative changes    2 XRAY VIEWS OF THE LEFT KNEE       7/1/2019 4:25 pm       COMPARISON:   None.       HISTORY:   ORDERING SYSTEM PROVIDED HISTORY: fall   TECHNOLOGIST PROVIDED HISTORY:   Reason for exam:->fall   Ordering Physician Provided Reason for Exam: Knee Injury (pt got tangled in   oxygen tubing and fell straight down on his left knee. )   Acuity: Acute   Type of Exam: Initial       FINDINGS:   There is no acute fracture or dislocation. There is mild lateral compartment   joint space narrowing. There is no sizable knee joint effusion. Oleta Sand is a   mild amount of superior patellar enthesopathy.  Arterial vascular   calcifications are noted.       There are no radiopaque foreign bodies.           Impression   No acute fracture or dislocation. ASSESSMENT  79-year-old male with a left knee contusion      PLAN  -Diagnosis and treatment options discussed in detail today  -At this time we will proceed with conservative treatment. He may continue with ice, activity modification, over-the-counter medications. We discussed acetaminophen dosages as well as risks  -If he continues having symptoms in 4 to 6 weeks we will see him back and consider an MRI to evaluate for an occult injury and if there are issues interim he will contact the office    Miguelito Soler MD  [address] [employer] [facility] partner of [facility])          Voice Recognition Dictation disclaimer: Please note that portions of this chart were generated using Dragon dictation software. Although every effort was made to ensure the accuracy of this automated transcription, some errors in transcription may have occurred.

## 2019-07-03 NOTE — PROGRESS NOTES
skin.    2. Continue tylenol for pain    3. Follow up with Dr. Roxy Angel as planned.          VALERIE GONZALEZ - CNP

## 2019-07-22 NOTE — PROCEDURES
[address]                 [address] [address]                               PULMONARY FUNCTION    PATIENT NAME: Luis Manuel Herr                      :        1950  MED REC NO:   [id number]                          ROOM:  ACCOUNT NO:   [de-identified]                           ADMIT DATE: 2019  PROVIDER:     Wilma Browning MD    DATE OF PROCEDURE:  2019    INDICATION:  COPD. FINDINGS:  Six-minute walk was done per [facility]  protocol. The patient was able to walk 700 feet. Saturation on room  air at rest was 92% with a heart rate of 82. Desaturation on exertion  to 87%, required 1 liter to keep saturation above 88%. Max heart rate  of 104. CONCLUSION:  Submaximal exercise with desaturation on exertion, requires  1 liter to keep saturation above 88% on exertion.         Maida Solis MD    D: 2019 8:18:07       T: 2019 9:01:48     [id number]  Job#: [id number]     Doc#: [id number]    CC:

## 2019-07-23 NOTE — PROGRESS NOTES
[facility]    Outpatient Follow Up Note    CHIEF COMPLAINT : shortness of breath     HPI:  Presenting hx: The patient is a 59-year-old man with past medical history of tobacco abuse who was admitted with acute respiratory failure and a COPD exacerbation to [facility] in 6/18. Since last clinic visit, the patient has been doing okay. He was hospitalized in June 2019 for Pseudomonas pneumonia. He is feeling better now closer to his baseline. He is wearing oxygen throughout the day. His air conditioner recently broke and so is been having difficulty with the hot weather. There are no changes to past medical history, family history, social history or review of systems(except as noted in the history section) since prior note (all reviewed with patient). Current Medications:    Current Outpatient Medications:     hydroxyurea (HYDREA) 500 MG chemo capsule, , Disp: , Rfl:     blood glucose monitor kit and supplies, by Other route daily E11.9, FSBS daily. Meter preferred by insurance., Disp: 1 kit, Rfl: 0    blood glucose test strips (ASCENSIA AUTODISC VI;ONE TOUCH ULTRA TEST VI) strip, DX: E11.9 FSBS daily. Meter preferred by insurance., Disp: 50 strip, Rfl: 5    Lancets MISC, 1 each by Does not apply route daily DX: E 11.9. FSBS daily.  Meter preferred by insurance., Disp: 50 each, Rfl: 3    metFORMIN (GLUCOPHAGE) 500 MG tablet, Take 1 tablet by mouth 2 times daily (with meals), Disp: 60 tablet, Rfl: 3    ipratropium-albuterol (DUONEB) 0.5-2.5 (3) MG/3ML SOLN nebulizer solution, INHALE THREE MILLILITERS (ONE VIAL) VIA NEBULIZATION BY MOUTH EVERY 4 HOURS AS NEEDED FOR SHORTNESS OF BREATH, Disp: 360 mL, Rfl: 2    albuterol sulfate HFA (PROAIR HFA) 108 (90 Base) MCG/ACT inhaler, Inhale 2 puffs into the lungs every 6 hours as needed for Wheezing, Disp: 1 Inhaler, Rfl: 5    vitamin D (CHOLECALCIFEROL) 1000 UNIT TABS tablet, Take 1,000 Units by mouth daily, Disp: , Rfl:     vitamin C (ASCORBIC ACID) 500 MG tablet, Take 500 mg by mouth daily, Disp: , Rfl:     OXYGEN, Inhale 3 L/min into the lungs continuous, Disp: , Rfl:     meloxicam (MOBIC) 15 MG tablet, Take 1 tablet by mouth daily , take with food for pain, Disp: 30 tablet, Rfl: 3    guaiFENesin (MUCINEX) 600 MG extended release tablet, Take 1 tablet by mouth 2 times daily, Disp: 30 tablet, Rfl: 1    Multiple Vitamins-Minerals (THERAPEUTIC MULTIVITAMIN-MINERALS) tablet, Take 1 tablet by mouth daily, Disp: , Rfl:     aspirin 81 MG tablet, Take 81 mg by mouth daily , Disp: , Rfl:     predniSONE (DELTASONE) 10 MG tablet, 4 tabs for 3 days 3 tabs for 3 days 2 tabs for 3 days 1 tabs for 3 days (Patient not taking: Reported on 7/23/2019), Disp: 30 tablet, Rfl: 0        Objective:   PHYSICAL EXAM:        VITALS:  /68 (Site: Left Upper Arm, Position: Sitting)   Pulse 75   Temp 98.6 °F (37 °C) (Oral)   Resp 16   Ht 5' 8\" (1.727 m)   Wt 114 lb (51.7 kg)   SpO2 95%   BMI 17.33 kg/m²     Gen: In no acute distress. Alert. Comfortable. NCAT. Eyes: PERRL. No sclera icterus. No conjunctival injection. ENT: No ocular or auricular discharge. Oropharynx clear. Neck: Trachea midline. Normal thyroid. Resp: No accessory muscle use. No crackles. No wheezes. L sided rhonchi. No dullness on percussion. CV: Regular rate. Regular rhythm. No murmur or rub. Normal S1 and S2. No edema  GI: Abdomen non-tender. Non-distended. No masses. Skin: Warm and dry. No nodules on exposed extremities. No rash on exposed extremities. Lymph: No cervical LAD. No supraclavicular LAD. M/S: No cyanosis. No synovitis or joint deformity. No clubbing. Neuro: Cranial nerves are grossly intact. Moving all extremities. Motor and sensation grossly intact. Psych: Oriented x 3. No anxiety or agitation.        DATA:    LABS:        PFTs 7/18/18  FVC 0.93 (22%) FEV1 0.41 (13%) FEV1/FVC ratio  44%  TLC 8.50 (127%) DLCO 7.2 (25%) +

## 2019-07-23 NOTE — PATIENT INSTRUCTIONS
Remember to bring all pulmonary medications to your next appointment with the office. Please keep all of your future appointments scheduled by [facility] office. Out of respect for other patients and providers, you may be asked to reschedule your appointment if you arrive later than your scheduled appointment time. Appointments cancelled less than 24hrs in advance will be considered a no show. Patients with three missed appointments within 1 year or four missed appointments within 2 years can be dismissed from the practice.

## 2019-09-26 NOTE — PROGRESS NOTES
Antibiotic Interpretation ARTEM Status   cefepime Sensitive <=2 mcg/mL    ciprofloxacin Sensitive <=1 mcg/mL    gentamicin Sensitive <=4 mcg/mL    meropenem Sensitive <=1 mcg/mL    piperacillin-tazobactam Sensitive <=16 mcg/mL    tobramycin Sensitive <=4 mcg/mL          IMAGING:      I personally reviewed and interpreted the following :     Chest CT 9/23/19: Diffuse emphysema, consolidative opacity in the right upper lobe posteriorly and along the inferior aspect with adjacent right lower lobe opacity, improvement in the consolidation previously observed in the left lower lobe with minimal residual opacity in the posterior superior aspect left lower lobe    Chest CT 6/14/19:Lungs/pleura: There is a new small ill-defined nodule like focus of the right lung demonstrated on series 3, image 55. In the left lung there is new extensive left-sided pulmonary disease, mostly consolidative.  This involves the left lower lobe as well as the left upper lobe with sparing of the lingula. Robel Maker is a trace amount of left pleural fluid.  No drainable pleural effusion.  No pneumothorax.  Emphysema again  noted        Chest CT 4/3/19: Lungs/pleura: Airways are patent.  No pleural fluid.  Advanced centrilobular emphysema with lucent lungs and scattered reticular opacities.  Biapical  fibrotic changes are stable.  Chronic opacification in the left upper lobe is  unchanged since 11/28/2018.  Air bronchograms are seen through this region. Increasing patchy opacities more anteriorly in the left upper lobe are new. No underlying mass or nodule.  Stable appearance of noncalcified nodules in  the right upper lobe.  9 x 4 mm nodule is identical in size and shape on  series 3, image 44.  This does appear to have increased in size between  06/22/2018 and 11/28/2018.  A smaller adjacent nodule is also stable.  No new  pulmonary nodules are demonstrated. Chest CTA 11/28/18: No PE. Lungs/pleura:  There is moderate to severe centrilobular

## 2019-11-11 PROBLEM — R65.21 SEPTIC SHOCK (HCC): Status: RESOLVED | Noted: 2019-01-01 | Resolved: 2019-01-01

## 2019-11-11 PROBLEM — J15.1 PNEUMONIA OF LEFT UPPER LOBE DUE TO PSEUDOMONAS SPECIES (HCC): Status: RESOLVED | Noted: 2019-04-16 | Resolved: 2019-01-01

## 2019-11-11 PROBLEM — A41.9 SEPTIC SHOCK (HCC): Status: RESOLVED | Noted: 2019-01-01 | Resolved: 2019-01-01

## 2020-01-01 ENCOUNTER — APPOINTMENT (OUTPATIENT)
Dept: CT IMAGING | Age: 70
DRG: 190 | End: 2020-01-01
Payer: MEDICARE

## 2020-01-01 ENCOUNTER — CARE COORDINATION (OUTPATIENT)
Dept: CASE MANAGEMENT | Age: 70
End: 2020-01-01

## 2020-01-01 ENCOUNTER — TELEPHONE (OUTPATIENT)
Dept: FAMILY MEDICINE CLINIC | Age: 70
End: 2020-01-01

## 2020-01-01 ENCOUNTER — VIRTUAL VISIT (OUTPATIENT)
Dept: FAMILY MEDICINE CLINIC | Age: 70
End: 2020-01-01
Payer: MEDICARE

## 2020-01-01 ENCOUNTER — OFFICE VISIT (OUTPATIENT)
Dept: FAMILY MEDICINE CLINIC | Age: 70
End: 2020-01-01
Payer: MEDICARE

## 2020-01-01 ENCOUNTER — HOSPITAL ENCOUNTER (OUTPATIENT)
Dept: GENERAL RADIOLOGY | Age: 70
Discharge: HOME | End: 2020-03-02
Payer: MEDICARE

## 2020-01-01 ENCOUNTER — HOSPITAL ENCOUNTER (OUTPATIENT)
Dept: WOUND CARE | Age: 70
Discharge: HOME | End: 2020-05-14
Payer: MEDICARE

## 2020-01-01 ENCOUNTER — HOSPITAL ENCOUNTER (OUTPATIENT)
Age: 70
Discharge: HOME | DRG: 871 | End: 2020-01-03
Payer: MEDICARE

## 2020-01-01 ENCOUNTER — OFFICE VISIT (OUTPATIENT)
Dept: ORTHOPEDIC SURGERY | Age: 70
End: 2020-01-01

## 2020-01-01 ENCOUNTER — HOSPITAL ENCOUNTER (OUTPATIENT)
Dept: GENERAL RADIOLOGY | Age: 70
Discharge: HOME | DRG: 871 | End: 2020-01-03
Payer: MEDICARE

## 2020-01-01 ENCOUNTER — HOSPITAL ENCOUNTER (OUTPATIENT)
Dept: VASCULAR LAB | Age: 70
Discharge: HOME | End: 2020-05-22
Payer: MEDICARE

## 2020-01-01 ENCOUNTER — APPOINTMENT (OUTPATIENT)
Dept: CT IMAGING | Age: 70
DRG: 871 | End: 2020-01-01
Payer: MEDICARE

## 2020-01-01 ENCOUNTER — VIRTUAL VISIT (OUTPATIENT)
Dept: ENT CLINIC | Age: 70
End: 2020-01-01

## 2020-01-01 ENCOUNTER — HOSPITAL ENCOUNTER (OUTPATIENT)
Dept: CT IMAGING | Age: 70
Discharge: HOME | End: 2020-03-14
Payer: MEDICARE

## 2020-01-01 ENCOUNTER — OFFICE VISIT (OUTPATIENT)
Dept: SPEECH THERAPY | Age: 70
End: 2020-01-01
Payer: MEDICARE

## 2020-01-01 ENCOUNTER — APPOINTMENT (OUTPATIENT)
Dept: GENERAL RADIOLOGY | Age: 70
DRG: 190 | End: 2020-01-01
Payer: MEDICARE

## 2020-01-01 ENCOUNTER — VIRTUAL VISIT (OUTPATIENT)
Dept: ENT CLINIC | Age: 70
End: 2020-01-01
Payer: MEDICARE

## 2020-01-01 ENCOUNTER — APPOINTMENT (OUTPATIENT)
Dept: INTERVENTIONAL RADIOLOGY/VASCULAR | Age: 70
DRG: 190 | End: 2020-01-01
Payer: MEDICARE

## 2020-01-01 ENCOUNTER — TELEPHONE (OUTPATIENT)
Dept: PULMONOLOGY | Age: 70
End: 2020-01-01

## 2020-01-01 ENCOUNTER — OFFICE VISIT (OUTPATIENT)
Dept: ORTHOPEDIC SURGERY | Age: 70
End: 2020-01-01
Payer: MEDICARE

## 2020-01-01 ENCOUNTER — OFFICE VISIT (OUTPATIENT)
Dept: ENT CLINIC | Age: 70
End: 2020-01-01
Payer: MEDICARE

## 2020-01-01 ENCOUNTER — TELEPHONE (OUTPATIENT)
Dept: ADMINISTRATIVE | Age: 70
End: 2020-01-01

## 2020-01-01 ENCOUNTER — HOSPITAL ENCOUNTER (OUTPATIENT)
Dept: CT IMAGING | Age: 70
Discharge: HOME | DRG: 871 | End: 2020-01-06
Payer: MEDICARE

## 2020-01-01 ENCOUNTER — APPOINTMENT (OUTPATIENT)
Dept: GENERAL RADIOLOGY | Age: 70
DRG: 871 | End: 2020-01-01
Payer: MEDICARE

## 2020-01-01 ENCOUNTER — HOSPITAL ENCOUNTER (OUTPATIENT)
Dept: WOUND CARE | Age: 70
Discharge: HOME | End: 2020-06-04
Payer: MEDICARE

## 2020-01-01 ENCOUNTER — TELEPHONE (OUTPATIENT)
Dept: OTOLARYNGOLOGY | Age: 70
End: 2020-01-01

## 2020-01-01 ENCOUNTER — HOSPITAL ENCOUNTER (INPATIENT)
Age: 70
LOS: 4 days | Discharge: HOME HEALTH | DRG: 871 | End: 2020-01-10
Attending: EMERGENCY MEDICINE | Admitting: INTERNAL MEDICINE
Payer: MEDICARE

## 2020-01-01 ENCOUNTER — HOSPITAL ENCOUNTER (OUTPATIENT)
Dept: WOUND CARE | Age: 70
Discharge: HOME | End: 2020-05-28
Payer: MEDICARE

## 2020-01-01 ENCOUNTER — HOSPITAL ENCOUNTER (OUTPATIENT)
Dept: GENERAL RADIOLOGY | Age: 70
Discharge: HOME | End: 2020-04-23
Payer: MEDICARE

## 2020-01-01 ENCOUNTER — OFFICE VISIT (OUTPATIENT)
Dept: PULMONOLOGY | Age: 70
End: 2020-01-01
Payer: MEDICARE

## 2020-01-01 ENCOUNTER — HOSPITAL ENCOUNTER (OUTPATIENT)
Dept: WOUND CARE | Age: 70
Discharge: HOME | End: 2020-05-21
Payer: MEDICARE

## 2020-01-01 ENCOUNTER — APPOINTMENT (OUTPATIENT)
Dept: ULTRASOUND IMAGING | Age: 70
DRG: 190 | End: 2020-01-01
Payer: MEDICARE

## 2020-01-01 ENCOUNTER — HOSPITAL ENCOUNTER (OUTPATIENT)
Age: 70
Discharge: HOME | End: 2020-03-02
Payer: MEDICARE

## 2020-01-01 ENCOUNTER — HOSPITAL ENCOUNTER (OUTPATIENT)
Age: 70
Discharge: HOME | End: 2020-03-14
Payer: MEDICARE

## 2020-01-01 ENCOUNTER — HOSPITAL ENCOUNTER (INPATIENT)
Age: 70
LOS: 6 days | DRG: 190 | End: 2020-06-10
Attending: EMERGENCY MEDICINE | Admitting: INTERNAL MEDICINE
Payer: MEDICARE

## 2020-01-01 ENCOUNTER — VIRTUAL VISIT (OUTPATIENT)
Dept: PULMONOLOGY | Age: 70
End: 2020-01-01
Payer: MEDICARE

## 2020-01-01 ENCOUNTER — OFFICE VISIT (OUTPATIENT)
Dept: PRIMARY CARE CLINIC | Age: 70
End: 2020-01-01

## 2020-01-01 ENCOUNTER — HOSPITAL ENCOUNTER (INPATIENT)
Age: 70
LOS: 2 days | Discharge: HOME | DRG: 871 | End: 2020-03-23
Attending: EMERGENCY MEDICINE | Admitting: INTERNAL MEDICINE
Payer: MEDICARE

## 2020-01-01 ENCOUNTER — NURSE TRIAGE (OUTPATIENT)
Dept: OTHER | Facility: CLINIC | Age: 70
End: 2020-01-01

## 2020-01-01 VITALS
HEART RATE: 98 BPM | HEIGHT: 68 IN | WEIGHT: 110 LBS | OXYGEN SATURATION: 92 % | DIASTOLIC BLOOD PRESSURE: 60 MMHG | RESPIRATION RATE: 18 BRPM | TEMPERATURE: 100.2 F | SYSTOLIC BLOOD PRESSURE: 100 MMHG | BODY MASS INDEX: 16.67 KG/M2

## 2020-01-01 VITALS
SYSTOLIC BLOOD PRESSURE: 96 MMHG | DIASTOLIC BLOOD PRESSURE: 60 MMHG | OXYGEN SATURATION: 96 % | BODY MASS INDEX: 16.14 KG/M2 | OXYGEN SATURATION: 99 % | SYSTOLIC BLOOD PRESSURE: 100 MMHG | DIASTOLIC BLOOD PRESSURE: 58 MMHG | HEIGHT: 68 IN | TEMPERATURE: 98.4 F | TEMPERATURE: 98.2 F | HEART RATE: 64 BPM | RESPIRATION RATE: 16 BRPM | WEIGHT: 115.2 LBS | RESPIRATION RATE: 14 BRPM | HEART RATE: 66 BPM | HEIGHT: 68 IN | BODY MASS INDEX: 17.46 KG/M2 | WEIGHT: 106.5 LBS

## 2020-01-01 VITALS
TEMPERATURE: 97.9 F | HEIGHT: 67 IN | SYSTOLIC BLOOD PRESSURE: 108 MMHG | DIASTOLIC BLOOD PRESSURE: 51 MMHG | WEIGHT: 107.4 LBS | BODY MASS INDEX: 16.86 KG/M2 | HEART RATE: 77 BPM

## 2020-01-01 VITALS
HEART RATE: 58 BPM | TEMPERATURE: 98 F | WEIGHT: 117 LBS | HEIGHT: 68 IN | BODY MASS INDEX: 17.73 KG/M2 | SYSTOLIC BLOOD PRESSURE: 124 MMHG | OXYGEN SATURATION: 100 % | RESPIRATION RATE: 16 BRPM | DIASTOLIC BLOOD PRESSURE: 68 MMHG

## 2020-01-01 VITALS
RESPIRATION RATE: 16 BRPM | HEIGHT: 68 IN | SYSTOLIC BLOOD PRESSURE: 105 MMHG | WEIGHT: 117.13 LBS | HEART RATE: 98 BPM | BODY MASS INDEX: 17.75 KG/M2 | DIASTOLIC BLOOD PRESSURE: 63 MMHG | TEMPERATURE: 97.7 F

## 2020-01-01 VITALS
SYSTOLIC BLOOD PRESSURE: 112 MMHG | HEART RATE: 89 BPM | HEIGHT: 68 IN | WEIGHT: 107.4 LBS | OXYGEN SATURATION: 91 % | DIASTOLIC BLOOD PRESSURE: 59 MMHG | RESPIRATION RATE: 18 BRPM | BODY MASS INDEX: 16.28 KG/M2

## 2020-01-01 VITALS
BODY MASS INDEX: 16.88 KG/M2 | TEMPERATURE: 97.5 F | DIASTOLIC BLOOD PRESSURE: 70 MMHG | SYSTOLIC BLOOD PRESSURE: 116 MMHG | WEIGHT: 111 LBS | OXYGEN SATURATION: 99 %

## 2020-01-01 VITALS
OXYGEN SATURATION: 93 % | SYSTOLIC BLOOD PRESSURE: 90 MMHG | HEART RATE: 63 BPM | DIASTOLIC BLOOD PRESSURE: 50 MMHG | BODY MASS INDEX: 16.6 KG/M2 | WEIGHT: 106 LBS

## 2020-01-01 VITALS
HEIGHT: 67 IN | SYSTOLIC BLOOD PRESSURE: 98 MMHG | BODY MASS INDEX: 17.23 KG/M2 | TEMPERATURE: 98.6 F | RESPIRATION RATE: 14 BRPM | OXYGEN SATURATION: 99 % | WEIGHT: 109.8 LBS | DIASTOLIC BLOOD PRESSURE: 60 MMHG | HEART RATE: 61 BPM

## 2020-01-01 VITALS
DIASTOLIC BLOOD PRESSURE: 64 MMHG | HEART RATE: 96 BPM | HEIGHT: 68 IN | BODY MASS INDEX: 16.81 KG/M2 | RESPIRATION RATE: 17 BRPM | TEMPERATURE: 98.6 F | OXYGEN SATURATION: 99 % | SYSTOLIC BLOOD PRESSURE: 124 MMHG | WEIGHT: 110.9 LBS

## 2020-01-01 VITALS
BODY MASS INDEX: 17.73 KG/M2 | SYSTOLIC BLOOD PRESSURE: 100 MMHG | WEIGHT: 117 LBS | HEIGHT: 68 IN | DIASTOLIC BLOOD PRESSURE: 65 MMHG | OXYGEN SATURATION: 97 %

## 2020-01-01 VITALS
RESPIRATION RATE: 18 BRPM | HEART RATE: 97 BPM | HEIGHT: 65 IN | WEIGHT: 116.8 LBS | DIASTOLIC BLOOD PRESSURE: 74 MMHG | SYSTOLIC BLOOD PRESSURE: 110 MMHG | BODY MASS INDEX: 19.46 KG/M2 | TEMPERATURE: 98.7 F | OXYGEN SATURATION: 94 %

## 2020-01-01 VITALS
WEIGHT: 105.7 LBS | HEART RATE: 131 BPM | BODY MASS INDEX: 18.37 KG/M2 | DIASTOLIC BLOOD PRESSURE: 69 MMHG | HEIGHT: 67 IN | RESPIRATION RATE: 25 BRPM | TEMPERATURE: 97.7 F | WEIGHT: 117.06 LBS | HEIGHT: 69 IN | BODY MASS INDEX: 15.65 KG/M2 | SYSTOLIC BLOOD PRESSURE: 152 MMHG | OXYGEN SATURATION: 78 %

## 2020-01-01 VITALS
SYSTOLIC BLOOD PRESSURE: 118 MMHG | TEMPERATURE: 97.4 F | HEART RATE: 56 BPM | WEIGHT: 117 LBS | HEIGHT: 67 IN | DIASTOLIC BLOOD PRESSURE: 68 MMHG | BODY MASS INDEX: 18.36 KG/M2

## 2020-01-01 VITALS
HEIGHT: 67 IN | RESPIRATION RATE: 16 BRPM | SYSTOLIC BLOOD PRESSURE: 90 MMHG | TEMPERATURE: 97.1 F | BODY MASS INDEX: 17.96 KG/M2 | OXYGEN SATURATION: 96 % | DIASTOLIC BLOOD PRESSURE: 58 MMHG | WEIGHT: 114.4 LBS | HEART RATE: 76 BPM

## 2020-01-01 VITALS — BODY MASS INDEX: 16.81 KG/M2 | WEIGHT: 110.89 LBS | HEIGHT: 68 IN

## 2020-01-01 LAB
A/G RATIO: 0.7 (ref 1.1–2.2)
A/G RATIO: 0.8 (ref 1.1–2.2)
A/G RATIO: 0.8 (ref 1.1–2.2)
A/G RATIO: 1.2 (ref 1.1–2.2)
A/G RATIO: 1.3 (ref 1.1–2.2)
A/G RATIO: 1.3 (ref 1.1–2.2)
A/G RATIO: 1.4 (ref 1.1–2.2)
A/G RATIO: 1.5 (ref 1.1–2.2)
A/G RATIO: 1.5 (ref 1.1–2.2)
A/G RATIO: 1.6 (ref 1.1–2.2)
A/G RATIO: 1.6 (ref 1.1–2.2)
A/G RATIO: 1.7 (ref 1.1–2.2)
ACETAMINOPHEN LEVEL: <5 UG/ML (ref 10–30)
ALBUMIN SERPL-MCNC: 2.7 G/DL (ref 3.4–5)
ALBUMIN SERPL-MCNC: 2.9 G/DL (ref 3.4–5)
ALBUMIN SERPL-MCNC: 3 G/DL (ref 3.4–5)
ALBUMIN SERPL-MCNC: 3.1 G/DL (ref 3.4–5)
ALBUMIN SERPL-MCNC: 3.2 G/DL (ref 3.1–4.9)
ALBUMIN SERPL-MCNC: 3.4 G/DL (ref 3.4–5)
ALBUMIN SERPL-MCNC: 3.5 G/DL (ref 3.4–5)
ALBUMIN SERPL-MCNC: 3.6 G/DL (ref 3.4–5)
ALBUMIN SERPL-MCNC: 3.6 G/DL (ref 3.4–5)
ALBUMIN SERPL-MCNC: 3.7 G/DL (ref 3.4–5)
ALBUMIN SERPL-MCNC: 3.8 G/DL (ref 3.4–5)
ALBUMIN SERPL-MCNC: 3.8 G/DL (ref 3.4–5)
ALBUMIN SERPL-MCNC: 3.9 G/DL (ref 3.4–5)
ALP BLD-CCNC: 102 U/L (ref 40–129)
ALP BLD-CCNC: 106 U/L (ref 40–129)
ALP BLD-CCNC: 113 U/L (ref 40–129)
ALP BLD-CCNC: 115 U/L (ref 40–129)
ALP BLD-CCNC: 125 U/L (ref 40–129)
ALP BLD-CCNC: 129 U/L (ref 40–129)
ALP BLD-CCNC: 133 U/L (ref 40–129)
ALP BLD-CCNC: 82 U/L (ref 40–129)
ALP BLD-CCNC: 88 U/L (ref 40–129)
ALP BLD-CCNC: 93 U/L (ref 40–129)
ALP BLD-CCNC: 95 U/L (ref 40–129)
ALP BLD-CCNC: 96 U/L (ref 40–129)
ALPHA-1-GLOBULIN: 0.4 G/DL (ref 0.2–0.4)
ALPHA-2-GLOBULIN: 1 G/DL (ref 0.4–1.1)
ALT SERPL-CCNC: 11 U/L (ref 10–40)
ALT SERPL-CCNC: 1179 U/L (ref 10–40)
ALT SERPL-CCNC: 12 U/L (ref 10–40)
ALT SERPL-CCNC: 15 U/L (ref 10–40)
ALT SERPL-CCNC: 1849 U/L (ref 10–40)
ALT SERPL-CCNC: 1849 U/L (ref 10–40)
ALT SERPL-CCNC: 32 U/L (ref 10–40)
ALT SERPL-CCNC: 32 U/L (ref 10–40)
ALT SERPL-CCNC: 3264 U/L (ref 10–40)
ALT SERPL-CCNC: 3266 U/L (ref 10–40)
ALT SERPL-CCNC: 33 U/L (ref 10–40)
ALT SERPL-CCNC: 9 U/L (ref 10–40)
AMMONIA: 22 UMOL/L (ref 16–60)
AMMONIA: 49 UMOL/L (ref 16–60)
ANION GAP SERPL CALCULATED.3IONS-SCNC: 10 MMOL/L (ref 3–16)
ANION GAP SERPL CALCULATED.3IONS-SCNC: 11 MMOL/L (ref 3–16)
ANION GAP SERPL CALCULATED.3IONS-SCNC: 11 MMOL/L (ref 3–16)
ANION GAP SERPL CALCULATED.3IONS-SCNC: 12 MMOL/L (ref 3–16)
ANION GAP SERPL CALCULATED.3IONS-SCNC: 14 MMOL/L (ref 3–16)
ANION GAP SERPL CALCULATED.3IONS-SCNC: 15 MMOL/L (ref 3–16)
ANION GAP SERPL CALCULATED.3IONS-SCNC: 15 MMOL/L (ref 3–16)
ANION GAP SERPL CALCULATED.3IONS-SCNC: 17 MMOL/L (ref 3–16)
ANION GAP SERPL CALCULATED.3IONS-SCNC: 19 MMOL/L (ref 3–16)
ANION GAP SERPL CALCULATED.3IONS-SCNC: 8 MMOL/L (ref 3–16)
ANION GAP SERPL CALCULATED.3IONS-SCNC: 9 MMOL/L (ref 3–16)
ANISOCYTOSIS: ABNORMAL
ANTI-NUCLEAR ANTIBODY (ANA): NEGATIVE
AST SERPL-CCNC: 10 U/L (ref 15–37)
AST SERPL-CCNC: 12 U/L (ref 15–37)
AST SERPL-CCNC: 14 U/L (ref 15–37)
AST SERPL-CCNC: 21 U/L (ref 15–37)
AST SERPL-CCNC: 2562 U/L (ref 15–37)
AST SERPL-CCNC: 27 U/L (ref 15–37)
AST SERPL-CCNC: 2701 U/L (ref 15–37)
AST SERPL-CCNC: 29 U/L (ref 15–37)
AST SERPL-CCNC: 31 U/L (ref 15–37)
AST SERPL-CCNC: 3368 U/L (ref 15–37)
AST SERPL-CCNC: 504 U/L (ref 15–37)
AST SERPL-CCNC: 528 U/L (ref 15–37)
BACTERIA: ABNORMAL /HPF
BANDED NEUTROPHILS RELATIVE PERCENT: 5 % (ref 0–7)
BASE EXCESS ARTERIAL: -0.7 MMOL/L (ref -3–3)
BASE EXCESS ARTERIAL: -10 MMOL/L (ref -3–3)
BASE EXCESS ARTERIAL: 3.5 MMOL/L (ref -3–3)
BASE EXCESS ARTERIAL: 5 MMOL/L (ref -3–3)
BASE EXCESS VENOUS: 0.5 MMOL/L (ref -3–3)
BASE EXCESS VENOUS: 9.9 MMOL/L (ref -3–3)
BASOPHILIC STIPPLING: ABNORMAL
BASOPHILS ABSOLUTE: 0 K/UL (ref 0–0.2)
BASOPHILS ABSOLUTE: 0.1 K/UL (ref 0–0.2)
BASOPHILS ABSOLUTE: 0.2 K/UL (ref 0–0.2)
BASOPHILS RELATIVE PERCENT: 0 %
BASOPHILS RELATIVE PERCENT: 0 %
BASOPHILS RELATIVE PERCENT: 0.2 %
BASOPHILS RELATIVE PERCENT: 0.2 %
BASOPHILS RELATIVE PERCENT: 0.3 %
BASOPHILS RELATIVE PERCENT: 0.3 %
BASOPHILS RELATIVE PERCENT: 0.4 %
BASOPHILS RELATIVE PERCENT: 0.4 %
BASOPHILS RELATIVE PERCENT: 0.7 %
BASOPHILS RELATIVE PERCENT: 1.9 %
BETA GLOBULIN: 0.8 G/DL (ref 0.9–1.6)
BILIRUB SERPL-MCNC: 0.3 MG/DL (ref 0–1)
BILIRUB SERPL-MCNC: 0.4 MG/DL (ref 0–1)
BILIRUB SERPL-MCNC: 0.5 MG/DL (ref 0–1)
BILIRUB SERPL-MCNC: 0.6 MG/DL (ref 0–1)
BILIRUB SERPL-MCNC: 1.2 MG/DL (ref 0–1)
BILIRUB SERPL-MCNC: 1.8 MG/DL (ref 0–1)
BILIRUB SERPL-MCNC: 1.9 MG/DL (ref 0–1)
BILIRUB SERPL-MCNC: 2.1 MG/DL (ref 0–1)
BILIRUB SERPL-MCNC: <0.2 MG/DL (ref 0–1)
BILIRUB SERPL-MCNC: <0.2 MG/DL (ref 0–1)
BILIRUBIN URINE: ABNORMAL
BILIRUBIN URINE: NEGATIVE
BLOOD CULTURE, ROUTINE: ABNORMAL
BLOOD CULTURE, ROUTINE: ABNORMAL
BLOOD CULTURE, ROUTINE: NORMAL
BLOOD, URINE: NEGATIVE
BLOOD, URINE: NEGATIVE
BUN BLDV-MCNC: 10 MG/DL (ref 7–20)
BUN BLDV-MCNC: 10 MG/DL (ref 7–20)
BUN BLDV-MCNC: 11 MG/DL (ref 7–20)
BUN BLDV-MCNC: 11 MG/DL (ref 7–20)
BUN BLDV-MCNC: 12 MG/DL (ref 7–20)
BUN BLDV-MCNC: 13 MG/DL (ref 7–20)
BUN BLDV-MCNC: 14 MG/DL (ref 7–20)
BUN BLDV-MCNC: 29 MG/DL (ref 7–20)
BUN BLDV-MCNC: 50 MG/DL (ref 7–20)
BUN BLDV-MCNC: 79 MG/DL (ref 7–20)
BUN BLDV-MCNC: 87 MG/DL (ref 7–20)
BUN BLDV-MCNC: 87 MG/DL (ref 7–20)
BUN BLDV-MCNC: 9 MG/DL (ref 7–20)
BUN BLDV-MCNC: 94 MG/DL (ref 7–20)
BURR CELLS: ABNORMAL
CALCIUM SERPL-MCNC: 8.3 MG/DL (ref 8.3–10.6)
CALCIUM SERPL-MCNC: 8.3 MG/DL (ref 8.3–10.6)
CALCIUM SERPL-MCNC: 8.6 MG/DL (ref 8.3–10.6)
CALCIUM SERPL-MCNC: 8.7 MG/DL (ref 8.3–10.6)
CALCIUM SERPL-MCNC: 8.8 MG/DL (ref 8.3–10.6)
CALCIUM SERPL-MCNC: 8.9 MG/DL (ref 8.3–10.6)
CALCIUM SERPL-MCNC: 9 MG/DL (ref 8.3–10.6)
CALCIUM SERPL-MCNC: 9 MG/DL (ref 8.3–10.6)
CALCIUM SERPL-MCNC: 9.1 MG/DL (ref 8.3–10.6)
CALCIUM SERPL-MCNC: 9.3 MG/DL (ref 8.3–10.6)
CALCIUM SERPL-MCNC: 9.5 MG/DL (ref 8.3–10.6)
CALCIUM SERPL-MCNC: 9.6 MG/DL (ref 8.3–10.6)
CALCIUM SERPL-MCNC: 9.7 MG/DL (ref 8.3–10.6)
CALCIUM SERPL-MCNC: 9.8 MG/DL (ref 8.3–10.6)
CARBOXYHEMOGLOBIN ARTERIAL: 0.2 % (ref 0–1.5)
CARBOXYHEMOGLOBIN ARTERIAL: 0.6 % (ref 0–1.5)
CARBOXYHEMOGLOBIN: 1.8 % (ref 0–1.5)
CARBOXYHEMOGLOBIN: 3.2 % (ref 0–1.5)
CHLORIDE BLD-SCNC: 89 MMOL/L (ref 99–110)
CHLORIDE BLD-SCNC: 89 MMOL/L (ref 99–110)
CHLORIDE BLD-SCNC: 90 MMOL/L (ref 99–110)
CHLORIDE BLD-SCNC: 90 MMOL/L (ref 99–110)
CHLORIDE BLD-SCNC: 91 MMOL/L (ref 99–110)
CHLORIDE BLD-SCNC: 92 MMOL/L (ref 99–110)
CHLORIDE BLD-SCNC: 92 MMOL/L (ref 99–110)
CHLORIDE BLD-SCNC: 93 MMOL/L (ref 99–110)
CHLORIDE BLD-SCNC: 94 MMOL/L (ref 99–110)
CHLORIDE BLD-SCNC: 95 MMOL/L (ref 99–110)
CHLORIDE BLD-SCNC: 97 MMOL/L (ref 99–110)
CHLORIDE BLD-SCNC: 98 MMOL/L (ref 99–110)
CHLORIDE BLD-SCNC: 98 MMOL/L (ref 99–110)
CLARITY: CLEAR
CLARITY: CLEAR
CO2: 20 MMOL/L (ref 21–32)
CO2: 23 MMOL/L (ref 21–32)
CO2: 25 MMOL/L (ref 21–32)
CO2: 26 MMOL/L (ref 21–32)
CO2: 27 MMOL/L (ref 21–32)
CO2: 28 MMOL/L (ref 21–32)
CO2: 28 MMOL/L (ref 21–32)
CO2: 29 MMOL/L (ref 21–32)
CO2: 29 MMOL/L (ref 21–32)
CO2: 31 MMOL/L (ref 21–32)
CO2: 32 MMOL/L (ref 21–32)
COLOR: ABNORMAL
COLOR: YELLOW
CREAT SERPL-MCNC: 0.6 MG/DL (ref 0.8–1.3)
CREAT SERPL-MCNC: 0.7 MG/DL (ref 0.8–1.3)
CREAT SERPL-MCNC: 1 MG/DL (ref 0.8–1.3)
CREAT SERPL-MCNC: 1.1 MG/DL (ref 0.8–1.3)
CREAT SERPL-MCNC: <0.5 MG/DL (ref 0.8–1.3)
CULTURE, BLOOD 2: NORMAL
CULTURE, BLOOD 2: NORMAL
CULTURE, RESPIRATORY: ABNORMAL
CYTOMEGALOVIRUS IGM ANTIBODY: <8 AU/ML
D DIMER: 392 NG/ML DDU (ref 0–229)
DIGOXIN LEVEL: 3.4 NG/ML (ref 0.8–2)
EKG ATRIAL RATE: 100 BPM
EKG ATRIAL RATE: 109 BPM
EKG ATRIAL RATE: 128 BPM
EKG ATRIAL RATE: 130 BPM
EKG ATRIAL RATE: 134 BPM
EKG ATRIAL RATE: 77 BPM
EKG DIAGNOSIS: NORMAL
EKG P AXIS: 23 DEGREES
EKG P AXIS: 71 DEGREES
EKG P AXIS: 75 DEGREES
EKG P AXIS: 75 DEGREES
EKG P-R INTERVAL: 116 MS
EKG P-R INTERVAL: 124 MS
EKG P-R INTERVAL: 130 MS
EKG P-R INTERVAL: 130 MS
EKG P-R INTERVAL: 144 MS
EKG Q-T INTERVAL: 264 MS
EKG Q-T INTERVAL: 310 MS
EKG Q-T INTERVAL: 336 MS
EKG Q-T INTERVAL: 360 MS
EKG Q-T INTERVAL: 382 MS
EKG Q-T INTERVAL: 404 MS
EKG QRS DURATION: 158 MS
EKG QRS DURATION: 80 MS
EKG QRS DURATION: 82 MS
EKG QRS DURATION: 84 MS
EKG QRS DURATION: 92 MS
EKG QRS DURATION: 98 MS
EKG QTC CALCULATION (BAZETT): 385 MS
EKG QTC CALCULATION (BAZETT): 417 MS
EKG QTC CALCULATION (BAZETT): 457 MS
EKG QTC CALCULATION (BAZETT): 464 MS
EKG QTC CALCULATION (BAZETT): 494 MS
EKG QTC CALCULATION (BAZETT): 570 MS
EKG R AXIS: -89 DEGREES
EKG R AXIS: 63 DEGREES
EKG R AXIS: 74 DEGREES
EKG R AXIS: 77 DEGREES
EKG R AXIS: 78 DEGREES
EKG R AXIS: 9 DEGREES
EKG T AXIS: -70 DEGREES
EKG T AXIS: 123 DEGREES
EKG T AXIS: 2 DEGREES
EKG T AXIS: 32 DEGREES
EKG T AXIS: 83 DEGREES
EKG T AXIS: 88 DEGREES
EKG VENTRICULAR RATE: 100 BPM
EKG VENTRICULAR RATE: 109 BPM
EKG VENTRICULAR RATE: 128 BPM
EKG VENTRICULAR RATE: 130 BPM
EKG VENTRICULAR RATE: 134 BPM
EKG VENTRICULAR RATE: 77 BPM
EOSINOPHILS ABSOLUTE: 0 K/UL (ref 0–0.6)
EOSINOPHILS ABSOLUTE: 0.1 K/UL (ref 0–0.6)
EOSINOPHILS ABSOLUTE: 0.1 K/UL (ref 0–0.6)
EOSINOPHILS ABSOLUTE: 0.4 K/UL (ref 0–0.6)
EOSINOPHILS ABSOLUTE: 0.4 K/UL (ref 0–0.6)
EOSINOPHILS RELATIVE PERCENT: 0 %
EOSINOPHILS RELATIVE PERCENT: 0.1 %
EOSINOPHILS RELATIVE PERCENT: 1.5 %
EOSINOPHILS RELATIVE PERCENT: 1.6 %
EOSINOPHILS RELATIVE PERCENT: 3.5 %
EOSINOPHILS RELATIVE PERCENT: 4.4 %
EPITHELIAL CELLS, UA: ABNORMAL /HPF (ref 0–5)
EPSTEIN BARR VIRUS NUCLEAR AB IGG: <3 U/ML (ref 0–21.9)
EPSTEIN-BARR EARLY ANTIGEN ANTIBODY: <5 U/ML (ref 0–10.9)
EPSTEIN-BARR VCA IGG: >750 U/ML (ref 0–21.9)
EPSTEIN-BARR VCA IGM: <10 U/ML (ref 0–43.9)
ESTIMATED AVERAGE GLUCOSE: 151.3 MG/DL
F-ACTIN AB IGG: 19 UNITS (ref 0–19)
FERRITIN: 1740 NG/ML (ref 30–400)
FOLATE: 14.37 NG/ML (ref 4.78–24.2)
FOLATE: 15.16 NG/ML (ref 4.78–24.2)
FOLATE: 17.05 NG/ML (ref 4.78–24.2)
GAMMA GLOBULIN: 0.8 G/DL (ref 0.6–1.8)
GFR AFRICAN AMERICAN: >60
GFR NON-AFRICAN AMERICAN: >60
GLOBULIN: 1.8 G/DL
GLOBULIN: 1.8 G/DL
GLOBULIN: 2.3 G/DL
GLOBULIN: 2.4 G/DL
GLOBULIN: 2.4 G/DL
GLOBULIN: 2.5 G/DL
GLOBULIN: 2.7 G/DL
GLOBULIN: 2.9 G/DL
GLOBULIN: 3 G/DL
GLOBULIN: 3.7 G/DL
GLOBULIN: 4.1 G/DL
GLOBULIN: 4.4 G/DL
GLUCOSE BLD-MCNC: 103 MG/DL (ref 70–99)
GLUCOSE BLD-MCNC: 105 MG/DL (ref 70–99)
GLUCOSE BLD-MCNC: 108 MG/DL (ref 70–99)
GLUCOSE BLD-MCNC: 117 MG/DL (ref 70–99)
GLUCOSE BLD-MCNC: 118 MG/DL (ref 70–99)
GLUCOSE BLD-MCNC: 120 MG/DL (ref 70–99)
GLUCOSE BLD-MCNC: 122 MG/DL (ref 70–99)
GLUCOSE BLD-MCNC: 123 MG/DL (ref 70–99)
GLUCOSE BLD-MCNC: 128 MG/DL (ref 70–99)
GLUCOSE BLD-MCNC: 135 MG/DL (ref 70–99)
GLUCOSE BLD-MCNC: 140 MG/DL (ref 70–99)
GLUCOSE BLD-MCNC: 143 MG/DL (ref 70–99)
GLUCOSE BLD-MCNC: 154 MG/DL (ref 70–99)
GLUCOSE BLD-MCNC: 156 MG/DL (ref 70–99)
GLUCOSE BLD-MCNC: 161 MG/DL (ref 70–99)
GLUCOSE BLD-MCNC: 164 MG/DL (ref 70–99)
GLUCOSE BLD-MCNC: 166 MG/DL (ref 70–99)
GLUCOSE BLD-MCNC: 169 MG/DL (ref 70–99)
GLUCOSE BLD-MCNC: 173 MG/DL (ref 70–99)
GLUCOSE BLD-MCNC: 178 MG/DL (ref 70–99)
GLUCOSE BLD-MCNC: 185 MG/DL (ref 70–99)
GLUCOSE BLD-MCNC: 191 MG/DL (ref 70–99)
GLUCOSE BLD-MCNC: 192 MG/DL (ref 70–99)
GLUCOSE BLD-MCNC: 194 MG/DL (ref 70–99)
GLUCOSE BLD-MCNC: 194 MG/DL (ref 70–99)
GLUCOSE BLD-MCNC: 196 MG/DL (ref 70–99)
GLUCOSE BLD-MCNC: 204 MG/DL (ref 70–99)
GLUCOSE BLD-MCNC: 211 MG/DL (ref 70–99)
GLUCOSE BLD-MCNC: 214 MG/DL (ref 70–99)
GLUCOSE BLD-MCNC: 216 MG/DL (ref 70–99)
GLUCOSE BLD-MCNC: 218 MG/DL (ref 70–99)
GLUCOSE BLD-MCNC: 223 MG/DL (ref 70–99)
GLUCOSE BLD-MCNC: 226 MG/DL (ref 70–99)
GLUCOSE BLD-MCNC: 240 MG/DL (ref 70–99)
GLUCOSE BLD-MCNC: 240 MG/DL (ref 70–99)
GLUCOSE BLD-MCNC: 241 MG/DL (ref 70–99)
GLUCOSE BLD-MCNC: 242 MG/DL (ref 70–99)
GLUCOSE BLD-MCNC: 242 MG/DL (ref 70–99)
GLUCOSE BLD-MCNC: 244 MG/DL (ref 70–99)
GLUCOSE BLD-MCNC: 245 MG/DL (ref 70–99)
GLUCOSE BLD-MCNC: 246 MG/DL (ref 70–99)
GLUCOSE BLD-MCNC: 248 MG/DL (ref 70–99)
GLUCOSE BLD-MCNC: 249 MG/DL (ref 70–99)
GLUCOSE BLD-MCNC: 249 MG/DL (ref 70–99)
GLUCOSE BLD-MCNC: 250 MG/DL (ref 70–99)
GLUCOSE BLD-MCNC: 251 MG/DL (ref 70–99)
GLUCOSE BLD-MCNC: 269 MG/DL (ref 70–99)
GLUCOSE BLD-MCNC: 272 MG/DL (ref 70–99)
GLUCOSE BLD-MCNC: 304 MG/DL (ref 70–99)
GLUCOSE BLD-MCNC: 307 MG/DL (ref 70–99)
GLUCOSE BLD-MCNC: 312 MG/DL (ref 70–99)
GLUCOSE BLD-MCNC: 313 MG/DL (ref 70–99)
GLUCOSE BLD-MCNC: 329 MG/DL (ref 70–99)
GLUCOSE BLD-MCNC: 335 MG/DL (ref 70–99)
GLUCOSE BLD-MCNC: 35 MG/DL (ref 70–99)
GLUCOSE BLD-MCNC: 395 MG/DL (ref 70–99)
GLUCOSE BLD-MCNC: 421 MG/DL (ref 70–99)
GLUCOSE BLD-MCNC: 43 MG/DL (ref 70–99)
GLUCOSE BLD-MCNC: 486 MG/DL (ref 70–99)
GLUCOSE BLD-MCNC: 487 MG/DL (ref 70–99)
GLUCOSE BLD-MCNC: 51 MG/DL (ref 70–99)
GLUCOSE BLD-MCNC: 524 MG/DL (ref 70–99)
GLUCOSE BLD-MCNC: 540 MG/DL (ref 70–99)
GLUCOSE BLD-MCNC: 66 MG/DL (ref 70–99)
GLUCOSE BLD-MCNC: 72 MG/DL (ref 70–99)
GLUCOSE BLD-MCNC: 81 MG/DL (ref 70–99)
GLUCOSE BLD-MCNC: 97 MG/DL (ref 70–99)
GLUCOSE BLD-MCNC: 97 MG/DL (ref 70–99)
GLUCOSE URINE: NEGATIVE MG/DL
GLUCOSE URINE: NEGATIVE MG/DL
GRAM STAIN RESULT: ABNORMAL
GRAM STAIN RESULT: NORMAL
HAV IGM SER IA-ACNC: NORMAL
HBA1C MFR BLD: 6.9 %
HCO3 ARTERIAL: 17.5 MMOL/L (ref 21–29)
HCO3 ARTERIAL: 25 MMOL/L (ref 21–29)
HCO3 ARTERIAL: 30.6 MMOL/L (ref 21–29)
HCO3 ARTERIAL: 31 MMOL/L (ref 21–29)
HCO3 VENOUS: 26 MMOL/L (ref 23–29)
HCO3 VENOUS: 33.8 MMOL/L (ref 23–29)
HCT VFR BLD CALC: 25.9 % (ref 40.5–52.5)
HCT VFR BLD CALC: 27.3 % (ref 40.5–52.5)
HCT VFR BLD CALC: 28.2 % (ref 40.5–52.5)
HCT VFR BLD CALC: 31.7 % (ref 40.5–52.5)
HCT VFR BLD CALC: 32.6 % (ref 40.5–52.5)
HCT VFR BLD CALC: 32.7 % (ref 40.5–52.5)
HCT VFR BLD CALC: 32.7 % (ref 40.5–52.5)
HCT VFR BLD CALC: 32.9 % (ref 40.5–52.5)
HCT VFR BLD CALC: 33.2 % (ref 40.5–52.5)
HCT VFR BLD CALC: 34 % (ref 40.5–52.5)
HCT VFR BLD CALC: 35.1 % (ref 40.5–52.5)
HCT VFR BLD CALC: 36.1 % (ref 40.5–52.5)
HCT VFR BLD CALC: 38.8 % (ref 40.5–52.5)
HCT VFR BLD CALC: 39.2 % (ref 40.5–52.5)
HEMATOLOGY PATH CONSULT: NO
HEMATOLOGY PATH CONSULT: NORMAL
HEMATOLOGY PATH CONSULT: NORMAL
HEMATOLOGY PATH CONSULT: YES
HEMATOLOGY PATH CONSULT: YES
HEMOGLOBIN, ART, EXTENDED: 11.8 G/DL (ref 13.5–17.5)
HEMOGLOBIN, ART, EXTENDED: 12 G/DL (ref 13.5–17.5)
HEMOGLOBIN, ART, EXTENDED: 12.5 G/DL (ref 13.5–17.5)
HEMOGLOBIN, ART, EXTENDED: 13 G/DL (ref 13.5–17.5)
HEMOGLOBIN: 10.1 G/DL (ref 13.5–17.5)
HEMOGLOBIN: 10.5 G/DL (ref 13.5–17.5)
HEMOGLOBIN: 10.6 G/DL (ref 13.5–17.5)
HEMOGLOBIN: 10.7 G/DL (ref 13.5–17.5)
HEMOGLOBIN: 10.8 G/DL (ref 13.5–17.5)
HEMOGLOBIN: 11.4 G/DL (ref 13.5–17.5)
HEMOGLOBIN: 11.9 G/DL (ref 13.5–17.5)
HEMOGLOBIN: 12.1 G/DL (ref 13.5–17.5)
HEMOGLOBIN: 8.3 G/DL (ref 13.5–17.5)
HEMOGLOBIN: 8.7 G/DL (ref 13.5–17.5)
HEMOGLOBIN: 8.9 G/DL (ref 13.5–17.5)
HEMOGLOBIN: 9.8 G/DL (ref 13.5–17.5)
HEPATITIS B CORE IGM ANTIBODY: NORMAL
HEPATITIS B SURFACE ANTIGEN INTERPRETATION: NORMAL
HEPATITIS C ANTIBODY INTERPRETATION: NORMAL
HYALINE CASTS: >40 /LPF (ref 0–2)
INFLUENZA A ANTIBODY: NORMAL
INFLUENZA B ANTIBODY: NORMAL
INR BLD: 1.09 (ref 0.86–1.14)
INR BLD: 1.66 (ref 0.86–1.14)
INR BLD: 2.9 (ref 0.86–1.14)
INR BLD: 3.13 (ref 0.86–1.14)
INR BLD: 4.1 (ref 0.86–1.14)
IRON SATURATION: 15 % (ref 20–50)
IRON: 32 UG/DL (ref 59–158)
KETONES, URINE: NEGATIVE MG/DL
KETONES, URINE: NEGATIVE MG/DL
L. PNEUMOPHILA SEROGP 1 UR AG: NORMAL
LACTIC ACID, SEPSIS: 1.4 MMOL/L (ref 0.4–1.9)
LACTIC ACID, SEPSIS: 2.5 MMOL/L (ref 0.4–1.9)
LACTIC ACID: 1.2 MMOL/L (ref 0.4–2)
LACTIC ACID: 1.7 MMOL/L (ref 0.4–2)
LACTIC ACID: 1.9 MMOL/L (ref 0.4–2)
LACTIC ACID: 2 MMOL/L (ref 0.4–2)
LACTIC ACID: 2.2 MMOL/L (ref 0.4–2)
LACTIC ACID: 2.6 MMOL/L (ref 0.4–2)
LEUKOCYTE ESTERASE, URINE: ABNORMAL
LEUKOCYTE ESTERASE, URINE: NEGATIVE
LIPASE: 6 U/L (ref 13–60)
LV EF: 20 %
LVEF MODALITY: NORMAL
LYMPHOCYTES ABSOLUTE: 0.2 K/UL (ref 1–5.1)
LYMPHOCYTES ABSOLUTE: 0.5 K/UL (ref 1–5.1)
LYMPHOCYTES ABSOLUTE: 0.7 K/UL (ref 1–5.1)
LYMPHOCYTES ABSOLUTE: 0.8 K/UL (ref 1–5.1)
LYMPHOCYTES ABSOLUTE: 1 K/UL (ref 1–5.1)
LYMPHOCYTES ABSOLUTE: 1.2 K/UL (ref 1–5.1)
LYMPHOCYTES RELATIVE PERCENT: 1 %
LYMPHOCYTES RELATIVE PERCENT: 2 %
LYMPHOCYTES RELATIVE PERCENT: 2.1 %
LYMPHOCYTES RELATIVE PERCENT: 3.2 %
LYMPHOCYTES RELATIVE PERCENT: 6 %
LYMPHOCYTES RELATIVE PERCENT: 6 %
LYMPHOCYTES RELATIVE PERCENT: 6.8 %
LYMPHOCYTES RELATIVE PERCENT: 8.6 %
LYMPHOCYTES RELATIVE PERCENT: 9 %
LYMPHOCYTES RELATIVE PERCENT: 9.2 %
MACROCYTES: ABNORMAL
MACROCYTES: ABNORMAL
MAGNESIUM: 2.4 MG/DL (ref 1.8–2.4)
MAGNESIUM: 2.6 MG/DL (ref 1.8–2.4)
MAGNESIUM: 2.7 MG/DL (ref 1.8–2.4)
MCH RBC QN AUTO: 31.8 PG (ref 26–34)
MCH RBC QN AUTO: 32 PG (ref 26–34)
MCH RBC QN AUTO: 32.6 PG (ref 26–34)
MCH RBC QN AUTO: 33.9 PG (ref 26–34)
MCH RBC QN AUTO: 34.1 PG (ref 26–34)
MCH RBC QN AUTO: 34.3 PG (ref 26–34)
MCH RBC QN AUTO: 34.7 PG (ref 26–34)
MCH RBC QN AUTO: 35 PG (ref 26–34)
MCH RBC QN AUTO: 35 PG (ref 26–34)
MCH RBC QN AUTO: 35.7 PG (ref 26–34)
MCH RBC QN AUTO: 35.9 PG (ref 26–34)
MCH RBC QN AUTO: 36.4 PG (ref 26–34)
MCHC RBC AUTO-ENTMCNC: 30 G/DL (ref 31–36)
MCHC RBC AUTO-ENTMCNC: 30.4 G/DL (ref 31–36)
MCHC RBC AUTO-ENTMCNC: 31 G/DL (ref 31–36)
MCHC RBC AUTO-ENTMCNC: 31 G/DL (ref 31–36)
MCHC RBC AUTO-ENTMCNC: 31.1 G/DL (ref 31–36)
MCHC RBC AUTO-ENTMCNC: 31.4 G/DL (ref 31–36)
MCHC RBC AUTO-ENTMCNC: 31.6 G/DL (ref 31–36)
MCHC RBC AUTO-ENTMCNC: 31.8 G/DL (ref 31–36)
MCHC RBC AUTO-ENTMCNC: 31.8 G/DL (ref 31–36)
MCHC RBC AUTO-ENTMCNC: 32 G/DL (ref 31–36)
MCHC RBC AUTO-ENTMCNC: 32.2 G/DL (ref 31–36)
MCHC RBC AUTO-ENTMCNC: 32.3 G/DL (ref 31–36)
MCHC RBC AUTO-ENTMCNC: 32.5 G/DL (ref 31–36)
MCHC RBC AUTO-ENTMCNC: 32.7 G/DL (ref 31–36)
MCV RBC AUTO: 100.6 FL (ref 80–100)
MCV RBC AUTO: 102.5 FL (ref 80–100)
MCV RBC AUTO: 102.6 FL (ref 80–100)
MCV RBC AUTO: 105.9 FL (ref 80–100)
MCV RBC AUTO: 106.7 FL (ref 80–100)
MCV RBC AUTO: 107.1 FL (ref 80–100)
MCV RBC AUTO: 107.3 FL (ref 80–100)
MCV RBC AUTO: 108.5 FL (ref 80–100)
MCV RBC AUTO: 109.2 FL (ref 80–100)
MCV RBC AUTO: 112.5 FL (ref 80–100)
MCV RBC AUTO: 112.8 FL (ref 80–100)
MCV RBC AUTO: 114.1 FL (ref 80–100)
MCV RBC AUTO: 115 FL (ref 80–100)
MCV RBC AUTO: 116.7 FL (ref 80–100)
METHEMOGLOBIN ARTERIAL: 0.3 %
METHEMOGLOBIN VENOUS: 0.2 %
METHEMOGLOBIN VENOUS: 0.3 %
MICROSCOPIC EXAMINATION: NORMAL
MICROSCOPIC EXAMINATION: YES
MONOCYTES ABSOLUTE: 0.5 K/UL (ref 0–1.3)
MONOCYTES ABSOLUTE: 0.7 K/UL (ref 0–1.3)
MONOCYTES ABSOLUTE: 0.7 K/UL (ref 0–1.3)
MONOCYTES ABSOLUTE: 0.8 K/UL (ref 0–1.3)
MONOCYTES ABSOLUTE: 0.9 K/UL (ref 0–1.3)
MONOCYTES ABSOLUTE: 0.9 K/UL (ref 0–1.3)
MONOCYTES ABSOLUTE: 1 K/UL (ref 0–1.3)
MONOCYTES RELATIVE PERCENT: 11.8 %
MONOCYTES RELATIVE PERCENT: 2.3 %
MONOCYTES RELATIVE PERCENT: 3.5 %
MONOCYTES RELATIVE PERCENT: 4 %
MONOCYTES RELATIVE PERCENT: 4.4 %
MONOCYTES RELATIVE PERCENT: 5 %
MONOCYTES RELATIVE PERCENT: 6.6 %
MONOCYTES RELATIVE PERCENT: 8.2 %
MONOCYTES RELATIVE PERCENT: 8.5 %
MONOCYTES RELATIVE PERCENT: 8.8 %
MUCUS: ABNORMAL /LPF
NEUTROPHILS ABSOLUTE: 18.2 K/UL (ref 1.7–7.7)
NEUTROPHILS ABSOLUTE: 20.6 K/UL (ref 1.7–7.7)
NEUTROPHILS ABSOLUTE: 22.1 K/UL (ref 1.7–7.7)
NEUTROPHILS ABSOLUTE: 22.4 K/UL (ref 1.7–7.7)
NEUTROPHILS ABSOLUTE: 25.8 K/UL (ref 1.7–7.7)
NEUTROPHILS ABSOLUTE: 6.3 K/UL (ref 1.7–7.7)
NEUTROPHILS ABSOLUTE: 6.5 K/UL (ref 1.7–7.7)
NEUTROPHILS ABSOLUTE: 7.6 K/UL (ref 1.7–7.7)
NEUTROPHILS ABSOLUTE: 8.4 K/UL (ref 1.7–7.7)
NEUTROPHILS ABSOLUTE: 8.7 K/UL (ref 1.7–7.7)
NEUTROPHILS RELATIVE PERCENT: 77.2 %
NEUTROPHILS RELATIVE PERCENT: 77.3 %
NEUTROPHILS RELATIVE PERCENT: 77.5 %
NEUTROPHILS RELATIVE PERCENT: 83.6 %
NEUTROPHILS RELATIVE PERCENT: 84 %
NEUTROPHILS RELATIVE PERCENT: 86.2 %
NEUTROPHILS RELATIVE PERCENT: 92.1 %
NEUTROPHILS RELATIVE PERCENT: 94.2 %
NEUTROPHILS RELATIVE PERCENT: 95 %
NEUTROPHILS RELATIVE PERCENT: 95.4 %
NITRITE, URINE: NEGATIVE
NITRITE, URINE: POSITIVE
NUCLEATED RED BLOOD CELLS: 34 /100 WBC
NUCLEATED RED BLOOD CELLS: 34 /100 WBC
NUCLEATED RED BLOOD CELLS: 8 /100 WBC
NUCLEATED RED BLOOD CELLS: 8 /100 WBC
O2 CONTENT ARTERIAL: 16 ML/DL
O2 CONTENT ARTERIAL: 17 ML/DL
O2 CONTENT ARTERIAL: 17 ML/DL
O2 CONTENT ARTERIAL: 18 ML/DL
O2 CONTENT, VEN: 13 VOL %
O2 CONTENT, VEN: 9 VOL %
O2 SAT, ARTERIAL: 95.5 %
O2 SAT, ARTERIAL: 95.7 %
O2 SAT, ARTERIAL: 97.4 %
O2 SAT, ARTERIAL: 98.8 %
O2 SAT, VEN: 60 %
O2 SAT, VEN: 83 %
O2 THERAPY: ABNORMAL
ORGANISM: ABNORMAL
PCO2 ARTERIAL: 45.3 MMHG (ref 35–45)
PCO2 ARTERIAL: 45.4 MMHG (ref 35–45)
PCO2 ARTERIAL: 51.8 MMHG (ref 35–45)
PCO2 ARTERIAL: 57.9 MMHG (ref 35–45)
PCO2, VEN: 42.6 MMHG (ref 40–50)
PCO2, VEN: 45.3 MMHG (ref 40–50)
PDW BLD-RTO: 15.1 % (ref 12.4–15.4)
PDW BLD-RTO: 15.3 % (ref 12.4–15.4)
PDW BLD-RTO: 15.4 % (ref 12.4–15.4)
PDW BLD-RTO: 15.5 % (ref 12.4–15.4)
PDW BLD-RTO: 15.6 % (ref 12.4–15.4)
PDW BLD-RTO: 15.7 % (ref 12.4–15.4)
PDW BLD-RTO: 15.8 % (ref 12.4–15.4)
PDW BLD-RTO: 16 % (ref 12.4–15.4)
PDW BLD-RTO: 16.2 % (ref 12.4–15.4)
PDW BLD-RTO: 16.7 % (ref 12.4–15.4)
PDW BLD-RTO: 17 % (ref 12.4–15.4)
PDW BLD-RTO: 17.1 % (ref 12.4–15.4)
PDW BLD-RTO: 17.4 % (ref 12.4–15.4)
PDW BLD-RTO: 17.5 % (ref 12.4–15.4)
PERFORMED ON: ABNORMAL
PERFORMED ON: NORMAL
PH ARTERIAL: 7.21 (ref 7.35–7.45)
PH ARTERIAL: 7.34 (ref 7.35–7.45)
PH ARTERIAL: 7.36 (ref 7.35–7.45)
PH ARTERIAL: 7.39 (ref 7.35–7.45)
PH UA: 5 (ref 5–8)
PH UA: 7.5 (ref 5–8)
PH VENOUS: 7.38 (ref 7.35–7.45)
PH VENOUS: 7.52 (ref 7.35–7.45)
PHOSPHORUS: 3.6 MG/DL (ref 2.5–4.9)
PHOSPHORUS: 5.7 MG/DL (ref 2.5–4.9)
PLATELET # BLD: 186 K/UL (ref 135–450)
PLATELET # BLD: 211 K/UL (ref 135–450)
PLATELET # BLD: 211 K/UL (ref 135–450)
PLATELET # BLD: 431 K/UL (ref 135–450)
PLATELET # BLD: 545 K/UL (ref 135–450)
PLATELET # BLD: 557 K/UL (ref 135–450)
PLATELET # BLD: 616 K/UL (ref 135–450)
PLATELET # BLD: 622 K/UL (ref 135–450)
PLATELET # BLD: 694 K/UL (ref 135–450)
PLATELET # BLD: 703 K/UL (ref 135–450)
PLATELET # BLD: 717 K/UL (ref 135–450)
PLATELET # BLD: 721 K/UL (ref 135–450)
PLATELET # BLD: 837 K/UL (ref 135–450)
PLATELET # BLD: ABNORMAL K/UL (ref 135–450)
PLATELET SLIDE REVIEW: ABNORMAL
PLATELET SLIDE REVIEW: ABNORMAL
PLATELET SLIDE REVIEW: ADEQUATE
PMV BLD AUTO: 7.1 FL (ref 5–10.5)
PMV BLD AUTO: 7.2 FL (ref 5–10.5)
PMV BLD AUTO: 7.5 FL (ref 5–10.5)
PMV BLD AUTO: 7.7 FL (ref 5–10.5)
PMV BLD AUTO: 7.9 FL (ref 5–10.5)
PMV BLD AUTO: 8 FL (ref 5–10.5)
PMV BLD AUTO: 8.3 FL (ref 5–10.5)
PMV BLD AUTO: 9.5 FL (ref 5–10.5)
PMV BLD AUTO: 9.6 FL (ref 5–10.5)
PMV BLD AUTO: 9.7 FL (ref 5–10.5)
PMV BLD AUTO: ABNORMAL FL (ref 5–10.5)
PO2 ARTERIAL: 116 MMHG (ref 75–108)
PO2 ARTERIAL: 144.1 MMHG (ref 75–108)
PO2 ARTERIAL: 82.4 MMHG (ref 75–108)
PO2 ARTERIAL: 83.8 MMHG (ref 75–108)
PO2, VEN: 32.1 MMHG (ref 25–40)
PO2, VEN: 42.4 MMHG (ref 25–40)
POIKILOCYTES: ABNORMAL
POLYCHROMASIA: ABNORMAL
POTASSIUM REFLEX MAGNESIUM: 3.6 MMOL/L (ref 3.5–5.1)
POTASSIUM REFLEX MAGNESIUM: 3.7 MMOL/L (ref 3.5–5.1)
POTASSIUM REFLEX MAGNESIUM: 3.8 MMOL/L (ref 3.5–5.1)
POTASSIUM REFLEX MAGNESIUM: 3.8 MMOL/L (ref 3.5–5.1)
POTASSIUM REFLEX MAGNESIUM: 4.1 MMOL/L (ref 3.5–5.1)
POTASSIUM REFLEX MAGNESIUM: 4.4 MMOL/L (ref 3.5–5.1)
POTASSIUM REFLEX MAGNESIUM: 4.6 MMOL/L (ref 3.5–5.1)
POTASSIUM REFLEX MAGNESIUM: 4.8 MMOL/L (ref 3.5–5.1)
POTASSIUM REFLEX MAGNESIUM: 4.8 MMOL/L (ref 3.5–5.1)
POTASSIUM REFLEX MAGNESIUM: 5.1 MMOL/L (ref 3.5–5.1)
POTASSIUM REFLEX MAGNESIUM: 6.5 MMOL/L (ref 3.5–5.1)
POTASSIUM SERPL-SCNC: 3.4 MMOL/L (ref 3.5–5.1)
POTASSIUM SERPL-SCNC: 3.4 MMOL/L (ref 3.5–5.1)
POTASSIUM SERPL-SCNC: 3.8 MMOL/L (ref 3.5–5.1)
POTASSIUM SERPL-SCNC: 3.8 MMOL/L (ref 3.5–5.1)
POTASSIUM SERPL-SCNC: 3.9 MMOL/L (ref 3.5–5.1)
POTASSIUM SERPL-SCNC: 4.1 MMOL/L (ref 3.5–5.1)
POTASSIUM SERPL-SCNC: 4.6 MMOL/L (ref 3.5–5.1)
POTASSIUM SERPL-SCNC: 4.8 MMOL/L (ref 3.5–5.1)
POTASSIUM SERPL-SCNC: 6.5 MMOL/L (ref 3.5–5.1)
PRO-BNP: 1117 PG/ML (ref 0–124)
PRO-BNP: ABNORMAL PG/ML (ref 0–124)
PROCALCITONIN: 0.13 NG/ML (ref 0–0.15)
PROCALCITONIN: 0.17 NG/ML (ref 0–0.15)
PROTEIN UA: 30 MG/DL
PROTEIN UA: NEGATIVE MG/DL
PROTHROMBIN TIME: 12.7 SEC (ref 10–13.2)
PROTHROMBIN TIME: 19.3 SEC (ref 10–13.2)
PROTHROMBIN TIME: 34 SEC (ref 10–13.2)
PROTHROMBIN TIME: 36.7 SEC (ref 10–13.2)
PROTHROMBIN TIME: 48.2 SEC (ref 10–13.2)
QUANTI TB GOLD PLUS: NORMAL
QUANTI TB1 MINUS NIL: 0 IU/ML (ref 0–0.34)
QUANTI TB2 MINUS NIL: 0 IU/ML (ref 0–0.34)
QUANTIFERON MITOGEN: 0.06 IU/ML
QUANTIFERON NIL: 0.02 IU/ML
RAPID INFLUENZA  B AGN: NEGATIVE
RAPID INFLUENZA A AGN: NEGATIVE
RBC # BLD: 2.44 M/UL (ref 4.2–5.9)
RBC # BLD: 2.47 M/UL (ref 4.2–5.9)
RBC # BLD: 2.67 M/UL (ref 4.2–5.9)
RBC # BLD: 2.84 M/UL (ref 4.2–5.9)
RBC # BLD: 2.92 M/UL (ref 4.2–5.9)
RBC # BLD: 3.01 M/UL (ref 4.2–5.9)
RBC # BLD: 3.05 M/UL (ref 4.2–5.9)
RBC # BLD: 3.06 M/UL (ref 4.2–5.9)
RBC # BLD: 3.09 M/UL (ref 4.2–5.9)
RBC # BLD: 3.09 M/UL (ref 4.2–5.9)
RBC # BLD: 3.33 M/UL (ref 4.2–5.9)
RBC # BLD: 3.38 M/UL (ref 4.2–5.9)
RBC # BLD: 3.48 M/UL (ref 4.2–5.9)
RBC # BLD: 3.55 M/UL (ref 4.2–5.9)
RBC UA: ABNORMAL /HPF (ref 0–4)
REPORT: NORMAL
SARS-COV-2, NAAT: NOT DETECTED
SARS-COV-2: NOT DETECTED
SLIDE REVIEW: ABNORMAL
SODIUM BLD-SCNC: 128 MMOL/L (ref 136–145)
SODIUM BLD-SCNC: 129 MMOL/L (ref 136–145)
SODIUM BLD-SCNC: 130 MMOL/L (ref 136–145)
SODIUM BLD-SCNC: 131 MMOL/L (ref 136–145)
SODIUM BLD-SCNC: 132 MMOL/L (ref 136–145)
SODIUM BLD-SCNC: 132 MMOL/L (ref 136–145)
SODIUM BLD-SCNC: 133 MMOL/L (ref 136–145)
SODIUM BLD-SCNC: 134 MMOL/L (ref 136–145)
SODIUM BLD-SCNC: 134 MMOL/L (ref 136–145)
SODIUM BLD-SCNC: 135 MMOL/L (ref 136–145)
SOURCE: NORMAL
SPE/IFE INTERPRETATION: NORMAL
SPECIFIC GRAVITY UA: 1.02 (ref 1–1.03)
SPECIFIC GRAVITY UA: <=1.005 (ref 1–1.03)
STREP PNEUMONIAE ANTIGEN, URINE: NORMAL
TCO2 ARTERIAL: 18.9 MMOL/L
TCO2 ARTERIAL: 26.4 MMOL/L
TCO2 ARTERIAL: 32.4 MMOL/L
TCO2 ARTERIAL: 32.6 MMOL/L
TCO2 CALC VENOUS: 27 MMOL/L
TCO2 CALC VENOUS: 35 MMOL/L
TOTAL IRON BINDING CAPACITY: 214 UG/DL (ref 260–445)
TOTAL PROTEIN: 4.5 G/DL (ref 6.4–8.2)
TOTAL PROTEIN: 4.9 G/DL (ref 6.4–8.2)
TOTAL PROTEIN: 5.8 G/DL (ref 6.4–8.2)
TOTAL PROTEIN: 6.1 G/DL (ref 6.4–8.2)
TOTAL PROTEIN: 6.1 G/DL (ref 6.4–8.2)
TOTAL PROTEIN: 6.2 G/DL (ref 6.4–8.2)
TOTAL PROTEIN: 6.3 G/DL (ref 6.4–8.2)
TOTAL PROTEIN: 6.3 G/DL (ref 6.4–8.2)
TOTAL PROTEIN: 6.7 G/DL (ref 6.4–8.2)
TOTAL PROTEIN: 7 G/DL (ref 6.4–8.2)
TOTAL PROTEIN: 7.8 G/DL (ref 6.4–8.2)
TROPONIN: 0.02 NG/ML
TROPONIN: <0.01 NG/ML
TSH SERPL DL<=0.05 MIU/L-ACNC: 0.55 UIU/ML (ref 0.27–4.2)
URINE CULTURE, ROUTINE: ABNORMAL
URINE CULTURE, ROUTINE: NORMAL
URINE REFLEX TO CULTURE: NORMAL
URINE TYPE: ABNORMAL
URINE TYPE: NORMAL
UROBILINOGEN, URINE: 0.2 E.U./DL
UROBILINOGEN, URINE: 2 E.U./DL
VANCOMYCIN TROUGH: 10.1 UG/ML (ref 10–20)
VITAMIN B-12: 665 PG/ML (ref 211–911)
VITAMIN B-12: 736 PG/ML (ref 211–911)
VITAMIN B-12: >2000 PG/ML (ref 211–911)
WBC # BLD: 10 K/UL (ref 4–11)
WBC # BLD: 10.8 K/UL (ref 4–11)
WBC # BLD: 14.4 K/UL (ref 4–11)
WBC # BLD: 20.5 K/UL (ref 4–11)
WBC # BLD: 21.7 K/UL (ref 4–11)
WBC # BLD: 21.9 K/UL (ref 4–11)
WBC # BLD: 22.3 K/UL (ref 4–11)
WBC # BLD: 23.3 K/UL (ref 4–11)
WBC # BLD: 23.5 K/UL (ref 4–11)
WBC # BLD: 27.4 K/UL (ref 4–11)
WBC # BLD: 7.7 K/UL (ref 4–11)
WBC # BLD: 8.1 K/UL (ref 4–11)
WBC # BLD: 8.4 K/UL (ref 4–11)
WBC # BLD: 9 K/UL (ref 4–11)
WBC UA: ABNORMAL /HPF (ref 0–5)
YEAST: PRESENT /HPF

## 2020-01-01 PROCEDURE — 87205 SMEAR GRAM STAIN: CPT

## 2020-01-01 PROCEDURE — 97161 PT EVAL LOW COMPLEX 20 MIN: CPT

## 2020-01-01 PROCEDURE — 87070 CULTURE OTHR SPECIMN AEROBIC: CPT

## 2020-01-01 PROCEDURE — 3044F HG A1C LEVEL LT 7.0%: CPT | Performed by: NURSE PRACTITIONER

## 2020-01-01 PROCEDURE — 6370000000 HC RX 637 (ALT 250 FOR IP): Performed by: INTERNAL MEDICINE

## 2020-01-01 PROCEDURE — 94150 VITAL CAPACITY TEST: CPT

## 2020-01-01 PROCEDURE — G8427 DOCREV CUR MEDS BY ELIG CLIN: HCPCS | Performed by: PHYSICIAN ASSISTANT

## 2020-01-01 PROCEDURE — 6360000002 HC RX W HCPCS: Performed by: INTERNAL MEDICINE

## 2020-01-01 PROCEDURE — 1200000000 HC SEMI PRIVATE

## 2020-01-01 PROCEDURE — 3017F COLORECTAL CA SCREEN DOC REV: CPT | Performed by: ORTHOPAEDIC SURGERY

## 2020-01-01 PROCEDURE — 87529 HSV DNA AMP PROBE: CPT

## 2020-01-01 PROCEDURE — 2580000003 HC RX 258: Performed by: PHYSICIAN ASSISTANT

## 2020-01-01 PROCEDURE — 71045 X-RAY EXAM CHEST 1 VIEW: CPT

## 2020-01-01 PROCEDURE — 2500000003 HC RX 250 WO HCPCS: Performed by: INTERNAL MEDICINE

## 2020-01-01 PROCEDURE — 1111F DSCHRG MED/CURRENT MED MERGE: CPT | Performed by: NURSE PRACTITIONER

## 2020-01-01 PROCEDURE — 1123F ACP DISCUSS/DSCN MKR DOCD: CPT | Performed by: NURSE PRACTITIONER

## 2020-01-01 PROCEDURE — 4040F PNEUMOC VAC/ADMIN/RCVD: CPT | Performed by: ORTHOPAEDIC SURGERY

## 2020-01-01 PROCEDURE — 36415 COLL VENOUS BLD VENIPUNCTURE: CPT

## 2020-01-01 PROCEDURE — G8482 FLU IMMUNIZE ORDER/ADMIN: HCPCS | Performed by: INTERNAL MEDICINE

## 2020-01-01 PROCEDURE — 1036F TOBACCO NON-USER: CPT | Performed by: ORTHOPAEDIC SURGERY

## 2020-01-01 PROCEDURE — 99233 SBSQ HOSP IP/OBS HIGH 50: CPT | Performed by: INTERNAL MEDICINE

## 2020-01-01 PROCEDURE — 83735 ASSAY OF MAGNESIUM: CPT

## 2020-01-01 PROCEDURE — 71260 CT THORAX DX C+: CPT

## 2020-01-01 PROCEDURE — 94761 N-INVAS EAR/PLS OXIMETRY MLT: CPT

## 2020-01-01 PROCEDURE — 70450 CT HEAD/BRAIN W/O DYE: CPT

## 2020-01-01 PROCEDURE — 3023F SPIROM DOC REV: CPT | Performed by: NURSE PRACTITIONER

## 2020-01-01 PROCEDURE — G8926 SPIRO NO PERF OR DOC: HCPCS | Performed by: NURSE PRACTITIONER

## 2020-01-01 PROCEDURE — 94640 AIRWAY INHALATION TREATMENT: CPT

## 2020-01-01 PROCEDURE — 6370000000 HC RX 637 (ALT 250 FOR IP): Performed by: PHYSICIAN ASSISTANT

## 2020-01-01 PROCEDURE — 99214 OFFICE O/P EST MOD 30 MIN: CPT | Performed by: INTERNAL MEDICINE

## 2020-01-01 PROCEDURE — 99212 OFFICE O/P EST SF 10 MIN: CPT | Performed by: ORTHOPAEDIC SURGERY

## 2020-01-01 PROCEDURE — 4040F PNEUMOC VAC/ADMIN/RCVD: CPT | Performed by: OTOLARYNGOLOGY

## 2020-01-01 PROCEDURE — 6370000000 HC RX 637 (ALT 250 FOR IP): Performed by: EMERGENCY MEDICINE

## 2020-01-01 PROCEDURE — 2022F DILAT RTA XM EVC RTNOPTHY: CPT | Performed by: NURSE PRACTITIONER

## 2020-01-01 PROCEDURE — 74230 X-RAY XM SWLNG FUNCJ C+: CPT

## 2020-01-01 PROCEDURE — 80048 BASIC METABOLIC PNL TOTAL CA: CPT

## 2020-01-01 PROCEDURE — 99214 OFFICE O/P EST MOD 30 MIN: CPT | Performed by: NURSE PRACTITIONER

## 2020-01-01 PROCEDURE — 93306 TTE W/DOPPLER COMPLETE: CPT

## 2020-01-01 PROCEDURE — 99232 SBSQ HOSP IP/OBS MODERATE 35: CPT | Performed by: INTERNAL MEDICINE

## 2020-01-01 PROCEDURE — 3017F COLORECTAL CA SCREEN DOC REV: CPT | Performed by: NURSE PRACTITIONER

## 2020-01-01 PROCEDURE — 99233 SBSQ HOSP IP/OBS HIGH 50: CPT | Performed by: NURSE PRACTITIONER

## 2020-01-01 PROCEDURE — 99213 OFFICE O/P EST LOW 20 MIN: CPT | Performed by: OTOLARYNGOLOGY

## 2020-01-01 PROCEDURE — 6360000004 HC RX CONTRAST MEDICATION: Performed by: OTOLARYNGOLOGY

## 2020-01-01 PROCEDURE — 76700 US EXAM ABDOM COMPLETE: CPT

## 2020-01-01 PROCEDURE — 96374 THER/PROPH/DIAG INJ IV PUSH: CPT

## 2020-01-01 PROCEDURE — 87186 SC STD MICRODIL/AGAR DIL: CPT

## 2020-01-01 PROCEDURE — 2580000003 HC RX 258: Performed by: INTERNAL MEDICINE

## 2020-01-01 PROCEDURE — 96376 TX/PRO/DX INJ SAME DRUG ADON: CPT

## 2020-01-01 PROCEDURE — 51702 INSERT TEMP BLADDER CATH: CPT

## 2020-01-01 PROCEDURE — G8428 CUR MEDS NOT DOCUMENT: HCPCS | Performed by: OTOLARYNGOLOGY

## 2020-01-01 PROCEDURE — 2060000000 HC ICU INTERMEDIATE R&B

## 2020-01-01 PROCEDURE — 6360000002 HC RX W HCPCS: Performed by: PHYSICIAN ASSISTANT

## 2020-01-01 PROCEDURE — 99285 EMERGENCY DEPT VISIT HI MDM: CPT

## 2020-01-01 PROCEDURE — 80053 COMPREHEN METABOLIC PANEL: CPT

## 2020-01-01 PROCEDURE — 80074 ACUTE HEPATITIS PANEL: CPT

## 2020-01-01 PROCEDURE — 84100 ASSAY OF PHOSPHORUS: CPT

## 2020-01-01 PROCEDURE — 11730 AVULSION NAIL PLATE SIMPLE 1: CPT | Performed by: ORTHOPAEDIC SURGERY

## 2020-01-01 PROCEDURE — L3260 AMBULATORY SURGICAL BOOT EAC: HCPCS | Performed by: ORTHOPAEDIC SURGERY

## 2020-01-01 PROCEDURE — 99239 HOSP IP/OBS DSCHRG MGMT >30: CPT | Performed by: INTERNAL MEDICINE

## 2020-01-01 PROCEDURE — 1036F TOBACCO NON-USER: CPT | Performed by: OTOLARYNGOLOGY

## 2020-01-01 PROCEDURE — 4040F PNEUMOC VAC/ADMIN/RCVD: CPT | Performed by: NURSE PRACTITIONER

## 2020-01-01 PROCEDURE — 83605 ASSAY OF LACTIC ACID: CPT

## 2020-01-01 PROCEDURE — 84145 PROCALCITONIN (PCT): CPT

## 2020-01-01 PROCEDURE — 73630 X-RAY EXAM OF FOOT: CPT

## 2020-01-01 PROCEDURE — 6370000000 HC RX 637 (ALT 250 FOR IP): Performed by: NURSE PRACTITIONER

## 2020-01-01 PROCEDURE — 82728 ASSAY OF FERRITIN: CPT

## 2020-01-01 PROCEDURE — G8926 SPIRO NO PERF OR DOC: HCPCS | Performed by: INTERNAL MEDICINE

## 2020-01-01 PROCEDURE — 97116 GAIT TRAINING THERAPY: CPT

## 2020-01-01 PROCEDURE — 94669 MECHANICAL CHEST WALL OSCILL: CPT

## 2020-01-01 PROCEDURE — 84484 ASSAY OF TROPONIN QUANT: CPT

## 2020-01-01 PROCEDURE — 2000000000 HC ICU R&B

## 2020-01-01 PROCEDURE — 2700000000 HC OXYGEN THERAPY PER DAY

## 2020-01-01 PROCEDURE — 82803 BLOOD GASES ANY COMBINATION: CPT

## 2020-01-01 PROCEDURE — 99214 OFFICE O/P EST MOD 30 MIN: CPT

## 2020-01-01 PROCEDURE — 85610 PROTHROMBIN TIME: CPT

## 2020-01-01 PROCEDURE — 99238 HOSP IP/OBS DSCHRG MGMT 30/<: CPT | Performed by: INTERNAL MEDICINE

## 2020-01-01 PROCEDURE — G8428 CUR MEDS NOT DOCUMENT: HCPCS | Performed by: ORTHOPAEDIC SURGERY

## 2020-01-01 PROCEDURE — 83036 HEMOGLOBIN GLYCOSYLATED A1C: CPT

## 2020-01-01 PROCEDURE — G8419 CALC BMI OUT NRM PARAM NOF/U: HCPCS | Performed by: INTERNAL MEDICINE

## 2020-01-01 PROCEDURE — 82746 ASSAY OF FOLIC ACID SERUM: CPT

## 2020-01-01 PROCEDURE — 6360000004 HC RX CONTRAST MEDICATION: Performed by: EMERGENCY MEDICINE

## 2020-01-01 PROCEDURE — 2580000003 HC RX 258

## 2020-01-01 PROCEDURE — 81003 URINALYSIS AUTO W/O SCOPE: CPT

## 2020-01-01 PROCEDURE — G8427 DOCREV CUR MEDS BY ELIG CLIN: HCPCS | Performed by: INTERNAL MEDICINE

## 2020-01-01 PROCEDURE — G8419 CALC BMI OUT NRM PARAM NOF/U: HCPCS | Performed by: OTOLARYNGOLOGY

## 2020-01-01 PROCEDURE — G0480 DRUG TEST DEF 1-7 CLASSES: HCPCS

## 2020-01-01 PROCEDURE — 93000 ELECTROCARDIOGRAM COMPLETE: CPT | Performed by: NURSE PRACTITIONER

## 2020-01-01 PROCEDURE — 1123F ACP DISCUSS/DSCN MKR DOCD: CPT | Performed by: PHYSICIAN ASSISTANT

## 2020-01-01 PROCEDURE — 99223 1ST HOSP IP/OBS HIGH 75: CPT | Performed by: INTERNAL MEDICINE

## 2020-01-01 PROCEDURE — 99203 OFFICE O/P NEW LOW 30 MIN: CPT | Performed by: OTOLARYNGOLOGY

## 2020-01-01 PROCEDURE — 1111F DSCHRG MED/CURRENT MED MERGE: CPT | Performed by: ORTHOPAEDIC SURGERY

## 2020-01-01 PROCEDURE — 99213 OFFICE O/P EST LOW 20 MIN: CPT | Performed by: NURSE PRACTITIONER

## 2020-01-01 PROCEDURE — 4040F PNEUMOC VAC/ADMIN/RCVD: CPT | Performed by: INTERNAL MEDICINE

## 2020-01-01 PROCEDURE — 36573 INSJ PICC RS&I 5 YR+: CPT

## 2020-01-01 PROCEDURE — 86480 TB TEST CELL IMMUN MEASURE: CPT

## 2020-01-01 PROCEDURE — 1036F TOBACCO NON-USER: CPT | Performed by: NURSE PRACTITIONER

## 2020-01-01 PROCEDURE — 2580000003 HC RX 258: Performed by: EMERGENCY MEDICINE

## 2020-01-01 PROCEDURE — 80162 ASSAY OF DIGOXIN TOTAL: CPT

## 2020-01-01 PROCEDURE — 31575 DIAGNOSTIC LARYNGOSCOPY: CPT | Performed by: OTOLARYNGOLOGY

## 2020-01-01 PROCEDURE — 93010 ELECTROCARDIOGRAM REPORT: CPT | Performed by: INTERNAL MEDICINE

## 2020-01-01 PROCEDURE — 83880 ASSAY OF NATRIURETIC PEPTIDE: CPT

## 2020-01-01 PROCEDURE — 93005 ELECTROCARDIOGRAM TRACING: CPT | Performed by: INTERNAL MEDICINE

## 2020-01-01 PROCEDURE — 85027 COMPLETE CBC AUTOMATED: CPT

## 2020-01-01 PROCEDURE — 1123F ACP DISCUSS/DSCN MKR DOCD: CPT | Performed by: ORTHOPAEDIC SURGERY

## 2020-01-01 PROCEDURE — 87077 CULTURE AEROBIC IDENTIFY: CPT

## 2020-01-01 PROCEDURE — 99213 OFFICE O/P EST LOW 20 MIN: CPT | Performed by: PHYSICIAN ASSISTANT

## 2020-01-01 PROCEDURE — 85025 COMPLETE CBC W/AUTO DIFF WBC: CPT

## 2020-01-01 PROCEDURE — 87804 INFLUENZA ASSAY W/OPTIC: CPT

## 2020-01-01 PROCEDURE — 1036F TOBACCO NON-USER: CPT | Performed by: PHYSICIAN ASSISTANT

## 2020-01-01 PROCEDURE — 71046 X-RAY EXAM CHEST 2 VIEWS: CPT

## 2020-01-01 PROCEDURE — 99211 OFF/OP EST MAY X REQ PHY/QHP: CPT | Performed by: OTOLARYNGOLOGY

## 2020-01-01 PROCEDURE — 71250 CT THORAX DX C-: CPT

## 2020-01-01 PROCEDURE — 70491 CT SOFT TISSUE NECK W/DYE: CPT

## 2020-01-01 PROCEDURE — 4040F PNEUMOC VAC/ADMIN/RCVD: CPT | Performed by: PHYSICIAN ASSISTANT

## 2020-01-01 PROCEDURE — 87150 DNA/RNA AMPLIFIED PROBE: CPT

## 2020-01-01 PROCEDURE — G8419 CALC BMI OUT NRM PARAM NOF/U: HCPCS | Performed by: ORTHOPAEDIC SURGERY

## 2020-01-01 PROCEDURE — G8419 CALC BMI OUT NRM PARAM NOF/U: HCPCS | Performed by: PHYSICIAN ASSISTANT

## 2020-01-01 PROCEDURE — 97535 SELF CARE MNGMENT TRAINING: CPT

## 2020-01-01 PROCEDURE — 3017F COLORECTAL CA SCREEN DOC REV: CPT | Performed by: INTERNAL MEDICINE

## 2020-01-01 PROCEDURE — 3023F SPIROM DOC REV: CPT | Performed by: INTERNAL MEDICINE

## 2020-01-01 PROCEDURE — 1036F TOBACCO NON-USER: CPT | Performed by: INTERNAL MEDICINE

## 2020-01-01 PROCEDURE — 11042 DBRDMT SUBQ TIS 1ST 20SQCM/<: CPT

## 2020-01-01 PROCEDURE — 83540 ASSAY OF IRON: CPT

## 2020-01-01 PROCEDURE — G8420 CALC BMI NORM PARAMETERS: HCPCS | Performed by: NURSE PRACTITIONER

## 2020-01-01 PROCEDURE — 6360000002 HC RX W HCPCS: Performed by: NURSE PRACTITIONER

## 2020-01-01 PROCEDURE — 82140 ASSAY OF AMMONIA: CPT

## 2020-01-01 PROCEDURE — 83550 IRON BINDING TEST: CPT

## 2020-01-01 PROCEDURE — 6360000002 HC RX W HCPCS: Performed by: EMERGENCY MEDICINE

## 2020-01-01 PROCEDURE — 96366 THER/PROPH/DIAG IV INF ADDON: CPT

## 2020-01-01 PROCEDURE — 83690 ASSAY OF LIPASE: CPT

## 2020-01-01 PROCEDURE — 82565 ASSAY OF CREATININE: CPT

## 2020-01-01 PROCEDURE — G8419 CALC BMI OUT NRM PARAM NOF/U: HCPCS | Performed by: NURSE PRACTITIONER

## 2020-01-01 PROCEDURE — 3017F COLORECTAL CA SCREEN DOC REV: CPT | Performed by: PHYSICIAN ASSISTANT

## 2020-01-01 PROCEDURE — G8427 DOCREV CUR MEDS BY ELIG CLIN: HCPCS | Performed by: ORTHOPAEDIC SURGERY

## 2020-01-01 PROCEDURE — 1123F ACP DISCUSS/DSCN MKR DOCD: CPT | Performed by: OTOLARYNGOLOGY

## 2020-01-01 PROCEDURE — 86663 EPSTEIN-BARR ANTIBODY: CPT

## 2020-01-01 PROCEDURE — G8427 DOCREV CUR MEDS BY ELIG CLIN: HCPCS | Performed by: NURSE PRACTITIONER

## 2020-01-01 PROCEDURE — U0002 COVID-19 LAB TEST NON-CDC: HCPCS

## 2020-01-01 PROCEDURE — 1123F ACP DISCUSS/DSCN MKR DOCD: CPT | Performed by: INTERNAL MEDICINE

## 2020-01-01 PROCEDURE — 93005 ELECTROCARDIOGRAM TRACING: CPT | Performed by: EMERGENCY MEDICINE

## 2020-01-01 PROCEDURE — G8427 DOCREV CUR MEDS BY ELIG CLIN: HCPCS | Performed by: OTOLARYNGOLOGY

## 2020-01-01 PROCEDURE — 83516 IMMUNOASSAY NONANTIBODY: CPT

## 2020-01-01 PROCEDURE — G8482 FLU IMMUNIZE ORDER/ADMIN: HCPCS | Performed by: OTOLARYNGOLOGY

## 2020-01-01 PROCEDURE — 86645 CMV ANTIBODY IGM: CPT

## 2020-01-01 PROCEDURE — 82607 VITAMIN B-12: CPT

## 2020-01-01 PROCEDURE — 0BH18EZ INSERTION OF ENDOTRACHEAL AIRWAY INTO TRACHEA, VIA NATURAL OR ARTIFICIAL OPENING ENDOSCOPIC: ICD-10-PCS | Performed by: INTERNAL MEDICINE

## 2020-01-01 PROCEDURE — 2580000003 HC RX 258: Performed by: NURSE PRACTITIONER

## 2020-01-01 PROCEDURE — 87040 BLOOD CULTURE FOR BACTERIA: CPT

## 2020-01-01 PROCEDURE — 99291 CRITICAL CARE FIRST HOUR: CPT | Performed by: INTERNAL MEDICINE

## 2020-01-01 PROCEDURE — 93925 LOWER EXTREMITY STUDY: CPT

## 2020-01-01 PROCEDURE — 99222 1ST HOSP IP/OBS MODERATE 55: CPT | Performed by: PHYSICIAN ASSISTANT

## 2020-01-01 PROCEDURE — 3017F COLORECTAL CA SCREEN DOC REV: CPT | Performed by: OTOLARYNGOLOGY

## 2020-01-01 PROCEDURE — 96375 TX/PRO/DX INJ NEW DRUG ADDON: CPT

## 2020-01-01 PROCEDURE — 97530 THERAPEUTIC ACTIVITIES: CPT

## 2020-01-01 PROCEDURE — 85379 FIBRIN DEGRADATION QUANT: CPT

## 2020-01-01 PROCEDURE — 84155 ASSAY OF PROTEIN SERUM: CPT

## 2020-01-01 PROCEDURE — 3044F HG A1C LEVEL LT 7.0%: CPT | Performed by: ORTHOPAEDIC SURGERY

## 2020-01-01 PROCEDURE — 84165 PROTEIN E-PHORESIS SERUM: CPT

## 2020-01-01 PROCEDURE — 86664 EPSTEIN-BARR NUCLEAR ANTIGEN: CPT

## 2020-01-01 PROCEDURE — 87804 INFLUENZA ASSAY W/OPTIC: CPT | Performed by: NURSE PRACTITIONER

## 2020-01-01 PROCEDURE — 99213 OFFICE O/P EST LOW 20 MIN: CPT | Performed by: ORTHOPAEDIC SURGERY

## 2020-01-01 PROCEDURE — 97165 OT EVAL LOW COMPLEX 30 MIN: CPT

## 2020-01-01 PROCEDURE — 86038 ANTINUCLEAR ANTIBODIES: CPT

## 2020-01-01 PROCEDURE — 1111F DSCHRG MED/CURRENT MED MERGE: CPT | Performed by: INTERNAL MEDICINE

## 2020-01-01 PROCEDURE — 81001 URINALYSIS AUTO W/SCOPE: CPT

## 2020-01-01 PROCEDURE — 96367 TX/PROPH/DG ADDL SEQ IV INF: CPT

## 2020-01-01 PROCEDURE — 99495 TRANSJ CARE MGMT MOD F2F 14D: CPT | Performed by: NURSE PRACTITIONER

## 2020-01-01 PROCEDURE — 84520 ASSAY OF UREA NITROGEN: CPT

## 2020-01-01 PROCEDURE — 84443 ASSAY THYROID STIM HORMONE: CPT

## 2020-01-01 PROCEDURE — 80202 ASSAY OF VANCOMYCIN: CPT

## 2020-01-01 PROCEDURE — 93005 ELECTROCARDIOGRAM TRACING: CPT | Performed by: NURSE PRACTITIONER

## 2020-01-01 PROCEDURE — 94660 CPAP INITIATION&MGMT: CPT

## 2020-01-01 PROCEDURE — G8482 FLU IMMUNIZE ORDER/ADMIN: HCPCS | Performed by: PHYSICIAN ASSISTANT

## 2020-01-01 PROCEDURE — 87086 URINE CULTURE/COLONY COUNT: CPT

## 2020-01-01 PROCEDURE — G8482 FLU IMMUNIZE ORDER/ADMIN: HCPCS | Performed by: NURSE PRACTITIONER

## 2020-01-01 PROCEDURE — 2022F DILAT RTA XM EVC RTNOPTHY: CPT | Performed by: ORTHOPAEDIC SURGERY

## 2020-01-01 PROCEDURE — 96365 THER/PROPH/DIAG IV INF INIT: CPT

## 2020-01-01 PROCEDURE — 31579 LARYNGOSCOPY TELESCOPIC: CPT | Performed by: SPEECH-LANGUAGE PATHOLOGIST

## 2020-01-01 PROCEDURE — 86665 EPSTEIN-BARR CAPSID VCA: CPT

## 2020-01-01 PROCEDURE — 87449 NOS EACH ORGANISM AG IA: CPT

## 2020-01-01 PROCEDURE — C1751 CATH, INF, PER/CENT/MIDLINE: HCPCS

## 2020-01-01 RX ORDER — ALBUTEROL SULFATE 2.5 MG/3ML
2.5 SOLUTION RESPIRATORY (INHALATION)
Status: DISCONTINUED | OUTPATIENT
Start: 2020-01-01 | End: 2020-01-01 | Stop reason: HOSPADM

## 2020-01-01 RX ORDER — SPIRONOLACTONE 25 MG/1
12.5 TABLET ORAL DAILY
Status: DISCONTINUED | OUTPATIENT
Start: 2020-01-01 | End: 2020-01-01

## 2020-01-01 RX ORDER — DOXYCYCLINE HYCLATE 100 MG/1
100 CAPSULE ORAL 2 TIMES DAILY
Qty: 14 CAPSULE | Refills: 0 | Status: ON HOLD | OUTPATIENT
Start: 2020-01-01 | End: 2020-01-01 | Stop reason: HOSPADM

## 2020-01-01 RX ORDER — ASPIRIN 81 MG/1
81 TABLET, CHEWABLE ORAL DAILY
Status: DISCONTINUED | OUTPATIENT
Start: 2020-01-01 | End: 2020-01-01 | Stop reason: HOSPADM

## 2020-01-01 RX ORDER — INSULIN GLARGINE 100 [IU]/ML
20 INJECTION, SOLUTION SUBCUTANEOUS ONCE
Status: COMPLETED | OUTPATIENT
Start: 2020-01-01 | End: 2020-01-01

## 2020-01-01 RX ORDER — DIGOXIN 0.25 MG/ML
250 INJECTION INTRAMUSCULAR; INTRAVENOUS ONCE
Status: COMPLETED | OUTPATIENT
Start: 2020-01-01 | End: 2020-01-01

## 2020-01-01 RX ORDER — FUROSEMIDE 10 MG/ML
20 INJECTION INTRAMUSCULAR; INTRAVENOUS ONCE
Status: COMPLETED | OUTPATIENT
Start: 2020-01-01 | End: 2020-01-01

## 2020-01-01 RX ORDER — LISINOPRIL 5 MG/1
2.5 TABLET ORAL NIGHTLY
Status: DISCONTINUED | OUTPATIENT
Start: 2020-01-01 | End: 2020-01-01

## 2020-01-01 RX ORDER — SULFAMETHOXAZOLE AND TRIMETHOPRIM 800; 160 MG/1; MG/1
1 TABLET ORAL 2 TIMES DAILY
Qty: 14 TABLET | Refills: 0 | Status: SHIPPED | OUTPATIENT
Start: 2020-01-01 | End: 2020-01-01

## 2020-01-01 RX ORDER — SODIUM CHLORIDE, SODIUM LACTATE, POTASSIUM CHLORIDE, AND CALCIUM CHLORIDE .6; .31; .03; .02 G/100ML; G/100ML; G/100ML; G/100ML
1000 INJECTION, SOLUTION INTRAVENOUS ONCE
Status: COMPLETED | OUTPATIENT
Start: 2020-01-01 | End: 2020-01-01

## 2020-01-01 RX ORDER — 0.9 % SODIUM CHLORIDE 0.9 %
500 INTRAVENOUS SOLUTION INTRAVENOUS ONCE
Status: COMPLETED | OUTPATIENT
Start: 2020-01-01 | End: 2020-01-01

## 2020-01-01 RX ORDER — IPRATROPIUM BROMIDE AND ALBUTEROL SULFATE 2.5; .5 MG/3ML; MG/3ML
1 SOLUTION RESPIRATORY (INHALATION) EVERY 4 HOURS
Status: DISCONTINUED | OUTPATIENT
Start: 2020-01-01 | End: 2020-01-01 | Stop reason: HOSPADM

## 2020-01-01 RX ORDER — DOXYCYCLINE HYCLATE 100 MG
100 TABLET ORAL EVERY 12 HOURS
Status: DISCONTINUED | OUTPATIENT
Start: 2020-01-01 | End: 2020-01-01

## 2020-01-01 RX ORDER — ONDANSETRON 2 MG/ML
4 INJECTION INTRAMUSCULAR; INTRAVENOUS EVERY 6 HOURS PRN
Status: DISCONTINUED | OUTPATIENT
Start: 2020-01-01 | End: 2020-01-01 | Stop reason: HOSPADM

## 2020-01-01 RX ORDER — DEXTROSE MONOHYDRATE 25 G/50ML
12.5 INJECTION, SOLUTION INTRAVENOUS PRN
Status: DISCONTINUED | OUTPATIENT
Start: 2020-01-01 | End: 2020-01-01 | Stop reason: HOSPADM

## 2020-01-01 RX ORDER — LEVOFLOXACIN 750 MG/1
750 TABLET ORAL DAILY
Qty: 11 TABLET | Refills: 0 | Status: SHIPPED | OUTPATIENT
Start: 2020-01-01 | End: 2020-01-01

## 2020-01-01 RX ORDER — CEPHALEXIN 250 MG/1
250 CAPSULE ORAL 4 TIMES DAILY
Qty: 40 CAPSULE | Refills: 0 | Status: SHIPPED | OUTPATIENT
Start: 2020-01-01 | End: 2020-01-01

## 2020-01-01 RX ORDER — POLYETHYLENE GLYCOL 3350 17 G/17G
17 POWDER, FOR SOLUTION ORAL DAILY PRN
Status: CANCELLED | OUTPATIENT
Start: 2020-01-01

## 2020-01-01 RX ORDER — METHYLPREDNISOLONE SODIUM SUCCINATE 40 MG/ML
40 INJECTION, POWDER, LYOPHILIZED, FOR SOLUTION INTRAMUSCULAR; INTRAVENOUS EVERY 6 HOURS
Status: COMPLETED | OUTPATIENT
Start: 2020-01-01 | End: 2020-01-01

## 2020-01-01 RX ORDER — TRAMADOL HYDROCHLORIDE 50 MG/1
50 TABLET ORAL EVERY 6 HOURS PRN
Qty: 60 TABLET | Refills: 0 | Status: SHIPPED | OUTPATIENT
Start: 2020-01-01 | End: 2020-06-21

## 2020-01-01 RX ORDER — METHYLPREDNISOLONE SODIUM SUCCINATE 40 MG/ML
40 INJECTION, POWDER, LYOPHILIZED, FOR SOLUTION INTRAMUSCULAR; INTRAVENOUS EVERY 12 HOURS
Status: DISCONTINUED | OUTPATIENT
Start: 2020-01-01 | End: 2020-01-01

## 2020-01-01 RX ORDER — ACETAMINOPHEN 325 MG/1
650 TABLET ORAL EVERY 6 HOURS PRN
Status: DISCONTINUED | OUTPATIENT
Start: 2020-01-01 | End: 2020-01-01 | Stop reason: HOSPADM

## 2020-01-01 RX ORDER — ACETAMINOPHEN 325 MG/1
650 TABLET ORAL EVERY 4 HOURS PRN
Status: DISCONTINUED | OUTPATIENT
Start: 2020-01-01 | End: 2020-01-01 | Stop reason: HOSPADM

## 2020-01-01 RX ORDER — ASCORBIC ACID 500 MG
500 TABLET ORAL DAILY
Status: DISCONTINUED | OUTPATIENT
Start: 2020-01-01 | End: 2020-01-01

## 2020-01-01 RX ORDER — SODIUM CHLORIDE 0.9 % (FLUSH) 0.9 %
10 SYRINGE (ML) INJECTION EVERY 12 HOURS SCHEDULED
Status: DISCONTINUED | OUTPATIENT
Start: 2020-01-01 | End: 2020-01-01 | Stop reason: HOSPADM

## 2020-01-01 RX ORDER — SPIRONOLACTONE 25 MG/1
25 TABLET ORAL DAILY
Status: DISCONTINUED | OUTPATIENT
Start: 2020-01-01 | End: 2020-01-01 | Stop reason: SDUPTHER

## 2020-01-01 RX ORDER — ACETAMINOPHEN 650 MG/1
650 SUPPOSITORY RECTAL EVERY 6 HOURS PRN
Status: DISCONTINUED | OUTPATIENT
Start: 2020-01-01 | End: 2020-01-01 | Stop reason: HOSPADM

## 2020-01-01 RX ORDER — IPRATROPIUM BROMIDE AND ALBUTEROL SULFATE 2.5; .5 MG/3ML; MG/3ML
1 SOLUTION RESPIRATORY (INHALATION)
Status: DISCONTINUED | OUTPATIENT
Start: 2020-01-01 | End: 2020-01-01 | Stop reason: HOSPADM

## 2020-01-01 RX ORDER — ONDANSETRON 2 MG/ML
4 INJECTION INTRAMUSCULAR; INTRAVENOUS EVERY 6 HOURS PRN
Status: CANCELLED | OUTPATIENT
Start: 2020-01-01

## 2020-01-01 RX ORDER — DEXTROSE MONOHYDRATE 50 MG/ML
100 INJECTION, SOLUTION INTRAVENOUS PRN
Status: DISCONTINUED | OUTPATIENT
Start: 2020-01-01 | End: 2020-01-01 | Stop reason: HOSPADM

## 2020-01-01 RX ORDER — SODIUM CHLORIDE 0.9 % (FLUSH) 0.9 %
10 SYRINGE (ML) INJECTION EVERY 12 HOURS SCHEDULED
Status: DISCONTINUED | OUTPATIENT
Start: 2020-01-01 | End: 2020-01-01 | Stop reason: SDUPTHER

## 2020-01-01 RX ORDER — SODIUM CHLORIDE 0.9 % (FLUSH) 0.9 %
10 SYRINGE (ML) INJECTION PRN
Status: DISCONTINUED | OUTPATIENT
Start: 2020-01-01 | End: 2020-01-01 | Stop reason: HOSPADM

## 2020-01-01 RX ORDER — SPIRONOLACTONE 25 MG/1
25 TABLET ORAL DAILY
Status: DISCONTINUED | OUTPATIENT
Start: 2020-01-01 | End: 2020-01-01

## 2020-01-01 RX ORDER — ALBUTEROL SULFATE 2.5 MG/3ML
2.5 SOLUTION RESPIRATORY (INHALATION) EVERY 4 HOURS
Status: DISCONTINUED | OUTPATIENT
Start: 2020-01-01 | End: 2020-01-01 | Stop reason: ALTCHOICE

## 2020-01-01 RX ORDER — IPRATROPIUM BROMIDE AND ALBUTEROL SULFATE 2.5; .5 MG/3ML; MG/3ML
1 SOLUTION RESPIRATORY (INHALATION) 4 TIMES DAILY
Status: DISCONTINUED | OUTPATIENT
Start: 2020-01-01 | End: 2020-01-01

## 2020-01-01 RX ORDER — CIPROFLOXACIN 750 MG/1
750 TABLET, FILM COATED ORAL 2 TIMES DAILY
Qty: 24 TABLET | Refills: 0 | Status: SHIPPED | OUTPATIENT
Start: 2020-01-01 | End: 2020-01-01

## 2020-01-01 RX ORDER — HYDROXYUREA 500 MG/1
500 CAPSULE ORAL 2 TIMES DAILY
Status: DISCONTINUED | OUTPATIENT
Start: 2020-01-01 | End: 2020-01-01 | Stop reason: SDUPTHER

## 2020-01-01 RX ORDER — LEVALBUTEROL 1.25 MG/.5ML
1.26 SOLUTION, CONCENTRATE RESPIRATORY (INHALATION) ONCE
Status: COMPLETED | OUTPATIENT
Start: 2020-01-01 | End: 2020-01-01

## 2020-01-01 RX ORDER — DIGOXIN 0.25 MG/ML
125 INJECTION INTRAMUSCULAR; INTRAVENOUS ONCE
Status: COMPLETED | OUTPATIENT
Start: 2020-01-01 | End: 2020-01-01

## 2020-01-01 RX ORDER — DOCUSATE SODIUM 100 MG/1
100 CAPSULE, LIQUID FILLED ORAL DAILY
COMMUNITY

## 2020-01-01 RX ORDER — PREDNISONE 20 MG/1
40 TABLET ORAL DAILY
Status: DISCONTINUED | OUTPATIENT
Start: 2020-01-01 | End: 2020-01-01 | Stop reason: HOSPADM

## 2020-01-01 RX ORDER — DIGOXIN 0.25 MG/ML
250 INJECTION INTRAMUSCULAR; INTRAVENOUS EVERY 6 HOURS
Status: COMPLETED | OUTPATIENT
Start: 2020-01-01 | End: 2020-01-01

## 2020-01-01 RX ORDER — ALBUTEROL SULFATE 90 UG/1
2 AEROSOL, METERED RESPIRATORY (INHALATION) EVERY 4 HOURS
Status: DISCONTINUED | OUTPATIENT
Start: 2020-01-01 | End: 2020-01-01

## 2020-01-01 RX ORDER — PROMETHAZINE HYDROCHLORIDE 25 MG/1
12.5 TABLET ORAL EVERY 6 HOURS PRN
Status: DISCONTINUED | OUTPATIENT
Start: 2020-01-01 | End: 2020-01-01 | Stop reason: HOSPADM

## 2020-01-01 RX ORDER — FUROSEMIDE 20 MG/1
20 TABLET ORAL DAILY
Status: DISCONTINUED | OUTPATIENT
Start: 2020-01-01 | End: 2020-01-01

## 2020-01-01 RX ORDER — DIGOXIN 0.25 MG/ML
125 INJECTION INTRAMUSCULAR; INTRAVENOUS DAILY
Status: DISCONTINUED | OUTPATIENT
Start: 2020-01-01 | End: 2020-01-01 | Stop reason: HOSPADM

## 2020-01-01 RX ORDER — LIDOCAINE 40 MG/G
CREAM TOPICAL ONCE
Status: DISCONTINUED | OUTPATIENT
Start: 2020-01-01 | End: 2020-01-01 | Stop reason: HOSPADM

## 2020-01-01 RX ORDER — NICOTINE POLACRILEX 4 MG
15 LOZENGE BUCCAL PRN
Status: DISCONTINUED | OUTPATIENT
Start: 2020-01-01 | End: 2020-01-01 | Stop reason: HOSPADM

## 2020-01-01 RX ORDER — ALBUTEROL SULFATE 90 UG/1
2 AEROSOL, METERED RESPIRATORY (INHALATION) EVERY 6 HOURS PRN
Status: DISCONTINUED | OUTPATIENT
Start: 2020-01-01 | End: 2020-01-01

## 2020-01-01 RX ORDER — PREDNISONE 10 MG/1
TABLET ORAL
Qty: 30 TABLET | Refills: 0 | Status: SHIPPED | OUTPATIENT
Start: 2020-01-01 | End: 2020-01-01

## 2020-01-01 RX ORDER — HYDROXYUREA 500 MG/1
500 CAPSULE ORAL DAILY
Status: DISCONTINUED | OUTPATIENT
Start: 2020-01-01 | End: 2020-01-01

## 2020-01-01 RX ORDER — PROMETHAZINE HYDROCHLORIDE AND CODEINE PHOSPHATE 6.25; 1 MG/5ML; MG/5ML
5 SYRUP ORAL EVERY 8 HOURS PRN
Qty: 150 ML | Refills: 0 | Status: ON HOLD | OUTPATIENT
Start: 2020-01-01 | End: 2020-01-01 | Stop reason: HOSPADM

## 2020-01-01 RX ORDER — IPRATROPIUM BROMIDE AND ALBUTEROL SULFATE 2.5; .5 MG/3ML; MG/3ML
SOLUTION RESPIRATORY (INHALATION)
Qty: 360 ML | Refills: 5 | Status: SHIPPED | OUTPATIENT
Start: 2020-01-01

## 2020-01-01 RX ORDER — PREDNISONE 20 MG/1
40 TABLET ORAL DAILY
Status: DISCONTINUED | OUTPATIENT
Start: 2020-01-01 | End: 2020-01-01

## 2020-01-01 RX ORDER — GUAIFENESIN 600 MG/1
600 TABLET, EXTENDED RELEASE ORAL 2 TIMES DAILY
Status: DISCONTINUED | OUTPATIENT
Start: 2020-01-01 | End: 2020-01-01 | Stop reason: HOSPADM

## 2020-01-01 RX ORDER — SODIUM CHLORIDE 9 MG/ML
INJECTION, SOLUTION INTRAVENOUS CONTINUOUS
Status: DISCONTINUED | OUTPATIENT
Start: 2020-01-01 | End: 2020-01-01

## 2020-01-01 RX ORDER — PREDNISONE 10 MG/1
TABLET ORAL
Qty: 15 TABLET | Refills: 0 | Status: CANCELLED | OUTPATIENT
Start: 2020-01-01

## 2020-01-01 RX ORDER — M-VIT,TX,IRON,MINS/CALC/FOLIC 27MG-0.4MG
1 TABLET ORAL DAILY
Status: DISCONTINUED | OUTPATIENT
Start: 2020-01-01 | End: 2020-01-01 | Stop reason: HOSPADM

## 2020-01-01 RX ORDER — ALBUTEROL SULFATE 90 UG/1
2 AEROSOL, METERED RESPIRATORY (INHALATION) EVERY 6 HOURS PRN
Qty: 1 INHALER | Refills: 5 | Status: SHIPPED | OUTPATIENT
Start: 2020-01-01

## 2020-01-01 RX ORDER — IPRATROPIUM BROMIDE AND ALBUTEROL SULFATE 2.5; .5 MG/3ML; MG/3ML
1 SOLUTION RESPIRATORY (INHALATION)
Status: DISCONTINUED | OUTPATIENT
Start: 2020-01-01 | End: 2020-01-01

## 2020-01-01 RX ORDER — INSULIN GLARGINE 100 [IU]/ML
15 INJECTION, SOLUTION SUBCUTANEOUS NIGHTLY
Status: DISCONTINUED | OUTPATIENT
Start: 2020-01-01 | End: 2020-01-01

## 2020-01-01 RX ORDER — PREDNISONE 20 MG/1
60 TABLET ORAL ONCE
Status: COMPLETED | OUTPATIENT
Start: 2020-01-01 | End: 2020-01-01

## 2020-01-01 RX ORDER — SODIUM CHLORIDE 9 MG/ML
INJECTION, SOLUTION INTRAVENOUS
Status: DISPENSED
Start: 2020-01-01 | End: 2020-01-01

## 2020-01-01 RX ORDER — SODIUM CHLORIDE, SODIUM LACTATE, POTASSIUM CHLORIDE, CALCIUM CHLORIDE 600; 310; 30; 20 MG/100ML; MG/100ML; MG/100ML; MG/100ML
INJECTION, SOLUTION INTRAVENOUS
Status: COMPLETED
Start: 2020-01-01 | End: 2020-01-01

## 2020-01-01 RX ORDER — PROMETHAZINE HYDROCHLORIDE 25 MG/1
12.5 TABLET ORAL EVERY 6 HOURS PRN
Status: CANCELLED | OUTPATIENT
Start: 2020-01-01

## 2020-01-01 RX ORDER — KETOROLAC TROMETHAMINE 30 MG/ML
15 INJECTION, SOLUTION INTRAMUSCULAR; INTRAVENOUS ONCE
Status: COMPLETED | OUTPATIENT
Start: 2020-01-01 | End: 2020-01-01

## 2020-01-01 RX ORDER — IPRATROPIUM BROMIDE AND ALBUTEROL SULFATE 2.5; .5 MG/3ML; MG/3ML
3 SOLUTION RESPIRATORY (INHALATION) ONCE
Status: COMPLETED | OUTPATIENT
Start: 2020-01-01 | End: 2020-01-01

## 2020-01-01 RX ORDER — LIDOCAINE HYDROCHLORIDE 10 MG/ML
5 INJECTION, SOLUTION EPIDURAL; INFILTRATION; INTRACAUDAL; PERINEURAL ONCE
Status: COMPLETED | OUTPATIENT
Start: 2020-01-01 | End: 2020-01-01

## 2020-01-01 RX ORDER — TRAMADOL HYDROCHLORIDE 50 MG/1
50 TABLET ORAL EVERY 4 HOURS PRN
Status: DISCONTINUED | OUTPATIENT
Start: 2020-01-01 | End: 2020-01-01 | Stop reason: HOSPADM

## 2020-01-01 RX ORDER — ALBUTEROL SULFATE 2.5 MG/3ML
2.5 SOLUTION RESPIRATORY (INHALATION) EVERY 4 HOURS PRN
Status: DISCONTINUED | OUTPATIENT
Start: 2020-01-01 | End: 2020-01-01 | Stop reason: HOSPADM

## 2020-01-01 RX ORDER — FERROUS SULFATE 325(65) MG
325 TABLET ORAL
Status: DISCONTINUED | OUTPATIENT
Start: 2020-01-01 | End: 2020-01-01 | Stop reason: HOSPADM

## 2020-01-01 RX ORDER — ALBUTEROL SULFATE 90 UG/1
2 AEROSOL, METERED RESPIRATORY (INHALATION) EVERY 6 HOURS PRN
Qty: 1 INHALER | Refills: 5 | Status: SHIPPED | OUTPATIENT
Start: 2020-01-01 | End: 2020-01-01 | Stop reason: SDUPTHER

## 2020-01-01 RX ORDER — LEVOFLOXACIN 500 MG/1
500 TABLET, FILM COATED ORAL DAILY
Qty: 7 TABLET | Refills: 0 | Status: ON HOLD
Start: 2020-01-01 | End: 2020-01-01 | Stop reason: HOSPADM

## 2020-01-01 RX ORDER — METHYLPREDNISOLONE SODIUM SUCCINATE 40 MG/ML
40 INJECTION, POWDER, LYOPHILIZED, FOR SOLUTION INTRAMUSCULAR; INTRAVENOUS EVERY 12 HOURS
Status: DISCONTINUED | OUTPATIENT
Start: 2020-01-01 | End: 2020-01-01 | Stop reason: HOSPADM

## 2020-01-01 RX ORDER — METOPROLOL SUCCINATE 25 MG/1
12.5 TABLET, EXTENDED RELEASE ORAL DAILY
Status: DISCONTINUED | OUTPATIENT
Start: 2020-01-01 | End: 2020-01-01

## 2020-01-01 RX ORDER — POLYETHYLENE GLYCOL 3350 17 G/17G
17 POWDER, FOR SOLUTION ORAL DAILY PRN
Status: DISCONTINUED | OUTPATIENT
Start: 2020-01-01 | End: 2020-01-01 | Stop reason: HOSPADM

## 2020-01-01 RX ORDER — PREDNISONE 10 MG/1
TABLET ORAL
Qty: 18 TABLET | Refills: 0 | Status: SHIPPED | OUTPATIENT
Start: 2020-01-01 | End: 2020-01-01

## 2020-01-01 RX ORDER — SODIUM CHLORIDE 0.9 % (FLUSH) 0.9 %
10 SYRINGE (ML) INJECTION PRN
Status: DISCONTINUED | OUTPATIENT
Start: 2020-01-01 | End: 2020-01-01 | Stop reason: SDUPTHER

## 2020-01-01 RX ORDER — HYDROXYUREA 500 MG/1
500 CAPSULE ORAL DAILY
Status: DISCONTINUED | OUTPATIENT
Start: 2020-01-01 | End: 2020-01-01 | Stop reason: HOSPADM

## 2020-01-01 RX ORDER — SODIUM CHLORIDE 9 MG/ML
INJECTION, SOLUTION INTRAVENOUS
Status: COMPLETED
Start: 2020-01-01 | End: 2020-01-01

## 2020-01-01 RX ORDER — LISINOPRIL 5 MG/1
2.5 TABLET ORAL DAILY
Status: DISCONTINUED | OUTPATIENT
Start: 2020-01-01 | End: 2020-01-01

## 2020-01-01 RX ORDER — METHYLPREDNISOLONE 4 MG/1
TABLET ORAL
Qty: 1 KIT | Refills: 0 | Status: ON HOLD | OUTPATIENT
Start: 2020-01-01 | End: 2020-01-01 | Stop reason: HOSPADM

## 2020-01-01 RX ORDER — VITAMIN B COMPLEX
1000 TABLET ORAL DAILY
Status: DISCONTINUED | OUTPATIENT
Start: 2020-01-01 | End: 2020-01-01 | Stop reason: HOSPADM

## 2020-01-01 RX ORDER — LIDOCAINE HYDROCHLORIDE 40 MG/ML
SOLUTION TOPICAL ONCE
Status: DISCONTINUED | OUTPATIENT
Start: 2020-01-01 | End: 2020-01-01 | Stop reason: HOSPADM

## 2020-01-01 RX ORDER — 0.9 % SODIUM CHLORIDE 0.9 %
30 INTRAVENOUS SOLUTION INTRAVENOUS ONCE
Status: COMPLETED | OUTPATIENT
Start: 2020-01-01 | End: 2020-01-01

## 2020-01-01 RX ORDER — GUAIFENESIN 600 MG/1
600 TABLET, EXTENDED RELEASE ORAL 2 TIMES DAILY
Status: DISCONTINUED | OUTPATIENT
Start: 2020-01-01 | End: 2020-01-01

## 2020-01-01 RX ORDER — TRAMADOL HYDROCHLORIDE 50 MG/1
50 TABLET ORAL EVERY 6 HOURS PRN
Status: DISCONTINUED | OUTPATIENT
Start: 2020-01-01 | End: 2020-01-01

## 2020-01-01 RX ORDER — 0.9 % SODIUM CHLORIDE 0.9 %
250 INTRAVENOUS SOLUTION INTRAVENOUS
Status: DISCONTINUED | OUTPATIENT
Start: 2020-01-01 | End: 2020-01-01 | Stop reason: HOSPADM

## 2020-01-01 RX ORDER — METOPROLOL SUCCINATE 25 MG/1
25 TABLET, EXTENDED RELEASE ORAL DAILY
Status: DISCONTINUED | OUTPATIENT
Start: 2020-01-01 | End: 2020-01-01

## 2020-01-01 RX ORDER — METHYLPREDNISOLONE SODIUM SUCCINATE 125 MG/2ML
125 INJECTION, POWDER, LYOPHILIZED, FOR SOLUTION INTRAMUSCULAR; INTRAVENOUS ONCE
Status: COMPLETED | OUTPATIENT
Start: 2020-01-01 | End: 2020-01-01

## 2020-01-01 RX ADMIN — AMIODARONE HYDROCHLORIDE 150 MG: 50 INJECTION, SOLUTION INTRAVENOUS at 18:40

## 2020-01-01 RX ADMIN — HYDROXYUREA 500 MG: 500 CAPSULE ORAL at 09:59

## 2020-01-01 RX ADMIN — Medication 10 ML: at 23:19

## 2020-01-01 RX ADMIN — IPRATROPIUM BROMIDE AND ALBUTEROL SULFATE 1 AMPULE: .5; 3 SOLUTION RESPIRATORY (INHALATION) at 07:29

## 2020-01-01 RX ADMIN — FUROSEMIDE 20 MG: 20 TABLET ORAL at 16:30

## 2020-01-01 RX ADMIN — DOXYCYCLINE HYCLATE 100 MG: 100 TABLET, COATED ORAL at 16:30

## 2020-01-01 RX ADMIN — ENOXAPARIN SODIUM 40 MG: 40 INJECTION SUBCUTANEOUS at 08:22

## 2020-01-01 RX ADMIN — DEXTROSE 50 % IN WATER (D50W) INTRAVENOUS SYRINGE 12.5 G: at 07:40

## 2020-01-01 RX ADMIN — SODIUM CHLORIDE 500 ML: 9 INJECTION, SOLUTION INTRAVENOUS at 16:32

## 2020-01-01 RX ADMIN — METHYLPREDNISOLONE SODIUM SUCCINATE 40 MG: 40 INJECTION, POWDER, FOR SOLUTION INTRAMUSCULAR; INTRAVENOUS at 18:07

## 2020-01-01 RX ADMIN — CEFEPIME 2 G: 2 INJECTION, POWDER, FOR SOLUTION INTRAVENOUS at 11:27

## 2020-01-01 RX ADMIN — GUAIFENESIN 600 MG: 600 TABLET, EXTENDED RELEASE ORAL at 20:56

## 2020-01-01 RX ADMIN — Medication 10 ML: at 21:06

## 2020-01-01 RX ADMIN — SPIRONOLACTONE 25 MG: 25 TABLET ORAL at 08:16

## 2020-01-01 RX ADMIN — IPRATROPIUM BROMIDE AND ALBUTEROL SULFATE 1 AMPULE: .5; 3 SOLUTION RESPIRATORY (INHALATION) at 07:39

## 2020-01-01 RX ADMIN — VANCOMYCIN HYDROCHLORIDE 1000 MG: 10 INJECTION, POWDER, LYOPHILIZED, FOR SOLUTION INTRAVENOUS at 04:08

## 2020-01-01 RX ADMIN — FERROUS SULFATE TAB 325 MG (65 MG ELEMENTAL FE) 325 MG: 325 (65 FE) TAB at 08:16

## 2020-01-01 RX ADMIN — ASPIRIN 81 MG 81 MG: 81 TABLET ORAL at 12:20

## 2020-01-01 RX ADMIN — VANCOMYCIN HYDROCHLORIDE 750 MG: 10 INJECTION, POWDER, LYOPHILIZED, FOR SOLUTION INTRAVENOUS at 12:13

## 2020-01-01 RX ADMIN — HYDROXYUREA 500 MG: 500 CAPSULE ORAL at 08:36

## 2020-01-01 RX ADMIN — GUAIFENESIN 600 MG: 600 TABLET, EXTENDED RELEASE ORAL at 21:57

## 2020-01-01 RX ADMIN — AZITHROMYCIN 500 MG: 500 INJECTION, POWDER, LYOPHILIZED, FOR SOLUTION INTRAVENOUS at 16:44

## 2020-01-01 RX ADMIN — INSULIN GLARGINE 15 UNITS: 100 INJECTION, SOLUTION SUBCUTANEOUS at 03:28

## 2020-01-01 RX ADMIN — SODIUM CHLORIDE 250 ML: 9 INJECTION, SOLUTION INTRAVENOUS at 01:21

## 2020-01-01 RX ADMIN — IPRATROPIUM BROMIDE AND ALBUTEROL SULFATE 1 AMPULE: .5; 3 SOLUTION RESPIRATORY (INHALATION) at 19:20

## 2020-01-01 RX ADMIN — IPRATROPIUM BROMIDE AND ALBUTEROL SULFATE 1 AMPULE: .5; 3 SOLUTION RESPIRATORY (INHALATION) at 23:33

## 2020-01-01 RX ADMIN — IPRATROPIUM BROMIDE AND ALBUTEROL SULFATE 1 AMPULE: .5; 3 SOLUTION RESPIRATORY (INHALATION) at 00:12

## 2020-01-01 RX ADMIN — IPRATROPIUM BROMIDE AND ALBUTEROL SULFATE 1 AMPULE: .5; 3 SOLUTION RESPIRATORY (INHALATION) at 13:44

## 2020-01-01 RX ADMIN — TRAMADOL HYDROCHLORIDE 50 MG: 50 TABLET, FILM COATED ORAL at 22:38

## 2020-01-01 RX ADMIN — ENOXAPARIN SODIUM 40 MG: 40 INJECTION SUBCUTANEOUS at 08:17

## 2020-01-01 RX ADMIN — VANCOMYCIN HYDROCHLORIDE 750 MG: 10 INJECTION, POWDER, LYOPHILIZED, FOR SOLUTION INTRAVENOUS at 23:09

## 2020-01-01 RX ADMIN — INSULIN LISPRO 2 UNITS: 100 INJECTION, SOLUTION INTRAVENOUS; SUBCUTANEOUS at 23:17

## 2020-01-01 RX ADMIN — INSULIN LISPRO 18 UNITS: 100 INJECTION, SOLUTION INTRAVENOUS; SUBCUTANEOUS at 16:48

## 2020-01-01 RX ADMIN — GUAIFENESIN 600 MG: 600 TABLET, EXTENDED RELEASE ORAL at 20:15

## 2020-01-01 RX ADMIN — ENOXAPARIN SODIUM 40 MG: 40 INJECTION SUBCUTANEOUS at 08:41

## 2020-01-01 RX ADMIN — SODIUM CHLORIDE, POTASSIUM CHLORIDE, SODIUM LACTATE AND CALCIUM CHLORIDE 1000 ML: 600; 310; 30; 20 INJECTION, SOLUTION INTRAVENOUS at 13:50

## 2020-01-01 RX ADMIN — MULTIPLE VITAMINS W/ MINERALS TAB 1 TABLET: TAB at 08:41

## 2020-01-01 RX ADMIN — IPRATROPIUM BROMIDE AND ALBUTEROL SULFATE 1 AMPULE: .5; 3 SOLUTION RESPIRATORY (INHALATION) at 19:33

## 2020-01-01 RX ADMIN — DEXTROSE 1 MG/MIN: 5 SOLUTION INTRAVENOUS at 18:54

## 2020-01-01 RX ADMIN — CEFEPIME 2 G: 2 INJECTION, POWDER, FOR SOLUTION INTRAVENOUS at 20:01

## 2020-01-01 RX ADMIN — Medication 10 ML: at 20:56

## 2020-01-01 RX ADMIN — DIGOXIN 250 MCG: 0.25 INJECTION INTRAMUSCULAR; INTRAVENOUS at 19:57

## 2020-01-01 RX ADMIN — SODIUM CHLORIDE: 9 INJECTION, SOLUTION INTRAVENOUS at 08:11

## 2020-01-01 RX ADMIN — ENOXAPARIN SODIUM 40 MG: 40 INJECTION SUBCUTANEOUS at 08:47

## 2020-01-01 RX ADMIN — METHYLPREDNISOLONE SODIUM SUCCINATE 40 MG: 40 INJECTION, POWDER, FOR SOLUTION INTRAMUSCULAR; INTRAVENOUS at 16:01

## 2020-01-01 RX ADMIN — GUAIFENESIN 600 MG: 600 TABLET, EXTENDED RELEASE ORAL at 10:01

## 2020-01-01 RX ADMIN — INSULIN LISPRO 4 UNITS: 100 INJECTION, SOLUTION INTRAVENOUS; SUBCUTANEOUS at 15:16

## 2020-01-01 RX ADMIN — Medication 10 ML: at 09:18

## 2020-01-01 RX ADMIN — FUROSEMIDE 20 MG: 20 TABLET ORAL at 09:18

## 2020-01-01 RX ADMIN — METHYLPREDNISOLONE SODIUM SUCCINATE 125 MG: 125 INJECTION, POWDER, FOR SOLUTION INTRAMUSCULAR; INTRAVENOUS at 12:41

## 2020-01-01 RX ADMIN — INSULIN LISPRO 1 UNITS: 100 INJECTION, SOLUTION INTRAVENOUS; SUBCUTANEOUS at 20:30

## 2020-01-01 RX ADMIN — INSULIN LISPRO 2 UNITS: 100 INJECTION, SOLUTION INTRAVENOUS; SUBCUTANEOUS at 20:10

## 2020-01-01 RX ADMIN — ALBUTEROL SULFATE 7.5 MG: 2.5 SOLUTION RESPIRATORY (INHALATION) at 15:21

## 2020-01-01 RX ADMIN — IPRATROPIUM BROMIDE AND ALBUTEROL SULFATE 1 AMPULE: .5; 3 SOLUTION RESPIRATORY (INHALATION) at 11:03

## 2020-01-01 RX ADMIN — ALBUTEROL SULFATE 2.5 MG: 2.5 SOLUTION RESPIRATORY (INHALATION) at 23:47

## 2020-01-01 RX ADMIN — IPRATROPIUM BROMIDE AND ALBUTEROL SULFATE 1 AMPULE: .5; 3 SOLUTION RESPIRATORY (INHALATION) at 07:25

## 2020-01-01 RX ADMIN — PREDNISONE 60 MG: 20 TABLET ORAL at 13:44

## 2020-01-01 RX ADMIN — Medication 2 PUFF: at 22:51

## 2020-01-01 RX ADMIN — IPRATROPIUM BROMIDE AND ALBUTEROL SULFATE 1 AMPULE: .5; 3 SOLUTION RESPIRATORY (INHALATION) at 23:23

## 2020-01-01 RX ADMIN — IPRATROPIUM BROMIDE AND ALBUTEROL SULFATE 1 AMPULE: .5; 3 SOLUTION RESPIRATORY (INHALATION) at 23:20

## 2020-01-01 RX ADMIN — INSULIN LISPRO 4 UNITS: 100 INJECTION, SOLUTION INTRAVENOUS; SUBCUTANEOUS at 10:02

## 2020-01-01 RX ADMIN — ALBUTEROL SULFATE 2.5 MG: 2.5 SOLUTION RESPIRATORY (INHALATION) at 22:50

## 2020-01-01 RX ADMIN — VANCOMYCIN HYDROCHLORIDE 750 MG: 10 INJECTION, POWDER, LYOPHILIZED, FOR SOLUTION INTRAVENOUS at 20:51

## 2020-01-01 RX ADMIN — INSULIN LISPRO 1 UNITS: 100 INJECTION, SOLUTION INTRAVENOUS; SUBCUTANEOUS at 20:49

## 2020-01-01 RX ADMIN — ENOXAPARIN SODIUM 40 MG: 40 INJECTION SUBCUTANEOUS at 10:01

## 2020-01-01 RX ADMIN — INSULIN LISPRO 4 UNITS: 100 INJECTION, SOLUTION INTRAVENOUS; SUBCUTANEOUS at 12:21

## 2020-01-01 RX ADMIN — ALBUTEROL SULFATE 2.5 MG: 2.5 SOLUTION RESPIRATORY (INHALATION) at 19:12

## 2020-01-01 RX ADMIN — CEFEPIME HYDROCHLORIDE 1 G: 1 INJECTION, POWDER, FOR SOLUTION INTRAMUSCULAR; INTRAVENOUS at 03:07

## 2020-01-01 RX ADMIN — INSULIN LISPRO 18 UNITS: 100 INJECTION, SOLUTION INTRAVENOUS; SUBCUTANEOUS at 20:45

## 2020-01-01 RX ADMIN — ASPIRIN 81 MG 81 MG: 81 TABLET ORAL at 08:34

## 2020-01-01 RX ADMIN — GUAIFENESIN 600 MG: 600 TABLET, EXTENDED RELEASE ORAL at 08:16

## 2020-01-01 RX ADMIN — FUROSEMIDE 20 MG: 20 TABLET ORAL at 08:17

## 2020-01-01 RX ADMIN — IPRATROPIUM BROMIDE AND ALBUTEROL SULFATE 1 AMPULE: .5; 3 SOLUTION RESPIRATORY (INHALATION) at 07:05

## 2020-01-01 RX ADMIN — SODIUM CHLORIDE 500 ML: 9 INJECTION, SOLUTION INTRAVENOUS at 15:05

## 2020-01-01 RX ADMIN — ALBUTEROL SULFATE 2.5 MG: 2.5 SOLUTION RESPIRATORY (INHALATION) at 22:33

## 2020-01-01 RX ADMIN — MULTIPLE VITAMINS W/ MINERALS TAB 1 TABLET: TAB at 18:32

## 2020-01-01 RX ADMIN — GUAIFENESIN 600 MG: 600 TABLET, EXTENDED RELEASE ORAL at 08:47

## 2020-01-01 RX ADMIN — SODIUM CHLORIDE: 9 INJECTION, SOLUTION INTRAVENOUS at 23:17

## 2020-01-01 RX ADMIN — VANCOMYCIN HYDROCHLORIDE 1000 MG: 10 INJECTION, POWDER, LYOPHILIZED, FOR SOLUTION INTRAVENOUS at 15:14

## 2020-01-01 RX ADMIN — ASPIRIN 81 MG 81 MG: 81 TABLET ORAL at 08:16

## 2020-01-01 RX ADMIN — METOPROLOL SUCCINATE 12.5 MG: 25 TABLET, EXTENDED RELEASE ORAL at 16:29

## 2020-01-01 RX ADMIN — TRAMADOL HYDROCHLORIDE 50 MG: 50 TABLET, FILM COATED ORAL at 00:16

## 2020-01-01 RX ADMIN — VANCOMYCIN HYDROCHLORIDE 1000 MG: 10 INJECTION, POWDER, LYOPHILIZED, FOR SOLUTION INTRAVENOUS at 01:50

## 2020-01-01 RX ADMIN — AZITHROMYCIN 500 MG: 500 INJECTION, POWDER, LYOPHILIZED, FOR SOLUTION INTRAVENOUS at 17:53

## 2020-01-01 RX ADMIN — INSULIN LISPRO 2 UNITS: 100 INJECTION, SOLUTION INTRAVENOUS; SUBCUTANEOUS at 08:00

## 2020-01-01 RX ADMIN — ASPIRIN 81 MG 81 MG: 81 TABLET ORAL at 18:31

## 2020-01-01 RX ADMIN — ALBUTEROL SULFATE 2.5 MG: 2.5 SOLUTION RESPIRATORY (INHALATION) at 02:43

## 2020-01-01 RX ADMIN — DIGOXIN 250 MCG: 0.25 INJECTION INTRAMUSCULAR; INTRAVENOUS at 17:22

## 2020-01-01 RX ADMIN — METOPROLOL SUCCINATE 12.5 MG: 25 TABLET, EXTENDED RELEASE ORAL at 09:18

## 2020-01-01 RX ADMIN — METHYLPREDNISOLONE SODIUM SUCCINATE 40 MG: 40 INJECTION, POWDER, FOR SOLUTION INTRAMUSCULAR; INTRAVENOUS at 04:28

## 2020-01-01 RX ADMIN — METHYLPREDNISOLONE SODIUM SUCCINATE 40 MG: 40 INJECTION, POWDER, FOR SOLUTION INTRAMUSCULAR; INTRAVENOUS at 16:32

## 2020-01-01 RX ADMIN — CEFEPIME 1 G: 1 INJECTION, POWDER, FOR SOLUTION INTRAMUSCULAR; INTRAVENOUS at 17:07

## 2020-01-01 RX ADMIN — INSULIN LISPRO 6 UNITS: 100 INJECTION, SOLUTION INTRAVENOUS; SUBCUTANEOUS at 12:58

## 2020-01-01 RX ADMIN — IPRATROPIUM BROMIDE AND ALBUTEROL SULFATE 1 AMPULE: .5; 3 SOLUTION RESPIRATORY (INHALATION) at 15:09

## 2020-01-01 RX ADMIN — ALBUTEROL SULFATE 2.5 MG: 2.5 SOLUTION RESPIRATORY (INHALATION) at 23:11

## 2020-01-01 RX ADMIN — IPRATROPIUM BROMIDE AND ALBUTEROL SULFATE 1 AMPULE: .5; 3 SOLUTION RESPIRATORY (INHALATION) at 10:34

## 2020-01-01 RX ADMIN — INSULIN LISPRO 7 UNITS: 100 INJECTION, SOLUTION INTRAVENOUS; SUBCUTANEOUS at 22:30

## 2020-01-01 RX ADMIN — METHYLPREDNISOLONE SODIUM SUCCINATE 40 MG: 40 INJECTION, POWDER, FOR SOLUTION INTRAMUSCULAR; INTRAVENOUS at 11:39

## 2020-01-01 RX ADMIN — ALBUTEROL SULFATE 2.5 MG: 2.5 SOLUTION RESPIRATORY (INHALATION) at 07:20

## 2020-01-01 RX ADMIN — ENOXAPARIN SODIUM 40 MG: 40 INJECTION SUBCUTANEOUS at 11:28

## 2020-01-01 RX ADMIN — METFORMIN HYDROCHLORIDE 500 MG: 500 TABLET ORAL at 08:47

## 2020-01-01 RX ADMIN — Medication 10 ML: at 08:17

## 2020-01-01 RX ADMIN — ASPIRIN 81 MG 81 MG: 81 TABLET ORAL at 08:47

## 2020-01-01 RX ADMIN — INSULIN LISPRO 4 UNITS: 100 INJECTION, SOLUTION INTRAVENOUS; SUBCUTANEOUS at 12:10

## 2020-01-01 RX ADMIN — ENOXAPARIN SODIUM 40 MG: 40 INJECTION SUBCUTANEOUS at 10:46

## 2020-01-01 RX ADMIN — METHYLPREDNISOLONE SODIUM SUCCINATE 40 MG: 40 INJECTION, POWDER, FOR SOLUTION INTRAMUSCULAR; INTRAVENOUS at 10:14

## 2020-01-01 RX ADMIN — Medication 2 PUFF: at 19:11

## 2020-01-01 RX ADMIN — Medication 10 ML: at 23:54

## 2020-01-01 RX ADMIN — ENOXAPARIN SODIUM 40 MG: 40 INJECTION SUBCUTANEOUS at 08:05

## 2020-01-01 RX ADMIN — GUAIFENESIN 600 MG: 600 TABLET, EXTENDED RELEASE ORAL at 08:41

## 2020-01-01 RX ADMIN — GUAIFENESIN 600 MG: 600 TABLET, EXTENDED RELEASE ORAL at 20:14

## 2020-01-01 RX ADMIN — DOXYCYCLINE HYCLATE 100 MG: 100 TABLET, COATED ORAL at 05:19

## 2020-01-01 RX ADMIN — FUROSEMIDE 20 MG: 10 INJECTION, SOLUTION INTRAVENOUS at 15:21

## 2020-01-01 RX ADMIN — ALBUTEROL SULFATE 2.5 MG: 2.5 SOLUTION RESPIRATORY (INHALATION) at 06:40

## 2020-01-01 RX ADMIN — METHYLPREDNISOLONE SODIUM SUCCINATE 40 MG: 40 INJECTION, POWDER, FOR SOLUTION INTRAMUSCULAR; INTRAVENOUS at 23:53

## 2020-01-01 RX ADMIN — INSULIN LISPRO 3 UNITS: 100 INJECTION, SOLUTION INTRAVENOUS; SUBCUTANEOUS at 17:11

## 2020-01-01 RX ADMIN — INSULIN LISPRO 6 UNITS: 100 INJECTION, SOLUTION INTRAVENOUS; SUBCUTANEOUS at 16:46

## 2020-01-01 RX ADMIN — Medication 10 ML: at 20:14

## 2020-01-01 RX ADMIN — LISINOPRIL 2.5 MG: 5 TABLET ORAL at 11:53

## 2020-01-01 RX ADMIN — CEFEPIME HYDROCHLORIDE 1 G: 1 INJECTION, POWDER, FOR SOLUTION INTRAMUSCULAR; INTRAVENOUS at 15:29

## 2020-01-01 RX ADMIN — GUAIFENESIN 600 MG: 600 TABLET, EXTENDED RELEASE ORAL at 09:17

## 2020-01-01 RX ADMIN — OXYCODONE HYDROCHLORIDE AND ACETAMINOPHEN 500 MG: 500 TABLET ORAL at 18:31

## 2020-01-01 RX ADMIN — TRAMADOL HYDROCHLORIDE 50 MG: 50 TABLET, FILM COATED ORAL at 02:10

## 2020-01-01 RX ADMIN — MULTIPLE VITAMINS W/ MINERALS TAB 1 TABLET: TAB at 08:47

## 2020-01-01 RX ADMIN — ALBUTEROL SULFATE 2.5 MG: 2.5 SOLUTION RESPIRATORY (INHALATION) at 15:37

## 2020-01-01 RX ADMIN — INSULIN LISPRO 6 UNITS: 100 INJECTION, SOLUTION INTRAVENOUS; SUBCUTANEOUS at 13:02

## 2020-01-01 RX ADMIN — ASPIRIN 81 MG 81 MG: 81 TABLET ORAL at 08:05

## 2020-01-01 RX ADMIN — IPRATROPIUM BROMIDE AND ALBUTEROL SULFATE 1 AMPULE: .5; 3 SOLUTION RESPIRATORY (INHALATION) at 19:44

## 2020-01-01 RX ADMIN — ALBUTEROL SULFATE 2.5 MG: 2.5 SOLUTION RESPIRATORY (INHALATION) at 03:13

## 2020-01-01 RX ADMIN — VANCOMYCIN HYDROCHLORIDE 750 MG: 10 INJECTION, POWDER, LYOPHILIZED, FOR SOLUTION INTRAVENOUS at 23:53

## 2020-01-01 RX ADMIN — METHYLPREDNISOLONE SODIUM SUCCINATE 40 MG: 40 INJECTION, POWDER, FOR SOLUTION INTRAMUSCULAR; INTRAVENOUS at 05:38

## 2020-01-01 RX ADMIN — IPRATROPIUM BROMIDE AND ALBUTEROL SULFATE 1 AMPULE: .5; 3 SOLUTION RESPIRATORY (INHALATION) at 06:42

## 2020-01-01 RX ADMIN — METOPROLOL SUCCINATE 25 MG: 25 TABLET, EXTENDED RELEASE ORAL at 08:16

## 2020-01-01 RX ADMIN — IPRATROPIUM BROMIDE AND ALBUTEROL SULFATE 1 AMPULE: .5; 3 SOLUTION RESPIRATORY (INHALATION) at 11:25

## 2020-01-01 RX ADMIN — INSULIN LISPRO 1 UNITS: 100 INJECTION, SOLUTION INTRAVENOUS; SUBCUTANEOUS at 21:58

## 2020-01-01 RX ADMIN — DOXYCYCLINE HYCLATE 100 MG: 100 TABLET, COATED ORAL at 05:45

## 2020-01-01 RX ADMIN — ALBUTEROL SULFATE 2.5 MG: 2.5 SOLUTION RESPIRATORY (INHALATION) at 11:46

## 2020-01-01 RX ADMIN — GUAIFENESIN 600 MG: 600 TABLET, EXTENDED RELEASE ORAL at 10:46

## 2020-01-01 RX ADMIN — Medication 10 ML: at 08:36

## 2020-01-01 RX ADMIN — POLYETHYLENE GLYCOL (3350) 17 G: 17 POWDER, FOR SOLUTION ORAL at 09:04

## 2020-01-01 RX ADMIN — ASPIRIN 81 MG 81 MG: 81 TABLET ORAL at 10:47

## 2020-01-01 RX ADMIN — PREDNISONE 40 MG: 20 TABLET ORAL at 13:32

## 2020-01-01 RX ADMIN — ALBUTEROL SULFATE 2.5 MG: 2.5 SOLUTION RESPIRATORY (INHALATION) at 10:48

## 2020-01-01 RX ADMIN — METHYLPREDNISOLONE SODIUM SUCCINATE 40 MG: 40 INJECTION, POWDER, FOR SOLUTION INTRAMUSCULAR; INTRAVENOUS at 18:32

## 2020-01-01 RX ADMIN — Medication 10 ML: at 08:22

## 2020-01-01 RX ADMIN — IPRATROPIUM BROMIDE AND ALBUTEROL SULFATE 1 AMPULE: .5; 3 SOLUTION RESPIRATORY (INHALATION) at 11:10

## 2020-01-01 RX ADMIN — GUAIFENESIN 600 MG: 600 TABLET, EXTENDED RELEASE ORAL at 08:34

## 2020-01-01 RX ADMIN — IPRATROPIUM BROMIDE AND ALBUTEROL SULFATE 1 AMPULE: .5; 3 SOLUTION RESPIRATORY (INHALATION) at 20:10

## 2020-01-01 RX ADMIN — SODIUM CHLORIDE, PRESERVATIVE FREE 10 ML: 5 INJECTION INTRAVENOUS at 10:15

## 2020-01-01 RX ADMIN — IPRATROPIUM BROMIDE AND ALBUTEROL SULFATE 1 AMPULE: .5; 3 SOLUTION RESPIRATORY (INHALATION) at 10:58

## 2020-01-01 RX ADMIN — METHYLPREDNISOLONE SODIUM SUCCINATE 40 MG: 40 INJECTION, POWDER, FOR SOLUTION INTRAMUSCULAR; INTRAVENOUS at 16:49

## 2020-01-01 RX ADMIN — IPRATROPIUM BROMIDE AND ALBUTEROL SULFATE 1 AMPULE: .5; 3 SOLUTION RESPIRATORY (INHALATION) at 13:01

## 2020-01-01 RX ADMIN — CEFEPIME 2 G: 2 INJECTION, POWDER, FOR SOLUTION INTRAVENOUS at 08:22

## 2020-01-01 RX ADMIN — ALBUTEROL SULFATE 2.5 MG: 2.5 SOLUTION RESPIRATORY (INHALATION) at 19:01

## 2020-01-01 RX ADMIN — TRAMADOL HYDROCHLORIDE 50 MG: 50 TABLET, FILM COATED ORAL at 16:30

## 2020-01-01 RX ADMIN — CEFEPIME HYDROCHLORIDE 1 G: 1 INJECTION, POWDER, FOR SOLUTION INTRAMUSCULAR; INTRAVENOUS at 16:54

## 2020-01-01 RX ADMIN — IPRATROPIUM BROMIDE AND ALBUTEROL SULFATE 1 AMPULE: .5; 3 SOLUTION RESPIRATORY (INHALATION) at 03:35

## 2020-01-01 RX ADMIN — ENOXAPARIN SODIUM 40 MG: 40 INJECTION SUBCUTANEOUS at 08:35

## 2020-01-01 RX ADMIN — SODIUM CHLORIDE, SODIUM LACTATE, POTASSIUM CHLORIDE, AND CALCIUM CHLORIDE 1000 ML: .6; .31; .03; .02 INJECTION, SOLUTION INTRAVENOUS at 13:50

## 2020-01-01 RX ADMIN — IPRATROPIUM BROMIDE AND ALBUTEROL SULFATE 3 AMPULE: .5; 3 SOLUTION RESPIRATORY (INHALATION) at 12:54

## 2020-01-01 RX ADMIN — ASPIRIN 81 MG 81 MG: 81 TABLET ORAL at 09:59

## 2020-01-01 RX ADMIN — DIGOXIN 250 MCG: 0.25 INJECTION INTRAMUSCULAR; INTRAVENOUS at 15:19

## 2020-01-01 RX ADMIN — Medication 10 ML: at 20:59

## 2020-01-01 RX ADMIN — ALBUTEROL SULFATE 2.5 MG: 2.5 SOLUTION RESPIRATORY (INHALATION) at 10:24

## 2020-01-01 RX ADMIN — Medication 1000 UNITS: at 18:31

## 2020-01-01 RX ADMIN — ALBUTEROL SULFATE 2.5 MG: 2.5 SOLUTION RESPIRATORY (INHALATION) at 11:08

## 2020-01-01 RX ADMIN — SODIUM CHLORIDE: 9 INJECTION, SOLUTION INTRAVENOUS at 04:09

## 2020-01-01 RX ADMIN — SPIRONOLACTONE 25 MG: 25 TABLET ORAL at 09:18

## 2020-01-01 RX ADMIN — INSULIN LISPRO 9 UNITS: 100 INJECTION, SOLUTION INTRAVENOUS; SUBCUTANEOUS at 03:29

## 2020-01-01 RX ADMIN — SODIUM CHLORIDE 1000 ML: 9 INJECTION, SOLUTION INTRAVENOUS at 19:50

## 2020-01-01 RX ADMIN — Medication 10 ML: at 13:15

## 2020-01-01 RX ADMIN — CEFEPIME HYDROCHLORIDE 1 G: 1 INJECTION, POWDER, FOR SOLUTION INTRAMUSCULAR; INTRAVENOUS at 05:44

## 2020-01-01 RX ADMIN — LIDOCAINE HYDROCHLORIDE 5 ML: 10 INJECTION, SOLUTION EPIDURAL; INFILTRATION; INTRACAUDAL; PERINEURAL at 13:14

## 2020-01-01 RX ADMIN — HYDROXYUREA 500 MG: 500 CAPSULE ORAL at 08:42

## 2020-01-01 RX ADMIN — ASPIRIN 81 MG 81 MG: 81 TABLET ORAL at 17:25

## 2020-01-01 RX ADMIN — ACETAMINOPHEN 650 MG: 325 TABLET ORAL at 20:27

## 2020-01-01 RX ADMIN — CEFEPIME 2 G: 2 INJECTION, POWDER, FOR SOLUTION INTRAVENOUS at 20:52

## 2020-01-01 RX ADMIN — Medication 10 ML: at 20:05

## 2020-01-01 RX ADMIN — DOXYCYCLINE HYCLATE 100 MG: 100 TABLET, COATED ORAL at 05:10

## 2020-01-01 RX ADMIN — ACETAMINOPHEN 650 MG: 325 TABLET ORAL at 19:03

## 2020-01-01 RX ADMIN — METHYLPREDNISOLONE SODIUM SUCCINATE 40 MG: 40 INJECTION, POWDER, FOR SOLUTION INTRAMUSCULAR; INTRAVENOUS at 06:18

## 2020-01-01 RX ADMIN — PREDNISONE 40 MG: 20 TABLET ORAL at 08:17

## 2020-01-01 RX ADMIN — ACETAMINOPHEN 650 MG: 325 TABLET ORAL at 17:16

## 2020-01-01 RX ADMIN — ASPIRIN 81 MG 81 MG: 81 TABLET ORAL at 10:01

## 2020-01-01 RX ADMIN — CEFEPIME HYDROCHLORIDE 1 G: 1 INJECTION, POWDER, FOR SOLUTION INTRAMUSCULAR; INTRAVENOUS at 15:45

## 2020-01-01 RX ADMIN — ENOXAPARIN SODIUM 40 MG: 40 INJECTION SUBCUTANEOUS at 20:15

## 2020-01-01 RX ADMIN — SODIUM CHLORIDE: 9 INJECTION, SOLUTION INTRAVENOUS at 16:54

## 2020-01-01 RX ADMIN — Medication 1000 UNITS: at 08:47

## 2020-01-01 RX ADMIN — SODIUM CHLORIDE 250 ML: 9 INJECTION, SOLUTION INTRAVENOUS at 18:47

## 2020-01-01 RX ADMIN — SODIUM CHLORIDE 1524 ML: 9 INJECTION, SOLUTION INTRAVENOUS at 12:40

## 2020-01-01 RX ADMIN — Medication 10 ML: at 22:30

## 2020-01-01 RX ADMIN — PREDNISONE 40 MG: 20 TABLET ORAL at 08:34

## 2020-01-01 RX ADMIN — FUROSEMIDE 20 MG: 10 INJECTION, SOLUTION INTRAVENOUS at 11:41

## 2020-01-01 RX ADMIN — Medication 10 ML: at 12:37

## 2020-01-01 RX ADMIN — IPRATROPIUM BROMIDE AND ALBUTEROL SULFATE 1 AMPULE: .5; 3 SOLUTION RESPIRATORY (INHALATION) at 20:40

## 2020-01-01 RX ADMIN — INSULIN LISPRO 2 UNITS: 100 INJECTION, SOLUTION INTRAVENOUS; SUBCUTANEOUS at 08:46

## 2020-01-01 RX ADMIN — CEFEPIME 2 G: 2 INJECTION, POWDER, FOR SOLUTION INTRAVENOUS at 16:01

## 2020-01-01 RX ADMIN — METHYLPREDNISOLONE SODIUM SUCCINATE 40 MG: 40 INJECTION, POWDER, FOR SOLUTION INTRAMUSCULAR; INTRAVENOUS at 23:08

## 2020-01-01 RX ADMIN — Medication 1000 UNITS: at 08:41

## 2020-01-01 RX ADMIN — GUAIFENESIN 600 MG: 600 TABLET, EXTENDED RELEASE ORAL at 21:06

## 2020-01-01 RX ADMIN — VANCOMYCIN HYDROCHLORIDE 750 MG: 10 INJECTION, POWDER, LYOPHILIZED, FOR SOLUTION INTRAVENOUS at 15:19

## 2020-01-01 RX ADMIN — DOXYCYCLINE HYCLATE 100 MG: 100 TABLET, COATED ORAL at 17:16

## 2020-01-01 RX ADMIN — ALBUTEROL SULFATE 2.5 MG: 2.5 SOLUTION RESPIRATORY (INHALATION) at 11:45

## 2020-01-01 RX ADMIN — ALBUTEROL SULFATE 2.5 MG: 2.5 SOLUTION RESPIRATORY (INHALATION) at 07:40

## 2020-01-01 RX ADMIN — METHYLPREDNISOLONE SODIUM SUCCINATE 40 MG: 40 INJECTION, POWDER, FOR SOLUTION INTRAMUSCULAR; INTRAVENOUS at 04:09

## 2020-01-01 RX ADMIN — INSULIN LISPRO 2 UNITS: 100 INJECTION, SOLUTION INTRAVENOUS; SUBCUTANEOUS at 08:48

## 2020-01-01 RX ADMIN — ALBUTEROL SULFATE 2.5 MG: 2.5 SOLUTION RESPIRATORY (INHALATION) at 16:32

## 2020-01-01 RX ADMIN — HYDROXYUREA 500 MG: 500 CAPSULE ORAL at 12:58

## 2020-01-01 RX ADMIN — INSULIN LISPRO 4 UNITS: 100 INJECTION, SOLUTION INTRAVENOUS; SUBCUTANEOUS at 18:16

## 2020-01-01 RX ADMIN — METHYLPREDNISOLONE SODIUM SUCCINATE 40 MG: 40 INJECTION, POWDER, FOR SOLUTION INTRAMUSCULAR; INTRAVENOUS at 03:28

## 2020-01-01 RX ADMIN — POLYETHYLENE GLYCOL (3350) 17 G: 17 POWDER, FOR SOLUTION ORAL at 17:06

## 2020-01-01 RX ADMIN — GUAIFENESIN 600 MG: 600 TABLET, EXTENDED RELEASE ORAL at 21:13

## 2020-01-01 RX ADMIN — METHYLPREDNISOLONE SODIUM SUCCINATE 40 MG: 40 INJECTION, POWDER, FOR SOLUTION INTRAMUSCULAR; INTRAVENOUS at 09:17

## 2020-01-01 RX ADMIN — IOPAMIDOL 85 ML: 755 INJECTION, SOLUTION INTRAVENOUS at 14:13

## 2020-01-01 RX ADMIN — CEFEPIME 1 G: 1 INJECTION, POWDER, FOR SOLUTION INTRAMUSCULAR; INTRAVENOUS at 04:20

## 2020-01-01 RX ADMIN — HYDROXYUREA 500 MG: 500 CAPSULE ORAL at 08:05

## 2020-01-01 RX ADMIN — INSULIN LISPRO 4 UNITS: 100 INJECTION, SOLUTION INTRAVENOUS; SUBCUTANEOUS at 11:53

## 2020-01-01 RX ADMIN — SODIUM CHLORIDE 0.2 MCG/KG/HR: 9 INJECTION, SOLUTION INTRAVENOUS at 22:59

## 2020-01-01 RX ADMIN — CEFEPIME 2 G: 2 INJECTION, POWDER, FOR SOLUTION INTRAVENOUS at 20:56

## 2020-01-01 RX ADMIN — Medication 10 ML: at 11:27

## 2020-01-01 RX ADMIN — ALBUTEROL SULFATE 2.5 MG: 2.5 SOLUTION RESPIRATORY (INHALATION) at 07:19

## 2020-01-01 RX ADMIN — INSULIN LISPRO 2 UNITS: 100 INJECTION, SOLUTION INTRAVENOUS; SUBCUTANEOUS at 17:27

## 2020-01-01 RX ADMIN — DIGOXIN 125 MCG: 0.25 INJECTION INTRAMUSCULAR; INTRAVENOUS at 16:33

## 2020-01-01 RX ADMIN — IPRATROPIUM BROMIDE AND ALBUTEROL SULFATE 1 AMPULE: .5; 3 SOLUTION RESPIRATORY (INHALATION) at 00:08

## 2020-01-01 RX ADMIN — INSULIN LISPRO 2 UNITS: 100 INJECTION, SOLUTION INTRAVENOUS; SUBCUTANEOUS at 11:59

## 2020-01-01 RX ADMIN — ENOXAPARIN SODIUM 40 MG: 40 INJECTION SUBCUTANEOUS at 11:06

## 2020-01-01 RX ADMIN — CEFEPIME HYDROCHLORIDE 1 G: 1 INJECTION, POWDER, FOR SOLUTION INTRAMUSCULAR; INTRAVENOUS at 05:23

## 2020-01-01 RX ADMIN — Medication 10 ML: at 21:16

## 2020-01-01 RX ADMIN — METHYLPREDNISOLONE SODIUM SUCCINATE 40 MG: 40 INJECTION, POWDER, FOR SOLUTION INTRAMUSCULAR; INTRAVENOUS at 12:14

## 2020-01-01 RX ADMIN — SPIRONOLACTONE 25 MG: 25 TABLET ORAL at 17:22

## 2020-01-01 RX ADMIN — ALBUTEROL SULFATE 2.5 MG: 2.5 SOLUTION RESPIRATORY (INHALATION) at 19:27

## 2020-01-01 RX ADMIN — IPRATROPIUM BROMIDE AND ALBUTEROL SULFATE 1 AMPULE: .5; 3 SOLUTION RESPIRATORY (INHALATION) at 18:48

## 2020-01-01 RX ADMIN — Medication 10 ML: at 07:45

## 2020-01-01 RX ADMIN — PIPERACILLIN SODIUM AND TAZOBACTAM SODIUM 3.38 G: 3; .375 INJECTION, POWDER, LYOPHILIZED, FOR SOLUTION INTRAVENOUS at 13:54

## 2020-01-01 RX ADMIN — DEXTROSE MONOHYDRATE 500 MG: 50 INJECTION, SOLUTION INTRAVENOUS at 17:11

## 2020-01-01 RX ADMIN — INSULIN GLARGINE 20 UNITS: 100 INJECTION, SOLUTION SUBCUTANEOUS at 17:57

## 2020-01-01 RX ADMIN — METFORMIN HYDROCHLORIDE 500 MG: 500 TABLET ORAL at 08:41

## 2020-01-01 RX ADMIN — INSULIN LISPRO 6 UNITS: 100 INJECTION, SOLUTION INTRAVENOUS; SUBCUTANEOUS at 21:32

## 2020-01-01 RX ADMIN — ASPIRIN 81 MG 81 MG: 81 TABLET ORAL at 08:41

## 2020-01-01 RX ADMIN — ASPIRIN 81 MG 81 MG: 81 TABLET ORAL at 11:06

## 2020-01-01 RX ADMIN — IOPAMIDOL 75 ML: 755 INJECTION, SOLUTION INTRAVENOUS at 14:40

## 2020-01-01 RX ADMIN — Medication 10 ML: at 20:45

## 2020-01-01 RX ADMIN — VANCOMYCIN HYDROCHLORIDE 1000 MG: 1 INJECTION, POWDER, LYOPHILIZED, FOR SOLUTION INTRAVENOUS at 14:43

## 2020-01-01 RX ADMIN — CEFEPIME HYDROCHLORIDE 1 G: 1 INJECTION, POWDER, FOR SOLUTION INTRAMUSCULAR; INTRAVENOUS at 03:28

## 2020-01-01 RX ADMIN — ALBUTEROL SULFATE 2.5 MG: 2.5 SOLUTION RESPIRATORY (INHALATION) at 01:13

## 2020-01-01 RX ADMIN — ACETAMINOPHEN 650 MG: 325 TABLET ORAL at 07:40

## 2020-01-01 RX ADMIN — IPRATROPIUM BROMIDE AND ALBUTEROL SULFATE 1 AMPULE: .5; 3 SOLUTION RESPIRATORY (INHALATION) at 16:04

## 2020-01-01 RX ADMIN — INSULIN LISPRO 2 UNITS: 100 INJECTION, SOLUTION INTRAVENOUS; SUBCUTANEOUS at 18:23

## 2020-01-01 RX ADMIN — AZITHROMYCIN 500 MG: 500 INJECTION, POWDER, LYOPHILIZED, FOR SOLUTION INTRAVENOUS at 18:07

## 2020-01-01 RX ADMIN — INSULIN LISPRO 6 UNITS: 100 INJECTION, SOLUTION INTRAVENOUS; SUBCUTANEOUS at 08:08

## 2020-01-01 RX ADMIN — INSULIN LISPRO 6 UNITS: 100 INJECTION, SOLUTION INTRAVENOUS; SUBCUTANEOUS at 11:46

## 2020-01-01 RX ADMIN — PREDNISONE 30 MG: 10 TABLET ORAL at 09:59

## 2020-01-01 RX ADMIN — INSULIN LISPRO 1 UNITS: 100 INJECTION, SOLUTION INTRAVENOUS; SUBCUTANEOUS at 21:03

## 2020-01-01 RX ADMIN — SODIUM CHLORIDE 250 ML: 9 INJECTION, SOLUTION INTRAVENOUS at 19:23

## 2020-01-01 RX ADMIN — CEFEPIME 1 G: 1 INJECTION, POWDER, FOR SOLUTION INTRAMUSCULAR; INTRAVENOUS at 03:16

## 2020-01-01 RX ADMIN — KETOROLAC TROMETHAMINE 15 MG: 30 INJECTION, SOLUTION INTRAMUSCULAR at 12:41

## 2020-01-01 RX ADMIN — GUAIFENESIN 600 MG: 600 TABLET, EXTENDED RELEASE ORAL at 23:16

## 2020-01-01 RX ADMIN — GUAIFENESIN 600 MG: 600 TABLET, EXTENDED RELEASE ORAL at 20:45

## 2020-01-01 RX ADMIN — IPRATROPIUM BROMIDE AND ALBUTEROL SULFATE 1 AMPULE: .5; 3 SOLUTION RESPIRATORY (INHALATION) at 11:15

## 2020-01-01 RX ADMIN — PREDNISONE 40 MG: 20 TABLET ORAL at 13:02

## 2020-01-01 RX ADMIN — LEVALBUTEROL 1.25 MG: 1.25 SOLUTION, CONCENTRATE RESPIRATORY (INHALATION) at 12:42

## 2020-01-01 RX ADMIN — AMIODARONE HYDROCHLORIDE 0.5 MG/MIN: 50 INJECTION, SOLUTION INTRAVENOUS at 00:31

## 2020-01-01 RX ADMIN — IPRATROPIUM BROMIDE AND ALBUTEROL SULFATE 1 AMPULE: .5; 3 SOLUTION RESPIRATORY (INHALATION) at 15:20

## 2020-01-01 RX ADMIN — METHYLPREDNISOLONE SODIUM SUCCINATE 40 MG: 40 INJECTION, POWDER, FOR SOLUTION INTRAMUSCULAR; INTRAVENOUS at 20:45

## 2020-01-01 RX ADMIN — GUAIFENESIN 600 MG: 600 TABLET, EXTENDED RELEASE ORAL at 22:30

## 2020-01-01 RX ADMIN — IPRATROPIUM BROMIDE AND ALBUTEROL SULFATE 1 AMPULE: .5; 3 SOLUTION RESPIRATORY (INHALATION) at 07:09

## 2020-01-01 RX ADMIN — SODIUM CHLORIDE: 9 INJECTION, SOLUTION INTRAVENOUS at 18:51

## 2020-01-01 RX ADMIN — ALBUTEROL SULFATE 2.5 MG: 2.5 SOLUTION RESPIRATORY (INHALATION) at 02:16

## 2020-01-01 RX ADMIN — ENOXAPARIN SODIUM 40 MG: 40 INJECTION SUBCUTANEOUS at 17:24

## 2020-01-01 RX ADMIN — GUAIFENESIN 600 MG: 600 TABLET, EXTENDED RELEASE ORAL at 08:05

## 2020-01-01 RX ADMIN — GUAIFENESIN 600 MG: 600 TABLET, EXTENDED RELEASE ORAL at 20:47

## 2020-01-01 RX ADMIN — PHENYLEPHRINE HYDROCHLORIDE 100 MCG/MIN: 10 INJECTION INTRAVENOUS at 01:53

## 2020-01-01 RX ADMIN — INSULIN GLARGINE 15 UNITS: 100 INJECTION, SOLUTION SUBCUTANEOUS at 21:32

## 2020-01-01 RX ADMIN — INSULIN LISPRO 4 UNITS: 100 INJECTION, SOLUTION INTRAVENOUS; SUBCUTANEOUS at 08:22

## 2020-01-01 RX ADMIN — TRAMADOL HYDROCHLORIDE 50 MG: 50 TABLET, FILM COATED ORAL at 17:59

## 2020-01-01 RX ADMIN — CEFEPIME 2 G: 2 INJECTION, POWDER, FOR SOLUTION INTRAVENOUS at 08:19

## 2020-01-01 RX ADMIN — VANCOMYCIN HYDROCHLORIDE 750 MG: 10 INJECTION, POWDER, LYOPHILIZED, FOR SOLUTION INTRAVENOUS at 11:39

## 2020-01-01 RX ADMIN — ALBUTEROL SULFATE 2.5 MG: 2.5 SOLUTION RESPIRATORY (INHALATION) at 07:43

## 2020-01-01 RX ADMIN — CEFEPIME HYDROCHLORIDE 1 G: 1 INJECTION, POWDER, FOR SOLUTION INTRAMUSCULAR; INTRAVENOUS at 16:05

## 2020-01-01 RX ADMIN — IPRATROPIUM BROMIDE AND ALBUTEROL SULFATE 1 AMPULE: .5; 3 SOLUTION RESPIRATORY (INHALATION) at 14:47

## 2020-01-01 RX ADMIN — METHYLPREDNISOLONE SODIUM SUCCINATE 40 MG: 40 INJECTION, POWDER, FOR SOLUTION INTRAMUSCULAR; INTRAVENOUS at 04:36

## 2020-01-01 ASSESSMENT — ENCOUNTER SYMPTOMS
SHORTNESS OF BREATH: 1
ABDOMINAL PAIN: 0
COLOR CHANGE: 1
BACK PAIN: 0
WHEEZING: 1
BACK PAIN: 0
COUGH: 1
COUGH: 0
COUGH: 1
SHORTNESS OF BREATH: 1
SORE THROAT: 0
CHEST TIGHTNESS: 1
SORE THROAT: 0
RHINORRHEA: 0
TROUBLE SWALLOWING: 0
ABDOMINAL PAIN: 0
WHEEZING: 1
DIARRHEA: 0
NAUSEA: 0
BACK PAIN: 1
NAUSEA: 0
DIARRHEA: 0
SHORTNESS OF BREATH: 1
VOMITING: 0
COUGH: 1
VOMITING: 0
SHORTNESS OF BREATH: 1
CONSTIPATION: 1
VOICE CHANGE: 1
COLOR CHANGE: 0
ABDOMINAL PAIN: 0
CONSTIPATION: 1
DIARRHEA: 0
COUGH: 1
DIARRHEA: 0
DIARRHEA: 0
VOICE CHANGE: 1
SHORTNESS OF BREATH: 1
SHORTNESS OF BREATH: 1
PHOTOPHOBIA: 0
NAUSEA: 0
DIARRHEA: 0
NAUSEA: 0
CHEST TIGHTNESS: 0
SORE THROAT: 1
NAUSEA: 0
SHORTNESS OF BREATH: 1
COUGH: 1
CHEST TIGHTNESS: 1
WHEEZING: 1
BACK PAIN: 0
CHEST TIGHTNESS: 0
BACK PAIN: 0
SHORTNESS OF BREATH: 1
VOMITING: 0
CHEST TIGHTNESS: 0

## 2020-01-01 ASSESSMENT — PAIN DESCRIPTION - ONSET
ONSET: ON-GOING

## 2020-01-01 ASSESSMENT — PAIN DESCRIPTION - LOCATION
LOCATION: FOOT
LOCATION: FOOT
LOCATION: BACK;SHOULDER
LOCATION: FOOT
LOCATION: CHEST

## 2020-01-01 ASSESSMENT — PATIENT HEALTH QUESTIONNAIRE - PHQ9
SUM OF ALL RESPONSES TO PHQ QUESTIONS 1-9: 12
2. FEELING DOWN, DEPRESSED OR HOPELESS: 3
1. LITTLE INTEREST OR PLEASURE IN DOING THINGS: 1
SUM OF ALL RESPONSES TO PHQ9 QUESTIONS 1 & 2: 3
2. FEELING DOWN, DEPRESSED OR HOPELESS: 1
3. TROUBLE FALLING OR STAYING ASLEEP: 3
7. TROUBLE CONCENTRATING ON THINGS, SUCH AS READING THE NEWSPAPER OR WATCHING TELEVISION: 0
SUM OF ALL RESPONSES TO PHQ9 QUESTIONS 1 & 2: 2
1. LITTLE INTEREST OR PLEASURE IN DOING THINGS: 0
8. MOVING OR SPEAKING SO SLOWLY THAT OTHER PEOPLE COULD HAVE NOTICED. OR THE OPPOSITE, BEING SO FIGETY OR RESTLESS THAT YOU HAVE BEEN MOVING AROUND A LOT MORE THAN USUAL: 0
SUM OF ALL RESPONSES TO PHQ QUESTIONS 1-9: 2
10. IF YOU CHECKED OFF ANY PROBLEMS, HOW DIFFICULT HAVE THESE PROBLEMS MADE IT FOR YOU TO DO YOUR WORK, TAKE CARE OF THINGS AT HOME, OR GET ALONG WITH OTHER PEOPLE: 3
6. FEELING BAD ABOUT YOURSELF - OR THAT YOU ARE A FAILURE OR HAVE LET YOURSELF OR YOUR FAMILY DOWN: 0
5. POOR APPETITE OR OVEREATING: 3
SUM OF ALL RESPONSES TO PHQ QUESTIONS 1-9: 12
4. FEELING TIRED OR HAVING LITTLE ENERGY: 3
SUM OF ALL RESPONSES TO PHQ QUESTIONS 1-9: 2
9. THOUGHTS THAT YOU WOULD BE BETTER OFF DEAD, OR OF HURTING YOURSELF: 0

## 2020-01-01 ASSESSMENT — PAIN SCALES - GENERAL
PAINLEVEL_OUTOF10: 5
PAINLEVEL_OUTOF10: 6
PAINLEVEL_OUTOF10: 0
PAINLEVEL_OUTOF10: 0
PAINLEVEL_OUTOF10: 6
PAINLEVEL_OUTOF10: 4
PAINLEVEL_OUTOF10: 6
PAINLEVEL_OUTOF10: 5
PAINLEVEL_OUTOF10: 8
PAINLEVEL_OUTOF10: 6
PAINLEVEL_OUTOF10: 0
PAINLEVEL_OUTOF10: 5
PAINLEVEL_OUTOF10: 8
PAINLEVEL_OUTOF10: 0
PAINLEVEL_OUTOF10: 3
PAINLEVEL_OUTOF10: 7
PAINLEVEL_OUTOF10: 6
PAINLEVEL_OUTOF10: 6
PAINLEVEL_OUTOF10: 4
PAINLEVEL_OUTOF10: 0
PAINLEVEL_OUTOF10: 7
PAINLEVEL_OUTOF10: 6
PAINLEVEL_OUTOF10: 0
PAINLEVEL_OUTOF10: 5
PAINLEVEL_OUTOF10: 0

## 2020-01-01 ASSESSMENT — PAIN DESCRIPTION - DIRECTION: RADIATING_TOWARDS: DENIES

## 2020-01-01 ASSESSMENT — PAIN - FUNCTIONAL ASSESSMENT
PAIN_FUNCTIONAL_ASSESSMENT: PREVENTS OR INTERFERES SOME ACTIVE ACTIVITIES AND ADLS
PAIN_FUNCTIONAL_ASSESSMENT: PREVENTS OR INTERFERES SOME ACTIVE ACTIVITIES AND ADLS

## 2020-01-01 ASSESSMENT — PAIN DESCRIPTION - PROGRESSION
CLINICAL_PROGRESSION: NOT CHANGED
CLINICAL_PROGRESSION: GRADUALLY IMPROVING

## 2020-01-01 ASSESSMENT — PAIN DESCRIPTION - PAIN TYPE
TYPE: ACUTE PAIN
TYPE: CHRONIC PAIN
TYPE: CHRONIC PAIN

## 2020-01-01 ASSESSMENT — PAIN DESCRIPTION - ORIENTATION
ORIENTATION: LEFT

## 2020-01-01 ASSESSMENT — PAIN DESCRIPTION - DESCRIPTORS
DESCRIPTORS: ACHING
DESCRIPTORS: SHARP
DESCRIPTORS: ACHING;SHARP
DESCRIPTORS: BURNING

## 2020-01-01 ASSESSMENT — PAIN DESCRIPTION - FREQUENCY
FREQUENCY: INTERMITTENT
FREQUENCY: CONTINUOUS

## 2020-01-03 NOTE — PROGRESS NOTES
Subjective:      Patient ID: Larry Pitts is a 71 y.o. male. HPI     C/O no BM past week, hardly any BM past 2 months. Sore throat. No energy, aching since illness last year. No appetite. Oxygen at home full time 1.5-2 L NC. Cough productive brown and green. Using HHN 3-4 times daily. Continues to cough. Has iron infusion last week and felt decent for 1 days. Had another this week but not feeling well. Review of Systems   Constitutional: Positive for appetite change and fatigue. Negative for chills, fever and unexpected weight change. HENT: Positive for sore throat. Respiratory: Positive for shortness of breath. Negative for chest tightness. Cardiovascular: Negative for chest pain and palpitations. Gastrointestinal: Positive for constipation. Negative for diarrhea and nausea. Musculoskeletal: Positive for arthralgias and gait problem. Negative for back pain. Neurological: Negative for dizziness and headaches. Psychiatric/Behavioral: Negative. Objective:   Physical Exam  Constitutional:       General: He is not in acute distress. Appearance: He is well-developed. He is ill-appearing. HENT:      Right Ear: Tympanic membrane and ear canal normal.      Left Ear: Tympanic membrane and ear canal normal.      Mouth/Throat:      Pharynx: No oropharyngeal exudate or posterior oropharyngeal erythema. Cardiovascular:      Rate and Rhythm: Normal rate and regular rhythm. Heart sounds: Normal heart sounds. Pulmonary:      Effort: Tachypnea present. No accessory muscle usage. Breath sounds: Decreased air movement present. Wheezing and rhonchi present. Abdominal:      General: Bowel sounds are normal.      Palpations: Abdomen is soft. Tenderness: There is no abdominal tenderness. Musculoskeletal: Normal range of motion. Skin:     General: Skin is warm and dry. Neurological:      Mental Status: He is alert and oriented to person, place, and time.

## 2020-01-04 NOTE — TELEPHONE ENCOUNTER
rec'd call from [facility] [place] [facility] radiology re cxr results.   Reviewed chart and appeared he is already on abx, it has already been addressed, and has appropriate f/u

## 2020-01-06 PROBLEM — J18.9 PNA (PNEUMONIA): Status: ACTIVE | Noted: 2020-01-01

## 2020-01-06 NOTE — ED PROVIDER NOTES
[address] ED  EMERGENCY DEPARTMENT ENCOUNTER        Pt Name: Bailey Wells  MRN: [id number]  [place] 1950  Date of evaluation: 1/6/2020  Provider: VALERIE Morrison CNP  PCP: VALERIE Dodson CNP    This patient was seen and evaluated by the attending physician Amy Boyce MD.      [facility]       Chief Complaint   Patient presents with    Shortness of Breath     went for outpatient xray of chest due to sob and weakness. pt transported here due to sob and hypoxia. HISTORY OF PRESENT ILLNESS   (Location/Symptom, Timing/Onset, Context/Setting, Quality, Duration, Modifying Factors, Severity)  Note limiting factors. Bailey Wells is a 71 y.o. male who presents the emergency department with complaints of fever and cough for the last 2 weeks. He also reports increased shortness of breath for the last 1 week. Cough is productive of colored sputum. He normally wears 2 L nasal cannula but has been requiring increased oxygen. Today he was at an outpatient center receiving a CT scan of his chest as ordered by his pulmonologist when they recommended he come to the emergency department due to his hypoxia. He was quite tachycardic upon arrival to the ED. He was audibly wheezing and having difficulty speaking in complete sentences. Nursing Notes were all reviewed and agreed with or any disagreements were addressed in the HPI. REVIEW OF SYSTEMS    (2-9 systems for level 4, 10 or more for level 5)     Review of Systems   Constitutional: Negative for chills and fever. HENT: Negative for congestion, ear pain and sore throat. Respiratory: Positive for cough, chest tightness, shortness of breath and wheezing. Cardiovascular: Negative for chest pain, palpitations and leg swelling. Gastrointestinal: Negative for abdominal pain, diarrhea, nausea and vomiting. Genitourinary: Negative. Musculoskeletal: Negative. Skin: Negative. Neurological: Negative for dizziness, weakness and headaches. Psychiatric/Behavioral: Negative. Positives and Pertinent negatives as per HPI. Except as noted above in the ROS, all other systems were reviewed and negative. PAST MEDICAL HISTORY     Past Medical History:   Diagnosis Date    Arthritis     Chronic back pain     [facility]     Type 2 diabetes mellitus without complication, without long-term current use of insulin ([facility] [facility] 6/20/2019    Wears glasses          SURGICAL HISTORY     Past Surgical History:   Procedure Laterality Date    BRONCHOSCOPY N/A 11/30/2018    BRONCHOSCOPY ALVEOLAR LAVAGE performed by Fabi Kumar MD at [address] N/A 4/11/2019    BRONCHOSCOPY ALVEOLAR LAVAGE performed by Shira Carvajal MD at [address] [place] [address] Right 2007   [address]    EYE SURGERY Right 1995    detached retina    FINGER SURGERY  5/24/11    duputrens release right ring finger inclluding proxinal interphanageal open CTR    FINGER SURGERY Right 1957    contusion finger # 3    OTHER SURGICAL HISTORY  12/29/2017    excision left facial cyst         CURRENTMEDICATIONS       Previous Medications    ALBUTEROL SULFATE HFA (PROAIR HFA) 108 (90 BASE) MCG/ACT INHALER    Inhale 2 puffs into the lungs every 6 hours as needed for Wheezing    ASPIRIN 81 MG TABLET    Take 81 mg by mouth daily     BLOOD GLUCOSE MONITOR KIT AND SUPPLIES    by Other route daily E11.9, FSBS daily. Meter preferred by insurance. BLOOD GLUCOSE TEST STRIPS (ASCENSIA AUTODISC VI;ONE TOUCH ULTRA TEST VI) STRIP    DX: E11.9 FSBS daily. Meter preferred by insurance.     DOXYCYCLINE HYCLATE (VIBRAMYCIN) 100 MG CAPSULE    Take 1 capsule by mouth 2 times daily for 7 days    GUAIFENESIN (MUCINEX) 600 MG EXTENDED RELEASE TABLET    Take 1 tablet by mouth 2 times daily    HYDROXYUREA (HYDREA) 500 MG CHEMO CAPSULE IPRATROPIUM-ALBUTEROL (DUONEB) 0.5-2.5 (3) MG/3ML SOLN NEBULIZER SOLUTION    INHALE THREE MILLILITERS (ONE VIAL) VIA NEBULIZATION BY MOUTH EVERY 4 HOURS AS NEEDED FOR SHORTNESS OF BREATH    LANCETS MISC    1 each by Does not apply route daily DX: E 11.9. FSBS daily. Meter preferred by insurance. MELOXICAM (MOBIC) 15 MG TABLET    Take 1 tablet by mouth daily , take with food for pain    METFORMIN (GLUCOPHAGE) 500 MG TABLET    Take 1 tablet by mouth 2 times daily (with meals)    METHYLPREDNISOLONE (MEDROL, ABHISHEK,) 4 MG TABLET    As directed    MULTIPLE VITAMINS-MINERALS (THERAPEUTIC MULTIVITAMIN-MINERALS) TABLET    Take 1 tablet by mouth daily    OXYGEN    Inhale 1 L/min into the lungs continuous     PROMETHAZINE-CODEINE (PHENERGAN WITH CODEINE) 6.25-10 MG/5ML SYRUP    Take 5 mLs by mouth every 8 hours as needed for Cough for up to 10 days. TRAMADOL (ULTRAM) 50 MG TABLET    Take 1 tablet by mouth every 8 hours as needed for Pain for up to 30 days. VITAMIN C (ASCORBIC ACID) 500 MG TABLET    Take 500 mg by mouth daily    VITAMIN D (CHOLECALCIFEROL) 1000 UNIT TABS TABLET    Take 1,000 Units by mouth daily         ALLERGIES     Patient has no known allergies. FAMILYHISTORY       Family History   Problem Relation Age of Onset    Diabetes Mother     Cancer Sister         pancreatic    Emphysema Father           SOCIAL HISTORY       Social History     Tobacco Use    Smoking status: Former Smoker     Packs/day: 1.00     Years: 40.00     Pack years: 40.00     Types: Cigarettes     Start date: 1976     Last attempt to quit: 2018     Years since quittin.6    Smokeless tobacco: Never Used    Tobacco comment: Advised to quit. Had one cig for the past 5 days. Substance Use Topics    Alcohol use:  Yes     Alcohol/week: 12.0 standard drinks     Types: 12 Cans of beer per week     Frequency: 4 or more times a week     Drinks per session: 3 or 4     Binge frequency: Never     Comment: weekly    Drug use: No       SCREENINGS    Silke Coma Scale  Eye Opening: Spontaneous  Best Verbal Response: Oriented  Best Motor Response: Obeys commands  Silke Coma Scale Score: 15        PHYSICAL EXAM    (up to 7 for level 4, 8 or more for level 5)     ED Triage Vitals   BP Temp Temp Source Pulse Resp SpO2 Height Weight   01/06/20 1147 01/06/20 1144 01/06/20 1144 01/06/20 1144 01/06/20 1144 01/06/20 1147 -- 01/06/20 1147   92/63 103 °F (39.4 °C) Oral 130 (!) 34 99 %  112 lb (50.8 kg)       Physical Exam  Vitals signs and nursing note reviewed. Constitutional:       General: He is in acute distress. Appearance: Normal appearance. He is normal weight. He is ill-appearing. HENT:      Head: Normocephalic and atraumatic. Right Ear: External ear normal.      Left Ear: External ear normal.      Nose: Nose normal.      Mouth/Throat:      Mouth: Mucous membranes are moist.   Eyes:      Conjunctiva/sclera: Conjunctivae normal.   Neck:      Musculoskeletal: Normal range of motion. Cardiovascular:      Rate and Rhythm: Regular rhythm. Tachycardia present. Pulmonary:      Effort: Pulmonary effort is normal. Tachypnea present. Breath sounds: Wheezing and rhonchi present. Abdominal:      General: Bowel sounds are normal. There is no distension. Palpations: Abdomen is soft. Tenderness: There is no tenderness. Musculoskeletal: Normal range of motion. Skin:     General: Skin is warm and dry. Neurological:      General: No focal deficit present. Mental Status: He is alert.    Psychiatric:         Mood and Affect: Mood normal.         Behavior: Behavior normal.         DIAGNOSTIC RESULTS   LABS:    Labs Reviewed   CBC WITH AUTO DIFFERENTIAL - Abnormal; Notable for the following components:       Result Value    WBC 22.3 (*)     RBC 2.92 (*)     Hemoglobin 10.6 (*)     Hematocrit 32.9 (*)     .5 (*)     MCH 36.4 (*)     Platelets 790 (*)     Neutrophils Absolute 20.6 (*)     Lymphocytes Absolute 0.7 (*)     Macrocytes 2+ (*)     Polychromasia Occasional (*)     Poikilocytes Occasional (*)     Basophilic Stippling Occasional (*)     All other components within normal limits    Narrative:     Performed at:  [facility],  [place], [email or web address]   Phone [phone]   COMPREHENSIVE METABOLIC PANEL W/ REFLEX TO MG FOR LOW K - Abnormal; Notable for the following components:    Sodium 129 (*)     Chloride 89 (*)     Glucose 154 (*)     CREATININE <0.5 (*)     Alb 2.9 (*)     Albumin/Globulin Ratio 0.7 (*)     AST 12 (*)     All other components within normal limits    Narrative:     Performed at:  [place] [address],  [employer]   Phone [phone]   BLOOD GAS, VENOUS - Abnormal; Notable for the following components:    pH, Jj 7.517 (*)     pO2, Jj 42.4 (*)     HCO3, Venous 33.8 (*)     Base Excess, Jj 9.9 (*)     Carboxyhemoglobin 3.2 (*)     All other components within normal limits    Narrative:     Performed at:  [facility]) - [facility],  [employer], [email or web address]   Phone [phone]   CULTURE BLOOD #1   CULTURE BLOOD #2   QUANTIFERON, INCUBATED   RAPID INFLUENZA A/B ANTIGENS   TROPONIN    Narrative:     Performed at:  [place] [address],  [employer], [email or web address]   Phone [phone]   LACTATE, SEPSIS    Narrative:     Performed at:  [facility]) - [facility],  [place], [email or web address]   Phone [phone]   URINE RT REFLEX TO CULTURE    Narrative:     Performed at:  [facility]) [facility],  [place], [email or web address]   Phone [phone]   LACTATE, SEPSIS       All other labs were within normal range or not returned as of this dictation. EKG:  All EKG's are interpreted by the Emergency Department Physician in the absence of a cardiologist.  Please node, measuring 1.1 cm in short axis. No axillary adenopathy. Lungs/pleura: Emphysema is present. Bilateral upper lobe consolidation is demonstrated, increased bilaterally, right greater than left, with superimposed cavitation. Air-fluid levels seen within these apical cavities. Dense consolidation with bronchiectasis is again demonstrated within the perihilar regions. Consolidation is seen within the posterior left lower lobe with internal cystic space or cavity, slightly larger than prior. Small pleural effusions. Upper Abdomen: No acute process in the uppermost abdomen. Soft Tissues/Bones: Degenerative change of the spine. Biapical consolidation has increased as compared to prior examination dated 09/23/2019 with interval cavitation, including air-fluid levels. Cavitating pneumonia, of typical or atypical etiologies, including tuberculosis could be considered. Malignancy cannot be excluded. Additional perihilar consolidation, bronchiectasis, and left lower lobe consolidation is again demonstrated.            PROCEDURES   Unless otherwise noted below, none     Procedures    CRITICAL CARE TIME   N/A    CONSULTS:  IP CONSULT TO PULMONOLOGY  PHARMACY TO DOSE VANCOMYCIN  IP CONSULT TO HOSPITALIST      EMERGENCY DEPARTMENT COURSE and DIFFERENTIAL DIAGNOSIS/MDM:   Vitals:    Vitals:    01/06/20 1421 01/06/20 1436 01/06/20 1453 01/06/20 1454   BP: (!) 86/59 (!) 89/57 101/88    Pulse: 98 100 106 121   Resp: 19 24 20 22   Temp:       TempSrc:       SpO2: 100% 100% (!) 86% 97%   Weight:           Patient was given thefollowing medications:  Medications   azithromycin (ZITHROMAX) 500 mg in D5W 250ml addavial (has no administration in time range)   vancomycin 1000 mg IVPB in 250 mL D5W addavial (1,000 mg Intravenous New Bag 1/6/20 1443)   0.9 % sodium chloride bolus (1,524 mLs Intravenous New Bag 1/6/20 1240)   ketorolac (TORADOL) injection 15 mg (15 mg Intravenous Given 1/6/20 1241)   methylPREDNISolone sodium (SOLU-MEDROL) injection 125 mg (125 mg Intravenous Given 1/6/20 1241)   levalbuterol (XOPENEX) nebulizer solution 1.26 mg (1.25 mg Nebulization Given 1/6/20 1242)   piperacillin-tazobactam (ZOSYN) 3.375 g in sodium chloride 0.9 % 100 mL IVPB (mini-bag) (0 g Intravenous Stopped 1/6/20 1457)       Patient presented to the emergency department with complaints of cough, shortness of breath and increased oxygen requirement. CBC with leukocytosis of 22.3. BMP with hyponatremia of 129. Troponin was less than 0.01. Lactate was normal at 1.4. VBG as was as above. Chest x-ray showed persistent chronic bilateral upper lobe consolidation likely representing chronic atypical infectious process. Patient was receiving a CT prior to coming to the emergency department and it did show a cavitary pneumonia. I consulted his pulmonologist for recommendations on antibiotic choice and he is recommending vancomycin, Rocephin and Zithromax. He was started on these medications. He was given Xopenex, Solu-Medrol and had improvement in his respiratory effort. After IV fluids his blood pressure did improve to 101/88. He was given Toradol for his fever. On recheck patient was feeling much improved. I did consult the hospitalist for admission given patient's increased oxygen requirement and presence of pneumonia. Hospitalist is agreeable. I discussed treatment plan with patient, patient is agreeable and denies questions at this time. FINAL IMPRESSION      1. Pneumonia due to organism    2. COPD exacerbation [facility])          DISPOSITION/PLAN   DISPOSITION Decision To Admit 01/06/2020 01:49:15 PM      PATIENT REFERREDTO:  No follow-up provider specified.     DISCHARGE MEDICATIONS:  New Prescriptions    No medications on file       DISCONTINUED MEDICATIONS:  Discontinued Medications    No medications on file              (Please note that portions of this note were completed with a voice recognition program.  Efforts were made to

## 2020-01-06 NOTE — CONSULTS
Patient is being seen at the request of Dr. Wilberto Da Silva for a consultation for pneumonia    HISTORY OF PRESENT ILLNESS: The patient is a 79-year-old man with a past medical history of severe emphysema/COPD, chronic back pain, chronic hypoxemic respiratory failure requiring supplemental oxygen, bilateral upper lobe consolidative opacities/fibrosis, tobacco abuse, protein calorie malnutrition, who presented to [facility] emergency department with worsening hypoxemia with shortness of breath and cough. He has been treated for complicated Pseudomonas pneumonia in the upper lobes within the last year. He underwent chest imaging which revealed increased opacities in his upper lobes. He was admitted to the hospital for further care. Currently he reports cough is productive of green sputum. No hx of Tb, tuberculosis risk factors, foreign travel or snf time. He has had a tuberculin skin test in the past which was negative.     PAST MEDICAL HISTORY:  Past Medical History:   Diagnosis Date    Arthritis     Chronic back pain     [facility]chronic obstructive [facility] disease) [facility]     Type 2 diabetes mellitus without complication, without long-term current use of insulin ([facility] [address] 6/20/2019    Wears glasses      PAST SURGICAL HISTORY:  Past Surgical History:   Procedure Laterality Date    BRONCHOSCOPY N/A 11/30/2018    BRONCHOSCOPY ALVEOLAR LAVAGE performed by Lucia Gallardo MD at [address] N/A 4/11/2019    BRONCHOSCOPY ALVEOLAR LAVAGE performed by Mila Ochoa MD at [address] [place] [address] Right 2007   [address]    EYE SURGERY Right 1995    detached retina    FINGER SURGERY  5/24/11    duputrens release right ring finger inclluding proxinal interphanageal open CTR    FINGER SURGERY Right 1957    contusion finger # 3    OTHER SURGICAL HISTORY  12/29/2017    excision left facial cyst       FAMILY HISTORY:  family history includes

## 2020-01-07 NOTE — PROGRESS NOTES
Inpatient Physical Therapy Evaluation and Treatment/Discharge Summary    Unit: PCU  Date:  1/7/2020  Patient Name:    Kassandra Kang  Admitting diagnosis:  PNA (pneumonia) [J18.9]  Admit Date:  1/6/2020  Precautions/Restrictions/WB Status/ Lines/ Wounds/ Oxygen: IV, supplemental O2 (3L) and droplet precautions    Treatment Time:  1883-5893  Treatment Number:  1   Timed Code Treatment Minutes: 14 minutes  Total Treatment Minutes:  24  minutes    Patient Goals for Therapy: \" Go home \"          Discharge Recommendations: Home with PRN assist  DME needs for discharge: Needs Met       Therapy recommendation for EMS Transport: can transport by wheelchair    Therapy recommendations for staff:   Stand by assist with use of No AD for all transfers and ambulation to/from bathroom    Home Health S4 Level Recommendation:  NA  AM-PAC Mobility Score    AM-PAC Inpatient Mobility Raw Score : 23       Preadmission Environment    Pt. Lives with spouse and 24/7 Assist Available   Home environment:  one story home with unfinished basement  Steps to enter first floor: 2 steps to enter and no Rail  Steps to basement: Full flight of 12-13 and one hand rail  Bathroom: Tub/Shower unit, Grab bars and Standard height toilet  Equipment owned: home O2 (2 L) continuous, pulse ox, inhaler and nebulizer    Preadmission Status:  Pt. Able to drive: Yes  Pt Fully independent with ADLs: Yes  Pt. Required assistance from family for: Cleaning, Cooking and Laundry   Pt. Fully independent for transfers and gait and walked with No Device  History of falls No    Pain   No  Location:   Rating: NA /10  Pain Medicine Status: No request made    Cognition    A&O x4   Able to follow 2 step commands    Subjective  Patient sitting on EOB with no family present  Pt agreeable to this PT eval & tx. Upper Extremity ROM/Strength  Please see OT evaluation.       Lower Extremity ROM / Strength    AROM WFL: Yes  ROM limitations:     Strength Assessment (measured on a 0-5 scale):  R LE   Quad   5/5   Ant Tib  4+   Hamstring 5/5   Iliopsoas 4  L LE  Quad   5/5   Ant Tib  5/5   Hamstring 5/5   Iliopsoas 4    Lower Extremity Sensation    WFL    Lower Extremity Proprioception:   WFL    Coordination and Tone  WFL    Balance  Static Sitting:  Good    Tolerance:   Dynamic Sitting:  Good   Static Standing: Good    Tolerance:   Dynamic Standing: Good     Bed Mobility   Supine to Sit:   Not Tested  Sit to Supine:  Not Tested  Rolling:   Not Tested  Scooting at EOB: Independent  Scooting to [facility]:  Not Tested    Transfer Training     Sit to stand:   Independent  Stand to sit: Independent  Bed to Chair:  Not Tested with use of N/A    Gait gait completed as indicated below  Distance:  40 ft  Deviations (firm surface/linoleum): decreased isak and narrow NICOLASA   Assistive Device Used:  No AD  Level of Assist: Supervision for line management  Comment:     Stair Training deferred, pt unsafe/not appropriate to complete stairs at this time  Pt ascended/descended  stairs with N/A with N/A, and use of N/A. Pattern: N/A    Activity Tolerance   Pt completed therapy session with No nausea, dizziness, pain or SOB noted w/activity  SpO2:   HR:  BP:     Positioning Needs   Pt sitting up in chair, call light and needs in reach    Exercises Initiated    AP    Other  None. Patient/Family Education   Pt educated on role of inpatient PT, POC, importance of continued activity, DC recommendations, safety awareness, HEP and calling for assist with mobility. Assessment  Pt seen for Physical Therapy evaluation in acute care setting. Pt demonstrated decreased Activity tolerance and decreased independence with Ambulation. Pt near his baseline level of function requiring no more than supervision with ambulation for line management. Pt demonstrates good dynamic balance with turning with ambulation. Pt limited by coughing during therapy evaluation. No further acute PT needs demonstrated. Goals :    To be met in

## 2020-01-07 NOTE — PROGRESS NOTES
01/06/20  1214 01/07/20  0539   WBC 22.3* 27.4*   HGB 10.6* 10.1*   HCT 32.9* 32.7*   .5* 115.0*   * 721*     BMP:   Recent Labs     01/06/20  1214 01/07/20  0539   * 130*   K 4.6 3.8  3.8   CL 89* 93*   CO2 31 27   BUN 12 12   CREATININE <0.5* <0.5*     LIVER PROFILE:   Recent Labs     01/06/20  1214   AST 12*   ALT 12   BILITOT 0.4   ALKPHOS 115     PT/INR: No results for input(s): PROTIME, INR in the last 72 hours. APTT: No results for input(s): APTT in the last 72 hours. UA:  Recent Labs     01/06/20  1449   COLORU Yellow   PHUR 7.5   CLARITYU Clear   SPECGRAV <=1.005   LEUKOCYTESUR Negative   UROBILINOGEN 0.2   BILIRUBINUR Negative   BLOODU Negative   GLUCOSEU Negative     No results for input(s): PHART, VBL7KSW, PO2ART in the last 72 hours. Cultures:   Blood: CNS  Sputum- pending    Films:  Chest CT 1/6/19: Lungs/pleura: Emphysema is present.  Bilateral upper lobe consolidation is demonstrated, increased bilaterally, right greater than left, with  superimposed cavitation.  Air-fluid levels seen within these apical cavities.   Dense consolidation with bronchiectasis is again demonstrated within the  perihilar regions.  Consolidation is seen within the posterior left lower  lobe with internal cystic space or cavity, slightly larger than prior.  Small  pleural effusions.           ASSESSMENT:  ·  Acute on chronic respiratory failure with hypoxemia and hypercapnia  · Recurrent bilateral upper lobe cavitary pneumonia- pseudomonas in past; AFB negative on prior BAL  · Severe COPD with AE  · Tobacco abuse  · Severe protein calorie malnutrition     PLAN:  · Supplemental oxygen to maintain SaO2 >92%; wean as tolerated   ·  IV steroids   · Inhaled bronchodilators   · Antibiotics (D2 vancomycin, azithromycin and cefepime)  · Supplemental oxygen to maintain SaO2 >92%; wean as tolerated  · Non-invasive positive pressure ventilation, if needed  · Sputum culture  · May need bronchoscopy if does not clear quickly and noninvasive cultures are non- diagnostic  · Consider nutrition consult

## 2020-01-07 NOTE — CONSULTS
Pulmonology consult has been called to Dr. Leandro Preston on 1/7/20 through answering service @ [phone].  [facility], [place]

## 2020-01-07 NOTE — FLOWSHEET NOTE
01/07/20 0945   Vital Signs   Temp 97.3 °F (36.3 °C)   Temp Source Oral   Pulse 89   Heart Rate Source Monitor   Resp 16   BP (!) 75/51   BP Location Left upper arm   BP Upper/Lower Upper   Patient Position Semi fowlers   Level of Consciousness 0   MEWS Score 3   Oxygen Therapy   SpO2 99 %   O2 Device Nasal cannula   O2 Flow Rate (L/min) 3 L/min   Patient resting quietly in bed. No s/s of distress noted. Shift assessment complete, see flow sheet. Denies needs at this time. Call light within reach. Will continue to monitor.      BP recheck 99/60

## 2020-01-07 NOTE — CONSULTS
HEMATOLOGY/ONCOLOGY CONSULTATION:     1/7/2020 12:56 PM    REASON FOR CONSULT: Thrombocytosis    PROVIDERS:  VALERIE GONZALEZ CNP    CHIEF COMPLAINT:     Chief Complaint   Patient presents with    Shortness of Breath     went for outpatient xray of chest due to sob and weakness. pt transported here due to sob and hypoxia. HISTORY OF PRESENT ILLNESS:     HPI:  71 WM known to our practice. 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 He has been admitted for worsening hypoxia and pneumonia. He reports compliance with his hydrea.      PAST MEDICAL HISTORY:     Past Medical History:   Diagnosis Date    Arthritis     Chronic back pain     [facility] obstructive [facility] disease) [facility] [address] [facility]     Type 2 diabetes mellitus without complication, without long-term current use of insulin ([facility] [address] 6/20/2019    Wears glasses        PAST SURGICAL HISTORY:        Past Surgical History:   Procedure Laterality Date    BRONCHOSCOPY N/A 11/30/2018    BRONCHOSCOPY ALVEOLAR LAVAGE performed by Cathy Moyer MD at [address] [facility] N/A 4/11/2019    BRONCHOSCOPY ALVEOLAR LAVAGE performed by Jovita Khanna MD at [address] [place] [address] Right 2007   [address]    EYE SURGERY Right 1995    detached retina    FINGER SURGERY  5/24/11    duputrens release right ring finger inclluding proxinal interphanageal open CTR    FINGER SURGERY Right 1957    contusion finger # 3    OTHER SURGICAL HISTORY  12/29/2017    excision left facial cyst       SOCIAL HISTORY:     Social History     Socioeconomic History    Marital status:      Spouse name: Lisa Butcher Number of children: 2    Years of education: 12    Highest education level: Not on file   Occupational History    Occupation:      Employer: [employer] resource strain: Not on file    Food insecurity:

## 2020-01-07 NOTE — PROGRESS NOTES
Vancomycin Day: 2    Patient's labs, cultures, vitals, and vancomycin regimen reviewed. No changes today.   Trough due on 8th at [address] 1/7/202012:37 PM  .

## 2020-01-07 NOTE — PROGRESS NOTES
RESPIRATORY THERAPY ASSESSMENT    Name:  Eliana [facility] Record Number:  [id number]  Age: 71 y.o. Gender: male  : 1950  Today's Date:  2020  Room:  /0325-02    Assessment     Is the patient being admitted for a COPD or Asthma exacerbation? No   (If yes the patient will be seen every 4 hours for the first 24 hours and then reassessed)    Patient Admission Diagnosis      Allergies  No Known Allergies    Minimum Predicted Vital Capacity:     1006          Actual Vital Capacity:      920              Pulmonary History:COPD, PNA, Pulmonary nodule. Home Oxygen Therapy:  1.5 liters/min via nasal cannula  Home Respiratory Therapy:  Duoneb Q4PRN,  Albuterol 2puffs Q6PRN. Current Respiratory Therapy:  Duoneb Q4W/A,  Albuterol Q2PRN. Treatment Type: HHN  Medications: Albuterol/Ipratropium    Respiratory Severity Index(RSI)   Patients with orders for inhalation medications, oxygen, or any therapeutic treatment modality will be placed on Respiratory Protocol. They will be assessed with the first treatment and at least every 72 hours thereafter. The following severity scale will be used to determine frequency of treatment intervention. Smoking History: Pulmonary Disease or Smoking History, Greater than 15 pack year = 2    Social History  Social History     Tobacco Use    Smoking status: Former Smoker     Packs/day: 1.00     Years: 40.00     Pack years: 40.00     Types: Cigarettes     Start date: 1976     Last attempt to quit: 2018     Years since quittin.6    Smokeless tobacco: Never Used    Tobacco comment: Advised to quit. Had one cig for the past 5 days. Substance Use Topics    Alcohol use:  Yes     Alcohol/week: 12.0 standard drinks     Types: 12 Cans of beer per week     Frequency: 4 or more times a week     Drinks per session: 3 or 4     Binge frequency: Never     Comment: weekly    Drug use: No       Recent Surgical History: None = 0  Past Surgical History  Past Surgical History:   Procedure Laterality Date    BRONCHOSCOPY N/A 11/30/2018    BRONCHOSCOPY ALVEOLAR LAVAGE performed by Shreya Kelly MD at [address] N/A 4/11/2019    BRONCHOSCOPY ALVEOLAR LAVAGE performed by Dayton Min MD at [address] [facility] Right 2007   [address]    ALL TEETH PULLED    EYE SURGERY Right 1995    detached retina    FINGER SURGERY  5/24/11    duputrens release right ring finger inclluding proxinal interphanageal open CTR    FINGER SURGERY Right 1957    contusion finger # 3    OTHER SURGICAL HISTORY  12/29/2017    excision left facial cyst       Level of Consciousness: Alert, Oriented, and Cooperative = 0    Level of Activity: Walking with assistance = 1    Respiratory Pattern: Dyspnea with exertion;Irregular pattern;or RR less than 6 = 2    Breath Sounds: Diminshed bilaterally and/or crackles = 2    Sputum  Sputum Color: [place]: Thick, Sputum How Obtained: Spontaneous cough  Cough: Strong, spontaneous, non-productive = 0    Vital Signs   BP 90/62   Pulse 92   Temp 97.3 °F (36.3 °C) (Oral)   Resp 18   Ht 5' 7\" (1.702 m)   Wt 106 lb 12.8 oz (48.4 kg)   SpO2 99%   BMI 16.73 kg/m²   SPO2 (COPD values may differ): 88-89% on room air or greater than 92% on FiO2 28- 35% = 2    Peak Flow (asthma only): not applicable = 0    RSI: 9 - 10 = TID        Plan       Goals: medication delivery    Patient/caregiver was educated on the proper method of use for Respiratory Care Devices:  Yes      Level of patient/caregiver understanding able to:   ? Verbalize understanding   ? Demonstrate understanding       ? Teach back        ? Needs reinforcement       ? No available caregiver               ? Other:     Response to education:  Good     Is patient being placed on Home Treatment Regimen? No     Does the patient have everything they need prior to discharge? NA     Comments: Chart reviewed and patient assessed.      Plan of Care: Duoneb Q4W/A,  Albuterol Q2PRN. Electronically signed by Ellyn Frost RCP on 1/7/2020 at 12:35 AM    Respiratory Protocol Guidelines     1. Assessment and treatment by Respiratory Therapy will be initiated for medication and therapeutic interventions upon initiation of aerosolized medication. 2. Physician will be contacted for respiratory rate (RR) greater than 35 breaths per minute. Therapy will be held for heart rate (HR) greater than 140 beats per minute, pending direction from physician. 3. Bronchodilators will be administered via Metered Dose Inhaler (MDI) with spacer when the following criteria are met:  a. Alert and cooperative     b. HR < 140 bpm  c. RR < 30 bpm                d. Can demonstrate a 2-3 second inspiratory hold  4. Bronchodilators will be administered via Hand Held Nebulizer [facility]) to patients when ANY of the following criteria are met  a. Incognizant or uncooperative          b. Patients treated with HHN at Home        c. Unable to demonstrate proper use of MDI with spacer     d. RR > 30 bpm   5. Bronchodilators will be delivered via Metered Dose Inhaler (MDI), HHN, Aerogen to intubated patients on mechanical ventilation. 6. Inhalation medication orders will be delivered and/or substituted as outlined below. Aerosolized Medications Ordering and Administration Guidelines:    1. All Medications will be ordered by a physician, and their frequency and/or modality will be adjusted as defined by the patients Respiratory Severity Index (RSI) score. 2. If the patient does not have documented COPD, consider discontinuing anticholinergics when RSI is less than 9.  3. If the bronchospasm worsens (increased RSI), then the bronchodilator frequency can be increased to a maximum of every 4 hours. If greater than every 4 hours is required, the physician will be contacted.   4. If the bronchospasm improves, the frequency of the bronchodilator can be decreased, based on the patient's RSI, but not less than home treatment regimen frequency. 5. Bronchodilator(s) will be discontinued if patient has a RSI less than 9 and has received no scheduled or as needed treatment for 72  Hrs. Patients Ordered on a Mucolytic Agent:    1. Must always be administered with a bronchodilator. 2. Discontinue if patient experiences worsened bronchospasm, or secretions have lessened to the point that the patient is able to clear them with a cough. Anti-inflammatory and Combination Medications:    1. If the patient lacks prior history of lung disease, is not using inhaled anti-inflammatory medication at home, and lacks wheezing by examination or by history for at least 24 hours, contact physician for possible discontinuation.

## 2020-01-07 NOTE — PROGRESS NOTES
Pharmacy note:  Vancomycin Day #  1  Patient is a 70 yo male admitted with pneumonia. Pt was started on Cefepime to cover gram negative organisms, including Pseudomonas. He was started on azithromycin to cover atypical organisms. Pt was also started on Vancomycin to cover gram positive organisms, including MRSA. WBC           BUN/SCr      Ht. Wt.  22.3             12/< 0.5       5'7\"            50.8 kg. Based on patient's age, weight, and renal function will start Vancomycin 1000 mg IVPB q12h and check trough at steady state. Will adjust as necessary. Ava Forbes, Pharm. D. 1/6/2020 10:09 PM

## 2020-01-07 NOTE — PLAN OF CARE
Problem: Falls - Risk of:  Goal: Will remain free from falls  Description  Will remain free from falls  1/7/2020 1536 by Wesley Bee RN  Outcome: Ongoing  1/7/2020 0505 by Annabelle Cintron RN  Outcome: Ongoing  Goal: Absence of physical injury  Description  Absence of physical injury  Outcome: Ongoing     Problem: Risk for Impaired Skin Integrity  Goal: Tissue integrity - skin and mucous membranes  Description  Structural intactness and normal physiological function of skin and  mucous membranes.   1/7/2020 1536 by Wesley Bee RN  Outcome: Ongoing  1/7/2020 0505 by Annabelle Cintron RN  Outcome: Ongoing

## 2020-01-07 NOTE — H&P
Hospital Medicine History & Physical      PCP: VALERIE Velazquez CNP    Date of Admission: 1/6/2020    Date of Service: Pt seen/examined on 1/7/2020    Chief Complaint:    Chief Complaint   Patient presents with    Shortness of Breath     went for outpatient xray of chest due to sob and weakness. pt transported here due to sob and hypoxia. History Of Present Illness: The patient is a 71 y.o. male with chronic back pain, COPD, DM2 who presented to [facility] ED with complaint of SOB and malaise. Patient states that he was admitted for pneumonia earlier this year and starting around [place] he started feeling similar to the way he felt at that time. He states that he felt really fatigued and weak and had a productive cough with some shortness of breath. He states that this continued to worsen after he started getting his IV and iron infusions and when he followed up with his primary care provider, he was noted to have fevers, coughs and wheezing and was referred to the emergency room. He states that he does chronically use oxygen at home usually 1 to 1-1/2 L but was still feeling short of breath on this home oxygen. He states that he has been following with Dr. Nathalie Grewal with [facility] for his elevated platelet count and has been taking the Hydrea but it was decreased from 2 tablets daily to 1 tablet daily and he has since been compliant. He reports no significant weight loss recently. He denies any nausea vomiting and reports tolerating p.o. intake well. He denies any associated chest pain.     Past Medical History:        Diagnosis Date    Arthritis     Chronic back pain     COPD (chronic obstructive pulmonary disease) (HCC)     Type 2 diabetes mellitus without complication, without long-term current use of insulin ([facility] [address] 6/20/2019    Wears glasses        Past Surgical History:        Procedure Laterality Date    BRONCHOSCOPY N/A 11/30/2018    BRONCHOSCOPY ALVEOLAR LAVAGE performed by Vanessa Jaime Daryle Chalk, MD at [address] [facility] N/A 4/11/2019    BRONCHOSCOPY ALVEOLAR LAVAGE performed by Gianna Young MD at [address] [facility] Right 2007   [address]    ALL TEETH PULLED    EYE SURGERY Right 1995    detached retina    FINGER SURGERY  5/24/11    duputrens release right ring finger inclluding proxinal interphanageal open CTR    FINGER SURGERY Right 1957    contusion finger # 3    OTHER SURGICAL HISTORY  12/29/2017    excision left facial cyst       Medications Prior to Admission:    Prior to Admission medications    Medication Sig Start Date End Date Taking? Authorizing Provider   doxycycline hyclate (VIBRAMYCIN) 100 MG capsule Take 1 capsule by mouth 2 times daily for 7 days 1/3/20 1/10/20 Yes VALERIE Jarvis CNP   methylPREDNISolone (MEDROL, ABHISHEK,) 4 MG tablet As directed 1/3/20 1/9/20 Yes VALERIE Robison CNP   promethazine-codeine (PHENERGAN WITH CODEINE) 6.25-10 MG/5ML syrup Take 5 mLs by mouth every 8 hours as needed for Cough for up to 10 days.  1/3/20 1/13/20 Yes VALERIE Robison CNP   metFORMIN (GLUCOPHAGE) 500 MG tablet Take 1 tablet by mouth 2 times daily (with meals)  Patient taking differently: Take 500 mg by mouth daily (with breakfast)  10/17/19  Yes VALERIE Jarvis CNP   meloxicam (MOBIC) 15 MG tablet Take 1 tablet by mouth daily , take with food for pain 8/9/19  Yes VALERIE Jarvis CNP   hydroxyurea (HYDREA) 500 MG chemo capsule  7/1/19  Yes Historical Provider, MD   vitamin D (CHOLECALCIFEROL) 1000 UNIT TABS tablet Take 1,000 Units by mouth daily   Yes Historical Provider, MD   vitamin C (ASCORBIC ACID) 500 MG tablet Take 500 mg by mouth daily   Yes Historical Provider, MD   guaiFENesin (MUCINEX) 600 MG extended release tablet Take 1 tablet by mouth 2 times daily 6/23/18  Yes Makenna Griggs MD   Multiple Vitamins-Minerals (THERAPEUTIC MULTIVITAMIN-MINERALS) tablet Take 1 tablet by mouth daily   Yes Historical Provider, MD   aspirin 81 MG tablet Take 81 mg by mouth daily  1/14/16  Yes Historical Provider, MD   traMADol (ULTRAM) 50 MG tablet Take 1 tablet by mouth every 8 hours as needed for Pain for up to 30 days. 12/18/19 1/17/20  VALERIE Robison CNP   ipratropium-albuterol (DUONEB) 0.5-2.5 (3) MG/3ML SOLN nebulizer solution INHALE THREE MILLILITERS (ONE VIAL) VIA NEBULIZATION BY MOUTH EVERY 4 HOURS AS NEEDED FOR SHORTNESS OF BREATH 11/11/19   Renoquiline VALERIE Brown CNP   blood glucose monitor kit and supplies by Other route daily E11.9, FSBS daily. Meter preferred by insurance. 6/28/19   VALERIE Mack CNP   blood glucose test strips (ASCENSIA AUTODISC VI;ONE TOUCH ULTRA TEST VI) strip DX: E11.9 FSBS daily. Meter preferred by insurance. 6/28/19   VALERIE Mack CNP   Lancets MISC 1 each by Does not apply route daily DX: E 11.9. FSBS daily. Meter preferred by insurance. 6/28/19 1/3/20  Jacoboiline VALERIE Brown CNP   albuterol sulfate HFA (PROAIR HFA) 108 (90 Base) MCG/ACT inhaler Inhale 2 puffs into the lungs every 6 hours as needed for Wheezing 4/16/19   Romelia Conley MD   OXYGEN Inhale 1 L/min into the lungs continuous     Historical Provider, MD       Allergies:  Patient has no known allergies. Social History:  The patient currently lives at home    TOBACCO:   reports that he quit smoking about 19 months ago. His smoking use included cigarettes. He started smoking about 43 years ago. He has a 40.00 pack-year smoking history. He has never used smokeless tobacco.  ETOH:   reports current alcohol use of about 12.0 standard drinks of alcohol per week.       Family History:   Positive as follows:        Problem Relation Age of Onset    Diabetes Mother     Cancer Sister         pancreatic    Emphysema Father        REVIEW OF SYSTEMS:     Constitutional: + Fever, malaise, fatigue  HENT: Negative for sore throat   Eyes: Negative for redness Respiratory: + Cough, shortness of breath  Cardiovascular: Negative for chest pain   Gastrointestinal: Negative for vomiting, diarrhea   Genitourinary: Negative for hematuria   Musculoskeletal: Negative for arthralgias   Skin: Negative for rash   Neurological: Negative for syncope   Hematological: Negative for adenopathy   Psychiatric/Behavorial: Negative for anxiety    PHYSICAL EXAM:    BP 92/60   Pulse 80   Temp 97 °F (36.1 °C) (Oral)   Resp 18   Ht 5' 7\" (1.702 m)   Wt 106 lb 12.8 oz (48.4 kg)   SpO2 97%   BMI 16.73 kg/m²   Gen: Thin, frail-appearing male, no distress. Alert. Eyes: PERRL. No sclera icterus. No conjunctival injection. ENT: No discharge. Pharynx clear. Neck: No JVD. No Carotid Bruit. Trachea midline. Resp: No accessory muscle use. No crackles. + Wheezes. + Rhonchi. Diminished breath sounds throughout, on 2 L nasal cannula O2   CV: Regular rate. Regular rhythm. No murmur. No rub. No edema. Capillary Refill: Brisk,< 3 seconds   Peripheral Pulses: +2 palpable, equal bilaterally   GI: Non-tender. Non-distended. Normal bowel sounds. Skin: Warm and dry. M/S: No cyanosis. No joint deformity. No clubbing. Neuro: Awake. Grossly nonfocal    Psych: Oriented x 3. No anxiety or agitation.      CBC:   Recent Labs     01/06/20  1214 01/07/20  0539   WBC 22.3* 27.4*   HGB 10.6* 10.1*   HCT 32.9* 32.7*   .5* 115.0*   * 721*     BMP:   Recent Labs     01/06/20  1214 01/07/20  0539   * 130*   K 4.6 3.8  3.8   CL 89* 93*   CO2 31 27   BUN 12 12   CREATININE <0.5* <0.5*     LIVER PROFILE:   Recent Labs     01/06/20  1214   AST 12*   ALT 12   BILITOT 0.4   ALKPHOS 115   UA:  Recent Labs     01/06/20  1449   COLORU Yellow   PHUR 7.5   CLARITYU Clear   SPECGRAV <=1.005   LEUKOCYTESUR Negative   UROBILINOGEN 0.2   BILIRUBINUR Negative   BLOODU Negative   GLUCOSEU Negative     CARDIAC ENZYMES  Recent Labs     01/06/20  1214   TROPONINI <0.01     CULTURES  Blood Cultures 1/2: + Coag Neg Staph   Rapid Flu: negative     EKG:  I have reviewed the EKG with the following interpretation:   Sinus tachycardia, poor baseline, normal axis, normal interval, no acute ST segment changes concerning for acute ischemia     RADIOLOGY  XR CHEST STANDARD (2 VW)   Final Result   Persistent chronic bilateral upper lobe consolidation likely representing a   chronic atypical infectious process. Mycobacterial and fungal pathogens are   primary considerations. There has been some resolution of fluid that was   present within bilateral upper lobe cavities. There is persistent airspace   disease in the left lower lobe.   No definite new abnormality is demonstrated           Pertinent previous results reviewed   Resp Cx: 6/14/19  Enterobacter cloacae (1)     Antibiotic Interpretation ARTEM Status    amoxicillin-clavulanate Resistant >16/8 mcg/mL     ampicillin Resistant >16 mcg/mL     ceFAZolin Resistant >16 mcg/mL     cefepime Sensitive <=2 mcg/mL     cefotaxime Sensitive <=2 mcg/mL     cefTRIAXone Intermediate 2 mcg/mL     cefuroxime Resistant >16 mcg/mL     ciprofloxacin Sensitive <=1 mcg/mL     gentamicin Sensitive <=4 mcg/mL     meropenem Sensitive <=1 mcg/mL     piperacillin-tazobactam Sensitive <=16 mcg/mL     trimethoprim-sulfamethoxazole Sensitive <=2/38 mcg/mL     Pseudomonas aeruginosa (2)     Antibiotic Interpretation ARTEM Status    cefepime Sensitive <=2 mcg/mL     ciprofloxacin Sensitive <=1 mcg/mL     gentamicin Sensitive <=4 mcg/mL     meropenem Sensitive <=1 mcg/mL     piperacillin-tazobactam Sensitive <=16 mcg/mL     tobramycin Sensitive <=4 mcg/mL       ASSESSMENT/PLAN:  Sepsis (POA--febrile, tachycardia, tachypnea, hypoxia, source, leukocytosis)  -Secondary to recurrent bilateral upper lobe pneumonia  -Continue Vanco and cefepime  -Continue IV fluids, no pressors needed at this time  -Blood and sputum cultures pending    Recurrent BUL PNA  -Has been treated for the same with respiratory cultures growing Pseudomonas in the past  -Has no history of TB and has had previously negative AFBs from prior BAL  -QuantiFERON pending from ED  -Continue vancomycin and cefepime  -Repeat sputum cultures pending  -Continue supplemental O2, IV steroids and inhaled bronchodilators  - Pulmonology consult   -Airborne precautions    COPD AE  - 2/2 to above   - Continue IBD, IV steroids, IV Abx   - pulm consult   - supplemental O2 PRN     Acute on Chronic hypoxic respiratory failure  - Uses 1-2 L NC O2 at home   - Now on 2 L  -Secondary to above, continue to wean as tolerated    Hyponatremia   -2/2 mild hypovolemia secondary to recent malaise, reports continued p.o. intake but not a significant amount  -Continue IV fluids and continue to monitor labs    Macrocytic Anemia   -New compared to prior  -Hemoglobin stable with a baseline around 10.5  -Check W11 and folic acid levels  -Has been following with [facility] in the past and does report that he had some IV iron infusions recently as well    DM2  - BG is controlled  - Hgb A1c: 5.7 on November 11, 2019  - Continue sliding scale insulin    Leukocytosis   -In the setting of recurrent bilateral upper lobe pneumonia  -Now on steroids as well  -Continue to monitor closely    Thrombocytosis  -Has had platelet counts greater than 1000 in the past  -Has followed up with [facility] with Dr. Camila Rosenberg and started on Hydrea, KAYLEE 2+   -He was noted to have increased anemia on the Hydrea and therefore the dosing was decreased from thousand milligrams to 500 mg  -Patient recently had follow-up in November with [facility] but now with increased thrombocytosis again compared to prior notes of meeting goal less than 400  -We will resume Hydrea at this time and consult heme-onc    PCM   - Body mass index is 16.73 kg/m².   -Dietitian consult      + BCx  - 1/2 for coag negative staph   - suspect contaminate   - Continue to monitor     DVT Prophylaxis: Lovenox   Diet: DIET GENERAL;   Code Status: [place] course and plan of care discussed with Dr. Nicole Mayers.       Montserrat Hawkins PA-C  1/7/2020 8:22 AM

## 2020-01-08 PROBLEM — E43 SEVERE PROTEIN-CALORIE MALNUTRITION (HCC): Status: ACTIVE | Noted: 2018-09-20

## 2020-01-08 NOTE — PROGRESS NOTES
Report given to Warden Hannah RN at bedside for transfer of care. Pt denies needs at this time, call light within reach.

## 2020-01-08 NOTE — PROGRESS NOTES
Report given to [facility] RAÚL [place] at bedside for transfer of care. Pt denies needs at this time, call light within reach. Transport requested.

## 2020-01-08 NOTE — PROGRESS NOTES
Pulmonary Progress Note    CC: pneumonia    Subjective:   Patient feels a little worse, cough productive of brown sputum. Intake/Output Summary (Last 24 hours) at 1/8/2020 1128  Last data filed at 1/8/2020 0735  Gross per 24 hour   Intake 595 ml   Output 1600 ml   Net -1005 ml       Exam:   /66   Pulse 89   Temp 97.6 °F (36.4 °C) (Oral)   Resp 17   Ht 5' 7\" (1.702 m)   Wt 114 lb 6.4 oz (51.9 kg)   SpO2 100%   BMI 17.92 kg/m²  on 2l nc  Gen: Mild distress. Thin. Eyes: PERRL. No sclera icterus. No conjunctival injection. ENT: No discharge. Pharynx clear. Neck: Trachea midline. No obvious mass. Resp: No accessory muscle use. No crackles. No wheezes. Bilateral upper lobe rhonchi. No dullness on percussion. CV: Regular rate. Regular rhythm. No murmur or rub. No edema. GI: Non-tender. Non-distended. No hernia. Skin: Warm and dry. No nodule on exposed extremities. Lymph: No cervical LAD. No supraclavicular LAD. M/S: No cyanosis. No joint deformity. No clubbing. Neuro: Awake. Alert. Moves all four extremities. Psych: Oriented x 3. No anxiety.       Scheduled Meds:   insulin glargine  15 Units Subcutaneous Nightly    predniSONE  40 mg Oral Daily    ipratropium-albuterol  1 ampule Inhalation Q4H While awake    hydroxyurea  500 mg Oral Daily    insulin lispro  0-18 Units Subcutaneous TID     insulin lispro  0-9 Units Subcutaneous Nightly    aspirin  81 mg Oral Daily    sodium chloride flush  10 mL Intravenous 2 times per day    enoxaparin  40 mg Subcutaneous Daily    azithromycin  500 mg Intravenous Q24H    cefepime  1 g Intravenous Q12H    guaiFENesin  600 mg Oral BID    vancomycin  1,000 mg Intravenous Q12H     Continuous Infusions:   dextrose       PRN Meds:  sodium chloride flush, magnesium hydroxide, ondansetron, albuterol, acetaminophen, glucose, dextrose, glucagon (rDNA), dextrose    Labs:  CBC:   Recent Labs     01/06/20  1214 01/07/20  0539 01/08/20  0533   WBC 22.3* 27.4* 23.5*   HGB 10.6* 10.1* 8.9*   HCT 32.9* 32.7* 28.2*   .5* 115.0* 114.1*   * 721* 717*     BMP:   Recent Labs     01/06/20  1214 01/07/20  0539 01/08/20  0533   * 130* 133*   K 4.6 3.8  3.8 3.9   CL 89* 93* 95*   CO2 31 27 27   BUN 12 12 11   CREATININE <0.5* <0.5* <0.5*     LIVER PROFILE:   Recent Labs     01/06/20  1214   AST 12*   ALT 12   BILITOT 0.4   ALKPHOS 115     PT/INR: No results for input(s): PROTIME, INR in the last 72 hours. APTT: No results for input(s): APTT in the last 72 hours. UA:  Recent Labs     01/06/20  1449   COLORU Yellow   PHUR 7.5   CLARITYU Clear   SPECGRAV <=1.005   LEUKOCYTESUR Negative   UROBILINOGEN 0.2   BILIRUBINUR Negative   BLOODU Negative   GLUCOSEU Negative     No results for input(s): PHART, ZPU7SWX, PO2ART in the last 72 hours. Cultures:   Blood: CNS  Sputum- pseudomonas sens pending    Films:  Chest CT 1/6/19: Lungs/pleura: Emphysema is present.  Bilateral upper lobe consolidation is demonstrated, increased bilaterally, right greater than left, with  superimposed cavitation.  Air-fluid levels seen within these apical cavities.   Dense consolidation with bronchiectasis is again demonstrated within the  perihilar regions.  Consolidation is seen within the posterior left lower  lobe with internal cystic space or cavity, slightly larger than prior.  Small  pleural effusions.           ASSESSMENT:  ·  Acute on chronic respiratory failure with hypoxemia and hypercapnia  · Recurrent bilateral upper lobe cavitary pneumonia- pseudomonas; AFB negative on prior BAL  · Severe COPD with AE  · Tobacco abuse  · Severe protein calorie malnutrition     PLAN:  · Supplemental oxygen to maintain SaO2 >92%; wean as tolerated   ·  IV steroids -> Pred taper  · Inhaled bronchodilators   · Antibiotics (D3 vancomycin, azithromycin and cefepime)- stop vanc, f/u cx sensitivities  · Supplemental oxygen to maintain SaO2 >92%; wean as tolerated  · Sputum culture growing

## 2020-01-08 NOTE — PROGRESS NOTES
Nutrition Assessment    Type and Reason for Visit: Initial, Positive Nutrition Screen, Consult(poor appetite and wt loss )    Nutrition Recommendations:   1. Will continue general diet  2. Chocolate Ensure High Protein with Breakfast  3. Magic Cups with Lunch and dinner (orange or chocolate )     Nutrition Assessment:   Pt. severely malnourished AEB sever loss of muscle and fat tissue related poor intake d/t COPD and acute Pneumonia. At risk for further nutritional compromise r/t acute infection. Malnutrition Assessment:  · Malnutrition Status: Meets the criteria for severe malnutrition  · Context: Acute illness or injury  · Findings of the 6 clinical characteristics of malnutrition (Minimum of 2 out of 6 clinical characteristics is required to make the diagnosis of moderate or severe Protein Calorie Malnutrition based on AND/ASPEN Guidelines):  1. Energy Intake-Less than or equal to 75% of estimated energy requirement, Greater than or equal to 7 days    2. Weight Loss-No significant weight loss, in 1 month  3. Fat Loss-Severe subcutaneous fat loss, Orbital  4. Muscle Loss-Severe muscle mass loss, Temples (temporalis muscle), Clavicles (pectoralis and deltoids)  5.  Fluid Accumulation-No significant fluid accumulation, Extremities    Nutrition Risk Level: High    Nutrient Needs:  · Estimated Daily Total Kcal: 0346-8029 based ~ 25-30 kcal /kg cbw  · Estimated Daily Protein (g): 52-62 based ~ 1-1.2 gr/kg cbw  · Estimated Daily Total Fluid (ml/day): 3355-6770    Nutrition Diagnosis:   · Problem: Severe malnutrition  · Etiology: related to Catabolic illness, Insufficient energy/nutrient consumption     Signs and symptoms:  as evidenced by Diet history of poor intake, BMI, Severe loss of subcutaneous fat, Severe muscle loss    Objective Information:  · Nutrition-Focused Physical Findings: elderly male sitting up side of the bed working on his laptop; thin emaciated appearance; no teeth in reports he only has uppers does not effect his ability to eat;  appetiet poor for last few week with the recent infection   · Wound Type:    · Current Nutrition Therapies:  · Oral Diet Orders: General   · Oral Diet intake: %  · Oral Nutrition Supplement (ONS) Orders: None  · Anthropometric Measures:  · Ht: 5' 7\" (170.2 cm)   · Current Body Wt: 114 lb (51.7 kg)  · Admission Body Wt: 106 lb (48.1 kg)  · Usual Body Wt: 113 lb (51.3 kg)  · % Weight Change:  ,  7.5% gain in 2 days states he is eating great   · Ideal Body Wt: 148 lb (67.1 kg), % Ideal Body 77  · BMI Classification: BMI <18.5 Underweight    Nutrition Interventions:   Continue current diet, Start ONS  Continued Inpatient Monitoring, Coordination of Care, Coordination of Community Care    Nutrition Evaluation:   · Evaluation: Goals set   · Goals: pt will constinue to consume > 75% of meals and accept the ensure HP and magic cups to assit in keeping weight > 113#    · Monitoring: Meal Intake, Supplement Intake, Diet Tolerance, Weight, Monitor Bowel Function      Electronically signed by Shirin Carrizales RD, LD on 1/8/20 at 5:11 PM    Contact Number: [phone]

## 2020-01-08 NOTE — PLAN OF CARE
Nutrition Problem: Severe malnutrition  Intervention: Food and/or Nutrient Delivery: Continue current diet, Start ONS  Nutritional Goals: pt will constinue to consume > 75% of meals and accept the ensure HP and magic cups to assit in keeping weight > 113#

## 2020-01-08 NOTE — PROGRESS NOTES
New verbal orders from Dr. Dennys Simpson 20 units of Lantus now subcutaneous.    Humalog 15 units subcutaneous with each meal.  Maria Alejandra Quiñones

## 2020-01-08 NOTE — PROGRESS NOTES
Progress Note    Admit Date:  1/6/2020    Admitted with pneumonia and sepsis. Rule out TB, QuantiFERON pending. On airborne precautions  Seen by pulmonology     Subjective:  Mr. Ana Hair feels much better today. Sputum cultures are growing Pseudomonas. shortness of breath have improved, he is afebrile. He is coughing up quite a bit of tan-colored sputum  Oxygen saturations are stable on room air. Leukocytosis and thrombocytosis slightly improved. Objective:   /66   Pulse 89   Temp 97.6 °F (36.4 °C) (Oral)   Resp 17   Ht 5' 7\" (1.702 m)   Wt 114 lb 6.4 oz (51.9 kg)   SpO2 100%   BMI 17.92 kg/m²       Intake/Output Summary (Last 24 hours) at 1/8/2020 1044  Last data filed at 1/8/2020 0735  Gross per 24 hour   Intake 775 ml   Output 1600 ml   Net -825 ml         Physical Exam:  General:  Awake, alert, NAD  Thin and frail  Skin:  Warm and dry  Neck:  JVD absent. Neck supple  Chest: Diminished breath sounds, bilateral rhonchi. Cardiovascular:  RRR ,S1S2 normal  Abdomen:  Soft, non tender, non distended, BS +  Extremities:  No edema. Intact peripheral pulses.  Brisk cap refill, < 2 secs  Neuro: non focal      Medications:   Scheduled Meds:   insulin glargine  15 Units Subcutaneous Nightly    ipratropium-albuterol  1 ampule Inhalation Q4H While awake    hydroxyurea  500 mg Oral Daily    insulin lispro  0-18 Units Subcutaneous TID WC    insulin lispro  0-9 Units Subcutaneous Nightly    aspirin  81 mg Oral Daily    sodium chloride flush  10 mL Intravenous 2 times per day    enoxaparin  40 mg Subcutaneous Daily    methylPREDNISolone  40 mg Intravenous Q12H    Followed by   Nena Hemphill ON 1/9/2020] predniSONE  40 mg Oral Daily    azithromycin  500 mg Intravenous Q24H    cefepime  1 g Intravenous Q12H    guaiFENesin  600 mg Oral BID    vancomycin  1,000 mg Intravenous Q12H       Continuous Infusions:   sodium chloride 100 mL/hr at 01/08/20 0811    dextrose         Data:  CBC:   Recent Labs 01/06/20  1214 01/07/20  0539 01/08/20  0533   WBC 22.3* 27.4* 23.5*   RBC 2.92* 2.84* 2.47*   HGB 10.6* 10.1* 8.9*   HCT 32.9* 32.7* 28.2*   .5* 115.0* 114.1*   RDW 15.1 15.7* 15.6*   * 721* 717*     BMP:   Recent Labs     01/06/20  1214 01/07/20  0539 01/08/20  0533   * 130* 133*   K 4.6 3.8  3.8 3.9   CL 89* 93* 95*   CO2 31 27 27   BUN 12 12 11   CREATININE <0.5* <0.5* <0.5*     BNP: No results for input(s): BNP in the last 72 hours. PT/INR: No results for input(s): PROTIME, INR in the last 72 hours. APTT: No results for input(s): APTT in the last 72 hours. CARDIAC ENZYMES:   Recent Labs     01/06/20  1214   TROPONINI <0.01     FASTING LIPID PANEL:  Lab Results   Component Value Date    CHOL 166 11/29/2017    HDL 87 (H) 11/29/2017    TRIG 49 11/29/2017     LIVER PROFILE:   Recent Labs     01/06/20  1214   AST 12*   ALT 12   BILITOT 0.4   ALKPHOS 115        Radiology  XR CHEST STANDARD (2 VW)   Final Result   Persistent chronic bilateral upper lobe consolidation likely representing a   chronic atypical infectious process. Mycobacterial and fungal pathogens are   primary considerations. There has been some resolution of fluid that was   present within bilateral upper lobe cavities. There is persistent airspace   disease in the left lower lobe. No definite new abnormality is demonstrated             Telemetry  NSR      Cultures  Respiratory cultures growing Pseudomonas  Blood cultures coag negative staph 1 out of 2   rapid flu ag neg     Assessment:  Active Problems:    Protein calorie malnutrition (HCC)    Acute on chronic respiratory failure with hypoxia and hypercapnia (HCC)    HCAP (healthcare-associated pneumonia)    COPD exacerbation (HCC)    Pneumonia due to organism    PNA (pneumonia)    Hyponatremia    Sepsis ([facility] [address]  Resolved Problems:    * No resolved hospital problems.  *      Plan:    Sepsis (POA--febrile, tachycardia, tachypnea, hypoxia, source, leukocytosis)  -Secondary

## 2020-01-08 NOTE — FLOWSHEET NOTE
01/07/20 2033   Vitals   Temp 98.1 °F (36.7 °C)   Temp Source Oral   Pulse 100   Heart Rate Source Monitor   Resp 18   BP 99/67   BP Location Left upper arm   BP Upper/Lower Upper   BP Method Automatic   Patient Position Sitting   Level of Consciousness 0   MEWS Score 2   Oxygen Therapy   SpO2 99 %   O2 Device Nasal cannula   O2 Flow Rate (L/min) 3 L/min   Shift assessment completed. Patient in bed awake,alert and oriented. VSS. 99% on 3L/min. Pt with productive cough. Brown/tan thick sputum. , normal sinus to sinus tach on monitor. Evening medications given per order. Patient satisfied at this time,call light within reach,will continue to monitor.

## 2020-01-08 NOTE — PROGRESS NOTES
ONCOLOGY FOLLOW-UP:       Primary Oncologist:  Dada    PROBLEM LIST:       Patient Active Problem List   Diagnosis Code    COPD, very severe ([facility] [address] J44.9    Tobacco use Z72.0    Chronic midline low back pain with right-sided sciatica M54.41, G89.29    Age-related osteoporosis without current pathological fracture M81.0    Community acquired pneumonia of left upper lobe of lung ([facility] [address] J18.1    Tracheobronchitis J40    Tobacco abuse Z72.0    Hypoxemia requiring supplemental oxygen R09.02, Z99.81    Protein calorie malnutrition (HCC) E46    Acute on chronic respiratory failure with hypoxia and hypercapnia (HCC) J96.21, J96.22    Abnormal chest x-ray R93.89    Chronic hypoxemic respiratory failure (HCC) J96.11    Lactic acidosis E87.2    HCAP (healthcare-associated pneumonia) J18.9    Abnormal CT of the chest R93.89    Pulmonary nodule R91.1    Thrombocytosis (HCC) D47.3    COPD exacerbation (HCC) J44.1    Pneumonia due to organism J18.9    Shortness of breath R06.02    Type 2 diabetes mellitus without complication, without long-term current use of insulin (HCC) E11.9    PNA (pneumonia) J18.9    Hyponatremia E87.1    Sepsis (HCC) A41.9       INTERVAL HISTORY:       Up in bed for breakfast. States breathing has improved. REVIEW OF SYSTEMS:       10 point ROS completed. Pertinent positives in HPI, otherwise negative.      PHYSICAL EXAM:       /66   Pulse 89   Temp 97.6 °F (36.4 °C) (Oral)   Resp 17   Ht 5' 7\" (1.702 m)   Wt 114 lb 6.4 oz (51.9 kg)   SpO2 100%   BMI 17.92 kg/m²     General appearance: alert and cooperative  Head: Normocephalic, without obvious abnormality, atraumatic  Neck: No palpable lymphadenopathy in supraclavicular or cervical chains  Lungs: Clear to auscultation bilaterally, no audible rales, wheezes or crackles  Heart: Regular rate and rhythm, S1, S2 normal  Abdomen: Soft, non-tender; bowel sounds normal; no masses,  no organomegaly  Extremities: without cyanosis, clubbing, edema or asymmetry  Skin: No jaundice, purpura or petechiae    LABS:     CBC:   Lab Results   Component Value Date    WBC 23.5 (H) 01/08/2020    HGB 8.9 (L) 01/08/2020    HCT 28.2 (L) 01/08/2020    .1 (H) 01/08/2020     (H) 01/08/2020    LYMPHOPCT 2.1 01/08/2020    RBC 2.47 (L) 01/08/2020    MCH 35.9 (H) 01/08/2020    MCHC 31.4 01/08/2020    RDW 15.6 (H) 01/08/2020       BMP:   Lab Results   Component Value Date     01/08/2020    K 3.9 01/08/2020    K 3.8 01/07/2020    CL 95 01/08/2020    CO2 27 01/08/2020    BUN 11 01/08/2020    CREATININE <0.5 01/08/2020    GLUCOSE 269 01/08/2020    CALCIUM 8.6 01/08/2020        Hepatic Function Panel:   Lab Results   Component Value Date    ALKPHOS 115 01/06/2020    ALT 12 01/06/2020    AST 12 01/06/2020    PROT 7.0 01/06/2020    BILITOT 0.4 01/06/2020    LABALBU 2.9 01/06/2020        IMAGING:     Xr Chest Standard (2 Vw)  Result Date: 1/6/2020  Persistent chronic bilateral upper lobe consolidation likely representing a chronic atypical infectious process. Mycobacterial and fungal pathogens are primary considerations. There has been some resolution of fluid that was present within bilateral upper lobe cavities. There is persistent airspace disease in the left lower lobe. No definite new abnormality is demonstrated     Xr Chest Standard (2 Vw)  Result Date: 1/3/2020  Multifocal airspace opacities in the mid and upper lungs with architectural distortion. This is new in the right upper lung and progressed in the left upper lung since the previous radiograph and has not resolved since the September 2019 CT. Consider further evaluation with CT of the chest and possible bronchoscopy to assess for underlying infection or malignancy. Ct Chest Wo Contrast  Result Date: 1/6/2020  Biapical consolidation has increased as compared to prior examination dated 09/23/2019 with interval cavitation, including air-fluid levels.   Cavitating pneumonia, of typical or atypical etiologies, including tuberculosis could be considered. Malignancy cannot be excluded. Additional perihilar consolidation, bronchiectasis, and left lower lobe consolidation is again demonstrated. ASSESSMENT:     Problem List Items Addressed This Visit     COPD exacerbation ([address]    Relevant Medications    methylPREDNISolone sodium (SOLU-MEDROL) injection 125 mg (Completed)    levalbuterol (XOPENEX) nebulizer solution 1.26 mg (Completed)    albuterol (PROVENTIL) nebulizer solution 2.5 mg    guaiFENesin (MUCINEX) extended release tablet 600 mg    ipratropium-albuterol (DUONEB) nebulizer solution 1 ampule    predniSONE (DELTASONE) tablet 40 mg    Pneumonia due to organism - Primary    Relevant Medications    levalbuterol (XOPENEX) nebulizer solution 1.26 mg (Completed)    piperacillin-tazobactam (ZOSYN) 3.375 g in sodium chloride 0.9 % 100 mL IVPB (mini-bag) (Completed)    azithromycin (ZITHROMAX) 500 mg in D5W 250ml addavial (Completed)    vancomycin 1000 mg IVPB in 250 mL D5W addavial (Completed)    albuterol (PROVENTIL) nebulizer solution 2.5 mg    azithromycin (ZITHROMAX) 500 mg in D5W 250ml addavial    cefepime (MAXIPIME) 1 g IVPB minibag    guaiFENesin (MUCINEX) extended release tablet 600 mg    vancomycin 1000 mg IVPB in 250 mL D5W addavial    ipratropium-albuterol (DUONEB) nebulizer solution 1 ampule                PLAN:     1. Thrombocytosis  - KAYLEE-2 (+) ET  - also elevated from iron def and possibly reactive to infection  - Plt improving on Hydrea but may need dose increase given not at goal at < 400  - this can be titrated outpatient    2. Iron Def Anemia  - s/p IV iron outpatient completed 12/30/19  - Hgb has improved   - monitor CBC  - Macrocytosis is most likely from Hydrea    3.  Repsiratory Failure/Pneumonia  - per pulmonary    Taylor Back MD

## 2020-01-09 NOTE — FLOWSHEET NOTE
01/09/20 1030   Vital Signs   Pulse 103   BP (!) 83/52       Blood pressure reviewed with Dr. Michael Rojas, no new orders at this time.  Maria Alejandra Quiñones

## 2020-01-09 NOTE — PROGRESS NOTES
Morning assessment complete and medications given. Patient continues to have a moist productive cough. Postprandial blood sugar 211. Patient denies any additional needs at this time. Call light within reach. Will continue to monitor.  Maria Alejandra Quiñones

## 2020-01-09 NOTE — PROGRESS NOTES
Pulmonary Progress Note    CC: Pneumonia      Subjective:   Feels about the same  Congested and weak    IV line:      Intake/Output Summary (Last 24 hours) at 1/9/2020 0838  Last data filed at 1/9/2020 0832  Gross per 24 hour   Intake 500 ml   Output 625 ml   Net -125 ml       Exam:   BP 93/63   Pulse 103   Temp 97.2 °F (36.2 °C) (Oral)   Resp 20   Ht 5' 7\" (1.702 m)   Wt 114 lb 6.4 oz (51.9 kg)   SpO2 96%   BMI 17.92 kg/m²  on 2.5 L  Gen: + Distress. Alert. Ill-appearing  Eyes: PERRL. No sclera icterus. No conjunctival injection. ENT: No discharge. Pharynx clear. Neck: Trachea midline. Normal thyroid. Resp: + Accessory muscle use. No crackles. Few bilateral wheezes. Upper lobe rhonchi. No dullness on percussion. CV: Tachycardia. Regular rhythm. No murmur or rub. No edema. GI: Non-tender. Non-distended. No masses. No organomegaly. Normal bowel sounds. No hernia. Skin: Warm and dry. No nodule on exposed extremities. No rash on exposed extremities. Lymph: No cervical LAD. No supraclavicular LAD. M/S: No cyanosis. No joint deformity. No clubbing. Neuro: Awake. Moves all extremities. CN grossly intact. Psych: Oriented x 3. No anxiety or agitation.      Scheduled Meds:   insulin glargine  15 Units Subcutaneous Nightly    predniSONE  40 mg Oral Daily    insulin lispro  16 Units Subcutaneous TID WC    ipratropium-albuterol  1 ampule Inhalation Q4H While awake    hydroxyurea  500 mg Oral Daily    insulin lispro  0-18 Units Subcutaneous TID WC    insulin lispro  0-9 Units Subcutaneous Nightly    aspirin  81 mg Oral Daily    sodium chloride flush  10 mL Intravenous 2 times per day    enoxaparin  40 mg Subcutaneous Daily    azithromycin  500 mg Intravenous Q24H    cefepime  1 g Intravenous Q12H    guaiFENesin  600 mg Oral BID     Continuous Infusions:   dextrose       PRN Meds:  sodium chloride flush, magnesium hydroxide, ondansetron, albuterol, acetaminophen, glucose, dextrose, glucagon (rDNA), dextrose    Labs:  CBC:   Recent Labs     01/06/20  1214 01/07/20  0539 01/08/20  0533   WBC 22.3* 27.4* 23.5*   HGB 10.6* 10.1* 8.9*   HCT 32.9* 32.7* 28.2*   .5* 115.0* 114.1*   * 721* 717*     BMP:   Recent Labs     01/07/20  0539 01/08/20  0533 01/09/20  0601   * 133* 135*   K 3.8  3.8 3.9 3.4*   CL 93* 95* 98*   CO2 27 27 28   BUN 12 11 12   CREATININE <0.5* <0.5* <0.5*     LIVER PROFILE:   Recent Labs     01/06/20  1214   AST 12*   ALT 12   BILITOT 0.4   ALKPHOS 115     PT/INR: No results for input(s): PROTIME, INR in the last 72 hours. APTT: No results for input(s): APTT in the last 72 hours. UA:  Recent Labs     01/06/20  1449   COLORU Yellow   PHUR 7.5   CLARITYU Clear   SPECGRAV <=1.005   LEUKOCYTESUR Negative   UROBILINOGEN 0.2   BILIRUBINUR Negative   BLOODU Negative   GLUCOSEU Negative     No results for input(s): PHART, VYC6NYC, PO2ART in the last 72 hours. Cultures:   1/6 BC coag negative staph  1/6 flu negative  1/7 sputum Pseudomonas    Films:  CXR 1/6 reviewed by me and showed   Persistent chronic bilateral upper lobe consolidation likely representing a  chronic atypical infectious process.  Mycobacterial and fungal pathogens are  primary considerations. [place] has been some resolution of fluid that was  present within bilateral upper lobe cavities.  There is persistent airspace  disease in the left lower lobe.  No definite new abnormality is demonstrated    ASSESSMENT:  · Acute hypoxemic hypercapnic respiratory failure   · Recurrent bilateral upper lobe cavitary pneumonia-Pseudomonas. AFB negative on BAL  · Severe COPD with acute exacerbation  · Tobacco abuse  · Severe protein calorie malnutrition        PLAN:  · Supplemental oxygen to maintain SaO2 >92%; wean as tolerated  · Prednisone taper  · Inhaled bronchodilators  · Cefepime/Zithromax day #4.   Completed 3 days of vancomycin  · Acapella Mucinex  · PT OT

## 2020-01-09 NOTE — PROGRESS NOTES
32.9* 32.7* 28.2*   .5* 115.0* 114.1*   RDW 15.1 15.7* 15.6*   * 721* 717*     BMP:   Recent Labs     01/07/20  0539 01/08/20  0533 01/09/20  0601   * 133* 135*   K 3.8  3.8 3.9 3.4*   CL 93* 95* 98*   CO2 27 27 28   BUN 12 11 12   CREATININE <0.5* <0.5* <0.5*     BNP: No results for input(s): BNP in the last 72 hours. PT/INR: No results for input(s): PROTIME, INR in the last 72 hours. APTT: No results for input(s): APTT in the last 72 hours. CARDIAC ENZYMES:   Recent Labs     01/06/20  1214   TROPONINI <0.01     FASTING LIPID PANEL:  Lab Results   Component Value Date    CHOL 166 11/29/2017    HDL 87 (H) 11/29/2017    TRIG 49 11/29/2017     LIVER PROFILE:   Recent Labs     01/06/20  1214   AST 12*   ALT 12   BILITOT 0.4   ALKPHOS 115        Radiology  XR CHEST STANDARD (2 VW)   Final Result   Persistent chronic bilateral upper lobe consolidation likely representing a   chronic atypical infectious process. Mycobacterial and fungal pathogens are   primary considerations. There has been some resolution of fluid that was   present within bilateral upper lobe cavities. There is persistent airspace   disease in the left lower lobe. No definite new abnormality is demonstrated             Telemetry  NSR      Cultures  Respiratory cultures growing Pseudomonas  Blood cultures coag negative staph 1 out of 2   rapid flu ag neg     Assessment:  Active Problems:    Severe protein-calorie malnutrition (HCC)    Acute on chronic respiratory failure with hypoxia and hypercapnia (HCC)    HCAP (healthcare-associated pneumonia)    COPD exacerbation (HCC)    Pneumonia due to organism    PNA (pneumonia)    Hyponatremia    Sepsis ([facility] [address]  Resolved Problems:    * No resolved hospital problems. *      Plan:    Sepsis (POA--febrile, tachycardia, tachypnea, hypoxia, source, leukocytosis)  -Secondary to recurrent bilateral upper lobe pneumonia  -Continue Vanco and cefepime  -Hydrated with IV fluids.   Blood pressure stable  -Blood and sputum cultures , as above  -Sepsis improved     Recurrent BUL PNA  -Has been treated for the same with respiratory cultures growing Pseudomonas in the past.  Repeat respiratory cultures on admission again growing Pseudomonas  -He has no history of TB and has had previously negative AFBs from prior BAL  -QuantiFERON pending from ED. DC airborne precautions if QuantiFERON negative  -Continue vancomycin and cefepime. Consider discontinuing vancomycin, continue cefepime and azithro  -Continue supplemental O2 and inhaled bronchodilators  -Taper steroids  -Seen by pulmonology     COPD AE  - 2/2 to above   - Continue IBD  -Patient is hyperglycemic Taper steroids quickly. -Antibiotics as above  - pulm consult   - supplemental O2 PRN     DM2  -With steroid-induced hypoglycemia  - Hgb A1c: 5.7% on November 11, 2019  - Continue sliding scale insulin, increase to high-dose sliding scale. Continue Lantus nightly. Taper steroids    Acute on Chronic hypoxic respiratory failure  - Uses 1-2 L NC O2 at home   - Now on 2 L  -Secondary to above, continue to wean as tolerated     Hyponatremia   -2/2 mild hypovolemia secondary to recent malaise, reports continued p.o. intake but not a significant amount  -Improved with IV hydration .   -Monitor .      Macrocytic Anemia   -New compared to prior  -Hemoglobin stable with a baseline around 10.5  -Check M57 and folic acid levels  -Has been following with [facility] in the past and does report that he had some IV iron infusions recently as well     Leukocytosis   -In the setting of recurrent bilateral upper lobe pneumonia  -Now on steroids as well  -Continue to monitor closely.   White count down from 27K to 23.5K today     Thrombocytosis  -Has had platelet counts greater than 1000 in the past  -Has followed up with [facility] with Dr. Daysi Gan and started on Hydrea, KAYLEE 2+   -He was noted to have increased anemia on the Hydrea and therefore the dosing was decreased [id number] mg to 500 mg  -Patient recently had follow-up in November with [facility] but now with increased thrombocytosis again compared to prior notes of meeting goal less than 400  -We will resume Hydrea at this time and consult heme-onc  -Seen by heme-onc, and is to continue current dose of Hydrea for now. Monitor platelet count. Slightly improved today at 717 K     PCM   - Body mass index is 16.73 kg/m².   - Dietitian consult    -Encourage nutritional supplements     + BCx  - 1/2 for coag negative staph   - suspect contaminate   - Continue to monitor      DVT Prophylaxis: Lovenox   Diet: DIET GENERAL;   Code Status: Full Code         Enio Hardy MD 1/9/2020 10:26 AM

## 2020-01-09 NOTE — PLAN OF CARE
Problem: Falls - Risk of:  Goal: Will remain free from falls  Description  Will remain free from falls  1/9/2020 1303 by Deandre Rhodes RN  Outcome: Ongoing  1/9/2020 0434 by Cassi Henning RN  Outcome: Ongoing  1/9/2020 0434 by Cassi Henning RN  Outcome: Ongoing  Goal: Absence of physical injury  Description  Absence of physical injury  1/9/2020 0434 by Cassi Henning RN  Outcome: Ongoing  1/9/2020 0434 by Cassi Henning RN  Outcome: Ongoing     Problem: Falls - Risk of:  Goal: Absence of physical injury  Description  Absence of physical injury  1/9/2020 0434 by Cassi Henning RN  Outcome: Ongoing  1/9/2020 0434 by Cassi Henning RN  Outcome: Ongoing

## 2020-01-09 NOTE — PROGRESS NOTES
ONCOLOGY FOLLOW-UP:       Primary Oncologist:  Dada    PROBLEM LIST:       Patient Active Problem List   Diagnosis Code    COPD, very severe ([facility] [address] J44.9    Tobacco use Z72.0    Chronic midline low back pain with right-sided sciatica M54.41, G89.29    Age-related osteoporosis without current pathological fracture M81.0    Community acquired pneumonia of left upper lobe of lung ([facility] [address] J18.1    Tracheobronchitis J40    Tobacco abuse Z72.0    Hypoxemia requiring supplemental oxygen R09.02, Z99.81    Severe protein-calorie malnutrition (HCC) E43    Acute on chronic respiratory failure with hypoxia and hypercapnia (HCC) J96.21, J96.22    Abnormal chest x-ray R93.89    Chronic hypoxemic respiratory failure (HCC) J96.11    Lactic acidosis E87.2    HCAP (healthcare-associated pneumonia) J18.9    Abnormal CT of the chest R93.89    Pulmonary nodule R91.1    Thrombocytosis (HCC) D47.3    COPD exacerbation (HCC) J44.1    Pneumonia due to organism J18.9    Shortness of breath R06.02    Type 2 diabetes mellitus without complication, without long-term current use of insulin (HCC) E11.9    PNA (pneumonia) J18.9    Hyponatremia E87.1    Sepsis (HCC) A41.9       INTERVAL HISTORY:       Pt having hypoglycemia this AM after insulin. AM CBC pending. REVIEW OF SYSTEMS:       10 point ROS completed. Pertinent positives in HPI, otherwise negative.      PHYSICAL EXAM:       BP 93/63   Pulse 103   Temp 97.2 °F (36.2 °C) (Oral)   Resp 20   Ht 5' 7\" (1.702 m)   Wt 114 lb 6.4 oz (51.9 kg)   SpO2 96%   BMI 17.92 kg/m²     General appearance: alert and cooperative  Head: Normocephalic, without obvious abnormality, atraumatic  Neck: No palpable lymphadenopathy in supraclavicular or cervical chains  Lungs: Clear to auscultation bilaterally, no audible rales, wheezes or crackles  Heart: Regular rate and rhythm, S1, S2 normal  Abdomen: Soft, non-tender; bowel sounds normal; no masses,  no organomegaly  Extremities: without cyanosis, clubbing, edema or asymmetry  Skin: No jaundice, purpura or petechiae    LABS:     CBC:   Lab Results   Component Value Date    WBC 23.5 (H) 01/08/2020    HGB 8.9 (L) 01/08/2020    HCT 28.2 (L) 01/08/2020    .1 (H) 01/08/2020     (H) 01/08/2020    LYMPHOPCT 2.1 01/08/2020    RBC 2.47 (L) 01/08/2020    MCH 35.9 (H) 01/08/2020    MCHC 31.4 01/08/2020    RDW 15.6 (H) 01/08/2020       BMP:   Lab Results   Component Value Date     01/09/2020    K 3.4 01/09/2020    K 3.8 01/07/2020    CL 98 01/09/2020    CO2 28 01/09/2020    BUN 12 01/09/2020    CREATININE <0.5 01/09/2020    GLUCOSE 51 01/09/2020    CALCIUM 8.7 01/09/2020        Hepatic Function Panel:   Lab Results   Component Value Date    ALKPHOS 115 01/06/2020    ALT 12 01/06/2020    AST 12 01/06/2020    PROT 7.0 01/06/2020    BILITOT 0.4 01/06/2020    LABALBU 2.9 01/06/2020        IMAGING:     Xr Chest Standard (2 Vw)  Result Date: 1/6/2020  Persistent chronic bilateral upper lobe consolidation likely representing a chronic atypical infectious process. Mycobacterial and fungal pathogens are primary considerations. There has been some resolution of fluid that was present within bilateral upper lobe cavities. There is persistent airspace disease in the left lower lobe. No definite new abnormality is demonstrated     Xr Chest Standard (2 Vw)  Result Date: 1/3/2020  Multifocal airspace opacities in the mid and upper lungs with architectural distortion. This is new in the right upper lung and progressed in the left upper lung since the previous radiograph and has not resolved since the September 2019 CT. Consider further evaluation with CT of the chest and possible bronchoscopy to assess for underlying infection or malignancy. Ct Chest Wo Contrast  Result Date: 1/6/2020  Biapical consolidation has increased as compared to prior examination dated 09/23/2019 with interval cavitation, including air-fluid levels.   Cavitating pneumonia, of typical or atypical etiologies, including tuberculosis could be considered. Malignancy cannot be excluded. Additional perihilar consolidation, bronchiectasis, and left lower lobe consolidation is again demonstrated. ASSESSMENT:     Problem List Items Addressed This Visit     COPD exacerbation ([address]    Relevant Medications    methylPREDNISolone sodium (SOLU-MEDROL) injection 125 mg (Completed)    levalbuterol (XOPENEX) nebulizer solution 1.26 mg (Completed)    albuterol (PROVENTIL) nebulizer solution 2.5 mg    guaiFENesin (MUCINEX) extended release tablet 600 mg    ipratropium-albuterol (DUONEB) nebulizer solution 1 ampule    predniSONE (DELTASONE) tablet 40 mg    Pneumonia due to organism - Primary    Relevant Medications    levalbuterol (XOPENEX) nebulizer solution 1.26 mg (Completed)    piperacillin-tazobactam (ZOSYN) 3.375 g in sodium chloride 0.9 % 100 mL IVPB (mini-bag) (Completed)    azithromycin (ZITHROMAX) 500 mg in D5W 250ml addavial (Completed)    vancomycin 1000 mg IVPB in 250 mL D5W addavial (Completed)    albuterol (PROVENTIL) nebulizer solution 2.5 mg    azithromycin (ZITHROMAX) 500 mg in D5W 250ml addavial    cefepime (MAXIPIME) 1 g IVPB minibag    guaiFENesin (MUCINEX) extended release tablet 600 mg    ipratropium-albuterol (DUONEB) nebulizer solution 1 ampule                PLAN:     1. Thrombocytosis  - KAYLEE-2 (+) ET  - also elevated from iron def and possibly reactive to infection  - Plt improving on Hydrea but may need dose increase given not at goal at < 400  - this can be titrated outpatient    2. Iron Def Anemia  - s/p IV iron outpatient completed 12/30/19  - monitor CBC - AM level pending  - Macrocytosis is most likely from Hydrea    3. Repsiratory Failure/Pneumonia  - per pulmonary    4.  Leukocytosis  - likely from steroids/infection    Juanjose Hollis MD

## 2020-01-09 NOTE — PROGRESS NOTES
Patient laying in bed with eyes closed, RR are easy and even. Call light within reach. Will continue to monitor.  Maria Alejandra Quiñones

## 2020-01-09 NOTE — PROGRESS NOTES
RESPIRATORY THERAPY ASSESSMENT    Name:  [facility] Record Number:  [id number]  Age: 71 y.o. Gender: male  : 1950  Today's Date:  2020  Room:  [facility]0210-02    Assessment     Is the patient being admitted for a COPD or Asthma exacerbation? No   (If yes the patient will be seen every 4 hours for the first 24 hours and then reassessed)    Patient Admission Diagnosis      Allergies  No Known Allergies    Minimum Predicted Vital Capacity:     1006          Actual Vital Capacity:                    Pulmonary History:COPD and pulmonary nodule  Home Oxygen Therapy:  1.5 liters/min via nasal cannula  Home Respiratory Therapy:Albuterol/Ipratropium Bromide HHN prn albuterol prn  Current Respiratory Therapy:  duoneb W4w/a and prn  Treatment Type: HHN, Vibratory mucous clearing therapy or intervention  Medications: Albuterol/Ipratropium    Respiratory Severity Index(RSI)   Patients with orders for inhalation medications, oxygen, or any therapeutic treatment modality will be placed on Respiratory Protocol. They will be assessed with the first treatment and at least every 72 hours thereafter. The following severity scale will be used to determine frequency of treatment intervention. Smoking History: Pulmonary Disease or Smoking History, Greater than 15 pack year = 2    Social History  Social History     Tobacco Use    Smoking status: Former Smoker     Packs/day: 1.00     Years: 40.00     Pack years: 40.00     Types: Cigarettes     Start date: 1976     Last attempt to quit: 2018     Years since quittin.6    Smokeless tobacco: Never Used    Tobacco comment: Advised to quit. Had one cig for the past 5 days. Substance Use Topics    Alcohol use:  Yes     Alcohol/week: 12.0 standard drinks     Types: 12 Cans of beer per week     Frequency: 4 or more times a week     Drinks per session: 3 or 4     Binge frequency: Never     Comment: weekly    Drug use: No       Recent Surgical History: None = 0  Past Surgical History  Past Surgical History:   Procedure Laterality Date    BRONCHOSCOPY N/A 11/30/2018    BRONCHOSCOPY ALVEOLAR LAVAGE performed by Camden Montez MD at [address] N/A 4/11/2019    BRONCHOSCOPY ALVEOLAR LAVAGE performed by Desiree Shore MD at [address] [facility] Right 2007   [address]    ALL TEETH PULLED    EYE SURGERY Right 1995    detached retina    FINGER SURGERY  5/24/11    duputrens release right ring finger inclluding proxinal interphanageal open CTR    FINGER SURGERY Right 1957    contusion finger # 3    OTHER SURGICAL HISTORY  12/29/2017    excision left facial cyst       Level of Consciousness: Alert, Oriented, and Cooperative = 0    Level of Activity: Mostly sedentary, minimal walking = 2    Respiratory Pattern: Dyspnea with exertion;Irregular pattern;or RR less than 6 = 2    Breath Sounds: Absent bilaterally and/or with wheezes = 3    Sputum  Sputum Color: Redgie Manners, Creamy, Tenacity: Thick, Sputum How Obtained: Spontaneous cough  Cough: Strong, productive = 1    Vital Signs   BP 95/60   Pulse 84   Temp 98.6 °F (37 °C) (Oral)   Resp 18   Ht 5' 7\" (1.702 m)   Wt 114 lb 6.4 oz (51.9 kg)   SpO2 94%   BMI 17.92 kg/m²   SPO2 (COPD values may differ): 88-89% on room air or greater than 92% on FiO2 28- 35% = 2    Peak Flow (asthma only): not applicable = 0    RSI: 70-06 = Q6H or QID and Q4HPRN for dyspnea        Plan       Goals: medication delivery, mobilize retained secretions, volume expansion and improve oxygenation    Patient/caregiver was educated on the proper method of use for Respiratory Care Devices:  Yes      Level of patient/caregiver understanding able to:   ? Verbalize understanding   ? Demonstrate understanding       ? Teach back        ? Needs reinforcement       ? No available caregiver               ?   Other:     Response to education:  Very Good     Is patient being placed on Home Treatment contacted. 4. If the bronchospasm improves, the frequency of the bronchodilator can be decreased, based on the patient's RSI, but not less than home treatment regimen frequency. 5. Bronchodilator(s) will be discontinued if patient has a RSI less than 9 and has received no scheduled or as needed treatment for 72  Hrs. Patients Ordered on a Mucolytic Agent:    1. Must always be administered with a bronchodilator. 2. Discontinue if patient experiences worsened bronchospasm, or secretions have lessened to the point that the patient is able to clear them with a cough. Anti-inflammatory and Combination Medications:    1. If the patient lacks prior history of lung disease, is not using inhaled anti-inflammatory medication at home, and lacks wheezing by examination or by history for at least 24 hours, contact physician for possible discontinuation.

## 2020-01-09 NOTE — PROGRESS NOTES
Occupational Therapy and Physical Therapy  Patient evaluated on 1/7. Patient was independent and functioning at baseline, therefore discharged from OT/PT services. OT/PT re-ordered on 1/9. Per RN, patient continues to be independent with walking to and from the bathroom. No acute therapy needs at this time.      Thank you for the referral,     Maddy Staley, OTR/L #[id number]  [facility], [address] [place] [address], DPT #[id number]

## 2020-01-09 NOTE — PROGRESS NOTES
Shift assessment complete; see flow sheet. Scheduled medications administered; See MAR. IV flushed without difficulty. O2 2 LPM nc in place. Pt denies pain at this time. Pt denies any needs at this time.  Pt educated on use of call light and to call out with needs, verbalized understanding, bed in low locked position for pt safety

## 2020-01-10 NOTE — DISCHARGE INSTR - COC
([id number]) 12/06/2018       Active Problems:  Patient Active Problem List   Diagnosis Code    COPD, very severe ([facility] [address] J44.9    Tobacco use Z72.0    Chronic midline low back pain with right-sided sciatica M54.41, G89.29    Age-related osteoporosis without current pathological fracture M81.0    Community acquired pneumonia of left upper lobe of lung (HCC) J18.1    Tracheobronchitis J40    Tobacco abuse Z72.0    Hypoxemia requiring supplemental oxygen R09.02, Z99.81    Severe protein-calorie malnutrition (HCC) E43    Acute on chronic respiratory failure with hypoxia and hypercapnia (HCC) J96.21, J96.22    Abnormal chest x-ray R93.89    Chronic hypoxemic respiratory failure (HCC) J96.11    Lactic acidosis E87.2    HCAP (healthcare-associated pneumonia) J18.9    Abnormal CT of the chest R93.89    Pulmonary nodule R91.1    Thrombocytosis (HCC) D47.3    COPD exacerbation ([facility]) J44.1    Pneumonia due to organism J18.9    Shortness of breath R06.02    Type 2 diabetes mellitus without complication, without long-term current use of insulin ([facility]) E11.9    PNA (pneumonia) J18.9    Hyponatremia E87.1    Sepsis (HCC) A41.9    Acute respiratory failure with hypoxia and hypercapnia (HCC) J96.01, J96.02       Isolation/Infection:   Isolation          No Isolation        Patient Infection Status     None to display          Nurse Assessment:  Last Vital Signs: BP (!) 90/58   Pulse 76   Temp 97.1 °F (36.2 °C) (Axillary)   Resp 16   Ht 5' 7\" (1.702 m)   Wt 114 lb 6.4 oz (51.9 kg)   SpO2 96%   BMI 17.92 kg/m²     Last documented pain score (0-10 scale): Pain Level: 5  Last Weight:   Wt Readings from Last 1 Encounters:   01/08/20 114 lb 6.4 oz (51.9 kg)     Mental Status:  {IP PT MENTAL STATUS:20030}    IV Access:  {[facility] IV ACCESS:813979350}    Nursing Mobility/ADLs:  Walking   {P DME HEVN:711940682}  Transfer  {[facility] DME TWVN:602360928}  Bathing  {P DME LMBO:903101182}  Dressing  {P DME

## 2020-01-10 NOTE — CARE COORDINATION
DISCHARGE ORDER  Date/Time 1/10/2020 1:15 PM  Completed by: Ronaldo Harman, Case Management    Patient Name: Kate Walden    : 1950  Admitting Diagnosis: PNA (pneumonia) [J18.9]      Admit order Date and Status:in-pt  (verify MD's last order for status of admission)      Noted discharge order. Confirmed discharge plan with patient / family (pt): Yes    If pt confirmed DC plan does family need to be contacted by CM No if yes who______  Discharge Plan: Reviewed chart. Met with pt and pt's spouse at bedside. Pt chose SOC with [facility] for SN,PT/OT. Referral called to Lora Uribe and she will set up hhc at d/c today. Reviewed chart. Role of discharge planner explained and patient verbalized understanding. Discharge order is noted. Has Home O2 in place on admit:  Yes  Informed of need to bring portable home O2 tank on day of discharge for nursing to connect prior to leaving:   Yes  Verbalized agreement/Understanding:   Yes  Pt is being d/c'd to home with spouse today. Pt's O2 sats are 96% on 2 liters. Discharge timeout done with Osmin Fuentes. All discharge needs and concerns addressed.
INTERDISCIPLINARY PLAN OF CARE CONFERENCE    Date/Time: 1/8/2020 4:21 PM  Completed by: Maurice Jerome Case Management      Patient Name:  Rosanna Evans  YOB: 1950  Admitting Diagnosis: PNA (pneumonia) [J18.9]     Admit Date/Time:  1/6/2020 11:39 AM    Chart reviewed. Interdisciplinary team met to discuss patient progress and discharge plans. Disciplines included Case Management, Nursing, and Dietitian. Current Status:stable    PT/OT recommendation:  Home w/prn assist    Anticipated Discharge Date:  tba  Expected D/C Disposition:  Home  Confirmed plan with patient and/or family Yes  Discharge Plan Comments:  Spoke with patient and his wife. They state that he still plans to go home at discharge. They are unsure if he will need home care. Will continue to follow. Home O2 in place on admit: Yes  Pt informed of need to bring portable home O2 tank on day of discharge for nursing to connect prior to leaving:  Yes  Verbalized agreement/Understanding:   Yes
[facility]  [facility] services from Shahbaz RODRIGES CM. Pt has already left hospital. Telephone call to pt. Pt is agreeable to [facility] for SN, PT/OT. Verified demographics. 2:00 Faxed referral to [facility] for [facility].     Electronically signed by Bre Quinones RN on 1/10/2020 at 2:02 PM
Mental Health: No    Wound Clinic: No     Other:     DISCHARGE PLAN: Reviewed chart. Role of discharge planner explained and patient and wife verbalized understanding. Pt is from [place] with wife and plans to return. Pt is currently in Airborne precautions. Pt is active with [facility] for home O2/HHN and is on 2L at baseline. Pt currently declines [address], but states to recheck at discharge. He is aware that PT/OT recommend home with prn assist and have d/c'd acute PT. Follow for possible home O2. Explained Case Management role/services.

## 2020-01-10 NOTE — PROGRESS NOTES
Progress Note    Admit Date:  1/6/2020    Admitted with pneumonia and sepsis. Rule out TB, QuantiFERON pending. On airborne precautions  Seen by pulmonology     Subjective:  Mr. Mely Higginbotham feels much better today. Sputum cultures are growing Pseudomonas. Objective:   BP (!) 90/58   Pulse 76   Temp 97.1 °F (36.2 °C) (Axillary)   Resp 16   Ht 5' 7\" (1.702 m)   Wt 114 lb 6.4 oz (51.9 kg)   SpO2 96%   BMI 17.92 kg/m²       Intake/Output Summary (Last 24 hours) at 1/10/2020 5920  Last data filed at 1/9/2020 2014  Gross per 24 hour   Intake 240 ml   Output --   Net 240 ml         Physical Exam:  General:  Awake, alert, NAD  Thin and frail  Skin:  Warm and dry  Neck:  JVD absent. Neck supple  Chest: Diminished breath sounds, bilateral rhonchi. Cardiovascular:  RRR ,S1S2 normal  Abdomen:  Soft, non tender, non distended, BS +  Extremities:  No edema. Intact peripheral pulses. Brisk cap refill, < 2 secs  Neuro: non focal      Medications:   Scheduled Meds:   guaiFENesin  600 mg Oral BID    insulin glargine  15 Units Subcutaneous Nightly    predniSONE  40 mg Oral Daily    ipratropium-albuterol  1 ampule Inhalation Q4H While awake    hydroxyurea  500 mg Oral Daily    insulin lispro  0-18 Units Subcutaneous TID WC    insulin lispro  0-9 Units Subcutaneous Nightly    aspirin  81 mg Oral Daily    sodium chloride flush  10 mL Intravenous 2 times per day    enoxaparin  40 mg Subcutaneous Daily    azithromycin  500 mg Intravenous Q24H    cefepime  1 g Intravenous Q12H       Continuous Infusions:   dextrose         Data:  CBC:   Recent Labs     01/08/20  0533   WBC 23.5*   RBC 2.47*   HGB 8.9*   HCT 28.2*   .1*   RDW 15.6*   *     BMP:   Recent Labs     01/08/20  0533 01/09/20  0601 01/10/20  0522   * 135* 134*   K 3.9 3.4* 3.4*   CL 95* 98* 94*   CO2 27 28 32   BUN 11 12 11   CREATININE <0.5* <0.5* <0.5*     BNP: No results for input(s): BNP in the last 72 hours.   PT/INR: No results for and is to continue current dose of Hydrea for now. Monitor platelet count. Slightly improved today at 717 K     PCM   - Body mass index is 16.73 kg/m².   - Dietitian consult    -Encourage nutritional supplements     + BCx  - 1/2 for coag negative staph   - suspect contaminate   - Continue to monitor      DVT Prophylaxis: Lovenox   Diet: DIET GENERAL;   Code Status: Full Code     D/c planning     Sadia Rivas MD 1/10/2020 8:33 AM

## 2020-01-10 NOTE — PROGRESS NOTES
Pulmonary Progress Note  CC: Pneumonia    Subjective: Shortness of breath is significantly improved and patient would like to go home    IV line peripheral    EXAM:   BP (!) 90/58   Pulse 76   Temp 97.1 °F (36.2 °C) (Axillary)   Resp 16   Ht 5' 7\" (1.702 m)   Wt 114 lb 6.4 oz (51.9 kg)   SpO2 96%   BMI 17.92 kg/m²  on 2 L  Constitutional:  No acute distress   HEENT: no scleral icterus  Neck: No tracheal deviation present. Cardiovascular: Normal heart sounds. Pulmonary/Chest: No wheezes. No rhonchi. No rales. + decreased breath sounds. No accessory muscle usage or stridor. Abdominal: Soft. Musculoskeletal: No cyanosis. No clubbing. Skin: Skin is warm and dry.      Scheduled Meds:   predniSONE  30 mg Oral Daily    guaiFENesin  600 mg Oral BID    ipratropium-albuterol  1 ampule Inhalation Q4H While awake    hydroxyurea  500 mg Oral Daily    aspirin  81 mg Oral Daily    sodium chloride flush  10 mL Intravenous 2 times per day    enoxaparin  40 mg Subcutaneous Daily    azithromycin  500 mg Intravenous Q24H    cefepime  1 g Intravenous Q12H     Continuous Infusions:   dextrose       PRN Meds:  sodium chloride flush, magnesium hydroxide, ondansetron, albuterol, acetaminophen, glucose, dextrose, glucagon (rDNA), dextrose    Labs:  CBC:   Recent Labs     01/08/20  0533   WBC 23.5*   HGB 8.9*   HCT 28.2*   .1*   *     BMP:   Recent Labs     01/08/20  0533 01/09/20  0601 01/10/20  0522   * 135* 134*   K 3.9 3.4* 3.4*   CL 95* 98* 94*   CO2 27 28 32   BUN 11 12 11   CREATININE <0.5* <0.5* <0.5*       Cultures:  1/6/2020 blood CNS 1 of 2 bottles  1/6/2020 QuantiFERON gold is indeterminate due to poor response to mitogen  1/6/2020 and 1/7/2020 sputum cultures show Pseudomonas   Pseudomonas aeruginosa (1)     Antibiotic Interpretation ARTEM Status    cefepime Sensitive <=2 mcg/mL     ciprofloxacin Sensitive <=1 mcg/mL     gentamicin Sensitive <=4 mcg/mL     meropenem Sensitive <=1 mcg/mL     piperacillin-tazobactam Sensitive <=16 mcg/mL     tobramycin Sensitive <=4 mcg/mL         Films:  1/6/2020 CXR bilateral upper lobe cavitary lung disease, chronic  1/6/2020 CT chest  Lungs/pleura: Emphysema is present.  Bilateral upper lobe consolidation is   demonstrated, increased bilaterally, right greater than left, with   superimposed cavitation.  Air-fluid levels seen within these apical cavities. Dense consolidation with bronchiectasis is again demonstrated within the   perihilar regions.  Consolidation is seen within the posterior left lower   lobe with internal cystic space or cavity, slightly larger than prior.  Small   pleural effusions. 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 Marlena Maya in the pulmonary clinic. He has had bronchoscopy in November 2018 and April 2019 with negative cultures for AFB  · Severe COPD with acute exacerbation  · Severe protein calorie malnutrition  · Ongoing tobacco abuse    PLAN:  Supplemental oxygen to maintain SaO2 >92%   Prednisone taper  Inhaled bronchodilators   Cefepime and azithromycin day #5, DC azithromycin today. I would consider a prolonged course of oral Levaquin at discharge given infected cavities in the lung. Will plan to send home with 14 days of Levaquin but I will have him see Dr. Marlena Maya prior to stopping the Levaquin to determine if the antibiotic duration needs to be longer or not. He is already scheduled to follow-up with Dr. Marlena Maya next week. He should keep that appointment. I did discuss the risk and benefits of Levaquin, including dosing and duration. We specifically discussed the current FDA warnings. There is not an alternative p.o. antibiotic that will cover the Pseudomonas.   Acapella for chest clearance   PT OT   Tobacco cessation  Okay to discharge from pulmonary perspective

## 2020-01-11 NOTE — CARE COORDINATION
[place] [facility] Transitions Initial Follow Up Call    Call within 2 business days of discharge: Yes    Patient: Nathan Anton Patient : 1950   MRN: [id number]  Reason for Admission: PNA   Discharge Date: 1/10/20 RARS: Readmission Risk Score: 11      Last Discharge [employer]       Complaint Diagnosis Description Type Department Provider    20 Shortness of Breath Pneumonia due to organism . .. ED to Hosp-Admission (Discharged) (ADMITTED) [facility] Radha Baldwin MD; June Gilman. .. Spoke with: 1 [facility] [address]: [employer] services provided:  Obtained and reviewed discharge summary and/or continuity of care documents     Spoke with patient who reported he is doing alright just having issues with his nebulizer working right. [facility] and waiting for a call back. Patient reported he has all of his other medications and taking them as prescribed. Patient denied any increase in SOB, CP, or fever or chills. Patient has apt with Dr. Ben Shi on 2020. Patient stated [facility] will contact him this evening to setup soc. Denies needs at present time. Agreeable to f/u calls. Educated on the use of urgent care or physicians 24 hr access line if assistance is needed after hours & that they can always contact their home care provider to request a nurse visit even if it isn't their regularly scheduled day for their nurse to visit. CTN contacted [facility] and spoke with Rea Del Rio in intake who verified [address] orders were received and they will reach out to patient tonight.      Care Transitions 24 Hour Call    Do you have any ongoing symptoms?:  No  Do you have a copy of your discharge instructions?:  Yes  Do you have all of your prescriptions and are they filled?:  Yes  Have you been contacted by a [id number] Pharmacist?:  No  Have you scheduled your follow up appointment?:  Yes  How are you going to get to your appointment?:  Car - family or

## 2020-01-13 NOTE — PROGRESS NOTES
E11.9, FSBS daily. Meter preferred by insurance., Disp: 1 kit, Rfl: 0    blood glucose test strips (ASCENSIA AUTODISC VI;ONE TOUCH ULTRA TEST VI) strip, DX: E11.9 FSBS daily. Meter preferred by insurance., Disp: 50 strip, Rfl: 5    albuterol sulfate HFA (PROAIR HFA) 108 (90 Base) MCG/ACT inhaler, Inhale 2 puffs into the lungs every 6 hours as needed for Wheezing, Disp: 1 Inhaler, Rfl: 5    vitamin D (CHOLECALCIFEROL) 1000 UNIT TABS tablet, Take 1,000 Units by mouth daily, Disp: , Rfl:     vitamin C (ASCORBIC ACID) 500 MG tablet, Take 500 mg by mouth daily, Disp: , Rfl:     OXYGEN, Inhale 1 L/min into the lungs continuous , Disp: , Rfl:     guaiFENesin (MUCINEX) 600 MG extended release tablet, Take 1 tablet by mouth 2 times daily, Disp: 30 tablet, Rfl: 1    Multiple Vitamins-Minerals (THERAPEUTIC MULTIVITAMIN-MINERALS) tablet, Take 1 tablet by mouth daily, Disp: , Rfl:     aspirin 81 MG tablet, Take 81 mg by mouth daily , Disp: , Rfl:     Lancets MISC, 1 each by Does not apply route daily DX: E 11.9. FSBS daily. Meter preferred by insurance., Disp: 50 each, Rfl: 3        Objective:   PHYSICAL EXAM:        VITALS:  BP (!) 96/58 (Site: Left Upper Arm, Position: Sitting)   Pulse 64   Temp 98.4 °F (36.9 °C) (Oral)   Resp 16   Ht 5' 8\" (1.727 m)   Wt 115 lb 3.2 oz (52.3 kg)   SpO2 96% Comment: 2 litters O2  BMI 17.52 kg/m²     Gen: In no acute distress. Alert. Comfortable. NCAT. Thin. Eyes: PERRL. No sclera icterus. No conjunctival injection. ENT: No ocular or auricular discharge. Oropharynx clear. Neck: Trachea midline. Normal thyroid. Resp: No accessory muscle use. No crackles. No wheezes. L sided rhonchi. No dullness on percussion. CV: Regular rate. Regular rhythm. No murmur or rub. Normal S1 and S2. No edema  GI: Abdomen non-tender. Non-distended. No masses. Skin: Warm and dry. No nodules on exposed extremities. No rash on exposed extremities. Lymph: No cervical LAD.  No supraclavicular LAD. M/S: No cyanosis. No synovitis or joint deformity. No clubbing. Neuro: Cranial nerves are grossly intact. Moving all extremities. Motor and sensation grossly intact. Psych: Oriented x 3. No anxiety or agitation. DATA:    LABS:        PFTs 7/18/18  FVC 0.93 (22%) FEV1 0.41 (13%) FEV1/FVC ratio  44%  TLC 8.50 (127%) DLCO 7.2 (25%) + BD  6mwt 560 feet, low sat 84%; required 5L with exertion       6mwt 7/19 - 700 feet, low sat 87%, required 1L O2      IMAGING:      I personally reviewed and interpreted the following :     Chest CT 9/23/19: Diffuse emphysema, consolidative opacity in the right upper lobe posteriorly and along the inferior aspect with adjacent right lower lobe opacity, improvement in the consolidation previously observed in the left lower lobe with minimal residual opacity in the posterior superior aspect left lower lobe    Chest CT 6/14/19:Lungs/pleura: There is a new small ill-defined nodule like focus of the right lung demonstrated on series 3, image 55. In the left lung there is new extensive left-sided pulmonary disease, mostly consolidative.  This involves the left lower lobe as well as the left upper lobe with sparing of the lingula. [place] is a trace amount of left pleural fluid.  No drainable pleural effusion.  No pneumothorax.  Emphysema again  noted        Chest CT 4/3/19: Lungs/pleura: Airways are patent.  No pleural fluid.  Advanced centrilobular emphysema with lucent lungs and scattered reticular opacities.  Biapical  fibrotic changes are stable.  Chronic opacification in the left upper lobe is  unchanged since 11/28/2018.  Air bronchograms are seen through this region. Increasing patchy opacities more anteriorly in the left upper lobe are new.   No underlying mass or nodule.  Stable appearance of noncalcified nodules in  the right upper lobe.  9 x 4 mm nodule is identical in size and shape on  series 3, image 44.  This does appear to have increased in size between  06/22/2018 and 11/28/2018.  A smaller adjacent nodule is also stable.  No new  pulmonary nodules are demonstrated. Chest CTA 11/28/18: No PE. Lungs/pleura: There is moderate to severe centrilobular emphysema.  Right apical scarring is unchanged.  There is a 0.4 cm nodule in the right upper lobe, image 101, increased in size from the previous study. Ald[place] Gala is a 0.9 x  0.4 cm nodule in the right upper lobe, image 98, increased in size from the  previous study.  There is airspace consolidation in the posterior aspect of  the left upper lobe, new from the previous study.  No new or enlarging  nodules are seen within the left lung.  No pleural effusion or pneumothorax. Diffuse bronchial wall thickening is again noted and mildly increased from  the previous study.  There are multifocal filling defects in the bibasilar  bronchi. Chest CT(dated 6/22/18): No evidence of a pulmonary embolus. Apical predominant emphysema.  Diffuse bronchial wall thickening consistent  with bronchitis.       Assessment:   - COPD- very severe  - Tobacco abuse  - chronic respiratory failure with Hypoxemia requiring Supplemental oxygen  -Protein calorie malnutrition- severe  - lung nodule RUL 9 mm seen 11/18  - abnormal chest CT- increased consolidation in FLOR[place]- no endobronchial lesions visualized 4/19  Pseudomonas pneumonia- recurrent biapical with cavitary disease    Plan:     - complete Levofloxacin for 14 days  -Repeat chest x-ray in 6 weeks  - Inhaled bronchodilators - duonebs and albuterol mdi- refill  - supplemental oxygen 1 L at rest and with [id number]- he is mobile with oxygen in the home  - handicap placard letter  - Avoid tobacco use- none in 18 months  - increased  Dietary caloric itake

## 2020-01-13 NOTE — DISCHARGE SUMMARY
Name:  Mark Villeda  Room:  0210/0210-02  MRN:    [id number]    Discharge Summary      This discharge summary is in conjunction with a complete physical exam done on the day of discharge. Attending Physician: LM English. Discharging Physician: RICHARD English Admit: 1/6/2020  Discharge:  1/10/2020    Diagnoses this Admission    Active Problems:    Severe protein-calorie malnutrition (HCC)    Acute on chronic respiratory failure with hypoxia and hypercapnia (HCC)    HCAP (healthcare-associated pneumonia)    COPD exacerbation (HCC)    Pneumonia due to organism    PNA (pneumonia)    Hyponatremia    Sepsis ([facility]    Acute respiratory failure with hypoxia and hypercapnia (HCC)  Resolved Problems:    * No resolved hospital problems. *          Procedures (Please Review Full Report for Details)      Radiology  XR CHEST STANDARD (2 VW)   Final Result   Persistent chronic bilateral upper lobe consolidation likely representing a   chronic atypical infectious process. Mycobacterial and fungal pathogens are   primary considerations. There has been some resolution of fluid that was   present within bilateral upper lobe cavities. There is persistent airspace   disease in the left lower lobe. No definite new abnormality is demonstrated         Consults     pulm     HPI:  The patient is a 71 y.o. male with chronic back pain, COPD, DM2 who presented to [facility] ED with complaint of SOB and malaise. Patient states that he was admitted for pneumonia earlier this year and starting around [place] ro[place]inda he started feeling similar to the way he felt at that time. He states that he felt really fatigued and weak and had a productive cough with some shortness of breath. He states that this continued to worsen after he started getting his IV and iron infusions and when he followed up with his primary care provider, he was noted to have fevers, coughs and wheezing and was referred to the emergency room.   He states

## 2020-01-14 NOTE — CARE COORDINATION
[place] [facility] Trans[facility] Follow Up Call    2020    Patient: Rosanna Evans  Patient : 1950   MRN: [id number]  Reason for Admission: PNA  Discharge Date: 1/10/20 RARS: Readmission Risk Score: 11         Spoke with: Rosanna Evans (patient)    Outreach to patient. Reports has not had nebulizer tx since DC because his hhn is not working. States he called [facility] who said they couldn't find the order. He said he asked pulm to send new order but hasn't heard back from [facility]. He is congested and having thick sputum he knows he would be able to get up if he had hhn tx. Notified him that the outpatient pharmacy here at [facility] sells new nebulizer OTC for $30 cash. They will not bill insurance. He lives near here and says that is an option tomorrow if he doesn't figure it out by then. Writer will call [facility] and ask for their help. Outreach to [facility]. Per Osvaldo she can't find the order in their system. After some time on hold she was able to determine the nebulizer was new to patient on 6/3/2018. Writer confirmed this by documentation in chart. She confirms it is still under warranty. She then went on about all the ways he could trouble shoot the problem but that he would have to bring physically to their office in [facility] for repair. She states she will call Mr Doris Clayton directly (because I asked her to) to trouble shoot the machine issue and help direct him. Outreach to [facility]. Per Brigitte Grace MCS, SOC scheduled for today (4 days after discharge). She is unsure if the visit has been completed but she will notify the team that the patient needs help evaluating his nebulizer and that [facility] was going to try to help by phone today. Also notified her of the option to purchase a new one OTC that he is considering. They no longer offer RT visits at home at this time so this is not an option. Outreach back to Rosanna Evans to notify him of above. Was only able to leave message.  Care

## 2020-01-16 PROBLEM — R09.02 HYPOXEMIA REQUIRING SUPPLEMENTAL OXYGEN: Status: RESOLVED | Noted: 2018-06-27 | Resolved: 2020-01-01

## 2020-01-16 PROBLEM — D53.9 ANEMIA, MACROCYTIC: Status: ACTIVE | Noted: 2020-01-01

## 2020-01-16 PROBLEM — J18.9 PNA (PNEUMONIA): Status: RESOLVED | Noted: 2020-01-01 | Resolved: 2020-01-01

## 2020-01-16 PROBLEM — Z99.81 HYPOXEMIA REQUIRING SUPPLEMENTAL OXYGEN: Status: RESOLVED | Noted: 2018-06-27 | Resolved: 2020-01-01

## 2020-01-16 NOTE — PROGRESS NOTES
Post-Discharge Transitional Care Management Services or Hospital Follow Up      Yuni Diez   YOB: 1950    Date of Office Visit:  1/16/2020  Date of Hospital Admission: 1/6/20  Date of Hospital Discharge: 1/10/20  Risk of hospital readmission (high >=14%.  Medium >=10%) :Readmission Risk Score: 11      Care management risk score Rising risk (score 2-5) and Complex Care (Scores >=6): 10     Non face to face  following discharge, date last encounter closed (first attempt may have been earlier): 1/11/2020 10:09 AM    Call initiated 2 business days of discharge: Yes    Patient Active Problem List   Diagnosis    COPD, very severe ([address]    Tobacco use    Chronic midline low back pain with right-sided sciatica    Age-related osteoporosis without current pathological fracture    Community acquired pneumonia of left upper lobe of lung ([address]    Tracheobronchitis    Tobacco abuse    Hypoxemia requiring supplemental oxygen    Severe protein-calorie malnutrition ([address]    Acute on chronic respiratory failure with hypoxia and hypercapnia (HCC)    Abnormal chest x-ray    Chronic hypoxemic respiratory failure (HCC)    Lactic acidosis    HCAP (healthcare-associated pneumonia)    Abnormal CT of the chest    Pulmonary nodule    Pneumonia of both upper lobes due to Pseudomonas species (HCC)    Thrombocytosis (HCC)    COPD exacerbation ([address]    Pneumonia due to organism    Shortness of breath    Type 2 diabetes mellitus without complication, without long-term current use of insulin (HCC)    PNA (pneumonia)    Hyponatremia    Sepsis ([address]    Acute respiratory failure with hypoxia and hypercapnia (HCC)       No Known Allergies    Medications listed as ordered at the time of discharge from hospital   Tavo Benavidez   Home Medication Instructions HATTIE:    Printed on:01/16/20 1320   Medication Information                      albuterol sulfate HFA (PROAIR HFA) 108 (90 Base) MCG/ACT inhaler  Inhale 2 tablet 3 qd- 3 days,2 qd- 3 days,1 qd- 3 days 18 tablet 0    levofloxacin (LEVAQUIN) 750 MG tablet Take 1 tablet by mouth daily for 11 days 11 tablet 0    traMADol (ULTRAM) 50 MG tablet Take 1 tablet by mouth every 8 hours as needed for Pain for up to 30 days. 40 tablet 0    ipratropium-albuterol (DUONEB) 0.5-2.5 (3) MG/3ML SOLN nebulizer solution INHALE THREE MILLILITERS (ONE VIAL) VIA NEBULIZATION BY MOUTH EVERY 4 HOURS AS NEEDED FOR SHORTNESS OF BREATH 360 mL 3    metFORMIN (GLUCOPHAGE) 500 MG tablet Take 1 tablet by mouth 2 times daily (with meals) (Patient taking differently: Take 500 mg by mouth daily (with breakfast) ) 60 tablet 5    meloxicam (MOBIC) 15 MG tablet Take 1 tablet by mouth daily , take with food for pain 30 tablet 5    hydroxyurea (HYDREA) 500 MG chemo capsule       blood glucose monitor kit and supplies by Other route daily E11.9, FSBS daily. Meter preferred by insurance. 1 kit 0    blood glucose test strips (ASCENSIA AUTODISC VI;ONE TOUCH ULTRA TEST VI) strip DX: E11.9 FSBS daily. Meter preferred by insurance. 50 strip 5    Lancets MISC 1 each by Does not apply route daily DX: E 11.9. FSBS daily. Meter preferred by insurance.  50 each 3    albuterol sulfate HFA (PROAIR HFA) 108 (90 Base) MCG/ACT inhaler Inhale 2 puffs into the lungs every 6 hours as needed for Wheezing 1 Inhaler 5    vitamin D (CHOLECALCIFEROL) 1000 UNIT TABS tablet Take 1,000 Units by mouth daily      vitamin C (ASCORBIC ACID) 500 MG tablet Take 500 mg by mouth daily      OXYGEN Inhale 1 L/min into the lungs continuous       guaiFENesin (MUCINEX) 600 MG extended release tablet Take 1 tablet by mouth 2 times daily 30 tablet 1    Multiple Vitamins-Minerals (THERAPEUTIC MULTIVITAMIN-MINERALS) tablet Take 1 tablet by mouth daily      aspirin 81 MG tablet Take 81 mg by mouth daily           Medications patient taking as of now reconciled against medications ordered at time of hospital discharge: Yes    Chief Complaint   Patient presents with    Follow-Up from Hospital     pneumonia       History of Present illness - Follow up of Hospital diagnosis(es): Severe COPD  Pneumonia multifocal related to pseudomonas  Mal nutrition  Chronic hypoxemia with oxygen use  Thrombocytosis  Macrocytic anemia        Inpatient course: Discharge summary reviewed- see chart. Interval history/Current status: critical    A comprehensive review of systems was negative except for what was noted in the HPI. Appetite improving. Cough productive clear to pale yellow. Continues taking prednisone and levaquin      Vitals:    01/16/20 1252   BP: 98/60   Site: Right Upper Arm   Position: Sitting   Cuff Size: Medium Adult   Pulse: 61   Resp: 14   Temp: 98.6 °F (37 °C)   TempSrc: Oral   SpO2: 99%   Weight: 109 lb 12.8 oz (49.8 kg)   Height: 5' 7\" (1.702 m)     Body mass index is 17.2 kg/m². Wt Readings from Last 3 Encounters:   01/16/20 109 lb 12.8 oz (49.8 kg)   01/13/20 115 lb 3.2 oz (52.3 kg)   01/08/20 114 lb 6.4 oz (51.9 kg)     BP Readings from Last 3 Encounters:   01/16/20 98/60   01/13/20 (!) 96/58   01/10/20 (!) 90/58        Physical Exam:  Alert oriented W/M. Frail appearing. Oxygen 2L NC inplace. Very diminished A&P, neg for wheezing, rhonchi. SOB with exertion. Speaking in complete sentences. Ambulatory independent. Ext neg for edema  Abdomen soft, non tender. Neg NVD    Assessment/Plan:  1. Continue supplement 1-2 daily. 2. Currently eating small amount 5 times daily. Feels weight is improving. Continue. 3. Has appt with Dr. Mandi Chopra 3/20    4. Recommend follow up with [facility] in 2-3 weeks after prednisone and levaquin completed. Recommending call for appt for follow up. Last iron infusion 21/30/19.     5. Tavo was seen today for follow-up from hospital.    Diagnoses and all orders for this visit:    COPD, very severe ([address]  -     albuterol sulfate HFA (PROAIR HFA) 108 (90 Base) MCG/ACT inhaler;  Inhale 2 puffs into the lungs every 6 hours as needed for Wheezing    has contacted oxygen supply company, [employer], regarding replacement hoses, cannulas, and washer for oxygen unit. Hasn't been changing. Will see back in 3 months given follow up with pulmonary and hematology. Return quickly if health declines, changes.  Agreeable        Medical Decision Making: moderate complexity

## 2020-01-20 NOTE — TELEPHONE ENCOUNTER
Clayton Bienville called from [facility] to request occupational orders. If able to do so, call her back with verbal okay at [phone].  Thanks

## 2020-01-31 NOTE — PROGRESS NOTES
Subjective:      Patient ID: Natasha Edwards is a 71 y.o. male. HPI     For follow up COPD. Continues to have cough with little green color. Settles down after nebulizer. Can then return to sleep. Weight down 3 pounds over past 2 weeks. Continues to drink supplement 1 daily. Eating what he can, trying to eat small portions more frequently. Feels wife is not supportive of him doing this. Missing some teeth, interferes with meat intake. Home health nurse, PT coming out. Soon to be released. Last visit yesterday for PT. Currently taking levaquin and prednisone    Review of Systems   Constitutional: Positive for appetite change, fatigue and unexpected weight change. Negative for chills and fever. Respiratory: Positive for cough and shortness of breath. Negative for chest tightness. Cardiovascular: Negative for chest pain and palpitations. Gastrointestinal: Negative for diarrhea and nausea. Musculoskeletal: Negative for arthralgias, back pain and gait problem. Neurological: Negative for dizziness and headaches. Psychiatric/Behavioral: Negative. Objective:   Physical Exam  Constitutional:       General: He is not in acute distress. Appearance: He is well-developed. He is ill-appearing. Cardiovascular:      Rate and Rhythm: Normal rate and regular rhythm. Heart sounds: Normal heart sounds. Pulmonary:      Effort: Pulmonary effort is normal.      Breath sounds: Wheezing present. Comments: Frequent moist cough, clear in nature. Thick mucus heard in airway/throat. Oxygen 2L in place. Abdominal:      General: Bowel sounds are normal. There is no distension. Palpations: Abdomen is soft. Tenderness: There is no abdominal tenderness. Musculoskeletal: Normal range of motion. General: No swelling. Skin:     General: Skin is warm and dry. Neurological:      Mental Status: He is alert and oriented to person, place, and time. Assessment:      1.

## 2020-03-02 PROBLEM — R49.0 HOARSE: Status: ACTIVE | Noted: 2020-01-01

## 2020-03-02 NOTE — PATIENT INSTRUCTIONS
[address], [address]  [place], [address]  Ph: [phone]

## 2020-03-02 NOTE — PROGRESS NOTES
[facility]    Outpatient Follow Up Note    CHIEF COMPLAINT : shortness of breath     HPI:  Presenting hx: The patient is a 20-year-old man with past medical history of tobacco abuse who was admitted with acute respiratory failure and a COPD exacerbation to [facility] in 6/18. Since last clinic visit, the patient has been feeling better. He is less short of breath and had less cough. He has been more hoarse of late. There are no changes to past medical history, family history, social history or review of systems(except as noted in the history section) since prior note (all reviewed with patient). Current Medications:    Current Outpatient Medications:     meloxicam (MOBIC) 15 MG tablet, TAKE ONE TABLET BY MOUTH DAILY WITH FOOD FOR PAIN, Disp: 30 tablet, Rfl: 5    metFORMIN (GLUCOPHAGE) 500 MG tablet, Take 1 tablet by mouth daily (with breakfast), Disp: 30 tablet, Rfl: 5    albuterol sulfate HFA (PROAIR HFA) 108 (90 Base) MCG/ACT inhaler, Inhale 2 puffs into the lungs every 6 hours as needed for Wheezing, Disp: 1 Inhaler, Rfl: 5    ipratropium-albuterol (DUONEB) 0.5-2.5 (3) MG/3ML SOLN nebulizer solution, INHALE THREE MILLILITERS (ONE VIAL) VIA NEBULIZATION BY MOUTH EVERY 4 HOURS AS NEEDED FOR SHORTNESS OF BREATH, Disp: 360 mL, Rfl: 3    hydroxyurea (HYDREA) 500 MG chemo capsule, , Disp: , Rfl:     blood glucose monitor kit and supplies, by Other route daily E11.9, FSBS daily. Meter preferred by insurance., Disp: 1 kit, Rfl: 0    blood glucose test strips (ASCENSIA AUTODISC VI;ONE TOUCH ULTRA TEST VI) strip, DX: E11.9 FSBS daily. Meter preferred by insurance., Disp: 50 strip, Rfl: 5    Lancets MISC, 1 each by Does not apply route daily DX: E 11.9. FSBS daily.  Meter preferred by insurance., Disp: 50 each, Rfl: 3    vitamin D (CHOLECALCIFEROL) 1000 UNIT TABS tablet, Take 1,000 Units by mouth daily, Disp: , Rfl:     vitamin C (ASCORBIC ACID) 500 MG

## 2020-03-05 NOTE — PROGRESS NOTES
[address] [facility] SURGERY  NEW PATIENT HISTORY AND PHYSICAL NOTE      Patient Name: [address] [place] [address] Record Number:  [id number]  Primary Care Physician:  VALERIE Zavala - MONAE    ChiefComplaint     Chief Complaint   Patient presents with   [facility]     voice is very hoarse. Has been going on for a couple of weeks, if not longer. History of Present Illness     Geo Busby is an 71 y.o. male with complaint of hoarseness - started 01/2020, initially progressed however now has stabilized. States recent episode of pneumonia (admitted 1/6-1/10/2020). No sore throat, no odynodysphagia/dysphagia (tolerating a regular diet) or coughing/choking. Denies dyspnea at rest, occasionally on exertion. Has been losing weight for the past 2+ months, but states he is starting to re-gain weight. No chills/night sweats, no fevers. No neck masses/LAD noted. PMH DM2, chronic back pain, Has COPD, on 2L/min nasal cannula. Quit smoking 2 years - was smoking 1PPD x 40 years. No history of skin cancer, head/neck cancer. No recent intubations. Drinks 2 cups of coffee daily, denies reflux signs.      Past Medical History     Past Medical History:   Diagnosis Date    Allergic rhinitis     Anemia, macrocytic 1/16/2020    Arthritis     Chronic back pain     Chronic bronchitis (HCC)     COPD (chronic obstructive pulmonary disease) (HCC)     Dental disease     Lung disease     Rash     Type 2 diabetes mellitus without complication, without long-term current use of insulin ([facility] [address] 6/20/2019    Wears glasses        Past Surgical History     Past Surgical History:   Procedure Laterality Date    BRONCHOSCOPY N/A 11/30/2018    BRONCHOSCOPY ALVEOLAR LAVAGE performed by Kaitlynn Alejandre MD at [facility] N/A 4/11/2019    BRONCHOSCOPY ALVEOLAR LAVAGE performed by Becky Thakur MD at [address] [place] [address] 2007   [facility]     ALL TEETH PULLED    EYE SURGERY Right     detached retina    FINGER SURGERY  11    duputrens release right ring finger inclluding proxinal interphanageal open CTR    FINGER SURGERY Right     contusion finger # 3    OTHER SURGICAL HISTORY  2017    excision left facial cyst       Family History     Family History   Problem Relation Age of Onset    Diabetes Mother     Cancer Sister         pancreatic    Emphysema Father        Social History     Social History     Tobacco Use    Smoking status: Former Smoker     Packs/day: 1.00     Years: 40.00     Pack years: 40.00     Types: Cigarettes     Start date: 1976     Last attempt to quit: 2018     Years since quittin.7    Smokeless tobacco: Never Used    Tobacco comment: Advised to quit. Had one cig for the past 5 days. Substance Use Topics    Alcohol use: Yes     Alcohol/week: 12.0 standard drinks     Types: 12 Cans of beer per week     Frequency: 4 or more times a week     Drinks per session: 3 or 4     Binge frequency: Never     Comment: weekly    Drug use: No        Allergies     No Known Allergies    Medications     Current Outpatient Medications   Medication Sig Dispense Refill    albuterol sulfate HFA (PROAIR HFA) 108 (90 Base) MCG/ACT inhaler Inhale 2 puffs into the lungs every 6 hours as needed for Wheezing 1 Inhaler 5    ipratropium-albuterol (DUONEB) 0.5-2.5 (3) MG/3ML SOLN nebulizer solution INHALE THREE MILLILITERS (ONE VIAL) VIA NEBULIZATION BY MOUTH EVERY 4 HOURS AS NEEDED FOR SHORTNESS OF BREATH 360 mL 5    meloxicam (MOBIC) 15 MG tablet TAKE ONE TABLET BY MOUTH DAILY WITH FOOD FOR PAIN 30 tablet 5    metFORMIN (GLUCOPHAGE) 500 MG tablet Take 1 tablet by mouth daily (with breakfast) 30 tablet 5    hydroxyurea (HYDREA) 500 MG chemo capsule       blood glucose monitor kit and supplies by Other route daily E11.9, FSBS daily. Meter preferred by insurance.  1 kit 0    blood glucose test strips 108/51 (Site: Right Upper Arm, Position: Sitting, Cuff Size: Small Adult)   Pulse 77   Temp 97.9 °F (36.6 °C) (Oral)   Ht 5' 7\" (1.702 m)   Wt 107 lb 6.4 oz (48.7 kg)   BMI 16.82 kg/m²     GENERAL: No Acute Distress, Alert and Oriented, moderate hoarseness - slight breathiness, slight asthenia, no stran   EYES: EOMI, Anti-icteric  NOSE: On anterior rhinoscopy there is no epistaxis, nasal mucosa within normal limits, no purulent drainage  EARS: Normal external appearance; on portable otomicroscopy:  -Right ear: External auditory canal without stenosis, tympanic membrane clear, no middle ear effusions or retractions  -Left ear: External auditory canal without stenosis, tympanic membrane clear, no middle ear effusions or retractions  -Tuning fork testing: With a 512-Hz tuning fork the [place] is midline, The Rin[place] on the right ear the is air>bone conduction, on the left ear air>bone conduction  FACE: 1/6 House-Brackmann Scale, symmetric, sensation equal bilaterally  ORAL CAVITY: No masses or lesions palpated, uvula is midline, moist mucous membranes, 2+ tonsils, absent dentition  -Dental mirror exam: Base of tongue with no masses, uvula  anatomically normal (vocal cords not visualized)  NECK: Normal range of motion, no thyromegaly, trachea is midline, no lymphadenopathy, no neck masses, no crepitus  CHEST: Normal respiratory effort, no retractions, breathing comfortably  SKIN: No rashes, normal appearing skin, no evidence of skin lesions/tumors  NEURO: CN 2, 3, 4, 5, 6, 7, 11, 12 intact bilaterally     Data/Imaging Review       Procedure     Flexible Laryngoscopy    Pre op: Hoarseness, breathiness  Post op: Left TVC paralysis  Procedure : Flexible Nasopharyngolaryngoscopy  Surgeon: MOLLY Orlando  Anesthesia: Afrin with 2% lidocaine  Estimated Blood Loss: None    Procedure:   Flexible Laryngoscopy     After obtaining consent, the patient was placed in the examination chair in the upright position.   Decongestant and

## 2020-03-09 NOTE — PROGRESS NOTES
assist in diagnosis and provide assessment and plan for treatment. 2. Pt is a good candidate for further medical evaluation/intervention at the discretion of the treating physician. 3. Pt to follow up with ENT/Dr. Aster Fernández.     Thank you,    Jeff Lynne) [place], [place], Deidre Rapp; [id number]  Speech-Language Pathologist

## 2020-03-10 NOTE — PROGRESS NOTES
Edrie Fleischer 71 y.o. male    Chief Complaint   Patient presents with    Ingrown Toenail     Left Great toe        HPI  Patient is here for evaluation of left great toe pain. Symptoms have been ongoing for one week. There is erythema and small pocket of pus and blood noted on the medial nail fold. There is tenderness of the great toe. He has not appreciated any drainage. There is no streaking redness. He denies having any fevers. Current Outpatient Medications:     sulfamethoxazole-trimethoprim (BACTRIM DS;SEPTRA DS) 800-160 MG per tablet, Take 1 tablet by mouth 2 times daily for 7 days, Disp: 14 tablet, Rfl: 0    traMADol (ULTRAM) 50 MG tablet, TAKE ONE TABLET BY MOUTH EVERY 6 HOURS AS NEEDED FOR PAIN FOR 7 DAYS, Disp: 30 tablet, Rfl: 0    albuterol sulfate HFA (PROAIR HFA) 108 (90 Base) MCG/ACT inhaler, Inhale 2 puffs into the lungs every 6 hours as needed for Wheezing, Disp: 1 Inhaler, Rfl: 5    ipratropium-albuterol (DUONEB) 0.5-2.5 (3) MG/3ML SOLN nebulizer solution, INHALE THREE MILLILITERS (ONE VIAL) VIA NEBULIZATION BY MOUTH EVERY 4 HOURS AS NEEDED FOR SHORTNESS OF BREATH, Disp: 360 mL, Rfl: 5    meloxicam (MOBIC) 15 MG tablet, TAKE ONE TABLET BY MOUTH DAILY WITH FOOD FOR PAIN, Disp: 30 tablet, Rfl: 5    metFORMIN (GLUCOPHAGE) 500 MG tablet, Take 1 tablet by mouth daily (with breakfast), Disp: 30 tablet, Rfl: 5    levofloxacin (LEVAQUIN) 500 MG tablet, Take 1 tablet by mouth daily, Disp: 7 tablet, Rfl: 0    hydroxyurea (HYDREA) 500 MG chemo capsule, , Disp: , Rfl:     blood glucose monitor kit and supplies, by Other route daily E11.9, FSBS daily. Meter preferred by insurance., Disp: 1 kit, Rfl: 0    blood glucose test strips (ASCENSIA AUTODISC VI;ONE TOUCH ULTRA TEST VI) strip, DX: E11.9 FSBS daily. Meter preferred by insurance., Disp: 50 strip, Rfl: 5    Lancets MISC, 1 each by Does not apply route daily DX: E 11.9. FSBS daily.  Meter preferred by insurance., Disp: 50 each, Rfl: 3   vitamin D (CHOLECALCIFEROL) 1000 UNIT TABS tablet, Take 1,000 Units by mouth daily, Disp: , Rfl:     vitamin C (ASCORBIC ACID) 500 MG tablet, Take 500 mg by mouth daily, Disp: , Rfl:     OXYGEN, Inhale 1 L/min into the lungs continuous , Disp: , Rfl:     guaiFENesin (MUCINEX) 600 MG extended release tablet, Take 1 tablet by mouth 2 times daily, Disp: 30 tablet, Rfl: 1    Multiple Vitamins-Minerals (THERAPEUTIC MULTIVITAMIN-MINERALS) tablet, Take 1 tablet by mouth daily, Disp: , Rfl:     aspirin 81 MG tablet, Take 81 mg by mouth daily , Disp: , Rfl:       Vitals:    03/10/20 1441   BP: (!) 90/50   Pulse: 63   SpO2: 93%       Review of Systems   Constitutional: Negative for fever. Musculoskeletal: Negative for gait problem and joint swelling. Skin: Positive for color change. Hematological: Negative for adenopathy. Physical Exam  Vitals signs reviewed. Cardiovascular:      Pulses:           Dorsalis pedis pulses are 1+ on the left side. Posterior tibial pulses are 1+ on the left side. Musculoskeletal:        Feet:    Feet:      Comments: Area of hematoma and infection at the medial border of nail bed  Skin:     General: Skin is warm. Findings: Erythema present. Assessment    1. Paronychia of great toe of left foot        Plan    Chloe Connors was seen today for ingrown toenail. Diagnoses and all orders for this visit:    Paronychia of great toe of left foot  -     sulfamethoxazole-trimethoprim (BACTRIM DS;SEPTRA DS) 800-160 MG per tablet; Take 1 tablet by mouth 2 times daily for 7 days  -     Area was cleaned with alcohol wipe and sterile needle inserted to allow drainage. Pt tolerated well. Instructed to place foot in a warm bath when at home to promote continued drainage. Call if symptoms do not resolve with medication.

## 2020-03-10 NOTE — TELEPHONE ENCOUNTER
Last office visit 3/10/2020     Last written 2/17/20    Next office visit scheduled 4/16/2020    Requested Prescriptions     Pending Prescriptions Disp Refills    metFORMIN (GLUCOPHAGE) 500 MG tablet 30 tablet 5     Sig: Take 1 tablet by mouth daily (with breakfast)

## 2020-03-21 PROBLEM — J18.9 PNEUMONIA: Status: ACTIVE | Noted: 2020-01-01

## 2020-03-21 NOTE — PROGRESS NOTES
Pt arrived to unit, reviewed plan of care, denies needs. Oriented to room and unit. Admission assessment compete. Pt states he feels fine, but noted blood in sputum and chest pain. Has been recently hospitalized per pt and was in isolation for possible TB then, ED nurse spoke to Dr. Blake Tobar stated no need for Airborne precautions, droplet for pseudo pneumonia only.

## 2020-03-21 NOTE — TELEPHONE ENCOUNTER
Reason for Disposition   Message left on identified voice mail    Protocols used: NO CONTACT OR DUPLICATE CONTACT CALL-ADULT-    Voicemail received from [facility]. Attempted to call pt, no answer, left message on voicemail to call back for further assistance.

## 2020-03-21 NOTE — ED PROVIDER NOTES
[address] ED  EMERGENCY DEPARTMENT ENCOUNTER        Pt Name: Melodie Parikh  MRN: [id number]  [place] 1950  Date of evaluation: 3/21/2020  Provider: Ant Mujica MD  PCP: VALERIE Burnham - MONAE      CHIEF COMPLAINT       Chief Complaint   Patient presents with    Chest Pain     pt c/o generalized chest pain x 3 days, states was lifting groceries and \"pulled something\" in his chest, states pain worse when lifting arms above head, coughing up blood x 2 days       HISTORY OFPRESENT ILLNESS   (Location/Symptom, Timing/Onset, Context/Setting, Quality, Duration, Modifying Factors,Severity)  Note limiting factors. Melodie Parikh is a 71 y.o. male presenting today due to concern for chest pain for the last 4 days after attempting to lift an object associate with pain with arm movements but over the last 2 days having episodes where he has hemoptysis associated with blood mixed in with the sputum. He denies any lawson hemopytsis. He does have a history of COPD but quit smoking. He normally is on 2 L nasal cannula. He does complain of some shortness of breath. No headache or abdominal pain. No vomiting or diarrhea. He denies having blood in his sputum in the past.  No history of tuberculosis. No leg swelling or history of blood clots. No history of cancer. No fever. Due to concern for worsening blood in the sputum, he came to the emergency department for further evaluation after calling his primary doctor. No recent travel. He has a history of cavitary lesion noted on prior CT and had a negative work-up for tuberculosis. He has a history of Pseudomonas pneumonia. REVIEW OF SYSTEMS    (2-9 systems for level 4, 10 or more for level 5)     Review of Systems   Constitutional: Negative for chills and fever. HENT: Negative for congestion and sore throat. Respiratory: Positive for cough and shortness of breath. Cardiovascular: Positive for chest pain.  Negative for leg swelling. Gastrointestinal: Negative for abdominal pain, diarrhea, nausea and vomiting. Genitourinary: Negative for flank pain. Musculoskeletal: Negative for arthralgias (complains of left great toe pain due to an ingrown hair only), back pain and neck pain. Skin: Negative for color change. Neurological: Negative for syncope, weakness, numbness and headaches. Hematological: Does not bruise/bleed easily. Psychiatric/Behavioral: Negative for confusion. Positives and Pertinent negatives as per HPI.       PASTMEDICAL HISTORY     Past Medical History:   Diagnosis Date    Allergic rhinitis     Anemia, macrocytic 1/16/2020    Arthritis     Chronic back pain     Chronic bronchitis (HCC)     COPD (chronic obstructive pulmonary disease) ([facility] [address]     Dental disease     Lung disease     Rash     Type 2 diabetes mellitus without complication, without long-term current use of insulin ([facility] [address] 6/20/2019    Wears glasses          SURGICAL HISTORY       Past Surgical History:   Procedure Laterality Date    BRONCHOSCOPY N/A 11/30/2018    BRONCHOSCOPY ALVEOLAR LAVAGE performed by Kemi Zambrano MD at [address] [place]  N/A 4/11/2019    BRONCHOSCOPY ALVEOLAR LAVAGE performed by Linda Hendrix MD at [address] [facility] Right 2007   [address] PULLED    EYE SURGERY Right 1995    detached retina    FINGER SURGERY  5/24/11    duputrens release right ring finger inclluding proxinal interphanageal open CTR    FINGER SURGERY Right 1957    contusion finger # 3    OTHER SURGICAL HISTORY  12/29/2017    excision left facial cyst         CURRENT MEDICATIONS       Previous Medications    ALBUTEROL SULFATE HFA (PROAIR HFA) 108 (90 BASE) MCG/ACT INHALER    Inhale 2 puffs into the lungs every 6 hours as needed for Wheezing    ASPIRIN 81 MG TABLET    Take 81 mg by mouth daily     BLOOD GLUCOSE MONITOR KIT AND SUPPLIES    by Other route daily E11.9, FSBS daily. Meter preferred by insurance. BLOOD GLUCOSE TEST STRIPS (ASCENSIA AUTODISC VI;ONE TOUCH ULTRA TEST VI) STRIP    DX: E11.9 FSBS daily. Meter preferred by insurance. GUAIFENESIN (MUCINEX) 600 MG EXTENDED RELEASE TABLET    Take 1 tablet by mouth 2 times daily    HYDROXYUREA (HYDREA) 500 MG CHEMO CAPSULE        IPRATROPIUM-ALBUTEROL (DUONEB) 0.5-2.5 (3) MG/3ML SOLN NEBULIZER SOLUTION    INHALE THREE MILLILITERS (ONE VIAL) VIA NEBULIZATION BY MOUTH EVERY 4 HOURS AS NEEDED FOR SHORTNESS OF BREATH    LANCETS MISC    1 each by Does not apply route daily DX: E 11.9. FSBS daily. Meter preferred by insurance. LEVOFLOXACIN (LEVAQUIN) 500 MG TABLET    Take 1 tablet by mouth daily    MELOXICAM (MOBIC) 15 MG TABLET    TAKE ONE TABLET BY MOUTH DAILY WITH FOOD FOR PAIN    METFORMIN (GLUCOPHAGE) 500 MG TABLET    Take 1 tablet by mouth daily (with breakfast)    MULTIPLE VITAMINS-MINERALS (THERAPEUTIC MULTIVITAMIN-MINERALS) TABLET    Take 1 tablet by mouth daily    OXYGEN    Inhale 1 L/min into the lungs continuous     TRAMADOL (ULTRAM) 50 MG TABLET    TAKE ONE TABLET BY MOUTH EVERY 6 HOURS AS NEEDED FOR PAIN FOR 7 DAYS    VITAMIN C (ASCORBIC ACID) 500 MG TABLET    Take 500 mg by mouth daily    VITAMIN D (CHOLECALCIFEROL) 1000 UNIT TABS TABLET    Take 1,000 Units by mouth daily       ALLERGIES     Patient has no known allergies.     FAMILY HISTORY       Family History   Problem Relation Age of Onset    Diabetes Mother     Cancer Sister         pancreatic    Emphysema Father           SOCIAL HISTORY       Social History     Socioeconomic History    Marital status:      Spouse name: Hayden Niece Number of children: 2    Years of education: 15    Highest education level: None   Occupational History    Occupation:      Employer: [employer] resource strain: None    Food insecurity     Worry: None     Inability: None    Transportation needs     Medical: None     Non-medical: None Tobacco Use    Smoking status: Former Smoker     Packs/day: 1.00     Years: 40.00     Pack years: 40.00     Types: Cigarettes     Start date: 1976     Last attempt to quit: 2018     Years since quittin.8    Smokeless tobacco: Never Used    Tobacco comment: Advised to quit. Had one cig for the past 5 days. Substance and Sexual Activity    Alcohol use: Not Currently     Alcohol/week: 12.0 standard drinks     Types: 12 Cans of beer per week     Frequency: 4 or more times a week     Drinks per session: 3 or 4     Binge frequency: Never     Comment: rarely    Drug use: No    Sexual activity: Not Currently   Lifestyle    Physical activity     Days per week: None     Minutes per session: None    Stress: None   Relationships    Social connections     Talks on phone: None     Gets together: None     Attends Rastafarian service: None     Active member of club or organization: None     Attends meetings of clubs or organizations: None     Relationship status: None    Intimate partner violence     Fear of current or ex partner: None     Emotionally abused: None     Physically abused: None     Forced sexual activity: None   Other Topics Concern    None   Social History Narrative    None       SCREENINGS    Silke Coma Scale  Eye Opening: Spontaneous  Best Verbal Response: Oriented  Best Motor Response: Obeys commands  [place] Coma Scale Score: 15           PHYSICAL EXAM    (up to 7 for level 4, 8 or more for level 5)     ED Triage Vitals   BP Temp Temp src Pulse Resp SpO2 Height Weight   -- -- -- -- -- -- -- --       Physical Exam  Vitals signs and nursing note reviewed. Constitutional:       General: He is awake. He is not in acute distress. Appearance: Normal appearance. He is cachectic. He is not ill-appearing, toxic-appearing or diaphoretic. Interventions: He is not intubated. HENT:      Head: Normocephalic and atraumatic. Nose: Nose normal. No congestion or rhinorrhea. Mouth/Throat:      Mouth: Mucous membranes are dry. Pharynx: Oropharynx is clear. No oropharyngeal exudate or posterior oropharyngeal erythema. Eyes:      General: No scleral icterus. Right eye: No discharge. Left eye: No discharge. Neck:      Musculoskeletal: Full passive range of motion without pain, normal range of motion and neck supple. Normal range of motion. No edema, erythema, neck rigidity, crepitus, injury, pain with movement, torticollis, spinous process tenderness or muscular tenderness. Trachea: Trachea normal. No tracheal deviation. Cardiovascular:      Rate and Rhythm: Normal rate and regular rhythm. Pulses: Normal pulses. Pulmonary:      Effort: Pulmonary effort is normal. No tachypnea, bradypnea, accessory muscle usage, prolonged expiration, respiratory distress or retractions. He is not intubated. Breath sounds: Normal air entry. No stridor, decreased air movement or transmitted upper airway sounds. Examination of the right-upper field reveals wheezing. Examination of the left-upper field reveals wheezing. Examination of the right-middle field reveals wheezing. Examination of the left-middle field reveals wheezing. Examination of the right-lower field reveals wheezing. Examination of the left-lower field reveals wheezing. Wheezing present. No decreased breath sounds, rhonchi or rales. Chest:      Chest wall: Tenderness (mild) present. No lacerations, deformity, swelling or crepitus. Abdominal:      General: Bowel sounds are normal. There is no distension. Palpations: Abdomen is soft. Tenderness: There is no abdominal tenderness. There is no guarding or rebound. Musculoskeletal: Normal range of motion. General: No swelling, tenderness, deformity or signs of injury. Right lower leg: No edema. Left lower leg: No edema. Feet:    Skin:     General: Skin is warm and dry. Coloration: Skin is not jaundiced or pale. BLOOD 1   CULTURE, BLOOD 2   PROTIME-INR    Narrative:     Performed at:  [facility],  [place], [facility]   Phone [phone]   TROPONIN    Narrative:     Performed at:  [facility],  [place], [facility]   Phone [phone]   LACTIC ACID, PLASMA    Narrative:     Performed at:  [facility] [address],  [place], [facility]   Phone [phone]       All other labs were within normal range or not returned asof this dictation. EKG: All EKG's are interpreted by the Emergency Department Physician who either signs or Co-signs this chart in the absence of a cardiologist.    The Ekg interpreted by me shows  multifocal atrial tachycardia  with a rate of 100  Axis is   Normal  QTc is  within an acceptable range  Intervals and Durations are unremarkable. ST Segments: no acute change and nonspecific changes  No significant change from prior EKG dated - 1/6/20  No STEMI, concern for multifocal atrial tachycardia at this time          RADIOLOGY:   Non-plain film images such as CT, Ultrasound and MRI are read by the radiologist. Yolanda Lawn images are visualized and preliminarily interpreted by the  ED Provider with the belowfindings:        Interpretation per the Radiologist below, if available at the time of this note:    CT CHEST PULMONARY EMBOLISM W CONTRAST   Preliminary Result   No acute pulmonary artery embolism. Emphysema with redemonstration bilateral upper lobe cavitary lung lesions   with bronchiectasis and air-fluid levels. Findings suggest atypical   infectious origin such as tuberculous disease. Underlying malignancy is not   excluded.                PROCEDURES   Unless otherwise noted below, none     Procedures    CRITICAL CARE TIME   Time: at least 35 minutes   Includes repeat examinations, speaking with consultants, lab interpretation, charting, this time but will need to be admitted to stepdown unit. He was in no acute distress when I saw him. The patient tolerated their visit well. The patient and / or the family were informed of the results of any tests, a time was given to answer questions. FINAL IMPRESSION      1. Pneumonia of both upper lobes due to Pseudomonas species ([facility] [address]    2. Hemoptysis    3. Chest pain, unspecified type    4. COPD exacerbation ([facility] [address]          DISPOSITION/PLAN   DISPOSITION  -decision to admit      PATIENT REFERRED TO:  No follow-up provider specified.     DISCHARGEMEDICATIONS:  New Prescriptions    No medications on file       DISCONTINUED MEDICATIONS:  Discontinued Medications    No medications on file              (Please note that portions of this note were completed with a voicerecognition program.  Efforts were made to edit the dictations but occasionally words are mis-transcribed.)    Yani Hayes MD (electronically signed)           Yani Hayes MD  03/21/20 1638

## 2020-03-21 NOTE — PROGRESS NOTES
4 Eyes Skin Assessment     The patient is being assess for   Admission With Caitlin OLSEN    I agree that 2 RN's have performed a thorough Head to Toe Skin Assessment on the patient. ALL assessment sites listed below have been assessed. Areas assessed by both nurses:   [x]   Head, Face, and Ears   [x]   Shoulders, Back, and Chest, Abdomen  [x]   Arms, Elbows, and Hands   [x]   Coccyx, Sacrum, and Ischium  [x]   Legs, Feet, and Heels        Scattered bruising  Left great toe ingrown toenail  Left pinky toe purple  Red but blanchable buttock    **SHARE this note so that the co-signing nurse is able to place an eSignature**    Co-signer eSignature: Electronically signed by Jenny Marion RN on 3/21/20 at 6:51 PM EDT    Does the Patient have Skin Breakdown?   No          Andrew Prevention initiated:  No   Wound Care Orders initiated:  No      WOC nurse consulted for Pressure Injury (Stage 3,4, Unstageable, DTI, NWPT, Complex wounds)and New or Established Ostomies:  No      Primary Nurse eSignature: Electronically signed by Sadie Donato RN on 7/55/13 at 5:48 PM EDT

## 2020-03-21 NOTE — ED TRIAGE NOTES
Chief Complaint   Patient presents with    Chest Pain     pt c/o generalized chest pain x 3 days, states was lifting groceries and \"pulled something\" in his chest, states pain worse when lifting arms above head, coughing up blood x 2 days

## 2020-03-22 PROBLEM — R04.2 HEMOPTYSIS: Status: ACTIVE | Noted: 2020-01-01

## 2020-03-22 NOTE — PROGRESS NOTES
Shift assessment complete. See flow sheet. Evening medications administered. See MAR. Vital signs stable. Patient is alert and oriented X 4. Pt denies any present needs/concerns. Call light explained and in reach. Will continue to monitor.

## 2020-03-22 NOTE — H&P
Hospital Medicine History & Physical      PCP: Lorenza Fairchild APRN - CNP    Date of Service: Pt seen/examined on 3/21/20 and admitted on 3/21/20 to Inpatient. Chief Complaint   Patient presents with    Chest Pain     pt c/o generalized chest pain x 3 days, states was lifting groceries and \"pulled something\" in his chest, states pain worse when lifting arms above head, coughing up blood x 2 days       History Of Present Illness: The patient is a 71 y.o. male with PMH below, presents with CP, SOB/WATKINS, blood streaked sputum, productive cough. Pt reports that he has had pain in his chest for the last 4 days, exac by movement of his arms and deep inspiration. He reports this started after he \"pulled something while lifting heavy groceries. \"  He has also had increasing SOB/WATKINS and sputum production. Sputum has been yellow/green w/ some  blood streaking. Blood streaking has been increasing but denies lawson hemoptysis. He has hx of COPD and is on 2L at home. He has been admitted in the recent past w/ PNA. He also has a hx of cavitary lesions on CT. TB w/u negative at that time.        Past Medical History:        Diagnosis Date    Allergic rhinitis     Anemia, macrocytic 1/16/2020    Arthritis     Chronic back pain     Chronic bronchitis (HCC)     COPD (chronic obstructive pulmonary disease) ([address]     Dental disease     Lung disease     Rash     Type 2 diabetes mellitus without complication, without long-term current use of insulin ([address] 6/20/2019    Wears glasses        Past Surgical History:        Procedure Laterality Date    BRONCHOSCOPY N/A 11/30/2018    BRONCHOSCOPY ALVEOLAR LAVAGE performed by Dulce Gomez MD at [address] N/A 4/11/2019    BRONCHOSCOPY ALVEOLAR LAVAGE performed by Shakila Cordoba MD at [address] [facility] Right 2007   [address]   [address] Right 1995    detached retina    FINGER SURGERY  5/24/11    duputrens release right ring finger inclluding proxinal interphanageal open CTR    FINGER SURGERY Right 1957    contusion finger # 3    OTHER SURGICAL HISTORY  12/29/2017    excision left facial cyst       Medications Prior to Admission:    Prior to Admission medications    Medication Sig Start Date End Date Taking? Authorizing Provider   traMADol (ULTRAM) 50 MG tablet TAKE ONE TABLET BY MOUTH EVERY 6 HOURS AS NEEDED FOR PAIN FOR 7 DAYS 3/9/20 4/8/20 Yes VALERIE Robison CNP   albuterol sulfate HFA (PROAIR HFA) 108 (90 Base) MCG/ACT inhaler Inhale 2 puffs into the lungs every 6 hours as needed for Wheezing 3/2/20  Yes Nancie Manning MD   ipratropium-albuterol (DUONEB) 0.5-2.5 (3) MG/3ML SOLN nebulizer solution INHALE THREE MILLILITERS (ONE VIAL) VIA NEBULIZATION BY MOUTH EVERY 4 HOURS AS NEEDED FOR SHORTNESS OF BREATH 3/2/20  Yes Nancie Manning MD   meloxicam (MOBIC) 15 MG tablet TAKE ONE TABLET BY MOUTH DAILY WITH FOOD FOR PAIN 2/24/20  Yes VALERIE Hoffman CNP   metFORMIN (GLUCOPHAGE) 500 MG tablet Take 1 tablet by mouth daily (with breakfast) 2/17/20  Yes VALERIE Hoffman CNP   hydroxyurea (HYDREA) 500 MG chemo capsule  7/1/19  Yes Historical Provider, MD   blood glucose test strips (ASCENSIA AUTODISC VI;ONE TOUCH ULTRA TEST VI) strip DX: E11.9 FSBS daily. Meter preferred by insurance.  6/28/19  Yes VALERIE Hoffman CNP   vitamin D (CHOLECALCIFEROL) 1000 UNIT TABS tablet Take 1,000 Units by mouth daily   Yes Historical Provider, MD   vitamin C (ASCORBIC ACID) 500 MG tablet Take 500 mg by mouth daily   Yes Historical Provider, MD   OXYGEN Inhale 1 L/min into the lungs continuous    Yes Historical Provider, MD   guaiFENesin (MUCINEX) 600 MG extended release tablet Take 1 tablet by mouth 2 times daily 6/23/18  Yes Chelsie Castellano MD   Multiple Vitamins-Minerals (THERAPEUTIC MULTIVITAMIN-MINERALS) tablet Take 1 tablet by mouth daily   Yes Historical Provider, MD   aspirin 81 MG tablet Take 81 mg by mouth daily  1/14/16  Yes Historical Provider, MD   levofloxacin (LEVAQUIN) 500 MG tablet Take 1 tablet by mouth daily 1/24/20   VALERIE Batista CNP   blood glucose monitor kit and supplies by Other route daily E11.9, FSBS daily. Meter preferred by insurance. 6/28/19   VALERIE Batista CNP   Lancets MISC 1 each by Does not apply route daily DX: E 11.9. FSBS daily. Meter preferred by insurance. 6/28/19 1/16/21  VALERIE Batista CNP       Allergies:  Patient has no known allergies. Social History:    TOBACCO:   reports that he quit smoking about 21 months ago. His smoking use included cigarettes. He started smoking about 43 years ago. He has a 40.00 pack-year smoking history. He has never used smokeless tobacco.  ETOH:   reports previous alcohol use of about 12.0 standard drinks of alcohol per week. Family History:  Reviewed in detail and negative for DM, Early CAD, Cancer (except as below). Positive as follows:        Problem Relation Age of Onset    Diabetes Mother     Cancer Sister         pancreatic    Emphysema Father        REVIEW OF SYSTEMS:   Pertinent positives/negatives as follows: CP, SOB/WATKINS, blood streaked sputum, productive cough, and as discussed in HPI, otherwise a complete ROS performed and all other systems are negative  PHYSICAL EXAM PERFORMED:    /65   Pulse 86   Temp 98.1 °F (36.7 °C) (Oral)   Resp 18   Ht 5' 8\" (1.727 m)   Wt 103 lb (46.7 kg)   SpO2 99%   BMI 15.66 kg/m²     GEN:  A&Ox3, NAD. Cachectic. HEENT:  NC/AT,EOMI, semi dry MM, no erythema/exudates or visible masses. CVS:  Normal S1,S2. RRR. Without M/G/R. Central CW tenderness to firm palpation of central chest.  LUNG:   Bilat wheezes w/o rales or rhonchi. ABD:  Soft, ND/NT, BS+ x4. Without G/R.  EXT: 2+ pulses, no c/c/e. Brisk cap refill. PSY:  Thought process intact, affect appropriate.   TRACI:  CN III-XII intact, moves all 4 spontaneously, sensory grossly intact. SKIN: No rash or lesions on visible skin. Chart review shows recent radiographs:  Xr Chest Standard (2 Vw)    Result Date: 3/2/2020  EXAMINATION: TWO XRAY VIEWS OF THE CHEST 3/2/2020 10:53 am COMPARISON: 01/06/2020 HISTORY: ORDERING SYSTEM PROVIDED HISTORY: Abnormal CT of the chest TECHNOLOGIST PROVIDED HISTORY: Reason for exam:->abnormal CT chest Reason for Exam: COPD, SOB Acuity: Unknown Type of Exam: Unknown FINDINGS: There is improved aeration of the bilateral upper lungs with residual thick walled cavitary lesions and associated volume loss. The differential diagnosis includes fungal infections, cavitating pneumonia, silicosis and tubercle oasis. The lungs are hyperexpanded. The cardiac silhouette is within normal limits. There is no pneumothorax or pleural effusion. 1. Improved aeration of the bilateral upper lungs with residual thick walled cavitary lesions. Ct Soft Tissue Neck W Contrast    Result Date: 3/14/2020  EXAMINATION: CT OF THE NECK SOFT TISSUE WITH CONTRAST  3/14/2020 TECHNIQUE: CT of the neck was performed with the administration of intravenous contrast. Multiplanar reformatted images are provided for review. Dose modulation, iterative reconstruction, and/or weight based adjustment of the mA/kV was utilized to reduce the radiation dose to as low as reasonably achievable. COMPARISON: CT chest 01/06/2020 HISTORY: ORDERING SYSTEM PROVIDED HISTORY: Vocal cord paralysis, unilateral complete TECHNOLOGIST PROVIDED HISTORY: Reason for exam:-> idiopathic left TVC paralysis - please evaluate from skull base to aortic arch Reason for Exam: Idiopathic left TVC paralysis x 1 month Acuity: Chronic Type of Exam: Initial FINDINGS: PHARYNX/LARYNX:  The palatine tonsils are normal in appearance. The tongue is normal in appearance. The valleculae, epiglottis, aryepiglottic folds and pyriform sinuses appear unremarkable.   The right true and false vocal cords are normal in appearance. The left vocal cord appears flaccid. No mass or abscess is seen. SALIVARY GLANDS/THYROID:  The parotid and submandibular glands appear unremarkable. The thyroid gland appears unremarkable. LYMPH NODES:  No cervical or supraclavicular lymphadenopathy is seen. SOFT TISSUES:  No appreciable soft tissue swelling or mass is seen. Calcifications involving bilateral carotid vasculature reflect calcific atherosclerosis. BRAIN/ORBITS/SINUSES:  The visualized portion of the intracranial contents appear unremarkable. The visualized portion of the orbits, paranasal sinuses and mastoid air cells demonstrate no acute abnormality. Vascular calcifications are noted reflecting calcific atherosclerosis. VISUALIZED UPPER LUNGS: Extensive sequela from chronic pulmonary disease including changes of emphysema and areas of chronic fibrosis and scarring. VISUALIZED MEDIASTINUM: No superior mediastinal lymphadenopathy or mass; right paratracheal lymph node 1.1 x 2.1 cm on previous exam is smaller and appears fatty, 0.8 x 1.1 cm axial series 2, image 84. The visualized portion of the trachea appears unremarkable. BONES:  No aggressive appearing lytic or blastic bony lesion. 1. The left vocal cord appears flaccid, finding not present on CT chest 01/06/2020 which included this area in the field of view. 2. No discrete mass lesion. No specific finding to suggest etiology for vocal cord paralysis. 3. Chronic lung disease bilaterally stable compared with prior CT chest.     Ct Chest Pulmonary Embolism W Contrast    Result Date: 3/21/2020  EXAMINATION: CTA OF THE CHEST 3/21/2020 2:03 pm TECHNIQUE: CTA of the chest was performed after the administration of intravenous contrast.  Multiplanar reformatted images are provided for review. MIP images are provided for review.  Dose modulation, iterative reconstruction, and/or weight based adjustment of the mA/kV was utilized to reduce the radiation dose to as low as reasonably achievable. COMPARISON: 04/03/2019, 09/23/2019 and 01/06/2019 HISTORY: ORDERING SYSTEM PROVIDED HISTORY: chest pain, hemoptysis TECHNOLOGIST PROVIDED HISTORY: Reason for exam:->chest pain, hemoptysis Reason for Exam: chest pain, hemoptysis Initial exam FINDINGS: Pulmonary Arteries: Pulmonary arteries are adequately opacified for evaluation. No evidence of intraluminal filling defect to suggest pulmonary embolism. Main pulmonary artery is normal in caliber. Mediastinum: The heart size is stable. No pericardial effusion. The aorta is tortuous. Indeterminate right suprahilar lymph node measuring up to 1.5 cm, stable. There are small nonenlarged lymph nodes within the prevascular space and precarinal space. No axillary lymphadenopathy appreciated. There is nonspecific induration of the mediastinal fat. Lungs/pleura: The central airways remain patent. There is redemonstration to the previously described lung cavitation throughout the upper lobes with associated air-fluid levels and bronchiectasis. The degree of cavitation is stable. The air-fluid levels are improved but persistent. The lung parenchyma is emphysematous. There are no new or enlarging nodules. Scarring left lower lobe nodular configuration is stable. Upper Abdomen: Limited images of the upper abdomen are unremarkable. Soft Tissues/Bones: No acute bone or soft tissue abnormality. No acute pulmonary artery embolism. Emphysema with redemonstration bilateral upper lobe cavitary lung lesions with bronchiectasis and air-fluid levels. Findings suggest atypical infectious origin such as tuberculous disease. Underlying malignancy is not excluded.      EKG 12 Lead [[id number]]    Collected: 03/21/20 1224    Updated: 03/21/20 1314     Ventricular Rate 100 BPM    Atrial Rate 100 BPM    P-R Interval 144 ms    QRS Duration 84 ms    Q-T Interval 360 ms    QTc Calculation (Bazett) 464 ms    P Axis 71 degrees    R Axis 74 degrees    T have any questions or concerns please feel free to contact me via the [employer] at [phone]. This chart was generated using the [address] [place] [address] [address] dictation system. I created this record but it may contain dictation errors given the limitations of this technology.

## 2020-03-22 NOTE — PROGRESS NOTES
RESPIRATORY THERAPY ASSESSMENT    Name:  Eliana [facility] Record Number:  [id number]  Age: 71 y.o. Gender: male  : 1950  Today's Date:  3/21/2020  Room:  0224/0224-01    Assessment     Is the patient being admitted for a COPD or Asthma exacerbation? No   (If yes the patient will be seen every 4 hours for the first 24 hours and then reassessed)    Patient Admission Diagnosis      Allergies  No Known Allergies    Minimum Predicted Vital Capacity:               Actual Vital Capacity:                    Pulmonary History:copd/chronic bronchitis  Home Oxygen Therapy:  1.5  Home Respiratory Therapy:albuterol prn/duoneb prn-says he takes duoneb q4 at home   Current Respiratory Therapy:  duoneb 4 times daily  Treatment Type: HHN  Medications: Albuterol/Ipratropium    Respiratory Severity Index(RSI)   Patients with orders for inhalation medications, oxygen, or any therapeutic treatment modality will be placed on Respiratory Protocol. They will be assessed with the first treatment and at least every 72 hours thereafter. The following severity scale will be used to determine frequency of treatment intervention. Smoking History: Pulmonary Disease or Smoking History, Greater than 15 pack year = 2    Social History  Social History     Tobacco Use    Smoking status: Former Smoker     Packs/day: 1.00     Years: 40.00     Pack years: 40.00     Types: Cigarettes     Start date: 1976     Last attempt to quit: 2018     Years since quittin.8    Smokeless tobacco: Never Used    Tobacco comment: Advised to quit. Had one cig for the past 5 days.    Substance Use Topics    Alcohol use: Not Currently     Alcohol/week: 12.0 standard drinks     Types: 12 Cans of beer per week     Frequency: 4 or more times a week     Drinks per session: 3 or 4     Binge frequency: Never     Comment: rarely    Drug use: No       Recent Surgical History: None = 0  Past Surgical History  Past Surgical History:   Procedure home)    Electronically signed by Isamar Escobar RCP on 3/21/2020 at 8:20 PM    Respiratory Protocol Guidelines     1. Assessment and treatment by Respiratory Therapy will be initiated for medication and therapeutic interventions upon initiation of aerosolized medication. 2. Physician will be contacted for respiratory rate (RR) greater than 35 breaths per minute. Therapy will be held for heart rate (HR) greater than 140 beats per minute, pending direction from physician. 3. Bronchodilators will be administered via Metered Dose Inhaler (MDI) with spacer when the following criteria are met:  a. Alert and cooperative     b. HR < 140 bpm  c. RR < 30 bpm                d. Can demonstrate a 2-3 second inspiratory hold  4. Bronchodilators will be administered via Hand Held Nebulizer [facility]) to patients when ANY of the following criteria are met  a. Incognizant or uncooperative          b. Patients treated with HHN at Home        c. Unable to demonstrate proper use of MDI with spacer     d. RR > 30 bpm   5. Bronchodilators will be delivered via Metered Dose Inhaler (MDI), HHN, Aerogen to intubated patients on mechanical ventilation. 6. Inhalation medication orders will be delivered and/or substituted as outlined below. Aerosolized Medications Ordering and Administration Guidelines:    1. All Medications will be ordered by a physician, and their frequency and/or modality will be adjusted as defined by the patients Respiratory Severity Index (RSI) score. 2. If the patient does not have documented COPD, consider discontinuing anticholinergics when RSI is less than 9.  3. If the bronchospasm worsens (increased RSI), then the bronchodilator frequency can be increased to a maximum of every 4 hours. If greater than every 4 hours is required, the physician will be contacted.   4. If the bronchospasm improves, the frequency of the bronchodilator can be decreased, based on the patient's RSI, but not less than home treatment regimen frequency. 5. Bronchodilator(s) will be discontinued if patient has a RSI less than 9 and has received no scheduled or as needed treatment for 72  Hrs. Patients Ordered on a Mucolytic Agent:    1. Must always be administered with a bronchodilator. 2. Discontinue if patient experiences worsened bronchospasm, or secretions have lessened to the point that the patient is able to clear them with a cough. Anti-inflammatory and Combination Medications:    1. If the patient lacks prior history of lung disease, is not using inhaled anti-inflammatory medication at home, and lacks wheezing by examination or by history for at least 24 hours, contact physician for possible discontinuation.

## 2020-03-22 NOTE — CONSULTS
inclluding proxinal interphanageal open CTR    FINGER SURGERY Right 1957    contusion finger # 3    OTHER SURGICAL HISTORY  12/29/2017    excision left facial cyst       FAMILY HISTORY:  family history includes Cancer in his sister; Diabetes in his mother; Emphysema in his father. SOCIAL HISTORY:   reports that he quit smoking about 21 months ago. His smoking use included cigarettes. He started smoking about 43 years ago. He has a 40.00 pack-year smoking history. He has never used smokeless tobacco.    Scheduled Meds:   cefepime  1 g Intravenous Q12H    aspirin  81 mg Oral Daily    therapeutic multivitamin-minerals  1 tablet Oral Daily    guaiFENesin  600 mg Oral BID    Vitamin D  1,000 Units Oral Daily    metFORMIN  500 mg Oral Daily with breakfast    sodium chloride flush  10 mL Intravenous 2 times per day    enoxaparin  40 mg Subcutaneous Daily    insulin lispro  0-6 Units Subcutaneous TID WC    insulin lispro  0-3 Units Subcutaneous Nightly    methylPREDNISolone  40 mg Intravenous Q6H    Followed by   Bola Oiler ON 3/24/2020] predniSONE  40 mg Oral Daily    ipratropium-albuterol  1 ampule Inhalation Q4H    hydroxyurea  500 mg Oral Daily    vancomycin  750 mg Intravenous Q12H     Continuous Infusions:   sodium chloride 75 mL/hr at 03/21/20 1851    dextrose       PRN Meds:  traMADol, sodium chloride flush, acetaminophen **OR** acetaminophen, polyethylene glycol, promethazine **OR** ondansetron, glucose, dextrose, glucagon (rDNA), dextrose    ALLERGIES:  Patient has No Known Allergies.     REVIEW OF SYSTEMS:  Constitutional: Negative for fever  HENT: Negative for sore throat  Eyes: Negative for redness   Respiratory: Negative for dyspnea,+  cough  Cardiovascular: Negative for chest pain  Gastrointestinal: Negative for vomiting, diarrhea   Genitourinary: Negative for hematuria   Musculoskeletal: Negative for arthralgias   Skin: Negative for rash  Neurological: Negative for syncope  Hematological:

## 2020-03-22 NOTE — PROGRESS NOTES
Vancomycin Day # 2  Current dose = vancomycin 750 mg IV q12h    WBC   7.7            Tmax 98.1  Vancomycin trough ordered for 3/23 @ 1030. Continue same until then.

## 2020-03-22 NOTE — CONSULTS
Pharmacy Note  Vancomycin Consult    Andrey Jaramillo is a 71 y.o. male started on Vancomycin for Pneumonia; consult received from Dr. Chavo Caceres MD to manage therapy. Also receiving the following antibiotics: Cefepime. Patient Active Problem List   Diagnosis    COPD, very severe [facility]    Tobacco use    Chronic midline low back pain with right-sided sciatica    Age-related osteoporosis without current pathological fracture    Community acquired pneumonia of left upper lobe of lung ([facility]    Tracheobronchitis    Tobacco abuse    Severe protein-calorie malnutrition (HCC)    Acute on chronic respiratory failure with hypoxia and hypercapnia (HCC)    Abnormal chest x-ray    Chronic hypoxemic respiratory failure (HCC)    Lactic acidosis    HCAP (healthcare-associated pneumonia)    Abnormal CT of the chest    Pulmonary nodule    Pneumonia of both upper lobes due to Pseudomonas species (HCC)    Thrombocytosis (HCC)    COPD exacerbation (HCC)    Type 2 diabetes mellitus without complication, without long-term current use of insulin (HCC)    Hyponatremia    Sepsis (HCC)    Anemia, macrocytic    Hoarse    Pneumonia       Allergies:  Patient has no known allergies. Temp max: 98.4    Recent Labs     03/21/20  1235   BUN 13       Recent Labs     03/21/20  1235   CREATININE 0.6*       Recent Labs     03/21/20  1235   WBC 10.8         Intake/Output Summary (Last 24 hours) at 3/21/2020 2241  Last data filed at 3/21/2020 2016  Gross per 24 hour   Intake 1147 ml   Output --   Net 1147 ml         Ht Readings from Last 1 Encounters:   03/21/20 5' 8\" (1.727 m)        Wt Readings from Last 1 Encounters:   03/21/20 103 lb (46.7 kg)         Body mass index is 15.66 kg/m². CrCl cannot be calculated (Unknown ideal weight.). Goal Trough Level: 15-20 mcg/mL    Assessment/Plan:  Will initiate vancomycin 750 mg IV every 12 hours. Vancomycin trough ordered for 3/23/2020 at 1030. Thank you for the consult.   Will continue to follow.  Nora CHO. Ph 3/21/2020 10:45 PM

## 2020-03-22 NOTE — CARE COORDINATION
Case Management Assessment  Initial Evaluation    Date/Time of Evaluation: 3/22/2020 1:09 PM  Assessment Completed by: Alberto Nichole    Patient Name: Rajinder Lopez  YOB: 1950  Diagnosis: Pneumonia [J18.9]  Date / Time: 3/21/2020 12:15 PM  Admission status/Date:inpt  Chart Reviewed: Yes      Patient Interviewed: Yes   Family Interviewed:  No      Hospitalization in the last 30 days:  No    Contacts  :     Relationship to Patient:   Phone Number:    Alternate Contact:     Relationship to Patient:     Phone Number:    Met with:    Current PCP  VALERIE GONZALEZ CNP      Financial  Commercial  Precert required for SNF : Y, N        3 night stay required - Y, N    ADLS  Support Systems: Spouse/Significant Other  Transportation: self    Meal Preparation: self    Housing  Home Environment: home with spouse  Steps: two  Plans to Return to Present Housing: Yes  Other Identified Issues: no    Home Care Information  Currently active with [address] : No  Type of Home Care Services: None  Passport/Waiver : No  :                      Phone Number:    Passport/Waiver Services: no          Durable Medical Equipment   DME Provider: [facility]  Equipment: Walker___Cane___RTS___ BSC___Shower Chair___  02_x_ at _2___Liter(s)---Uses__cont______HHN_x__ CPAP___ BiPap___ Hospital Bed___W/C____Other________      Has Home O2 in place on admit:  Yes  Informed of need to bring portable home O2 tank on day of discharge for nursing to connect prior to leaving:   Yes  Verbalized agreement/Understanding:   Yes    Community Service Affiliation  Dialysis:  No    · Name:  · Location  · Dialysis Schedule:  · Phone:   · Fax:     Outpatient PT/OT: No    Cancer Center: No     CHF Clinic: No     Pulmonary Rehab: No  Pain Clinic: No  Community Mental Health: No    Wound Clinic: No     Other: no    The Plan for Transition of Care is related to the following treatment goals: home        DISCHARGE PLAN: Reviewed Chart. CM spoke with pt via TC. Role of dcp explained. Pt from home with spouse and plan return. Pt active with [facility] for home O2, uses 2 liters at baseline. Follow for possible hhc. Explained Case Management role/services.

## 2020-03-22 NOTE — PLAN OF CARE
oxygenation will improve  Outcome: Ongoing     Problem: Hyperthermia:  Goal: Ability to maintain a body temperature in the normal range will improve  Description: Ability to maintain a body temperature in the normal range will improve  Outcome: Ongoing     Problem: Tobacco Use:  Goal: Will participate in inpatient tobacco-use cessation counseling  Description: Will participate in inpatient tobacco-use cessation counseling  Outcome: Ongoing

## 2020-03-23 NOTE — PROGRESS NOTES
Pulmonary Progress Note    CC: Hemoptysis    Subjective:   Appears comfortable  No hemoptysis  On his home O2    IV line:      Intake/Output Summary (Last 24 hours) at 3/23/2020 0738  Last data filed at 3/22/2020 2354  Gross per 24 hour   Intake 730 ml   Output 200 ml   Net 530 ml       Exam:   /60   Pulse 70   Temp 96.9 °F (36.1 °C) (Oral)   Resp 16   Ht 5' 8\" (1.727 m)   Wt 110 lb 14.4 oz (50.3 kg)   SpO2 94%   BMI 16.86 kg/m²  on 1.5 L  Gen: No distress. Alert. Ill-appearing  Eyes: PERRL. No sclera icterus. No conjunctival injection. ENT: No discharge. Pharynx clear. Neck: Trachea midline. Normal thyroid. Resp: No accessory muscle use. No crackles. Minimal wheezes. No rhonchi. No dullness on percussion. CV: Regular rate. Regular rhythm. No murmur or rub. No edema. GI: Non-tender. Non-distended. No masses. No organomegaly. Normal bowel sounds. No hernia. Skin: Warm and dry. No nodule on exposed extremities. No rash on exposed extremities. Lymph: No cervical LAD. No supraclavicular LAD. M/S: No cyanosis. No joint deformity. No clubbing. Neuro: Awake. Moves all extremities. CN grossly intact. Psych: Oriented x 3. No anxiety or agitation.      Scheduled Meds:   cefepime  1 g Intravenous Q12H    aspirin  81 mg Oral Daily    therapeutic multivitamin-minerals  1 tablet Oral Daily    guaiFENesin  600 mg Oral BID    Vitamin D  1,000 Units Oral Daily    metFORMIN  500 mg Oral Daily with breakfast    sodium chloride flush  10 mL Intravenous 2 times per day    enoxaparin  40 mg Subcutaneous Daily    insulin lispro  0-6 Units Subcutaneous TID WC    insulin lispro  0-3 Units Subcutaneous Nightly    methylPREDNISolone  40 mg Intravenous Q6H    Followed by   Poornima Walker ON 3/24/2020] predniSONE  40 mg Oral Daily    ipratropium-albuterol  1 ampule Inhalation Q4H    vancomycin  750 mg Intravenous Q12H     Continuous Infusions:   dextrose       PRN Meds:  traMADol, sodium chloride flush, acetaminophen **OR** acetaminophen, polyethylene glycol, promethazine **OR** ondansetron, glucose, dextrose, glucagon (rDNA), dextrose    Labs:  CBC:   Recent Labs     03/21/20  1235 03/22/20  0558 03/23/20  0551   WBC 10.8 7.7 14.4*   HGB 10.8* 9.8* 8.7*   HCT 34.0* 31.7* 27.3*   .6* 102.6* 102.5*   * 616* 622*     BMP:   Recent Labs     03/21/20  1235 03/22/20  0558   * 130*   K 4.1 3.6   CL 91* 93*   CO2 27 23   BUN 13 10   CREATININE 0.6* <0.5*     LIVER PROFILE:   Recent Labs     03/21/20  1235 03/22/20  0558   AST 14* 10*   ALT 11 9*   LIPASE 6.0*  --    BILITOT <0.2 <0.2   ALKPHOS 96 82     PT/INR:   Recent Labs     03/21/20  1235   PROTIME 12.7   INR 1.09     APTT: No results for input(s): APTT in the last 72 hours. UA:No results for input(s): NITRITE, COLORU, PHUR, LABCAST, WBCUA, RBCUA, MUCUS, TRICHOMONAS, YEAST, BACTERIA, CLARITYU, SPECGRAV, LEUKOCYTESUR, UROBILINOGEN, BILIRUBINUR, BLOODU, GLUCOSEU, AMORPHOUS in the last 72 hours. Invalid input(s): KETONESU  No results for input(s): PHART, LMR5DBI, PO2ART in the last 72 hours. Cultures:   3/21 BC NGTD  3/21 respiratory pending    Films:  CT chest 3/21 reviewed by me and showed   No acute pulmonary artery embolism. Emphysema with redemonstration bilateral upper lobe cavitary lung lesions  with bronchiectasis and air-fluid levels.  Findings suggest atypical  infectious origin such as tuberculous disease.  Underlying malignancy is not  excluded. ASSESSMENT:  · Hemoptysis-possible new infection, history of Pseudomonas  · Possible acute on chronic cavitary pneumonia-history of Pseudomonas  · Abnormal CT chest-possible superinfection.   Low suspicion for TB given him negative prior testing  · Severe COPD  · Chronic thrombocytosis-on Hydrea        PLAN:  · Supplemental oxygen to maintain SaO2 >92%; wean as tolerated  · Cefepime and vancomycin day #3-DC planning on Levaquin for 5 days is okay with pulmonary  · Follow-up

## 2020-03-23 NOTE — PROGRESS NOTES
Inpatient Physical Therapy Evaluation and Treatment    [facility]  Date:  3/23/2020  Patient Name:    Geo Busby  Admitting diagnosis:  Pneumonia [J18.9]  Admit Date:  3/21/2020  Precautions/Restrictions/WB Status/ Lines/ Wounds/ Oxygen: supplemental O2 (1.5L)    Treatment Time:  13:50-14:09  Treatment Number:  1   Timed Code Treatment Minutes: 9 minutes  Total Treatment Minutes:  19  minutes    Patient Goals for Therapy: \" I don't want therapy, I just finished with several weeks of that \"          Discharge Recommendations: Home with PRN assist  DME needs for discharge: Needs Met       Therapy recommendation for EMS Transport: can transport by wheelchair    Therapy recommendations for staff:   Supervision with use of No AD for all transfers and ambulation to/from bathroom, cues for O2 line management    History of Present Illness: 71 y.o. male with PMH below, presents with CP, SOB/WATKINS, blood streaked sputum, productive cough. Pt reports that he has had pain in his chest for the last 4 days, exac by movement of his arms and deep inspiration. He reports this started after he \"pulled something while lifting heavy groceries. \"  He has also had increasing SOB/WATKINS and sputum production. Sputum has been yellow/green w/ some  blood streaking. Blood streaking has been increasing but denies lawson hemoptysis. He has hx of COPD and is on 2L at home. He has been admitted in the recent past w/ PNA. He also has a hx of cavitary lesions on CT. TB w/u negative at that time. PMH: DM, COPD, arthritis, wears glasses    Home Health S4 Level Recommendation:  NA  AM-PAC Mobility Score    AM-PAC Inpatient Mobility Raw Score : 21       Preadmission Environment    Pt.  Shane Medrano spouse and 24/7 Assist Available   Home environment:    one story home with unfinished basement  Steps to enter first floor: 2 steps to enter and no Rail  Steps to basement:     Full flight of 12-13 and one hand rail  Bathroom: Tub/Shower unit, Grab

## 2020-03-23 NOTE — PROGRESS NOTES
Inpatient Occupational Therapy  Evaluation Only    Unit: 2 [place]  Date:  3/23/2020  Patient Name:    Elizabeth Sim  Admitting diagnosis:  Pneumonia [J18.9]  Admit Date:  3/21/2020  Precautions/Restrictions/WB Status/ Lines/ Wounds/ Oxygen: fall risk, IV and supplemental O2 (1.5L)    Treatment Time:  13:50-14:12  Treatment Number: 1   Billable Treatment Time: 12 minutes   Total Treatment Time:  22 minutes    Patient Goals for Therapy:  \" to go home \"      Discharge Recommendations: Home with PRN assist  DME needs for discharge: Needs Met       Therapy recommendations for staff: Independent with use of No AD for all ambulation to/from bathroom    History of Present Illness:  past medical history of tobacco abuse, 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 Home Health S4 Level Recommendation:  NA  AM-PAC Score: 21    Preadmission Environment    Pt. Facundo Stoner spouse and 24/7 Assist Available   Home environment:    one story home with unfinished basement  Steps to enter first floor: 2 steps to enter and no Rail  Steps to basement:     Full flight of 12-13 and one hand rail  Bathroom: Tub/Shower unit, Grab bars and Standard height toilet  Equipment owned: home O2 (1.5 L) continuous, pulse ox, inhaler and nebulizer     Preadmission Status:  Pt. Able to drive: Yes  Pt Fully independent with ADLs: Yes  Pt. Required assistance from family for: Cleaning, Cooking and [address]  Pt. Fully independent for transfers and gait and walked with No Device  History of falls No    Pain  No  Rating:NA  Location:  Pain Medicine Status: Denies need      Cognition    A&O x4   Able to follow 2 step commands    Subjective  Patient lying supine in bed with no family present  Pt agreeable to this OT eval & tx.      Upper Extremity ROM:    WFL,  pt able to perform all bed mobility, transfers, and gait without ROM limitation. Upper Extremity Strength:    Strength Assessment (measured on a 0-5 scale):    B UEs 4+/5    Upper Extremity Sensation    WFL    Upper Extremity Proprioception:   WFL    Coordination and Tone  WFL    Balance  Functional Sitting Balance:  WFL  Functional Standing Balance:Diminished    Bed mobility:    Supine to sit:   Not Tested  Sit to supine:   Not Tested  Scooting to head of bed:   Not Tested  Scooting in sitting:  Independent  Rolling:   Not Tested  Bridging:   Not Tested    Transfers:    Sit to stand:  Independent  Stand to sit: Independent  Bed to [facility]:  Not Tested  Bed to chair:   Independent  Standard toilet: Independent    Activity Tolerance   Pt completed therapy session with SOB noted with mobility in room. SpO2: 98% at rest. 91% after functional mobility in hallway. HR: 90s  BP:     Dressing:      UE:   Not Tested  LE:    Modified Independent    Bathing:    UE:  Not Tested  LE:  Not Tested    Eating:   Not Tested    Toileting:  Independent    Positioning Needs:   Patient in the bathroom at end of treatment. Patient is safe to return back to bed with independence. Exercise / Activities Initiated:   N/A    Patient/Family Education:   Role of OT  Recommendations for DC  Energy conservation techniques    Assessment of Deficits: Pt seen for Occupational therapy evaluation in acute care setting. Pt demonstrated decreased activity tolerance but is functioning at baseline. Patient is safe to return home with PRN assist.        Plan:  DC acute OT.        Brian Jenkins OTR/L #[id number]      If patient discharges from this facility prior to next visit, this note will serve as the Discharge Summary

## 2020-03-23 NOTE — CARE COORDINATION
DISCHARGE ORDER  Date/Time 3/23/2020 4:44 PM  Completed by: Cornell Dillard, Case Management    Patient Name: Angel Thurston    : 1950  Admitting Diagnosis: Pneumonia [J18.9]      Admit order Date and Status: 3/21/20 inpatient  (verify MD's last order for status of admission)      Noted discharge order. Confirmed discharge plan  : Yes  with whom patient  If pt confirmed DC plan does family need to be contacted by CM No Discharge Plan: Order for dc noted. Spoke with pt who cont plan to return home at dc. States lives home alone but has friend who will assist as needed with care. Discussed HHC and pt declines. Reviewed chart. Role of discharge planner explained and patient verbalized understanding. Discharge order is noted. Has Home O2 in place on admit:  Yes  Informed of need to bring portable home O2 tank on day of discharge for nursing to connect prior to leaving:   Yes  Verbalized agreement/Understanding:   Yes  Pt is being d/c'd to home today. Pt's O2 sats are94 % on 1.5L. Discharge timeout done with  Nsg, CM and pt. All discharge needs and concerns addressed.

## 2020-03-23 NOTE — DISCHARGE SUMMARY
Name:  Lilian Diego  Room:  8045/8444-51  MRN:    [id number]    Discharge Summary      This discharge summary is in conjunction with a complete physical exam done on the day of discharge. Discharging Physician: Melinda Villar MD       Admit: 3/21/2020  Discharge:  3/23/2020    HPI taken from admission H&P:      The patient is a 71 y.o. male with PMH below, presents with CP, SOB/WATKINS, blood streaked sputum, productive cough. Pt reports that he has had pain in his chest for the last 4 days, exac by movement of his arms and deep inspiration. He reports this started after he \"pulled something while lifting heavy groceries. \"  He has also had increasing SOB/WATKINS and sputum production. Sputum has been yellow/green w/ some  blood streaking. Blood streaking has been increasing but denies lawson hemoptysis. He has hx of COPD and is on 2L at home. He has been admitted in the recent past w/ PNA. He also has a hx of cavitary lesions on CT. TB w/u negative at that time. Diagnoses this Admission and Hospital Course     Cavitary PNA w/ hx of same, likely related to pseudomonas. Probable aeCOPD component. - pulmonary consulted  -  IV solumedrol/pred  - IV vanco, cefepime D#3  - PRN/DRE intensive NEB therapy.    - Checked respiratory culture - neg and procalcitonin - 0.17  - discharged Cipro and Prednisone     Sepsis - Resolved  - POA, ABX as above, low normal BP's but does not need pressors at this time.  IVF.  Lactate normal.  F/u cx - Blood cx x2 NGTD, resp cx neg    - WBC 14.4     Thrombocytosis  - plt ct  694, similar recent values.  Monitored.      Chronic respiratory failure  - sats stable on home O2 of 2-3 L.        DM2, controlled  - chk A1c: 6.9, added Low SSI ---> changed to med dose SSI    Severe PCM.   Better nutrition recommended.       Procedures (Please Review Full Report for Details)  N/A    Consults    Pulmonology    Physical Exam at Discharge:    /64   Pulse 96   Temp 98.6 °F (37 °C) monitor kit and supplies  by Other route daily E11.9, FSBS daily. Meter preferred by insurance. blood glucose test strips strip  Commonly known as:  ASCENSIA AUTODISC VI;ONE TOUCH ULTRA TEST VI  DX: E11.9 FSBS daily. Meter preferred by insurance. guaiFENesin 600 MG extended release tablet  Commonly known as:  MUCINEX  Take 1 tablet by mouth 2 times daily     ipratropium-albuterol 0.5-2.5 (3) MG/3ML Soln nebulizer solution  Commonly known as:  DUONEB  INHALE THREE MILLILITERS (ONE VIAL) VIA NEBULIZATION BY MOUTH EVERY 4 HOURS AS NEEDED FOR SHORTNESS OF BREATH     Lancets Misc  1 each by Does not apply route daily DX: E 11.9. FSBS daily. Meter preferred by insurance. meloxicam 15 MG tablet  Commonly known as:  MOBIC  TAKE ONE TABLET BY MOUTH DAILY WITH FOOD FOR PAIN     metFORMIN 500 MG tablet  Commonly known as:  GLUCOPHAGE  Take 1 tablet by mouth daily (with breakfast)     OXYGEN     therapeutic multivitamin-minerals tablet     traMADol 50 MG tablet  Commonly known as:  ULTRAM  TAKE ONE TABLET BY MOUTH EVERY 6 HOURS AS NEEDED FOR PAIN FOR 7 DAYS     vitamin C 500 MG tablet  Commonly known as:  ASCORBIC ACID     vitamin D 1000 UNIT Tabs tablet  Commonly known as:  CHOLECALCIFEROL        STOP taking these medications    levoFLOXacin 500 MG tablet  Commonly known as:  Levaquin        ASK your doctor about these medications    hydroxyurea 500 MG chemo capsule  Commonly known as:  HYDREA           Where to Get Your Medications      These medications were sent to [facility] [address], [address] [place] [address] [phone] Negin Balderrama [phone]  [address] [place] [address]    Phone:  [phone]   · ciprofloxacin 750 MG tablet  · predniSONE 10 MG tablet           Discharged in stable condition to home. Follow Up: Follow up with PCP in 1 week.       Jessa Miller PA-C 10:22 AM 3/24/2020     EDUARDO KENNY 3/24/2020 12:53 PM

## 2020-03-24 NOTE — CARE COORDINATION
[place] [facility] Transitions Initial Follow Up Call    Call within 2 business days of discharge: Yes    Patient: Abdoulaye Morgan Patient : 1950   MRN: [id number]  Reason for Admission: Sepsis, resp failure, COPD, PNA  Discharge Date: 3/23/20 RARS: Readmission Risk Score: 15      Last Discharge [facility]       Complaint Diagnosis Description Type Department Provider    3/21/20 Chest Pain Pneumonia of both upper lobes due to Pseudomonas species ([facility] [address] . .. ED to Hosp-Admission (Discharged) (ADMITTED) [facility] MD Darcy; [place]. .. Facility: [facility]    1st attempt to reach patient for post 24h discharge care transition call. Message left stating purpose of call, contact information and request for return call.        Follow Up  Future Appointments   Date Time Provider Alex Duong   2020 11:00 AM Jake Jerome MD [facility] ENT [facility]   2020  9:45 AM VALERIE Parikh - CNP [facility]   2020 11:00 AM [facility] [place]   2020  9:45 AM Shakila Cordoba [facility]       Tan Hannah RN

## 2020-03-25 NOTE — CARE COORDINATION
[place] [facility] Transitions Initial Follow Up Call    Call within 2 business days of discharge: Yes    Patient: Melodie Parikh Patient : 1950   MRN: [id number]  Reason for Admission: PNA, sepsis, resp failure, DM, thrombocytosis  Discharge Date: 3/23/20 RARS: Readmission Risk Score: 15      Last Discharge [facility]       Complaint Diagnosis Description Type Department Provider    3/21/20 Chest Pain Pneumonia of both upper lobes due to Pseudomonas species ([facility] [address] . .. ED to Hosp-Admission (Discharged) (ADMITTED) [facility] MD Darcy; Mikal Conde. .. Spoke with: Melodie Parikh (patient)    Facility: [facility]    Non-face-to-face services provided:  Obtained and reviewed discharge summary and/or continuity of care documents  Education of patient/family/caregiver/guardian to support self-management-COVID-19 CDC recommendations  Assessment and support for treatment adherence and medication management-1111F Completed     Has new medications and taking as prescribed. Nebulizer working well. 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 Has been in communication with PCP for follow up appointment. Also reviewed COVID risk factors and instructions:    Patient has following risk factors of: COPD, pneumonia, sepsis, acute respiratory failure and immunocompromised. CTN/ACM reviewed discharge instructions, medical action plan and red flags related to discharge diagnosis. Reviewed and educated them on any new and changed medications related to discharge diagnosis. Education provided regarding infection prevention, and signs and symptoms of COVID-19 and when to seek medical attention with patient who verbalized understanding. Discussed exposure protocols and quarantine from [address] you at higher risk for severe illness  and given an opportunity for questions and concerns.  The patient agrees to contact the COVID-23 hotline [phone] or PCP office for questions related to their healthcare. CTN/ACM provided contact information for future reference. From CDC: Are you at higher risk for severe illness?  Wash your hands often.  Avoid close contact (6 feet, which is about two arm lengths) with people who are sick.  Put distance between yourself and other people if COVID-19 is spreading in your community.  Clean and disinfect frequently touched surfaces.  Avoid all cruise travel and non-essential air travel.  Call your healthcare professional if you have concerns about COVID-19 and your underlying condition or if you are sick.       Care Transitions 24 Hour Call    Schedule Follow Up Appointment with PCP:  Completed  Do you have any ongoing symptoms?:  No  Do you have a copy of your discharge instructions?:  Yes  Do you have all of your prescriptions and are they filled?:  Yes  Have you been contacted by a [address]?:  No  Have you scheduled your follow up appointment?:  Yes  How are you going to get to your appointment?:  Car - drive self  Were you discharged with any Home Care or Post Acute Services:  No  Post Acute Services:  Home Health (Comment: [facility])  Patient Home Equipment:  Nebulizer, Oxygen  Do you have support at home?:  Partner/Spouse/SO  Do you feel like you have everything you need to keep you well at home?:  Yes  Are you an active caregiver in your home?:  No  Care Transitions Interventions  No Identified Needs         Follow Up  Future Appointments   Date Time Provider Alex Duong   4/8/2020 11:00 AM Delmon Seip, MD [facility] ENT [facility]   4/16/2020  9:45 AM [facility]   6/4/2020 11:00 AM [facility] [place]   6/11/2020  9:45 AM Mila Ochoa [facility]       Erna Whalen RN

## 2020-03-30 NOTE — CARE COORDINATION
etc)             Avoid all cruise travel and non-essential air travel.            Call your healthcare professional if you have concerns about COVID-19 and your underlying condition or if you are sick. Care Transitions Subsequent and Final Call    Schedule Follow Up Appointment with PCP:  Completed  Subsequent and Final Calls  Do you have any ongoing symptoms?:  Yes  Onset of Patient-reported symptoms:  Other  Patient-reported symptoms:  Pain  Interventions for patient-reported symptoms:  Other  Have your medications changed?:  No  Do you have any questions related to your medications?:  No  Do you currently have any active services?:  No  Do you have any needs or concerns that I can assist you with?:  No  Identified Barriers: Other  Care Transitions Interventions  No Identified Needs  Other Interventions:             Follow Up  Future Appointments   Date Time Provider Alex Duong   4/2/2020  8:30 AM Henry Clifton MD [facility]   4/8/2020 11:00 AM Jose Carlos Chavez MD [facility] ENT [facility]   4/16/2020  9:45 AM VALERIE Silva - CNP [facility]   6/4/2020 11:00 AM [facility] ED [facility]   6/11/2020  9:45 AM Iliana Cordova [facility]       Wes Snider RN

## 2020-04-06 NOTE — CARE COORDINATION
any active services?:  No  Do you have any needs or concerns that I can assist you with?:  No  Identified Barriers:  Impairment  Care Transitions Interventions  No Identified Needs  Other Interventions:             Follow Up  Future Appointments   Date Time Provider Alex Duong   4/16/2020  9:45 AM VALERIE Alamo - CNP [facility]   6/4/2020 11:00 AM [facility] [place] [facility] ED [address] [facility]   6/11/2020  9:45 AM Ekaterina Haddad MD [facility] PULM [facility]       Srinath José RN

## 2020-04-15 NOTE — PROGRESS NOTES
Subjective: Patient is here for follow-up of his left foot first and fifth toe paronychia. States his pain levels a 5 out of 10. I removed his great toenail plate on 9/2/4460. He has been on antibiotics for 2 weeks. Denies fever chills. Most of his pain is at the fifth toe and he has been doing his warm soapy water soaks  Objective: Physical exam shows his great toe has a little bit of erythema at the very distal aspect of the hyponychium there is no purulent drainage there is no paronychia but he still somewhat sensitive in the great toe less so than previous. His fifth toe looks about 75% better as far as the redness is concerned. He does have hypersensitivity from the mid toe all the way up to the tip of the toe. No tenderness at the fifth MTP joint no lymphangitic streaks. Imaging: 3 views of the left foot show no obvious osteomyelitis  Assessment and plan: Because the amount of discomfort he is having I went ahead and did a ring block of the left fifth toe. We remove the nail plate without difficulty cover the area with PSO along with great toe and then put a Band-Aid over the first and a Xeroform dry sterile dressing Renetta and on the fifth. Put him back on his antibiotic Keflex 250 mg 4 times daily and he will follow-up with me in 2 weeks.

## 2020-04-16 PROBLEM — J38.00 VOCAL CORD PARALYSIS: Status: ACTIVE | Noted: 2020-01-01

## 2020-04-16 PROBLEM — L03.039 PARONYCHIA OF TOE: Status: ACTIVE | Noted: 2020-01-01

## 2020-04-16 NOTE — PROGRESS NOTES
2020    TELEHEALTH EVALUATION -- Audio/Visual (During WRXFR-24 public health emergency)    HPI:    Jane Winkler (:  1950) has requested an audio/video evaluation for the following concern(s): For follow up COPD. Feels breathing is good, denies being productive. Continues to be on 2 L NC. Following with Dr. Ernst Ye for vocal cord dysfunction felt to be related to aspiration pneumonia    Following with Dr. Kraig Reyes for ingrown toenail and was seen yesterday. Started on keflex 250 QID. Removed the nail. Feels is improving but still with infection. Taking tramadol for back pain and feels that is managed. However foot is causing him additional discomfort. States has hurt for the past 2 months. Review of Systems   Constitutional: Positive for activity change and appetite change. Negative for chills, fever and unexpected weight change. HENT: Positive for voice change. Respiratory: Positive for cough and shortness of breath. Musculoskeletal: Positive for arthralgias. Prior to Visit Medications    Medication Sig Taking? Authorizing Provider   cephALEXin (KEFLEX) 250 MG capsule Take 1 capsule by mouth 4 times daily for 10 days Yes Cristy Duff MD   traMADol (ULTRAM) 50 MG tablet Take 1 tablet by mouth every 6 hours as needed for Pain for up to 30 days.  Yes VALERIE Mcgarry - CNP   predniSONE (DELTASONE) 10 MG tablet 4 tabs for 3 days 3 tabs for 3 days 2 tabs for 3 days 1 tabs for 3 days Yes Jacoby Reyes MD   albuterol sulfate HFA (PROAIR HFA) 108 (90 Base) MCG/ACT inhaler Inhale 2 puffs into the lungs every 6 hours as needed for Wheezing Yes Lacretia Reason, MD   ipratropium-albuterol (DUONEB) 0.5-2.5 (3) MG/3ML SOLN nebulizer solution INHALE THREE MILLILITERS (ONE VIAL) VIA NEBULIZATION BY MOUTH EVERY 4 HOURS AS NEEDED FOR SHORTNESS OF BREATH Yes Lacretia MD [place]   meloxicam (MOBIC) 15 MG tablet TAKE ONE TABLET BY MOUTH DAILY WITH FOOD FOR PAIN Yes Brook Shearer Indicates a positive item  \"[]\" Indicates a negative item  -- DELETE ALL ITEMS NOT EXAMINED]  Vital Signs: (As obtained by patient/caregiver or practitioner observation)    Blood pressure-  Heart rate-    Respiratory rate-    Temperature-  Pulse oximetry-     Constitutional: [] Appears well-developed and well-nourished [x] No apparent distress      [] Abnormal-   Mental status  [x] Alert and awake  [x] Oriented to person/place/time []Able to follow commands      Eyes:  EOM    []  Normal  [] Abnormal-  Sclera  []  Normal  [] Abnormal -         Discharge []  None visible  [] Abnormal -    HENT:   [x] Normocephalic, atraumatic. [] Abnormal   [] Mouth/Throat: Mucous membranes are moist.     External Ears [] Normal  [] Abnormal-     Neck: [] No visualized mass     Pulmonary/Chest: [] Respiratory effort normal.  [] No visualized signs of difficulty breathing or respiratory distress        [] Abnormal-     Voice very raspy, following with ENT. Occ harsh cough. Musculoskeletal:   [] Normal gait with no signs of ataxia         [x] Normal range of motion of neck        [] Abnormal-       Neurological:        [] No Facial Asymmetry (Cranial nerve 7 motor function) (limited exam to video visit)          [] No gaze palsy        [] Abnormal-         Skin:        [] No significant exanthematous lesions or discoloration noted on facial skin         [] Abnormal-            Psychiatric:       [] Normal Affect [] No Hallucinations        [] Abnormal-     Other pertinent observable physical exam findings-     ASSESSMENT/PLAN:    Encouraged to be outside when warms up. Has place to sit at home. Will increase tramadol to taking 2 times daily for current foot pain. Today states BM 2 times weekly. Taking metamucil and colace. Recommend adding milk of magnesia every 2-3 days to increase BM. Again discussed contact with office early if any health changes occur. Agreeable.              Marilou Burch is a 71 y.o. male being

## 2020-04-20 NOTE — PROGRESS NOTES
10 days 40 capsule 0    traMADol (ULTRAM) 50 MG tablet Take 1 tablet by mouth every 6 hours as needed for Pain for up to 30 days. 30 tablet 0    albuterol sulfate HFA (PROAIR HFA) 108 (90 Base) MCG/ACT inhaler Inhale 2 puffs into the lungs every 6 hours as needed for Wheezing 1 Inhaler 5    ipratropium-albuterol (DUONEB) 0.5-2.5 (3) MG/3ML SOLN nebulizer solution INHALE THREE MILLILITERS (ONE VIAL) VIA NEBULIZATION BY MOUTH EVERY 4 HOURS AS NEEDED FOR SHORTNESS OF BREATH 360 mL 5    meloxicam (MOBIC) 15 MG tablet TAKE ONE TABLET BY MOUTH DAILY WITH FOOD FOR PAIN 30 tablet 5    metFORMIN (GLUCOPHAGE) 500 MG tablet Take 1 tablet by mouth daily (with breakfast) 30 tablet 5    hydroxyurea (HYDREA) 500 MG chemo capsule       blood glucose monitor kit and supplies by Other route daily E11.9, FSBS daily. Meter preferred by insurance. 1 kit 0    blood glucose test strips (ASCENSIA AUTODISC VI;ONE TOUCH ULTRA TEST VI) strip DX: E11.9 FSBS daily. Meter preferred by insurance. 50 strip 5    Lancets MISC 1 each by Does not apply route daily DX: E 11.9. FSBS daily. Meter preferred by insurance. 50 each 3    vitamin D (CHOLECALCIFEROL) 1000 UNIT TABS tablet Take 1,000 Units by mouth daily      vitamin C (ASCORBIC ACID) 500 MG tablet Take 500 mg by mouth daily      OXYGEN Inhale 1 L/min into the lungs continuous       guaiFENesin (MUCINEX) 600 MG extended release tablet Take 1 tablet by mouth 2 times daily 30 tablet 1    Multiple Vitamins-Minerals (THERAPEUTIC MULTIVITAMIN-MINERALS) tablet Take 1 tablet by mouth daily      aspirin 81 MG tablet Take 81 mg by mouth daily        No current facility-administered medications for this visit. Review of Systems     REVIEW OF SYSTEMS  Not completed due to telephone visit    PhysicalExam     There were no vitals filed for this visit. PHYSICAL EXAM  There were no vitals taken for this visit.     No physical examination due to telephone visit    Procedure

## 2020-05-06 NOTE — PROGRESS NOTES
[facility] FOLLOW UP VISIT       Patient Name: Jani Caldwell  Medical Record Number:  [id number]  Primary Care Physician:  Clotilda Boeck, APRN - [facility]    ChiefComplaint     Chief Complaint   Patient presents with    Follow-up     Patient is not sure why he is here today, possiably for his results on his barium swallow       History of Present Illness     Jani Caldwell is an 71 y.o. male previously seen for hoarseness - started 01/2020, initially progressed however now has stabilized. States recent episode of pneumonia (admitted 1/6-1/10/2020). No sore throat, no odynodysphagia/dysphagia (tolerating a regular diet) or coughing/choking. Denies dyspnea at rest, occasionally on exertion. Has been losing weight for the past 2+ months, but states he is starting to re-gain weight. No chills/night sweats, no fevers. No neck masses/LAD noted.      PMH DM2, chronic back pain, Has COPD, on 2L/min nasal cannula. Quit smoking 2 years - was smoking 1PPD x 40 years. No history of skin cancer, head/neck cancer. No recent intubations. Drinks 2 cups of coffee daily, denies reflux signs. 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 CT chest did not show any mass lesions. In the interim he has had hospital admission for pneumonia. Interval History: No improvement in voice with speech therapy exercises (\"videostrobe revealed LTVF paralysis, fixed in paramedial position w/ no purposeful movement noted. Severe LTVF bowing and mild-moderate RTVF bowing resulting in spindle shaped closure during all pitches; false fold phonation noted during sentence/conversation, most likely as compensation for incomplete closure. \"). Continues to have breathy, hoarse voice - however now states he has coughing with prolonged voice use (after 4-5s) - no issues with deglutition. No recent fevers, chills, night sweats, neck masses.  No current tobacco use have patient see PCP and Pulmonologist for clearance prior to procedure  -The patient was counseled at length about the risks of ayanna Covid-19 during their perioperative period and any recovery window from their procedure. The patient was made aware that ayanna Covid-19  may worsen their prognosis for recovering from their procedure  and lend to a higher morbidity and/or mortality risk. All material risks, benefits, and reasonable alternatives including postponing the procedure were discussed. The patient does wish to proceed with the procedure at this time. He will be tested for COVID-19 within 72 hours prior to surgery. Beverley Adame MD  [facility]  Department of Otolaryngology/Head and Neck Surgery  5/6/20    Medical Decision Making: The following items were considered in medical decision making:  Independent review of images  Review / order clinical lab tests  Review / order radiology tests  Decision to obtain old records  Review and summation of old records as accessed through [facility] (a summary of my findings in these old records: none)     Portions of this note were dictated using Dragon.  There may be linguistic errors secondary to the use of this program.

## 2020-05-06 NOTE — PROGRESS NOTES
Subjective: Patient is here for follow-up of his left first and fifth toe paronychia with removal of nail plates. He states that he still very sensitive denies fever chills. Very slow to heal he is been using Polysporin on the wounds  Objective: Physical exam shows his erythema on first and fifth toes are completely gone he does have some purple discoloration consistent with vascular disease and poor oxygenation. His sensitivity has gone down somewhat but he still very sensitive. He does have from the fibrinous exudate on the nail plate regions of the first and fifth on his left foot. No purulence no periwound erythema  Imaging:  Assessment and plan: We discussed best and worst case scenarios. Best case scenario is this continues to improve which is with his warm soapy water soaks he can do dressing changes and his sensitivity will continue to go down. Worse case scenario is he gets infected in the area again he may have osteomyelitis and I would end up amputating his toes and cannot say for sure that that would heal.  I am going to get him to the wound center so hopefully they can gently debride the fibrinous tissue using Santyl or another enzymatic debridement.   I will see him back as needed or call me immediately with any problems

## 2020-05-06 NOTE — TELEPHONE ENCOUNTER
Within this Telehealth Consent, the terms you and yours refer to the person using the Telehealth Service (Service), or in the case of a use of the Service by or on behalf of a minor, you and yours refer to and include (i) the parent or legal guardian who provides consent to the use of the Service by such minor or uses the Service on behalf of such minor, and (ii) the minor for whom consent is being provided or on whose behalf the Service is being utilized. When using Service, you will be consulting with your health care providers via the use of Telehealth.   Telehealth involves the delivery of healthcare services using electronic communications, information technology or other means between a healthcare provider and a patient who are not in the same physical location. Telehealth may be used for diagnosis, treatment, follow-up and/or patient education, and may include, but is not limited to, one or more of the following:    Electronic transmission of medical records, photo images, personal health information or other data between a patient and a healthcare provider    Interactions between a patient and healthcare provider via audio, video and/or data communications    Use of output data from medical devices, sound and video files    Anticipated Benefits   The use of Telehealth by your Provider(s) through the Service may have the following possible benefits:    Making it easier and more efficient for you to access medical care and treatment for the conditions treated by such Provider(s) utilizing the Service    Allowing you to obtain medical care and treatment by Provider(s) at times that are convenient for you    Enabling you to interact with Provider(s) without the necessity of an in-office appointment     Possible Risks   While the use of Telehealth can provide potential benefits for you, there are also potential risks associated with the use of Telehealth.  These risks include, but may not be the terms described in the Terms of Service and this Telehealth Consent. The patient was read the following statement and has consented to the visit as of 5/6/20. The patient has been scheduled for their first telehealth visit on 5/11/20 with Dr. Maricruz Schwarz

## 2020-05-06 NOTE — TELEPHONE ENCOUNTER
Rohini Villanueva MD at 5/6/2020 10:30 AM     Status: Signed      Please arrange VV for preoperative evaluation.

## 2020-05-11 NOTE — PROGRESS NOTES
2020    TELEHEALTH EVALUATION -- Audio/Visual (During DPQJX-98 public health emergency)    HPI:    Dell Jara (:  1950) has requested an audio/video evaluation for the following concern(s): preoperative pulmonary eval, COPD    Since last clinic visit, the patient reports that his dyspnea is about the same. He is planning to have surgery on his vocal cords on - using general anesthesia. He currently has a mild cough productive of clear sputum. He has not had any recent fevers, chills or sweats. REVIEW OF SYSTEMS:    CONSTITUTIONAL:  negative for  fevers and chills  EYES: no erythema or pain  EARS/NOSE/MOUTH/THROAT: no ulcers or dry mouth  RESPIRATORY:  See HPI  CARDIOVASCULAR:  negative for  chest pain, palpitations, edema  GASTROINTESTINAL:  negative for nausea, vomiting, diarrhea, constipation and abdominal pain  HEMATOLOGICAL: negative for adenopathy  SKIN: negative for clubbing, cyanosis, skin lesions  EXTREMITIES: negative for weakness, decreased ROM  NEUROLOGICAL: negative for unilateral weakness, speech or gait abnormalities  PSYCH: no depression or anxiety       Prior to Visit Medications    Medication Sig Taking? Authorizing Provider   traMADol (ULTRAM) 50 MG tablet Take 1 tablet by mouth every 6 hours as needed for Pain for up to 30 days.  Yes VALERIE Singh - CNP   docusate sodium (COLACE) 100 MG capsule Take 100 mg by mouth daily Yes Historical Provider, MD   Psyllium (METAMUCIL FIBER PO) Take 1 Dose by mouth daily Yes Historical Provider, MD   albuterol sulfate HFA (PROAIR HFA) 108 (90 Base) MCG/ACT inhaler Inhale 2 puffs into the lungs every 6 hours as needed for Wheezing Yes Willa Zurita MD   ipratropium-albuterol (DUONEB) 0.5-2.5 (3) MG/3ML SOLN nebulizer solution INHALE THREE MILLILITERS (ONE VIAL) VIA NEBULIZATION BY MOUTH EVERY 4 HOURS AS NEEDED FOR SHORTNESS OF BREATH Yes Willa Zurita MD   meloxicam (MOBIC) 15 MG tablet TAKE ONE TABLET BY MOUTH DAILY

## 2020-05-14 NOTE — PLAN OF CARE
Patient here for initial wound care visit. He has diabetic/arterial wounds present on left 1st and 5th toes. ROMANA results are abnormally low. Arterial duplex study ordered for BLE. Patient will have Triad applied to wounds, cover with gauze, Kerlix, once daily. Instructed him to place folded 2 x 2 gauze between 4th and 5th toes to offload wound. No compression to be applied at this time to BLE.   F/u x 1 week, MD orders/D/C instructions reviewed with patient, all questions answered; copy of instructions given to patient

## 2020-05-14 NOTE — PROGRESS NOTES
[facility]  Progress Note and Procedure Note      Leisa Mckeon  AGE: 71 y.o. GENDER: male  : 1950  TODAY'S DATE:  2020    Subjective:     Chief Complaint   Patient presents with    Wound Check         HISTORY of PRESENT ILLNESS HPI     Leisa Mckeon is a 71 y.o. male who presents today for wound evaluation. History of Wound: Patient admits to having toenail removals on his great toe and his fifth toe on the left foot within the last month. He states that he is having pain in the sites and there was some concern for infection. He admits to on and off pain in the toes. He rates the pain as moderate to severe. Its to being a 50-year pack a day smoker.   Wound Pain:  intermittent  Severity:  6 / 10   Wound Type:  arterial and diabetic  Modifying Factors:  edema, diabetes and arterial insufficiency  Associated Signs/Symptoms:  edema, erythema, drainage and pain        PAST MEDICAL HISTORY        Diagnosis Date    Allergic rhinitis     Anemia, macrocytic 2020    Arthritis     Chronic back pain     Chronic bronchitis (HCC)     COPD (chronic obstructive pulmonary disease) ([address]     Dental disease     Lung disease     Rash     Type 2 diabetes mellitus without complication, without long-term current use of insulin ([address] 2019    Wears glasses        PAST SURGICAL HISTORY    Past Surgical History:   Procedure Laterality Date    BRONCHOSCOPY N/A 2018    BRONCHOSCOPY ALVEOLAR LAVAGE performed by Tyshawn Alcaraz MD at [address] N/A 2019    BRONCHOSCOPY ALVEOLAR LAVAGE performed by Erika Cuellar MD at [address] [place] [address] Right    [address]    EYE SURGERY Right     detached retina    FINGER SURGERY  11    duputrens release right ring finger inclluding proxinal interphanageal open CTR    FINGER SURGERY Right     contusion finger # 3    OTHER SURGICAL HISTORY  2017    excision left facial cyst       FAMILY HISTORY    Family History   Problem Relation Age of Onset    Diabetes Mother     Cancer Sister         pancreatic    Emphysema Father        SOCIAL HISTORY    Social History     Tobacco Use    Smoking status: Former Smoker     Packs/day: 1.00     Years: 40.00     Pack years: 40.00     Types: Cigarettes     Start date: 1976     Last attempt to quit: 2018     Years since quittin.9    Smokeless tobacco: Never Used    Tobacco comment: Advised to quit. Had one cig for the past 5 days. Substance Use Topics    Alcohol use: Yes     Alcohol/week: 1.0 standard drinks     Types: 1 Cans of beer per week     Frequency: 4 or more times a week     Drinks per session: 3 or 4     Binge frequency: Never     Comment: 1 can weekly     Drug use: No       ALLERGIES    Allergies   Allergen Reactions    Ibuprofen      On blood thinner         MEDICATIONS    Current Outpatient Medications on File Prior to Encounter   Medication Sig Dispense Refill    traMADol (ULTRAM) 50 MG tablet Take 1 tablet by mouth every 6 hours as needed for Pain for up to 30 days. 60 tablet 0    docusate sodium (COLACE) 100 MG capsule Take 100 mg by mouth daily      albuterol sulfate HFA (PROAIR HFA) 108 (90 Base) MCG/ACT inhaler Inhale 2 puffs into the lungs every 6 hours as needed for Wheezing 1 Inhaler 5    ipratropium-albuterol (DUONEB) 0.5-2.5 (3) MG/3ML SOLN nebulizer solution INHALE THREE MILLILITERS (ONE VIAL) VIA NEBULIZATION BY MOUTH EVERY 4 HOURS AS NEEDED FOR SHORTNESS OF BREATH 360 mL 5    meloxicam (MOBIC) 15 MG tablet TAKE ONE TABLET BY MOUTH DAILY WITH FOOD FOR PAIN 30 tablet 5    metFORMIN (GLUCOPHAGE) 500 MG tablet Take 1 tablet by mouth daily (with breakfast) 30 tablet 5    hydroxyurea (HYDREA) 500 MG chemo capsule       blood glucose monitor kit and supplies by Other route daily E11.9, FSBS daily. Meter preferred by insurance.  1 kit 0    blood glucose 5/14/2020 10:33 AM   Wound Width (cm) 2 cm 5/14/2020 10:33 AM   Wound Depth (cm) 0.1 cm 5/14/2020 10:33 AM   Wound Surface Area (cm^2) 2.4 cm^2 5/14/2020 10:33 AM   Wound Volume (cm^3) 0.24 cm^3 5/14/2020 10:33 AM   Post-Procedure Length (cm) 1.2 cm 5/14/2020 11:28 AM   Post-Procedure Width (cm) 2 cm 5/14/2020 11:28 AM   Post-Procedure Depth (cm) 0.1 cm 5/14/2020 11:28 AM   Post-Procedure Surface Area (cm^2) 2.4 cm^2 5/14/2020 11:28 AM   Post-Procedure Volume (cm^3) 0.24 cm^3 5/14/2020 11:28 AM   Tunneling Position ___ O'Clock 0 5/14/2020 10:33 AM   Undermining Starts ___ O'Clock 0 5/14/2020 10:33 AM   Undermining Ends___ O'Clock 0 5/14/2020 10:33 AM   Undermining Maxium Distance (cm) 0 5/14/2020 10:33 AM   Wound Assessment Pink;Yellow 5/14/2020 10:33 AM   Drainage Amount Small 5/14/2020 10:33 AM   Drainage Description Serosanguinous 5/14/2020 10:33 AM   Odor None 5/14/2020 10:33 AM   Bethany-wound Assessment Pink 5/14/2020 10:33 AM   Number of days: 0       Wound 05/14/20 #2, left fifth toe, DFU, stokes 2, onset 2/14/2020 (Active)   Wound Image   5/14/2020 10:33 AM   Wound Diabetic Stokes 2 5/14/2020 10:33 AM   Wound Length (cm) 2 cm 5/14/2020 10:33 AM   Wound Width (cm) 1 cm 5/14/2020 10:33 AM   Wound Depth (cm) 0.1 cm 5/14/2020 10:33 AM   Wound Surface Area (cm^2) 2 cm^2 5/14/2020 10:33 AM   Wound Volume (cm^3) 0.2 cm^3 5/14/2020 10:33 AM   Post-Procedure Length (cm) 2 cm 5/14/2020 11:28 AM   Post-Procedure Width (cm) 1 cm 5/14/2020 11:28 AM   Post-Procedure Depth (cm) 0.1 cm 5/14/2020 11:28 AM   Post-Procedure Surface Area (cm^2) 2 cm^2 5/14/2020 11:28 AM   Post-Procedure Volume (cm^3) 0.2 cm^3 5/14/2020 11:28 AM   Distance Tunneling (cm) 0 cm 5/14/2020 10:33 AM   Tunneling Position ___ O'Clock 0 5/14/2020 10:33 AM   Undermining Starts ___ O'Clock 0 5/14/2020 10:33 AM   Undermining Ends___ O'Clock 0 5/14/2020 10:33 AM   Undermining Maxium Distance (cm) 0 5/14/2020 10:33 AM   Wound Assessment Pink;Yellow 5/14/2020

## 2020-05-21 NOTE — PROGRESS NOTES
[facility]  Progress Note and Procedure Note      Raegan Wright  AGE: 71 y.o. GENDER: male  : 1950  TODAY'S DATE:  2020    Subjective:     Chief Complaint   Patient presents with    Wound Check         HISTORY of PRESENT ILLNESS HPI     Raegan Wright is a 71 y.o. male who presents today for wound evaluation. History of Wound: Patient admits to having toenail removals on his great toe and his fifth toe on the left foot within the 2020. He has been changing the bandage as directed. He states his pain is decreased. He did not get vascular studies since his last visit. He states he was not called to schedule.   Wound Pain:  intermittent  Severity:  6 / 10   Wound Type:  arterial and diabetic  Modifying Factors:  edema, diabetes and arterial insufficiency  Associated Signs/Symptoms:  edema, erythema, drainage and pain        PAST MEDICAL HISTORY        Diagnosis Date    Allergic rhinitis     Anemia, macrocytic 2020    Arthritis     Chronic back pain     Chronic bronchitis (HCC)     COPD ([facility] disease) [facility]     Dental disease     Lung disease     Rash     Type 2 diabetes mellitus without complication, without long-term current use of insulin ([address] 2019    Wears glasses        PAST SURGICAL HISTORY    Past Surgical History:   Procedure Laterality Date    BRONCHOSCOPY N/A 2018    BRONCHOSCOPY ALVEOLAR LAVAGE performed by James Bernstein MD at [address] [place] [address] N/A 2019    BRONCHOSCOPY ALVEOLAR LAVAGE performed by Ekaterina Haddad MD at [address] [place] [address] Right    [address]    EYE SURGERY Right     detached retina    FINGER SURGERY  11    duputrens release right ring finger inclluding proxinal interphanageal open CTR    FINGER SURGERY Right     contusion finger # 3    OTHER SURGICAL HISTORY  2017    excision left TOUCH ULTRA TEST VI) strip DX: E11.9 FSBS daily. Meter preferred by insurance. 50 strip 5    Lancets MISC 1 each by Does not apply route daily DX: E 11.9. FSBS daily. Meter preferred by insurance. 50 each 3    vitamin D (CHOLECALCIFEROL) 1000 UNIT TABS tablet Take 1,000 Units by mouth daily      vitamin C (ASCORBIC ACID) 500 MG tablet Take 500 mg by mouth daily      OXYGEN Inhale 1 L/min into the lungs continuous       guaiFENesin (MUCINEX) 600 MG extended release tablet Take 1 tablet by mouth 2 times daily 30 tablet 1    Multiple Vitamins-Minerals (THERAPEUTIC MULTIVITAMIN-MINERALS) tablet Take 1 tablet by mouth daily      aspirin 81 MG tablet Take 81 mg by mouth daily        No current facility-administered medications on file prior to encounter. REVIEW OF SYSTEMS    Pertinent items are noted in HPI. Objective:      /63   Pulse 98   Temp 97.7 °F (36.5 °C) (Oral)   Resp 16   Ht 5' 8\" (1.727 m)   Wt 117 lb 2 oz (53.1 kg)   BMI 17.81 kg/m²     PHYSICAL EXAM    Vascular: Vascular status Impaired  palpable pedal pulses, right DP1/4 and PT1/4, left DP1/4 and PT1/4. CFT 3 seconds digits 1 to 5 bilateral.  Hair growthAbsent  both lower extremities and feet. Skin temperature is warm to warm from pretibial area to distal digits bilateral.  Exam is negative for rubor, pallor, cyanosis or signs of acute vascular compromise bilaterally. Exam is positive for edema bilateral lower extremity. Varicosities Present bilateral lower extremity. Neuro: Neurologic status normal bilateral with epicritic Present , proprioceptive Present, vibratory sensationPresent and protopathicPresent. DTRs Present bilateral Achilles. There were no reproducible neuritic symptoms on exam bilateral feet/ankles. Derm: Ulceration to left foot fifth toe and great toe. Ecchymosis Absent  bilateral feet/foot.     Musculoskeletal: Pain with palpation and debridement of wound 5/5 muscle strength in/eversion and dorsi/plantarflexion bilateral feet. No gross instability noted. Assessment:     Patient Active Problem List   Diagnosis    COPD, severe ([address]    Tobacco use    Chronic midline low back pain with right-sided sciatica    Age-related osteoporosis without current pathological fracture    Community acquired pneumonia of left upper lobe of lung ([address]    Tracheobronchitis    Tobacco abuse    Severe protein-calorie malnutrition (HCC)    Acute on chronic respiratory failure with hypoxia and hypercapnia (HCC)    Abnormal chest x-ray    Chronic hypoxemic respiratory failure (HCC)    Lactic acidosis    HCAP (healthcare-associated pneumonia)    Abnormal chest CT    Pulmonary nodule    Pneumonia of both upper lobes due to Pseudomonas species (HCC)    Thrombocytosis (HCC)    COPD exacerbation (HCC)    Type 2 diabetes mellitus without complication, without long-term current use of insulin (HCC)    Hyponatremia    Anemia, macrocytic    Hoarse    Pneumonia    Hemoptysis    Chest pain    Stage 3 severe COPD by GOLD classification ([facility]    Vocal cord paralysis    Paronychia of toe       Procedure Note    Performed by: Cristian Kohli DPM    Consent obtained: Yes    Time out taken:  Yes    Pain Control: Anesthetic  Anesthetic: 4% Lidocaine Cream     Debridement:Excisional Debridement    Using curette the wound was sharply debrided    down through and including the removal of epidermis, dermis and subcutaneous tissue. Devitalized Tissue Debrided:  fibrin, biofilm, slough, necrotic/eschar and exudate    Pre Debridement Measurements:  Are located in the Wound Documentation Flow Sheet    Wound Care Documentation:  Wound 05/14/20 #1, left great toe, DFU, stokes 1, onset 2/14/2020 (Active)   Wound Image   5/14/2020 10:33 AM   Wound Diabetic Stokes 1 5/21/2020 10:22 AM   Offloading for Diabetic Foot Ulcers Post op shoe 5/14/2020 11:57 AM   Dressing Status Clean;Dry; Intact 5/14/2020 11:57 AM

## 2020-05-21 NOTE — PLAN OF CARE
Diabetic foot wounds present on Bilateral great toes. Patient had arterial scan scheduled at last weeks visit for a future date, patient reports not being aware of this. BLE arterial duplex re-scheduled today for 5/22/20 at 2:00PM.  Continue Triad to both wounds, folded 2 x 2 dry gauze between left 4th and fifth toes, daily,. No compression.   F/u x 1 week, MD orders/D/C instructions reviewed with patient, all questions answered; copy of instructions given to patient

## 2020-05-22 PROBLEM — R04.2 HEMOPTYSIS: Status: RESOLVED | Noted: 2020-01-01 | Resolved: 2020-01-01

## 2020-05-22 PROBLEM — J18.9 PNEUMONIA: Status: RESOLVED | Noted: 2020-01-01 | Resolved: 2020-01-01

## 2020-05-22 NOTE — PROGRESS NOTES
6 hours as needed for Pain for up to 30 days. Yes VALERIE Silvestre CNP   docusate sodium (COLACE) 100 MG capsule Take 100 mg by mouth daily Yes Historical Provider, MD   albuterol sulfate HFA (PROAIR HFA) 108 (90 Base) MCG/ACT inhaler Inhale 2 puffs into the lungs every 6 hours as needed for Wheezing Yes Ricardo Gross MD   ipratropium-albuterol (DUONEB) 0.5-2.5 (3) MG/3ML SOLN nebulizer solution INHALE THREE MILLILITERS (ONE VIAL) VIA NEBULIZATION BY MOUTH EVERY 4 HOURS AS NEEDED FOR SHORTNESS OF BREATH Yes Ricardo Gross MD   meloxicam (MOBIC) 15 MG tablet TAKE ONE TABLET BY MOUTH DAILY WITH FOOD FOR PAIN Yes VALERIE Silvestre CNP   metFORMIN (GLUCOPHAGE) 500 MG tablet Take 1 tablet by mouth daily (with breakfast) Yes VALERIE Silvestre CNP   hydroxyurea (HYDREA) 500 MG chemo capsule  Yes Historical Provider, MD   blood glucose monitor kit and supplies by Other route daily E11.9, FSBS daily. Meter preferred by insurance. Yes VALERIE Silvestre CNP   blood glucose test strips (ASCENSIA AUTODISC VI;ONE TOUCH ULTRA TEST VI) strip DX: E11.9 FSBS daily. Meter preferred by insurance. Yes VALERIE Silvestre CNP   Lancets MISC 1 each by Does not apply route daily DX: E 11.9. FSBS daily. Meter preferred by insurance.  Yes VALERIE Silvestre CNP   vitamin D (CHOLECALCIFEROL) 1000 UNIT TABS tablet Take 1,000 Units by mouth daily Yes Historical Provider, MD   vitamin C (ASCORBIC ACID) 500 MG tablet Take 500 mg by mouth daily Yes Historical Provider, MD   OXYGEN Inhale 1 L/min into the lungs continuous  Yes Historical Provider, MD   guaiFENesin (MUCINEX) 600 MG extended release tablet Take 1 tablet by mouth 2 times daily Yes Leeanna Schaumann, MD   Multiple Vitamins-Minerals (THERAPEUTIC MULTIVITAMIN-MINERALS) tablet Take 1 tablet by mouth daily Yes Historical Provider, MD   aspirin 81 MG tablet Take 81 mg by mouth daily  Yes Historical Provider, MD        Allergies Alcohol use: Yes     Alcohol/week: 1.0 standard drinks     Types: 1 Cans of beer per week     Frequency: 4 or more times a week     Drinks per session: 3 or 4     Binge frequency: Never     Comment: 1 can weekly     Drug use: No    Sexual activity: Not Currently     Partners: Female   Lifestyle    Physical activity     Days per week: Not on file     Minutes per session: Not on file    Stress: Not on file   Relationships    Social connections     Talks on phone: Not on file     Gets together: Not on file     Attends [employer] service: Not on file     Active member of club or organization: Not on file     Attends meetings of clubs or organizations: Not on file     Relationship status: Not on file    Intimate partner violence     Fear of current or ex partner: Not on file     Emotionally abused: Not on file     Physically abused: Not on file     Forced sexual activity: Not on file   Other Topics Concern    Not on file   Social History Narrative    Not on file        Family History   Problem Relation Age of Onset    Diabetes Mother     Cancer Sister         pancreatic    Emphysema Father     No Known Problems Brother     No Known Problems Sister        ADVANCE DIRECTIVE: N, Not Received    Vitals:    05/22/20 0911   BP: 110/74   Site: Right Upper Arm   Position: Sitting   Cuff Size: Medium Adult   Pulse: 97   Resp: 18   Temp: 98.7 °F (37.1 °C)   SpO2: 94%   Weight: 116 lb 12.8 oz (53 kg)   Height: 5' 4.75\" (1.645 m)     Estimated body mass index is 19.59 kg/m² as calculated from the following:    Height as of this encounter: 5' 4.75\" (1.645 m). Weight as of this encounter: 116 lb 12.8 oz (53 kg). Physical Exam  Constitutional:       General: He is not in acute distress. Appearance: He is well-developed. He is ill-appearing. He is not toxic-appearing. Comments: Voice very raspy, whisper    Frail, chronically ill appearing but stable for him. Neck:      Vascular: No carotid bruit.

## 2020-06-01 NOTE — TELEPHONE ENCOUNTER
Pre-admission asking if patient's labs can be reviewed and an addendum added to the pre-op note saying patient is cleared for surgery. Surgery is Friday, 06/05/2020.

## 2020-06-04 PROBLEM — R06.00 DYSPNEA: Status: ACTIVE | Noted: 2020-01-01

## 2020-06-04 NOTE — H&P
Lancets MISC 1 each by Does not apply route daily DX: E 11.9. FSBS daily. Meter preferred by insurance. 6/28/19 1/16/21 Yes VALERIE Robison - CNP   vitamin D (CHOLECALCIFEROL) 1000 UNIT TABS tablet Take 1,000 Units by mouth daily   Yes Historical Provider, MD   vitamin C (ASCORBIC ACID) 500 MG tablet Take 500 mg by mouth daily   Yes Historical Provider, MD   OXYGEN Inhale 1 L/min into the lungs continuous    Yes Historical Provider, MD   guaiFENesin (MUCINEX) 600 MG extended release tablet Take 1 tablet by mouth 2 times daily 6/23/18  Yes Annalise Yu MD   Multiple Vitamins-Minerals (THERAPEUTIC MULTIVITAMIN-MINERALS) tablet Take 1 tablet by mouth daily   Yes Historical Provider, MD   aspirin 81 MG tablet Take 81 mg by mouth daily  1/14/16  Yes Historical Provider, MD       Allergies:  Ibuprofen    Social History:     TOBACCO:   reports that he quit smoking about 2 years ago. His smoking use included cigarettes. He started smoking about 43 years ago. He has a 40.00 pack-year smoking history. He has never used smokeless tobacco.  ETOH:   reports previous alcohol use of about 1.0 standard drinks of alcohol per week.       Family History:   Positive as follows:        Problem Relation Age of Onset    Diabetes Mother     Cancer Sister         pancreatic    Emphysema Father     No Known Problems Brother     No Known Problems Sister        REVIEW OF SYSTEMS:       Constitutional: Negative for fever   HENT: Negative for sore throat    Respiratory: Negative For cough + dyspnea  Cardiovascular: Negative for chest pain   Gastrointestinal: Negative for vomiting, diarrhea   Genitourinary: Negative for hematuria, dysuria  Musculoskeletal: Negative for arthralgias   Skin: Negative for rash   Neurological: Negative for syncope   Psychiatric/Behavorial: Negative for anxiety    PHYSICAL EXAM:    BP 99/75   Pulse 54   Temp 98.4 °F (36.9 °C) (Oral)   Resp 16   Ht 5' 9\" (1.753 m)   Wt 112 lb (50.8 kg)   SpO2 pain. Non-hypoxic, no tachycardia  - pulmonology consulted    Elevated BNP  - Echo ordered  - received IV Lasix 20 mg once. - daily weight; I&O; low sodium diet. Chronic hypoxic respiratory failure  - stable on home O2 settings: 2-3 L    Hyponatremia  - reports poor PO intake  - monitor BMP. - corrected Na is 132.8    DM type 2  - monitor glucose. SSI coverage. PAD  Wound left great toe; left 5th digit  - on aspirin. F/w Dr. Tara Vick  - Dressing changes daily  - X-ray left foot  - Tramadol prn.      Macrocytic anemia  - monitor CBC    DVT Prophylaxis: Lovenox  Diet: DIET CARB CONTROL; Low Sodium (2 GM)  Code Status: Full Code

## 2020-06-04 NOTE — PROGRESS NOTES
RESPIRATORY THERAPY ASSESSMENT    Name:  Eliana [facility] Record Number:  [id number]  Age: 71 y.o. Gender: male  : 1950  Today's Date:  2020  Room:  0214/0214-01    Assessment     Is the patient being admitted for a COPD or Asthma exacerbation? Yes   (If yes the patient will be seen every 4 hours for the first 24 hours and then reassessed)    Patient Admission Diagnosis      Allergies  Allergies   Allergen Reactions    Ibuprofen Other (See Comments)     On blood thinner         Minimum Predicted Vital Capacity:               Actual Vital Capacity:                    Pulmonary History:COPD and CHF/Pulmonary Edema  Home Oxygen Therapy:  3 liters/min via nasal cannula  Home Respiratory Therapy:Albuterol   Current Respiratory Therapy:  Albuterol 2p Q6 PRN          Respiratory Severity Index(RSI)   Patients with orders for inhalation medications, oxygen, or any therapeutic treatment modality will be placed on Respiratory Protocol. They will be assessed with the first treatment and at least every 72 hours thereafter. The following severity scale will be used to determine frequency of treatment intervention. Smoking History: Mild Exacerbation = 3    Social History  Social History     Tobacco Use    Smoking status: Former Smoker     Packs/day: 1.00     Years: 40.00     Pack years: 40.00     Types: Cigarettes     Start date: 1976     Last attempt to quit: 2018     Years since quittin.0    Smokeless tobacco: Never Used    Tobacco comment: Advised to quit. Had one cig for the past 5 days.    Substance Use Topics    Alcohol use: Not Currently     Alcohol/week: 1.0 standard drinks     Types: 1 Cans of beer per week     Frequency: 4 or more times a week     Drinks per session: 3 or 4     Binge frequency: Never     Comment: 1 can weekly; states no longer drinking at present    Drug use: No       Recent Surgical History: None = 0  Past Surgical History  Past Surgical History: patient's RSI, but not less than home treatment regimen frequency. 5. Bronchodilator(s) will be discontinued if patient has a RSI less than 9 and has received no scheduled or as needed treatment for 72  Hrs. Patients Ordered on a Mucolytic Agent:    1. Must always be administered with a bronchodilator. 2. Discontinue if patient experiences worsened bronchospasm, or secretions have lessened to the point that the patient is able to clear them with a cough. Anti-inflammatory and Combination Medications:    1. If the patient lacks prior history of lung disease, is not using inhaled anti-inflammatory medication at home, and lacks wheezing by examination or by history for at least 24 hours, contact physician for possible discontinuation.

## 2020-06-04 NOTE — ED NOTES
Patient ambulated 40ft on 3L oxygen (which he is normally on at home). Patient's O2 dropped to 92% and HR was as high 125bpm.  Patient reported increased SOB after ambulating. Reported to Tian Nunez RN.      Smita Estrada  06/04/20 [id number]

## 2020-06-04 NOTE — CONSULTS
Alert and Oriented to person, place and time. CBC:   Recent Labs     06/04/20  1330   WBC 9.0   HGB 10.7*   HCT 32.6*   .1*   *     BMP:   Recent Labs     06/04/20  1330   *   K 3.8   CL 91*   CO2 27   BUN 13   CREATININE <0.5*     Chest imaging was reviewed by me and showed   Patchy left basilar airspace disease consistent with atelectasis or mild   pneumonia.       Persistent biapical cavitary lesions with improved wall thickening and fluid     PCT 0.13    ASSESSMENT:  · COPD exacerbation  · Chronic hypoxemic respiratory failure  · The left lower lobe airspace disease on the chest x-ray appears chronic  · New lower extremity edema and markedly elevated BNP - possible cor pulmonale  · Recent pseudomonas pneumonia  · Elevated BNP  · PAD    PLAN:d  Supplemental oxygen to maintain SaO2 >92%; wean as tolerated  Intensive inhaled bronchodilator therapy. Prednisone taper   Doxycycline  Sputum GS&C.   TTE  · Lasix per IM    Thank you Inder Cleary MD for this consult

## 2020-06-04 NOTE — PROGRESS NOTES
Pt here from ER. RN received report at bedside. Pt's covid test is negative and taken out of droplet iso. Vitals are stable on 3 L O2. Tele/cont pulsox initiated. Pt c/o pain in left foot and states he has wounds from ingrown toe nails. Removed old dry drsg from LLE. Wound on left great toe and left 5th toe. Applied loose 4x4 until MD can assess in AM. Medicated with prn tramadol and tylenol. Circ checks are positive. Pt oriented to room and call light.

## 2020-06-05 NOTE — PROGRESS NOTES
* 132* 128*   K 3.8 3.7 4.1   CL 91* 91* 90*   CO2 27 27 26   BUN 13 12 14   CREATININE <0.5* <0.5* <0.5*     Recent Labs     06/04/20  1330   TROPONINI <0.01       Coagulation:   Lab Results   Component Value Date    INR 1.09 03/21/2020     Cardiac markers:   Lab Results   Component Value Date    TROPONINI <0.01 06/04/2020         Lab Results   Component Value Date    ALT 32 06/05/2020    AST 29 06/05/2020    ALKPHOS 93 06/05/2020    BILITOT 0.4 06/05/2020       Lab Results   Component Value Date    INR 1.09 03/21/2020    INR 1.09 11/27/2018    PROTIME 12.7 03/21/2020    PROTIME 12.4 11/27/2018       Radiology    CULTURES  Legionella: pending  Strep pneumoniae: pending  COVID: not detected     EKG:  I have reviewed the EKG with the following interpretation:   N/A     RADIOLOGY  XR CHEST PORTABLE   Final Result   Patchy left basilar airspace disease consistent with atelectasis or mild   pneumonia.       Persistent biapical cavitary lesions with improved wall thickening and fluid              Lower extremity bilateral arterial duplex 5/22/2020  Summary  Mild right lower extremity arterial disease with peroneal artery occlusion. Moderated left lower extremity disease. Mid SFA occlusion, distal posterior  tibial artery occlusion.         ECHO  6/2020  Technically difficult examination due to body habitus.   Left ventricular systolic function is severely reduced with a visually   estimated ejection fraction of <20 %.   The left ventricle is mildly dilated in size with normal wall thickness.   Severe global hypokinesis with regional variation.  Anteroseptal and Apical   septal walls are thin and akinetic.   Grade II diastolic dysfunction with elevated LV pressure.   There is no evidence of a left ventricular thrombus.   Mitral regurgitation is difficult to objectively quantify, but qualitatively   appears at least moderate.   The left atrium appears moderately enlarged.   Mild aortic regurgitation.  Florencio Oneill

## 2020-06-05 NOTE — PLAN OF CARE
Problem: Falls - Risk of:  Goal: Will remain free from falls  Description: Will remain free from falls  Outcome: Ongoing  Goal: Absence of physical injury  Description: Absence of physical injury  Outcome: Ongoing     Problem: Pain:  Description: Pain management should include both nonpharmacologic and pharmacologic interventions. Goal: Pain level will decrease  Description: Pain level will decrease  Outcome: Ongoing  Goal: Control of acute pain  Description: Control of acute pain  Outcome: Ongoing  Goal: Control of chronic pain  Description: Control of chronic pain  Outcome: Ongoing  Goal: Patient's pain/discomfort is manageable  Description: Patient's pain/discomfort is manageable  Outcome: Ongoing     Problem: Infection:  Goal: Will remain free from infection  Description: Will remain free from infection  Outcome: Ongoing     Problem: Safety:  Goal: Free from accidental physical injury  Description: Free from accidental physical injury  Outcome: Ongoing  Goal: Free from intentional harm  Description: Free from intentional harm  Outcome: Ongoing     Problem: Daily Care:  Goal: Daily care needs are met  Description: Daily care needs are met  Outcome: Ongoing     Problem: Skin Integrity:  Goal: Skin integrity will stabilize  Description: Skin integrity will stabilize  Outcome: Ongoing     Problem: Discharge Planning:  Goal: Patients continuum of care needs are met  Description: Patients continuum of care needs are met  Outcome: Ongoing     Problem: Discharge Planning:  Goal: Discharged to appropriate level of care  Description: Discharged to appropriate level of care  Outcome: Ongoing  Goal: Participates in care planning  Description: Participates in care planning  Outcome: Ongoing     Problem:  Activity Intolerance:  Goal: Ability to tolerate increased activity will improve  Description: Ability to tolerate increased activity will improve  Outcome: Ongoing     Problem: Airway Clearance - Ineffective:  Goal:

## 2020-06-05 NOTE — PROGRESS NOTES
Nutrition Supplement (ONS) Orders: None  · Anthropometric Measures:  · Ht: 5' 9\" (175.3 cm)   · Current Body Wt: 109 lb (49.4 kg)  · Admission Body Wt: 109 lb (49.4 kg)  · Usual Body Wt: 106 lb (48.1 kg)  · % Weight Change:  ,  2.8% increase x 5 months  · Ideal Body Wt: 160 lb (72.6 kg), % Ideal Body 68  · BMI Classification: BMI <18.5 Underweight    Nutrition Interventions:   Continue current diet, Start ONS  Continued Inpatient Monitoring, Coordination of Care, Coordination of Community Care    Nutrition Evaluation:   · Evaluation: Goals set   · Goals: pt will consume > 50% of meals and ONS ;weight will remain at or above 109#;  BS will be < 180 mg/dl     · Monitoring: Meal Intake, Weight, Pertinent Labs, Supplement Intake, Diet Tolerance      Electronically signed by Adama Cobian RD, LD on 6/5/20 at 3:25 PM EDT    Contact Number: [phone]

## 2020-06-05 NOTE — PROGRESS NOTES
Pt returned from test. Asst Pt to bed. Pt is tachypneic SPO2 stable on 3L O2. Pt resting in bed with call light in reach.

## 2020-06-05 NOTE — CONSULTS
Problems in his brother and sister. Home Medications:  Were reviewed and are listed in nursing record. and/or listed below  Prior to Admission medications    Medication Sig Start Date End Date Taking? Authorizing Provider   traMADol (ULTRAM) 50 MG tablet Take 1 tablet by mouth every 6 hours as needed for Pain for up to 30 days. 5/22/20 6/21/20 Yes VALERIE Robison CNP   docusate sodium (COLACE) 100 MG capsule Take 100 mg by mouth daily   Yes Historical Provider, MD   albuterol sulfate HFA (PROAIR HFA) 108 (90 Base) MCG/ACT inhaler Inhale 2 puffs into the lungs every 6 hours as needed for Wheezing 3/2/20  Yes Willa Zurita MD   ipratropium-albuterol (DUONEB) 0.5-2.5 (3) MG/3ML SOLN nebulizer solution INHALE THREE MILLILITERS (ONE VIAL) VIA NEBULIZATION BY MOUTH EVERY 4 HOURS AS NEEDED FOR SHORTNESS OF BREATH 3/2/20  Yes Willa Zurita MD   meloxicam (MOBIC) 15 MG tablet TAKE ONE TABLET BY MOUTH DAILY WITH FOOD FOR PAIN 2/24/20  Yes VALERIE Singh CNP   metFORMIN (GLUCOPHAGE) 500 MG tablet Take 1 tablet by mouth daily (with breakfast) 2/17/20  Yes VALERIE Singh CNP   hydroxyurea (HYDREA) 500 MG chemo capsule  7/1/19  Yes Historical Provider, MD   blood glucose monitor kit and supplies by Other route daily E11.9, FSBS daily. Meter preferred by insurance. 6/28/19  Yes VALERIE Robison CNP   blood glucose test strips (ASCENSIA AUTODISC VI;ONE TOUCH ULTRA TEST VI) strip DX: E11.9 FSBS daily. Meter preferred by insurance. 6/28/19  Yes VALERIE Singh CNP   Lancets MISC 1 each by Does not apply route daily DX: E 11.9. FSBS daily. Meter preferred by insurance.  6/28/19 1/16/21 Yes VALERIE Robison CNP   vitamin D (CHOLECALCIFEROL) 1000 UNIT TABS tablet Take 1,000 Units by mouth daily   Yes Historical Provider, MD   vitamin C (ASCORBIC ACID) 500 MG tablet Take 500 mg by mouth daily   Yes Historical Provider, MD   OXYGEN Inhale 1 L/min into the lungs continuous    Yes Historical Provider, MD   guaiFENesin (MUCINEX) 600 MG extended release tablet Take 1 tablet by mouth 2 times daily 6/23/18  Yes Irasema Shore MD   Multiple Vitamins-Minerals (THERAPEUTIC MULTIVITAMIN-MINERALS) tablet Take 1 tablet by mouth daily   Yes Historical Provider, MD   aspirin 81 MG tablet Take 81 mg by mouth daily  1/14/16  Yes Historical Provider, MD        Allergies:  Ibuprofen     Review of Systems:   12 point ROS negative in all areas as listed below except as in [place]  Constitutional, EENT, Cardiovascular, pulmonary, GI, , Musculoskeletal, skin, neurological, hematological, endocrine, Psychiatric    Physical Examination:  116/73  Temp 97.3 F, pulse 106 resp 24  Vitals:    06/05/20 1024   BP:    Pulse:    Resp:    Temp:    SpO2: 93%    Weight: 109 lb 8 oz (49.7 kg)       Poorly nourished cachectic chronically ill looking  General Appearance:  Alert, cooperative, no distress, appears stated age   Head:  Normocephalic, without obvious abnormality, atraumatic   Eyes:  PERRL, conjunctiva/corneas clear       Nose: Nares normal, no drainage or sinus tenderness   Throat: Lips, mucosa, and tongue normal   Neck: Supple, symmetrical, trachea midline, no adenopathy, thyroid: not enlarged, symmetric, no tenderness/mass/nodules, no carotid bruit or JVD       Lungs:   bilat wheezes diffuse, respirations labored   Chest Wall:  No tenderness or deformity   Heart:  Regular rate and rhythm, S1, S2 normal, no murmur, rub or gallop   Abdomen:   Soft, Liver is massively enlarged, smooth tender, bowel sounds active all four quadrants,  no masses, no organomegaly           Extremities: Extremities normal, atraumatic, no cyanosis, 2 + bilat ankle  edema   Pulses: 1+ and symmetric   Skin: Skin color, texture, turgor normal, no rashes or lesions   Pysch: Normal mood and affect   Neurologic: Normal gross motor and sensory exam.         Labs  CBC:   Lab Results   Component Value Date    WBC 10.0 06/05/2020 thrombus. Mitral regurgitation is difficult to objectively quantify, but qualitatively   appears at least moderate. The left atrium appears moderately enlarged. Mild aortic regurgitation. Right ventricular systolic function is reduced. Moderate tricuspid regurgitation. Systolic pulmonary artery pressure (SPAP) is elevated and estimated at 58   mmHg (right atrial pressure 8 mmHg) consistent with moderate pulmonary   hypertension. Eustachian valve a normal variant in right atrium. Signature      ------------------------------------------------------------------   Electronically signed by Randee Cannon MD, [facility] [place] (Interpreting   physician) on 06/05/2020 at 09:38 AM   ------------------------------------------------------------------       Assessment  Acute systolic CHF  Cardiomyopathy rule out ischemic or from diabetes  Rule out hemachromatosis   COPD acute exacerbation  Hoarseness due to vocal cord paralysis as per patient. History of alcohol and tobacco abuse.   Patient Active Problem List   Diagnosis    Chronic midline low back pain with right-sided sciatica    Age-related osteoporosis without current pathological fracture    Community acquired pneumonia of left upper lobe of lung ([address]    Tracheobronchitis    Tobacco abuse    Severe protein-calorie malnutrition ([address]    Acute on chronic respiratory failure with hypoxia and hypercapnia (HCC)    Chronic hypoxemic respiratory failure (HCC)    Lactic acidosis    HCAP (healthcare-associated pneumonia)    Abnormal chest CT    Pulmonary nodule    Pneumonia of both upper lobes due to Pseudomonas species (HCC)    Thrombocytosis (HCC)    COPD exacerbation (HCC)    Type 2 diabetes mellitus without complication, without long-term current use of insulin (HCC)    Hyponatremia    Anemia, macrocytic    Hoarse    Stage 3 severe COPD by GOLD classification ([address]    Vocal cord paralysis    Paronychia of toe    Dyspnea    Cardiomyopathy

## 2020-06-05 NOTE — PROGRESS NOTES
Pulmonary Progress Note  CC: SOB, COPD    Subjective:  C/O sob not improved    EXAM: /73   Pulse 106   Temp 97.3 °F (36.3 °C) (Oral)   Resp 24   Ht 5' 9\" (1.753 m)   Wt 109 lb 8 oz (49.7 kg)   SpO2 93%   BMI 16.17 kg/m²  on 3lpm  Constitutional:  No acute distress. Eyes: PERRL. Conjunctivae anicteric. ENT: Normal nose. Normal tongue. Neck:  Trachea is midline. No thyroid tenderness. Respiratory: + accessory muscle usage. No wheezes. No rales. No Rhonchi. Cardiovascular: Normal S1S2. No digit clubbing. No digit cyanosis. + LE edema. Psychiatric: No anxiety or Agitation. Alert and Oriented to person, place and time.     Scheduled Meds:   albuterol  2.5 mg Nebulization Q4H    metoprolol succinate  12.5 mg Oral Daily    aspirin  81 mg Oral Daily    guaiFENesin  600 mg Oral BID    sodium chloride flush  10 mL Intravenous 2 times per day    enoxaparin  40 mg Subcutaneous Daily    doxycycline hyclate  100 mg Oral Q12H    methylPREDNISolone  40 mg Intravenous Q12H    insulin lispro  0-12 Units Subcutaneous TID WC    insulin lispro  0-6 Units Subcutaneous Nightly     Continuous Infusions:   dextrose       PRN Meds:  albuterol, sodium chloride flush, acetaminophen **OR** acetaminophen, polyethylene glycol, promethazine **OR** ondansetron, glucose, dextrose, glucagon (rDNA), dextrose, traMADol    Labs:  CBC:   Recent Labs     06/04/20  1330 06/05/20  0547   WBC 9.0 10.0   HGB 10.7* 10.6*   HCT 32.6* 33.2*   .1* 107.3*   * 557*     BMP:   Recent Labs     06/04/20  1330 06/04/20  1743 06/05/20  0547   * 132* 128*   K 3.8 3.7 4.1   CL 91* 91* 90*   CO2 27 27 26   BUN 13 12 14   CREATININE <0.5* <0.5* <0.5*       Cultures:    Chest imaging was reviewed by me and showed   Patchy left basilar airspace disease consistent with atelectasis or mild   pneumonia.       Persistent biapical cavitary lesions with improved wall thickening and fluid      PCT 0.13     ASSESSMENT:  · COPD

## 2020-06-05 NOTE — PROGRESS NOTES
Pt resting in bed and respirations are not as labored. Pt denies needs at this time. Call light in reach.

## 2020-06-06 NOTE — PROGRESS NOTES
Normocephalic and atraumatic. · Mouth/Throat: Oropharynx is clear and moist.   · Eyes: Conjunctivae normal. EOM are normal.   · Neck: Neck supple. No rigidity. No JVD present. · Cardiovascular: Normal rate, regular rhythm, S1&S2. · Pulmonary/Chest: Bilateral respiratory sounds. No wheezes, No rhonchi. · Abdominal: Soft. Bowel sounds present. No distension, No tenderness. · Musculoskeletal: No tenderness. No edema  Foot brace  · Lymphadenopathy: Has no cervical adenopathy. · Neurological: Alert and oriented. Cranial nerve appears intact, No Gross deficit   · Skin: Skin is warm and dry. No rash noted. · Psychiatric: Has a normal behavior     Labs, diagnostic and imaging results reviewed. Reviewed. Recent Labs     06/04/20  1743 06/05/20  0547 06/06/20  0521   * 128* 133*   K 3.7 4.1 4.4   CL 91* 90* 90*   CO2 27 26 28   BUN 12 14 29*   CREATININE <0.5* <0.5* <0.5*     Recent Labs     06/04/20  1330 06/05/20  0547   WBC 9.0 10.0   HGB 10.7* 10.6*   HCT 32.6* 33.2*   .1* 107.3*   * 557*     Lab Results   Component Value Date    TROPONINI <0.01 06/04/2020     CrCl cannot be calculated (This lab value cannot be used to calculate CrCl because it is not a number: <0.5).    No results found for: BNP  Lab Results   Component Value Date    PROTIME 19.3 06/06/2020    PROTIME 12.7 03/21/2020    PROTIME 12.4 11/27/2018    INR 1.66 06/06/2020    INR 1.09 03/21/2020    INR 1.09 11/27/2018     Lab Results   Component Value Date    CHOL 166 11/29/2017    HDL 87 11/29/2017    TRIG 49 11/29/2017       Scheduled Meds:   albuterol  2.5 mg Nebulization Q4H    metoprolol succinate  12.5 mg Oral Daily    furosemide  20 mg Oral Daily    spironolactone  25 mg Oral Daily    aspirin  81 mg Oral Daily    guaiFENesin  600 mg Oral BID    sodium chloride flush  10 mL Intravenous 2 times per day    enoxaparin  40 mg Subcutaneous Daily    doxycycline hyclate  100 mg Oral Q12H    methylPREDNISolone  40 lisinopril if BP allows    - COPD exacerbation     On inhaler and steroid   Followed by pulmonary    - elevated LFT     Needs GI evaluation    - Hyponatremia     Adequate intake    -Severe protein-calorie malnutrition      Dietary evaluation and supplementation   Has no appetite       NOTE: This report was transcribed using voice recognition software. Every effort was made to ensure accuracy, however, inadvertent computerized transcription errors may be present.

## 2020-06-06 NOTE — PLAN OF CARE
Problem: Falls - Risk of:  Goal: Will remain free from falls  Description: Will remain free from falls  Outcome: Ongoing  Goal: Absence of physical injury  Description: Absence of physical injury  Outcome: Ongoing     Problem: Pain:  Goal: Pain level will decrease  Description: Pain level will decrease  Outcome: Ongoing  Goal: Control of acute pain  Description: Control of acute pain  Outcome: Ongoing  Goal: Control of chronic pain  Description: Control of chronic pain  Outcome: Ongoing  Goal: Patient's pain/discomfort is manageable  Description: Patient's pain/discomfort is manageable  Outcome: Ongoing     Problem: Infection:  Goal: Will remain free from infection  Description: Will remain free from infection  Outcome: Ongoing     Problem: Safety:  Goal: Free from accidental physical injury  Description: Free from accidental physical injury  Outcome: Ongoing     Problem: Daily Care:  Goal: Daily care needs are met  Description: Daily care needs are met  Outcome: Ongoing     Problem: Skin Integrity:  Goal: Skin integrity will stabilize  Description: Skin integrity will stabilize  Outcome: Ongoing     Problem: Discharge Planning:  Goal: Patients continuum of care needs are met  Description: Patients continuum of care needs are met  Outcome: Ongoing     Problem: Discharge Planning:  Goal: Discharged to appropriate level of care  Description: Discharged to appropriate level of care  Outcome: Ongoing  Goal: Participates in care planning  Description: Participates in care planning  Outcome: Ongoing     Problem:  Activity Intolerance:  Goal: Ability to tolerate increased activity will improve  Description: Ability to tolerate increased activity will improve  Outcome: Ongoing     Problem: Airway Clearance - Ineffective:  Goal: Ability to maintain a clear airway will improve  Description: Ability to maintain a clear airway will improve  Outcome: Ongoing  Goal: Clear lung sounds  Description: Clear lung sounds  Outcome:

## 2020-06-06 NOTE — PROGRESS NOTES
Called patient's wife Diaz Weinstein to give an update. Received no answer. Left voicemail message with unit phone number for callback.

## 2020-06-06 NOTE — PROGRESS NOTES
Pulmonary Progress Note  CC: SOB, COPD    Subjective:  C/O sob not improved    EXAM: /84   Pulse 100   Temp 97.2 °F (36.2 °C) (Oral)   Resp 18   Ht 5' 9\" (1.753 m)   Wt 109 lb 8 oz (49.7 kg)   SpO2 97%   BMI 16.17 kg/m²  on 3lpm  Constitutional:  No acute distress. Eyes: PERRL. Conjunctivae anicteric. ENT: Normal nose. Normal tongue. Neck:  Trachea is midline. No thyroid tenderness. Respiratory: + accessory muscle usage. No wheezes. No rales. No Rhonchi. Cardiovascular: Normal S1S2. No digit clubbing. No digit cyanosis. + LE edema. Psychiatric: No anxiety or Agitation. Alert and Oriented to person, place and time.     Scheduled Meds:   albuterol  2.5 mg Nebulization Q4H    metoprolol succinate  12.5 mg Oral Daily    furosemide  20 mg Oral Daily    spironolactone  25 mg Oral Daily    aspirin  81 mg Oral Daily    guaiFENesin  600 mg Oral BID    sodium chloride flush  10 mL Intravenous 2 times per day    enoxaparin  40 mg Subcutaneous Daily    doxycycline hyclate  100 mg Oral Q12H    methylPREDNISolone  40 mg Intravenous Q12H    insulin lispro  0-12 Units Subcutaneous TID WC    insulin lispro  0-6 Units Subcutaneous Nightly     Continuous Infusions:   dextrose       PRN Meds:  albuterol, sodium chloride flush, acetaminophen **OR** acetaminophen, polyethylene glycol, promethazine **OR** ondansetron, glucose, dextrose, glucagon (rDNA), dextrose, traMADol    Labs:  CBC:   Recent Labs     06/04/20  1330 06/05/20  0547   WBC 9.0 10.0   HGB 10.7* 10.6*   HCT 32.6* 33.2*   .1* 107.3*   * 557*     BMP:   Recent Labs     06/04/20  1743 06/05/20  0547 06/06/20  0521   * 128* 133*   K 3.7 4.1 4.4   CL 91* 90* 90*   CO2 27 26 28   BUN 12 14 29*   CREATININE <0.5* <0.5* <0.5*       Cultures:    Chest imaging was reviewed by me and showed   Patchy left basilar airspace disease consistent with atelectasis or mild   pneumonia.       Persistent biapical cavitary lesions with

## 2020-06-06 NOTE — PROGRESS NOTES
Dr. Paz Lopez made aware of patient's ALT and AST levels. No new orders received at this time. Will continue to monitor.

## 2020-06-06 NOTE — PROGRESS NOTES
no evidence of a left ventricular thrombus.   Mitral regurgitation is difficult to objectively quantify, but qualitatively   appears at least moderate.   The left atrium appears moderately enlarged.   Mild aortic regurgitation.   Right ventricular systolic function is reduced.   Moderate tricuspid regurgitation.   Systolic pulmonary artery pressure (SPAP) is elevated and estimated at 58   mmHg (right atrial pressure 8 mmHg) consistent with moderate pulmonary   hypertension.   Eustachian valve a normal variant in right atrium        ASSESSMENT/PLAN:    Acute on chronic respiratory failure   Suspect sec to CHF and COPD exacerbation    - oxygen support  - pulmonology consulted  - COVID ruled out.  - IV Solu-medrol and lasix  added  - Doxycycline D#3  - albuterol prn     Elevated D-dimer  - low suspicion for PE.  - denies chest pain. Non-hypoxic, no tachycardia  - pulmonology consulted     Cardiomyopathy   - acute systolic CHF  - new dx - low EF of 20 % and severe global hypokinesis  - diastolic dysfunction and moderate pulm htn  - started low dose BB, diuresis, cardiology consulted    Acute transaminitis - likely related to passive liver congestion with CHF - monitor  Check tylenol level <5        Hyponatremia-likely with fluid overload   - monitor with diuresis      DM type 2  - monitor glucose. SSI coverage.      PAD  Wound left great toe; left 5th digit  - on aspirin. F/w Dr. Ann Pond  - Dressing changes daily  - X-ray left foot with no osteo  - Tramadol prn.      Vocal cord paralysis - planned for surgery soon by ENT  - now delayed with above issue     Macrocytic anemia  - check b12 folate   - monitor CBC     DVT Prophylaxis: Lovenox  Diet: DIET CARB CONTROL; Low Sodium (2 GM)  Code Status: Full Code      Yosi Hilton MD 6/6/2020 8:21 AM

## 2020-06-07 NOTE — PLAN OF CARE
Problem: Falls - Risk of:  Goal: Will remain free from falls  Description: Will remain free from falls  Outcome: Ongoing  Goal: Absence of physical injury  Description: Absence of physical injury  Outcome: Ongoing     Problem: Pain:  Goal: Pain level will decrease  Description: Pain level will decrease  Outcome: Ongoing  Goal: Control of acute pain  Description: Control of acute pain  Outcome: Ongoing  Goal: Control of chronic pain  Description: Control of chronic pain  Outcome: Ongoing  Note: PRN tramadol  Goal: Patient's pain/discomfort is manageable  Description: Patient's pain/discomfort is manageable  Outcome: Ongoing     Problem: Infection:  Goal: Will remain free from infection  Description: Will remain free from infection  Outcome: Ongoing  Note: Scheduled PO doxycycline     Problem: Safety:  Goal: Free from accidental physical injury  Description: Free from accidental physical injury  Outcome: Ongoing  Goal: Free from intentional harm  Description: Free from intentional harm  Outcome: Ongoing     Problem: Daily Care:  Goal: Daily care needs are met  Description: Daily care needs are met  Outcome: Ongoing     Problem: Skin Integrity:  Goal: Skin integrity will stabilize  Description: Skin integrity will stabilize  Outcome: Ongoing     Problem: Discharge Planning:  Goal: Patients continuum of care needs are met  Description: Patients continuum of care needs are met  Outcome: Ongoing     Problem: Discharge Planning:  Goal: Discharged to appropriate level of care  Description: Discharged to appropriate level of care  Outcome: Ongoing  Goal: Participates in care planning  Description: Participates in care planning  Outcome: Ongoing     Problem:  Activity Intolerance:  Goal: Ability to tolerate increased activity will improve  Description: Ability to tolerate increased activity will improve  Outcome: Ongoing     Problem: Airway Clearance - Ineffective:  Goal: Ability to maintain a clear airway will improve  Description: Ability to maintain a clear airway will improve  Outcome: Ongoing  Goal: Clear lung sounds  Description: Clear lung sounds  Outcome: Ongoing     Problem: Breathing Pattern - Ineffective:  Goal: Ability to achieve and maintain a regular respiratory rate will improve  Description: Ability to achieve and maintain a regular respiratory rate will improve  Outcome: Ongoing     Problem: Gas Exchange - Impaired:  Goal: Levels of oxygenation will improve  Description: Levels of oxygenation will improve  Outcome: Ongoing     Problem: Fluid Volume - Deficit:  Goal: Achieves intake and output within specified parameters  Description: Achieves intake and output within specified parameters  Outcome: Ongoing     Problem: Hyperthermia:  Goal: Ability to maintain a body temperature in the normal range will improve  Description: Ability to maintain a body temperature in the normal range will improve  Outcome: Ongoing     Problem: Tobacco Use:  Goal: Will participate in inpatient tobacco-use cessation counseling  Description: Will participate in inpatient tobacco-use cessation counseling  Outcome: Ongoing     Problem: Nutrition  Goal: Optimal nutrition therapy  Outcome: Ongoing

## 2020-06-07 NOTE — PROGRESS NOTES
Pt resting in bed watching TV. He denies any pain at this time. Assessment complete-see flowsheet. Medications given-see MAR. Pt denies further needs, call light and bedside table within reach. Will continue to monitor.

## 2020-06-07 NOTE — PLAN OF CARE
care needs are met  Description: Daily care needs are met  6/7/2020 1033 by Tico Guaman RN  Outcome: Ongoing  6/7/2020 0732 by Cody Gunn RN  Outcome: Ongoing     Problem: Skin Integrity:  Goal: Skin integrity will stabilize  Description: Skin integrity will stabilize  6/7/2020 1033 by Tico Guaman RN  Outcome: Ongoing  6/7/2020 0732 by Cody Gunn RN  Outcome: Ongoing     Problem: Discharge Planning:  Goal: Patients continuum of care needs are met  Description: Patients continuum of care needs are met  6/7/2020 1033 by Tico Guaman RN  Outcome: Ongoing  6/7/2020 0732 by Cody Gunn RN  Outcome: Ongoing     Problem: Discharge Planning:  Goal: Discharged to appropriate level of care  Description: Discharged to appropriate level of care  6/7/2020 1033 by Tico Guaman RN  Outcome: Ongoing  6/7/2020 0732 by Cody Gunn RN  Outcome: Ongoing  Goal: Participates in care planning  Description: Participates in care planning  6/7/2020 1033 by Tico Guaman RN  Outcome: Ongoing  6/7/2020 0732 by Cody Gunn RN  Outcome: Ongoing     Problem:  Activity Intolerance:  Goal: Ability to tolerate increased activity will improve  Description: Ability to tolerate increased activity will improve  6/7/2020 1033 by Tico Guaman RN  Outcome: Ongoing  6/7/2020 0732 by Cody Gunn RN  Outcome: Ongoing     Problem: Airway Clearance - Ineffective:  Goal: Ability to maintain a clear airway will improve  Description: Ability to maintain a clear airway will improve  6/7/2020 1033 by Tico Guaman RN  Outcome: Ongoing  6/7/2020 0732 by Cody Gunn RN  Outcome: Ongoing  Goal: Clear lung sounds  Description: Clear lung sounds  6/7/2020 1033 by Tico Guaman RN  Outcome: Ongoing  6/7/2020 0732 by Cody Gunn RN  Outcome: Ongoing     Problem: Breathing Pattern - Ineffective:  Goal: Ability to achieve and maintain a regular respiratory rate

## 2020-06-07 NOTE — CONSULTS
noted in the HPI. All other systems reviewed and negative. PHYSICAL EXAM:    Vitals:    /72   Pulse 102   Temp 98.2 °F (36.8 °C) (Oral)   Resp 20   Ht 5' 9\" (1.753 m)   Wt 109 lb 8 oz (49.7 kg)   SpO2 94%   BMI 16.17 kg/m²     GEN: awake, alert, conversant   HEENT: PERRLA, clear and moist oropharynx  Neck: supple, no masses, trachea midline, no JVD  Lungs: CTA-B w/o wheezes, rhonchi or rales, good A/E B  Cardiac:  RRR w/o murmurs, rubs or gallops  Abdomen: soft, non-tender, non distended w/o HSM, masses, ascites or bruits, NABs  EXT: no clubbing, cyanosis, edema or asterixis  Skin: no rashes, telangiectasias, lesions  Neuro: grossly non-focal    DATA:    CBC with Differential:    Lab Results   Component Value Date    WBC 10.0 06/05/2020    RBC 3.09 06/05/2020    HGB 10.6 06/05/2020    HCT 33.2 06/05/2020     06/05/2020    .3 06/05/2020    MCH 34.3 06/05/2020    MCHC 32.0 06/05/2020    RDW 15.4 06/05/2020    BANDSPCT 5 06/14/2019    LYMPHOPCT 6.8 06/05/2020    MONOPCT 6.6 06/05/2020    BASOPCT 0.4 06/05/2020    MONOSABS 0.7 06/05/2020    LYMPHSABS 0.7 06/05/2020    EOSABS 0.0 06/05/2020    BASOSABS 0.0 06/05/2020     CMP:    Lab Results   Component Value Date     06/07/2020    K 4.8 06/07/2020    CL 89 06/07/2020    CO2 26 06/07/2020    BUN 50 06/07/2020    CREATININE 0.7 06/07/2020    GFRAA >60 06/07/2020    AGRATIO 1.6 06/07/2020    LABGLOM >60 06/07/2020    GLUCOSE 122 06/07/2020    PROT 6.2 06/07/2020    LABALBU 3.8 06/07/2020    CALCIUM 9.8 06/07/2020    BILITOT 1.2 06/07/2020    ALKPHOS 133 06/07/2020    AST 3,368 06/07/2020    ALT 3,264 06/07/2020     PT/INR:    Lab Results   Component Value Date    PROTIME 19.3 06/06/2020    INR 1.66 06/06/2020       IMPRESSION/RECOMMENDATIONS:  The patient is a 71 y.o. male with significant past medical history of CHF, O2 dependant COPD, DM, and PAD who presents with dyspnea found to be related to CHF and COPD exacerbation.  ECHO c/w EF <20%. LFTs normal on admission and the following day abruptly increased yesterday manifesting a severe hepatocellular pattern of injury. Although he has been diagnosed with severe cardiomyopathy there are no reports of an abrupt drop in blood pressure or MANDIE to suggest the presence of an ischemic hepatopathy. He is certainly predisposed to passive congestion but this would not explain the acute and abrupt rise in the ALT and AST. Alternatively it may be that he is experiencing a drug induced liver injury the most likely culprit being Doxycycline. This has been discontinued. I reviewed the mediations provided to the patient during this hospitalization with the staff RN. Spironolactone, enoxaparin, and furosemide are also considerations. He has not been provided with Zofran, Phenergan or Glycolax  - Doxycycline has been discontinued.   I discussed with Dr Rodriguez Carrillo discontinuing or finding alternatives to the three other medications listed above as possible contributors  - follow daily LFTs and INR  - obtain acute hepatitis panel, HSV/CMV/EBV serologies and markers of AIH  - d/c acetaminophen

## 2020-06-07 NOTE — PROGRESS NOTES
IM Progress Note    Admit Date:  6/4/2020  3    Interval history:  New cardiomyopathy noted  Started lasix, aldactone, BB      Subjective:  Mr. Brady Chase seen up in bed, feels ok today   Desaturations noted by staff but feels comfortable     Objective:   /72   Pulse 110   Temp 98.5 °F (36.9 °C) (Oral)   Resp 20   Ht 5' 9\" (1.753 m)   Wt 109 lb 8 oz (49.7 kg)   SpO2 95%   BMI 16.17 kg/m²       Intake/Output Summary (Last 24 hours) at 6/7/2020 0759  Last data filed at 6/7/2020 0430  Gross per 24 hour   Intake 320 ml   Output 600 ml   Net -280 ml       Physical Exam:        General:  Thin elderly male, Awake, alert and oriented. tachypneic and dyspneic   Mucous Membranes:  Pink , anicteric  Hoarse voice noted   Neck: No JVD, no carotid bruit, no thyromegaly  Chest: diminished max with improving  crackles. Cardiovascular:  RRR S1S2 heard, no murmurs or gallops  Abdomen:  Soft, undistended, non tender, no organomegaly, BS present  Extremities: left great toe and 5th toe with dry wounds.  No active infection   Trace max pedal edema  Distal pulses well felt  Neurological : grossly normal      Medications:   Scheduled Medications:    cefepime  2 g Intravenous Q12H    predniSONE  40 mg Oral Daily    albuterol  2.5 mg Nebulization Q4H    metoprolol succinate  12.5 mg Oral Daily    furosemide  20 mg Oral Daily    spironolactone  25 mg Oral Daily    aspirin  81 mg Oral Daily    guaiFENesin  600 mg Oral BID    sodium chloride flush  10 mL Intravenous 2 times per day    enoxaparin  40 mg Subcutaneous Daily    insulin lispro  0-12 Units Subcutaneous TID     insulin lispro  0-6 Units Subcutaneous Nightly     I   dextrose       albuterol, sodium chloride flush, acetaminophen **OR** acetaminophen, polyethylene glycol, promethazine **OR** ondansetron, glucose, dextrose, glucagon (rDNA), dextrose, traMADol    Lab Data:  Recent Labs     06/04/20  1330 06/05/20  0547   WBC 9.0 10.0   HGB 10.7* 10.6*   HCT 32.6* 33.2*   .1* 107.3*   * 557*     Recent Labs     06/05/20  0547 06/06/20  0521 06/07/20  0518   * 133* 134*   K 4.1 4.4 4.8   CL 90* 90* 89*   CO2 26 28 26   BUN 14 29* 50*   CREATININE <0.5* <0.5* 0.7*     Recent Labs     06/04/20  1330   TROPONINI <0.01       Coagulation:   Lab Results   Component Value Date    INR 1.66 06/06/2020     Cardiac markers:   Lab Results   Component Value Date    TROPONINI <0.01 06/04/2020         Lab Results   Component Value Date    ALT 3,264 (H) 06/07/2020    AST 3,368 (H) 06/07/2020    ALKPHOS 133 (H) 06/07/2020    BILITOT 1.2 (H) 06/07/2020       Lab Results   Component Value Date    INR 1.66 (H) 06/06/2020    INR 1.09 03/21/2020    INR 1.09 11/27/2018    PROTIME 19.3 (H) 06/06/2020    PROTIME 12.7 03/21/2020    PROTIME 12.4 11/27/2018       Radiology    CULTURES  Legionella: pending  Strep pneumoniae: pending  COVID: not detected     EKG:  I have reviewed the EKG with the following interpretation:   N/A     RADIOLOGY  XR CHEST PORTABLE   Final Result   Patchy left basilar airspace disease consistent with atelectasis or mild   pneumonia.       Persistent biapical cavitary lesions with improved wall thickening and fluid              Lower extremity bilateral arterial duplex 5/22/2020  Summary  Mild right lower extremity arterial disease with peroneal artery occlusion Moderated left lower extremity disease. Mid SFA occlusion, distal posterior  tibial artery occlusion.         ECHO  6/2020  Technically difficult examination due to body habitus.   Left ventricular systolic function is severely reduced with a visually   estimated ejection fraction of <20 %.   The left ventricle is mildly dilated in size with normal wall thickness.   Severe global hypokinesis with regional variation.  Anteroseptal and Apical   septal walls are thin and akinetic.   Grade II diastolic dysfunction with elevated LV pressure.   There is no evidence of a left ventricular thrombus.   Mitral

## 2020-06-07 NOTE — PROGRESS NOTES
Pulmonary Progress Note  CC: SOB, COPD    Subjective:  C/O sob, cx grew pseudomonas yesterday    EXAM: /72   Pulse 102   Temp 98.2 °F (36.8 °C) (Oral)   Resp 20   Ht 5' 9\" (1.753 m)   Wt 109 lb 8 oz (49.7 kg)   SpO2 94%   BMI 16.17 kg/m²  on 3lpm  Constitutional:  No acute distress. Eyes: PERRL. Conjunctivae anicteric. ENT: Normal nose. Normal tongue. Neck:  Trachea is midline. No thyroid tenderness. Respiratory: + accessory muscle usage. No wheezes. No rales. No Rhonchi. Cardiovascular: Normal S1S2. No digit clubbing. No digit cyanosis. + LE edema. Psychiatric: No anxiety or Agitation. Alert and Oriented to person, place and time.     Scheduled Meds:   cefepime  2 g Intravenous Q12H    ferrous sulfate  325 mg Oral Daily with breakfast    metoprolol succinate  25 mg Oral Daily    lisinopril  2.5 mg Oral Daily    predniSONE  40 mg Oral Daily    albuterol  2.5 mg Nebulization Q4H    furosemide  20 mg Oral Daily    spironolactone  25 mg Oral Daily    aspirin  81 mg Oral Daily    guaiFENesin  600 mg Oral BID    sodium chloride flush  10 mL Intravenous 2 times per day    enoxaparin  40 mg Subcutaneous Daily    insulin lispro  0-12 Units Subcutaneous TID WC    insulin lispro  0-6 Units Subcutaneous Nightly     Continuous Infusions:   sodium chloride      dextrose       PRN Meds:  albuterol, sodium chloride flush, acetaminophen **OR** acetaminophen, polyethylene glycol, promethazine **OR** ondansetron, glucose, dextrose, glucagon (rDNA), dextrose, traMADol    Labs:  CBC:   Recent Labs     06/05/20  0547   WBC 10.0   HGB 10.6*   HCT 33.2*   .3*   *     BMP:   Recent Labs     06/05/20  0547 06/06/20  0521 06/07/20  0518   * 133* 134*   K 4.1 4.4 4.8   CL 90* 90* 89*   CO2 26 28 26   BUN 14 29* 50*   CREATININE <0.5* <0.5* 0.7*       Cultures:  Sputum cx with pseudomonas    Chest imaging was reviewed by me and showed   Patchy left basilar airspace disease consistent

## 2020-06-07 NOTE — PROGRESS NOTES
Normocephalic and atraumatic. · Mouth/Throat: Oropharynx is clear and moist.   · Eyes: Conjunctivae normal. EOM are normal.   · Neck: Neck supple. No rigidity. No JVD present. · Cardiovascular: Normal rate, regular rhythm, S1&S2. · Pulmonary/Chest: Bilateral respiratory sounds. No wheezes, No rhonchi. · Abdominal: Soft. Bowel sounds present. No distension, No tenderness. · Musculoskeletal: No tenderness. No edema  Foot brace  · Lymphadenopathy: Has no cervical adenopathy. · Neurological: Alert and oriented. Cranial nerve appears intact, No Gross deficit   · Skin: Skin is warm and dry. No rash noted. · Psychiatric: Has a normal behavior     Labs, diagnostic and imaging results reviewed. Reviewed. Recent Labs     06/05/20  0547 06/06/20  0521 06/07/20  0518   * 133* 134*   K 4.1 4.4 4.8   CL 90* 90* 89*   CO2 26 28 26   BUN 14 29* 50*   CREATININE <0.5* <0.5* 0.7*     Recent Labs     06/04/20  1330 06/05/20  0547   WBC 9.0 10.0   HGB 10.7* 10.6*   HCT 32.6* 33.2*   .1* 107.3*   * 557*     Lab Results   Component Value Date    TROPONINI <0.01 06/04/2020     CrCl cannot be calculated (Unknown ideal weight.).    No results found for: BNP  Lab Results   Component Value Date    PROTIME 19.3 06/06/2020    PROTIME 12.7 03/21/2020    PROTIME 12.4 11/27/2018    INR 1.66 06/06/2020    INR 1.09 03/21/2020    INR 1.09 11/27/2018     Lab Results   Component Value Date    CHOL 166 11/29/2017    HDL 87 11/29/2017    TRIG 49 11/29/2017       Scheduled Meds:   cefepime  2 g Intravenous Q12H    ferrous sulfate  325 mg Oral Daily with breakfast    metoprolol succinate  25 mg Oral Daily    predniSONE  40 mg Oral Daily    albuterol  2.5 mg Nebulization Q4H    furosemide  20 mg Oral Daily    spironolactone  25 mg Oral Daily    aspirin  81 mg Oral Daily    guaiFENesin  600 mg Oral BID    sodium chloride flush  10 mL Intravenous 2 times per day    enoxaparin  40 mg Subcutaneous Daily    insulin lispro  0-12 Units Subcutaneous TID     insulin lispro  0-6 Units Subcutaneous Nightly     Continuous Infusions:   sodium chloride      dextrose       PRN Meds:albuterol, sodium chloride flush, acetaminophen **OR** acetaminophen, polyethylene glycol, promethazine **OR** ondansetron, glucose, dextrose, glucagon (rDNA), dextrose, traMADol     Patient Active Problem List    Diagnosis Date Noted    Elevated LFTs     Cardiomyopathy ([facility]     Acute systolic CHF (congestive heart failure) ([facility]     Dyspnea 06/04/2020    Vocal cord paralysis 04/16/2020    Paronychia of toe 04/16/2020    Stage 3 severe COPD by GOLD classification ([facility]     Hoarse 03/02/2020    Anemia, macrocytic 01/16/2020    Hyponatremia     Type 2 diabetes mellitus without complication, without long-term current use of insulin ([facility] 06/20/2019    Thrombocytosis ([facility] 06/14/2019    COPD exacerbation (HCC)     Pneumonia of both upper lobes due to Pseudomonas species ([facility] 04/16/2019    Pulmonary nodule     HCAP (healthcare-associated pneumonia)     Abnormal chest CT     Acute on chronic respiratory failure with hypoxia and hypercapnia (HCC) 11/27/2018    Chronic hypoxemic respiratory failure (HCC)     Lactic acidosis     Severe protein-calorie malnutrition ([facility] 09/20/2018    Tracheobronchitis     Tobacco abuse     Community acquired pneumonia of left upper lobe of lung ([facility] 05/31/2018    Age-related osteoporosis without current pathological fracture 04/25/2018    Chronic midline low back pain with right-sided sciatica 03/03/2017      Active Hospital Problems    Diagnosis Date Noted    Elevated LFTs [R94.5]     Cardiomyopathy ([facility] [I42.9]     Acute systolic CHF (congestive heart failure) (HCC) [I50.21]     Dyspnea [R06.00] 06/04/2020    COPD exacerbation (HCC) [J44.1]        Assessment:       Plan:     - cardiomyopathy and HFrEF     Continue lasix and aldactone   Monitor I/O and electrolytes   Will need work up

## 2020-06-08 NOTE — PROGRESS NOTES
[facility]  Cardiology  Progress Note    Admission date:  2020    Reason for follow up visit: shortness of breath, new onset cardiomyopathy, schf    HPI/CC: Love Escalona is a 71 y.o. male who presented to the hospital with complaints of shortness of breath, wheezing, and leg swelling. Echo completed and showed EF of <20%. He has been treated for acute on chronic respiratory failure, and  systolic CHF. Started on Lasix, Toprol, Lisinopril, and aldactone. New onset cardiomyopathy, possible Lexiscan Myoview/vs LHC once respiratory status has improved. Subjective: Resting in bed, awakes to verbal stimuli. Pt will wake up answer questions and go right back to sleep. Pt complains of shortness of breath and occasional dizziness when he gets up too fast. Denies any chest pain, palpitations, or orthopnea. On 3 liters n/c.     Vitals:  Blood pressure 111/69, pulse 88, temperature 97.6 °F (36.4 °C), temperature source Oral, resp. rate 20, height 5' 9\" (1.753 m), weight 103 lb 11.2 oz (47 kg), SpO2 98 %.   Temp  Av.5 °F (36.4 °C)  Min: 97.3 °F (36.3 °C)  Max: 97.6 °F (36.4 °C)  Pulse  Av.5  Min: 82  Max: 94  BP  Min: 89/63  Max: 110/74  SpO2  Av.7 %  Min: 90 %  Max: 98 %    24 hour I/O    Intake/Output Summary (Last 24 hours) at 2020 1246  Last data filed at 2020 7781  Gross per 24 hour   Intake 240 ml   Output 260 ml   Net -20 ml     Current Facility-Administered Medications   Medication Dose Route Frequency Provider Last Rate Last Dose    cefepime (MAXIPIME) 2 g IVPB minibag  2 g Intravenous Q12H Cathie Leary  mL/hr at 20 1127 2 g at 20 1127    ferrous sulfate (IRON 325) tablet 325 mg  325 mg Oral Daily with breakfast Cathie Leary MD   325 mg at 20 0816    metoprolol succinate (TOPROL XL) extended release tablet 25 mg  25 mg Oral Daily Cathie Leary MD   25 mg at 20 0816    lisinopril (PRINIVIL;ZESTRIL) tablet 2.5 mg  2.5 mg 06/04/2020     PT/ INR   Lab Results   Component Value Date    INR 3.13 06/08/2020    INR 1.66 06/06/2020    INR 1.09 03/21/2020    PROTIME 36.7 06/08/2020    PROTIME 19.3 06/06/2020    PROTIME 12.7 03/21/2020     PTT No results found for: PTT   Lab Results   Component Value Date    MG 2.70 06/15/2019    No results found for: TSH    All labs and imaging reviewed today    Assessment:  1. Acute on chronic respiratory failure: ongoing  2. Acute systolic/diastolic/ congestive heart failure: appears dry   -weights- not accurate 6/4 112-->6/5 109--> 6/8- 103   -I/O inaccurate, consider melissa for strict I/O   3. Cardiomyopathy: new onset EF <20%   -secondary work up in place- will proceed with stress test vs LHC if respiratory status improves. 4. Hypotension: r/t diuresis will discontinue Lasix and aldactone and start gentle hydration  5. PAT: noted on telemetry yesterday and today- HR up to 160's at times- pt asymptomatic   6. Pulmonary Hypertension: moderate per echo  7. Acute transaminitis: GI following  8. Hyponatremia: ongoing, improved with diuresis   9. PAD  10. Vocal cord paralysis   11. Hx of ETOH abuse      Plan:   1. Hold all diuretics at this time due to increasing BUN and hypotension  2. Started on Lisinopril 2.5 mg- will change to nightly, due to hypotension- holding parameters in place  3. Place melissa for strict I/O  4. Transfer to PCU for closer monitoring  5. Pt more lethargic today- ammonia drawn- if within normal limits consider ABG   6. EKG- reviewed with Dr. Patrick Cortez- no acute changes, overall improved since admission EKG  7. Add digoxin 0.125 mg x1 due to PAT and pt unable to take Toprol at this time due to hypotension  8. Continue daily weights, low sodium diet, strict I/O  9. Continue to monitor telemetry  10. Magnesium added to am labwork  Overall prognosis is guarded     Spoke with Dr. Odette Davis in regard to patients condition. Ok to transfer to PCU.  Will add IVF gentle hydration 50 ml/hr for 500 ml. Plan of care also discussed with Dr. Karen Brown- recommends x1 dose of IV dig due to PAT since unable to take Toprol at this time. Updated Ruy Walker RN. Updated Dr. Wendi Montana would like bolus wide open at this time and he will be down to assess patient, transfer to ICU. Time spent on patient: >35 minutes, including plan of care, and care coordination.       Leslie Franco, APRN - [facility]  [phone]

## 2020-06-08 NOTE — PROGRESS NOTES
Shift handoff report given to Daniel Gay RN at bedside. Pt denies any needs at this time on Bipap- IV handoff completed. Call light within reach, bed in lowest position, end of shift checks completed.     Perfect serve to hopsitalist for BUN of 87 and Lactic of 2.6

## 2020-06-08 NOTE — PROGRESS NOTES
Transfer assessment completed (see Flowsheet). All ICU lines  intact, ICU monitoring initiated- Pt being placed on BIPAP- pt -150- appears to be Aflutter/ Fib, EKG ordered. pt's blood pressures Low (see Flow Sheet), IV bolus infusing.  Continuing to monitor.

## 2020-06-08 NOTE — CONSULTS
acute exacerbation. 5. Emphasize daily weights - instructed to call the MD if the patient gains 2- 3 lb in a day or 5 lb in a week. 6. Provided patient with CHF Resource Line for questions and concerns. 7. PT/OT to assess pt level of functioning. Wife is the only available caregiver and not available 24/7.  8. Inpatient and outpatient spiritual care to support pt/wife emotionally and assist with ACP documents which they have not completed.        Nany Nichole RN, BSN, CDE, [facility]  Heart Failure Nurse [address] [place] [address]  [phone]  6/8/2020

## 2020-06-08 NOTE — CARE COORDINATION
INTERDISCIPLINARY PLAN OF CARE CONFERENCE    Date/Time: 6/8/2020 4:15 PM  Completed by: Rosa Hendrix, Case Management      Patient Name:  Naomi Tuttle  YOB: 1950  Admitting Diagnosis: Dyspnea [R06.00]     Admit Date/Time:  6/4/2020  1:17 PM    Chart reviewed. Interdisciplinary team met to discuss patient progress and discharge plans. Disciplines included Case Management, Nursing, and Dietitian. Current Status: Stable    PT/OT recommendation:N/A    Anticipated Discharge Date: TBD    Discharge Plan Comments: Noted pt transferred to ICU for initiation of bipap. Noted plan for home at admit. Unsure dcp at this time will follow and re visit once out of ICU for dc plan.     The Plan for Transition of Care is related to the following treatment goals: home    Home O2 in place on admit: Yes  Pt informed of need to bring portable home O2 tank on day of discharge for nursing to connect prior to leaving:  Not Indicated  Verbalized agreement/Understanding:  Not Indicated

## 2020-06-08 NOTE — PLAN OF CARE
Problem: Falls - Risk of:  Goal: Will remain free from falls  Description: Will remain free from falls  6/7/2020 2108 by Redd Arcos RN  Outcome: Ongoing  6/7/2020 1033 by Ludmila Acevedo RN  Outcome: Ongoing  6/7/2020 0732 by Colette Kitchen RN  Outcome: Ongoing  Goal: Absence of physical injury  Description: Absence of physical injury  6/7/2020 2108 by Redd Arcos RN  Outcome: Ongoing  6/7/2020 1033 by Ludmila Acevedo RN  Outcome: Ongoing  6/7/2020 0732 by Colette Kitchen RN  Outcome: Ongoing     Problem: Pain:  Goal: Pain level will decrease  Description: Pain level will decrease  6/7/2020 2108 by Redd Arcos RN  Outcome: Ongoing  6/7/2020 1033 by Ludmila Acevedo RN  Outcome: Ongoing  6/7/2020 0732 by Colette Kitchen RN  Outcome: Ongoing  Goal: Control of acute pain  Description: Control of acute pain  6/7/2020 2108 by Redd Arcos RN  Outcome: Ongoing  6/7/2020 1033 by Ludmila Acevedo RN  Outcome: Ongoing  6/7/2020 0732 by Colette Kitchen RN  Outcome: Ongoing  Goal: Control of chronic pain  Description: Control of chronic pain  6/7/2020 2108 by Redd Arcos RN  Outcome: Ongoing  6/7/2020 1033 by Ludmila Acevedo RN  Outcome: Ongoing  6/7/2020 0732 by Colette Kitchen RN  Outcome: Ongoing  Note: PRN tramadol  Goal: Patient's pain/discomfort is manageable  Description: Patient's pain/discomfort is manageable  6/7/2020 2108 by Redd Arcos RN  Outcome: Ongoing  6/7/2020 1033 by Ludmila Acevedo RN  Outcome: Ongoing  6/7/2020 0732 by Colette Kitchen RN  Outcome: Ongoing     Problem: Infection:  Goal: Will remain free from infection  Description: Will remain free from infection  6/7/2020 2108 by Redd Arcos RN  Outcome: Ongoing  6/7/2020 1033 by Ludmila Acevedo RN  Outcome: Ongoing  6/7/2020 0732 by Colette Kitchen RN  Outcome: Ongoing  Note: Scheduled PO doxycycline     Problem: Safety:  Goal: Free from accidental physical injury  Description: Free from accidental physical injury  6/7/2020 2108 by Thomas Baltazar RN  Outcome: Ongoing  6/7/2020 1033 by Shannon Jackson RN  Outcome: Ongoing  6/7/2020 0732 by Domingo Young RN  Outcome: Ongoing  Goal: Free from intentional harm  Description: Free from intentional harm  6/7/2020 2108 by Thomas Baltazar RN  Outcome: Ongoing  6/7/2020 1033 by Shannon Jackson RN  Outcome: Ongoing  6/7/2020 0732 by Domingo Young RN  Outcome: Ongoing     Problem: Daily Care:  Goal: Daily care needs are met  Description: Daily care needs are met  6/7/2020 2108 by Thomas Baltazar RN  Outcome: Ongoing  6/7/2020 1033 by Shannon Jackson RN  Outcome: Ongoing  6/7/2020 0732 by Domingo Young RN  Outcome: Ongoing     Problem: Skin Integrity:  Goal: Skin integrity will stabilize  Description: Skin integrity will stabilize  6/7/2020 2108 by Thomas Baltazar RN  Outcome: Ongoing  6/7/2020 1033 by Sahnnon Jackson RN  Outcome: Ongoing  6/7/2020 0732 by Domingo Young RN  Outcome: Ongoing     Problem: Discharge Planning:  Goal: Patients continuum of care needs are met  Description: Patients continuum of care needs are met  6/7/2020 2108 by Thomas Baltazar RN  Outcome: Ongoing  6/7/2020 1033 by Shannon Jackson RN  Outcome: Ongoing  6/7/2020 0732 by Domingo Young RN  Outcome: Ongoing     Problem: Discharge Planning:  Goal: Discharged to appropriate level of care  Description: Discharged to appropriate level of care  6/7/2020 2108 by Thomas Baltazar RN  Outcome: Ongoing  6/7/2020 1033 by Shannon Jackson RN  Outcome: Ongoing  6/7/2020 0732 by Domingo Young RN  Outcome: Ongoing  Goal: Participates in care planning  Description: Participates in care planning  6/7/2020 2108 by Thomas Baltazar RN  Outcome: Ongoing  6/7/2020 1033 by Shannon Jackson RN  Outcome: Ongoing  6/7/2020 0732 by Domingo Young RN  Outcome: Ongoing     Problem:  Activity Intolerance:  Goal: Ability to tolerate increased activity will improve  Description: Ability to tolerate increased activity will improve  6/7/2020 2108 by Axel Caldwell RN  Outcome: Ongoing  6/7/2020 1033 by Denisha Brunner RN  Outcome: Ongoing  6/7/2020 0732 by Lis Yeager RN  Outcome: Ongoing     Problem: Airway Clearance - Ineffective:  Goal: Ability to maintain a clear airway will improve  Description: Ability to maintain a clear airway will improve  6/7/2020 2108 by Axel Caldwell RN  Outcome: Ongoing  6/7/2020 1033 by Denisha Brunner RN  Outcome: Ongoing  6/7/2020 0732 by Lis Yeager RN  Outcome: Ongoing  Goal: Clear lung sounds  Description: Clear lung sounds  6/7/2020 2108 by Axel Caldwell RN  Outcome: Ongoing  6/7/2020 1033 by Denisha Brunner RN  Outcome: Ongoing  6/7/2020 0732 by Lis Yeager RN  Outcome: Ongoing     Problem: Breathing Pattern - Ineffective:  Goal: Ability to achieve and maintain a regular respiratory rate will improve  Description: Ability to achieve and maintain a regular respiratory rate will improve  6/7/2020 2108 by Axel Caldwell RN  Outcome: Ongoing  6/7/2020 1033 by Denisha Brunner RN  Outcome: Ongoing  6/7/2020 0732 by Lis Yeager RN  Outcome: Ongoing     Problem: Gas Exchange - Impaired:  Goal: Levels of oxygenation will improve  Description: Levels of oxygenation will improve  6/7/2020 2108 by Axel Caldwell RN  Outcome: Ongoing  6/7/2020 1033 by Denisha Brunner RN  Outcome: Ongoing  6/7/2020 0732 by Lis Yeager RN  Outcome: Ongoing     Problem: Fluid Volume - Deficit:  Goal: Achieves intake and output within specified parameters  Description: Achieves intake and output within specified parameters  6/7/2020 2108 by Axel Caldwell RN  Outcome: Ongoing  6/7/2020 1033 by Denisha Brunner RN  Outcome: Ongoing  6/7/2020 0732 by Lis Yeager RN  Outcome: Ongoing     Problem: Hyperthermia:  Goal: Ability to maintain a body temperature in the normal range will improve  Description: Ability to maintain a body temperature in the normal range will improve  6/7/2020 2108 by Izabella Palacios RN  Outcome: Ongoing  6/7/2020 1033 by Hortensia Arora RN  Outcome: Ongoing  6/7/2020 0732 by Laci Cordova RN  Outcome: Ongoing     Problem: Tobacco Use:  Goal: Will participate in inpatient tobacco-use cessation counseling  Description: Will participate in inpatient tobacco-use cessation counseling  6/7/2020 2108 by Izabella Palacios RN  Outcome: Ongoing  6/7/2020 1033 by Hortensia Arora RN  Outcome: Ongoing  6/7/2020 0732 by Laci Cordova RN  Outcome: Ongoing     Problem: Nutrition  Goal: Optimal nutrition therapy  6/7/2020 2108 by Izabella Palacios RN  Outcome: Ongoing  6/7/2020 1033 by Hortensia Arora RN  Outcome: Ongoing  6/7/2020 0732 by Laci Cordova RN  Outcome: Ongoing

## 2020-06-08 NOTE — PROGRESS NOTES
- planned for surgery soon by ENT  - now delayed with above issue     Macrocytic anemia  - b12 , folate good, iron def noted.  Add iron supplements    Severe PCM  Nutrition consult     DVT Prophylaxis: Lovenox  Diet: DIET CARB CONTROL; Low Sodium (2 GM)  Code Status: Full Code      Gilles Elizondo MD 6/8/2020 8:42 AM

## 2020-06-09 NOTE — PROGRESS NOTES
06/09/20 1851   NIV Type   $NIV $Daily Charge   Equipment Type v60   Mode Bilevel   Mask Type Full face mask   Mask Size Medium   Settings/Measurements   IPAP 16 cmH20   CPAP/EPAP 5 cmH2O   Rate Ordered 12   Resp 24   FiO2  30 %   Vt Exhaled 658 mL   Minute Volume 13.9 Liters   Mask Leak (lpm) 25 lpm   Comfort Level Good   Using Accessory Muscles No   SpO2 99   Breath Sounds   Right Upper Lobe Diminished   Right Middle Lobe Diminished   Right Lower Lobe Diminished   Left Upper Lobe Diminished   Left Lower Lobe Diminished   Alarm Settings   Alarms On Y   Press Low Alarm 14 cmH2O   High Pressure Alarm 25 cmH2O   Delay Alarm 20 sec(s)   Resp Rate Low Alarm 12   High Respiratory Rate 40 br/min   Oxygen Therapy/Pulse Ox   SpO2 100 %

## 2020-06-09 NOTE — PROGRESS NOTES
Pharmacy note:  Vancomycin Day #  1  Pharmacy was consulted to dose vancomycin. BUN/SCr            Calc. CrCl                Ht.             Wt.  79/1.1                  63.4 kg                   5'9\"             47 kg. Based on patient's age, weight, and renal function will start Vancomycin 750 mg IVPB q18h and check trough at steady state. Will adjust as necessary. Sheyla Dwyer Pharm. D. 6/8/2020 9:43 PM

## 2020-06-09 NOTE — PLAN OF CARE
Nutrition Problem: Severe malnutrition  Intervention: Food and/or Nutrient Delivery: Continue NPO  Nutritional Goals: patient will adhere to NPO status until abdominal US is completed and patient is medically cleared for po diet order to resume

## 2020-06-09 NOTE — PROGRESS NOTES
IM Progress Note    Admit Date:  6/4/2020  5    Interval history:    Patient presented with dyspnea,  admitted for exacerbation of COPD, COVID-19 ruled out. Work-up revealed new cardiomyopathy with ejection fraction less than 20 percent. cardiology consulted,  patient started on diuresis with Lasix. Became hypotensive, and in rapid A. Fib. Patient became lethargic yesterday 6/8/2020,  transferred to ICU, placed on BiPAP overnight. currently oxygen saturation stable on 4 L . A. fib RVR - placed on amiodarone drip overnight . Currently converted to normal sinus rhythm-> on digoxin. Subjective:  Mr. Hina Argueta seen. Resting,, eyes closed. Not responding much. In no distress. He did talk with RN earlier today. Objective:   /75   Pulse 81   Temp 98.9 °F (37.2 °C) (Oral)   Resp 24   Ht 5' 9\" (1.753 m)   Wt 105 lb 11.2 oz (47.9 kg)   SpO2 100%   BMI 15.61 kg/m²       Intake/Output Summary (Last 24 hours) at 6/9/2020 1420  Last data filed at 6/9/2020 1256  Gross per 24 hour   Intake 2227.9 ml   Output 1370 ml   Net 857.9 ml       Physical Exam:    General:  Thin elderly male, no distress   Resting , eyes closed . Mucous Membranes:  Pink , anicteric  Neck: No JVD, no carotid bruit, no thyromegaly  Chest: diminished max with improving  crackles. Cardiovascular:  RRR S1S2 heard, no murmurs or gallops  Abdomen:  Soft, undistended, non tender, no organomegaly, BS present  Extremities: left great toe and 5th toe with dry wounds.  No active infection   Trace max pedal edema  Distal pulses well felt  Neurological : grossly normal      Medications:   Scheduled Medications:    ipratropium-albuterol  1 ampule Inhalation Q4H    sodium chloride flush  10 mL Intravenous 2 times per day    digoxin  250 mcg Intravenous Q6H    [START ON 6/10/2020] digoxin  125 mcg Intravenous Daily    methylPREDNISolone  40 mg Intravenous Q12H    vancomycin  750 mg Intravenous Q18H    cefepime  2 g Intravenous Q12H   

## 2020-06-09 NOTE — PROGRESS NOTES
[facility]  Cardiology  Progress Note    Admission date:  2020    Reason for follow up visit: shortness of breath, new onset cardiomyopathy, schf    HPI/CC: Raegan Wright is a 71 y.o. male who presented to the hospital with complaints of shortness of breath, wheezing, and leg swelling. Echo completed and showed EF of <20%. He has been treated for acute on chronic respiratory failure, and  systolic CHF. Started on Lasix, Toprol, Lisinopril, and aldactone. New onset cardiomyopathy, possible Lexiscan Myoview/vs LHC once respiratory status has improved. He was transferred to ICU  for worsening respiratory failure. Placed on BIPAP. Pt became hypotensive and went into atrial fibrillation with RVR. Started on amiodarone gtt and converted to NSR last night at 23:58. Remains NSR in HR in the 80-90's with occasional PVCs. CT of head showed no acute abnormalities. Subjective: Resting in bed. Will wake up to verbal stimuli and look, but will not respond to questions when asked. Vitals:  Blood pressure 104/75, pulse 81, temperature 98.9 °F (37.2 °C), temperature source Oral, resp. rate 24, height 5' 9\" (1.753 m), weight 105 lb 11.2 oz (47.9 kg), SpO2 100 %.   Temp  Av.8 °F (36.6 °C)  Min: 95.5 °F (35.3 °C)  Max: 98.9 °F (37.2 °C)  Pulse  Av.2  Min: 72  Max: 159  BP  Min: 78/65  Max: 111/69  SpO2  Av.8 %  Min: 73 %  Max: 100 %  FiO2   Av.5 %  Min: 30 %  Max: 50 %    24 hour I/O    Intake/Output Summary (Last 24 hours) at 2020 1329  Last data filed at 2020 1256  Gross per 24 hour   Intake 2227.9 ml   Output 1370 ml   Net 857.9 ml     Current Facility-Administered Medications   Medication Dose Route Frequency Provider Last Rate Last Dose    ipratropium-albuterol (DUONEB) nebulizer solution 1 ampule  1 ampule Inhalation Q4H Rolando Patel MD   1 ampule at 20 1301    sodium chloride flush 0.9 % injection 10 mL  10 mL Intravenous 2 times per day James Bernstein MD   10 mL 06/09/20 4551    insulin lispro (HUMALOG) injection vial 0-6 Units  0-6 Units Subcutaneous Nightly Elizabeth Walker APRN - CNP   1 Units at 06/08/20 2103           Objective:     Telemetry monitor: AFIB converted to NSR 6/8 @ 2358 and continues NSR with occasional PVCs    Physical Exam:  Constitutional:  Comfortable, appears frail, chronically ill and older than statedstated age  Eyes: PERRL, sclera nonicteric  Neck:  Supple, no masses, no thyroidmegaly, no JVD  Skin:  Warm and dry; no rash or lesions, poor skin turgor   Heart:  Regular, normal apex, S1 and S2 normal, no M/G/R  Lungs:  +accessory muscle use; scattered expiratory wheezes and diminished throughout. No rhonchi/rales  Abdomen: soft, non tender, + bowel sounds  GI: melissa- tea colored urine   Extremities:  No edema, boot on LLE  Neuro:  Lethargic, not following commands    Data Reviewed:  EKG 6/8/2020:  Atrial fibrillation with rapid ventricular ratePremature ventricular and fusion complexesLeft axis deviationNon-specific intra-ventricular conduction delayAbnormal ECGWhen compared with ECG of 08-JUN-2020 16:58, (unconfirmed)afib with RVR has replaced NSRConfirmed by Mariam Harris MD, [facility] ([id number]) on 6/8/2020 5:10:25 PM      EKG 6/8/2020:  Normal sinus rhythmSeptal infarct , age undeterminedAbnormal ECGWhen compared with ECG of 04-JUN-2020 13:34,Premature ventricular complexes are no longer PresentPremature atrial complexes are no longer PresentConfirmed by Leonardo Contreras MD, [address] ([id number]) on 6/8/2020 4:43:16 PM      EKG 6/4/2020:  Sinus tachycardia with occasional Premature ventricular complexes Premature atrial complexesNonspecific ST and T wave abnormalityAbnormal ECGWhen compared with ECG of 21-MAR-2020 12:24,Premature ventricular complexes are now PresentT wave inversion now evident in Anterior leadsConfirmed by Leonardo Contreras MD, [address] (1986) on 6/5/2020 5:00:09 AM    CXR 6/4/2020:   Impression   Patchy left basilar airspace disease consistent with atelectasis or mild Value Date    PROBNP 19,879 06/04/2020    PROBNP 1,117 03/21/2020    PROBNP 955 11/27/2018    PROBNP 292 06/21/2018    PROBNP 379 05/31/2018     Fasting Lipid Panel:    Lab Results   Component Value Date    CHOL 166 11/29/2017    HDL 87 11/29/2017    TRIG 49 11/29/2017     Cardiac Enzymes:  CK/MbTroponin  Lab Results   Component Value Date    TROPONINI 0.02 06/09/2020     PT/ INR   Lab Results   Component Value Date    INR 2.90 06/09/2020    INR 3.13 06/08/2020    INR 1.66 06/06/2020    PROTIME 34.0 06/09/2020    PROTIME 36.7 06/08/2020    PROTIME 19.3 06/06/2020     PTT No results found for: PTT   Lab Results   Component Value Date    MG 2.40 06/09/2020    No results found for: TSH    All labs and imaging reviewed today    Assessment:  1. Acute on chronic respiratory failure: ongoing- pulmonary following   2. Acute systolic/diastolic/ congestive heart failure: compensated   -weights-  6/4 112-->6/5 109--> 6/8- 103-->6/9 105   -melissa placed urine output 1,630 last 24 hours  3. Cardiomyopathy: new onset EF <20%   -secondary work up in place- will proceed with stress test vs LHC if respiratory status improves. 4. Paroxsymal atrial fibrillation: NSR at this time   -[id number] Score for Atrial Fibrillation Stroke Risk 3   -unable to start anticoagulation due to elevated PT/INR and liver enzymes, consider starting when INR remains below to and medically safe for  patient. 5. Elevated troponin: flat- noted after patient was hypotensive and afib with RVR- likely related to supply demand mismatch  6. Hypotension: ? related to diuresis, improved with IVF    -Lasix, aldactone, Toprol, and Lisinopril discontinued    7. PAT: noted on telemetry 6/8-   8. Pulmonary Hypertension: moderate per echo  9. Acute transaminitis: GI following  10. Hyponatremia: ongoing  11. PAD  12 Vocal cord paralysis   13. Hx of ETOH abuse      Plan:   1.  Would avoid amiodarone at this time due to potential liver toxicity and liver enzymes already

## 2020-06-09 NOTE — PROGRESS NOTES
Medications, and Allergies. Physical Exam:  Vitals:    06/09/20 1302 06/09/20 1400 06/09/20 1500 06/09/20 1618   BP:  107/69 104/68 111/71   Pulse:  80 82 141   Resp:  (!) 32 23 26   Temp:    98.3 °F (36.8 °C)   TempSrc:    Oral   SpO2: 100% 100% 93% 97%   Weight:       Height:           Physical Examination: General appearance - chronically ill appearing  Mental status - confused  Eyes - pupils equal and reactive, extraocular eye movements intact  Neck - supple, no significant adenopathy  Chest - clear to auscultation, no wheezes, rales or rhonchi, symmetric air entry  Heart - normal rate, regular rhythm, normal S1, S2, no murmurs, rubs, clicks or gallops  Abdomen - soft, nontender, nondistended, no masses or organomegaly  Extremities - no pedal edema noted          Impression: 22-year-old male with a history of CHF, severe COPD, DM, PAD, vocal cord paralysis, and osteoarthritis admitted with COPD and CHF exacerbation. Acute rise in LFTs suspicious for DILI vs shock liver. LFTs improving. No signs of bleeding. Recommendation:  1. Continue supportive care  2. D/c doxycycline and acetaminophen  3. Cardiology workup in process  4. Monitor LFTs  5. Monitor and document BMs  6. Liver serology workup negative so far  7. Encourage nutrition  8. Advance diet as tolerated  9. PT/OT  10. Will follow      Ezekiel Fontaine PA-C  9:52 AM 6/9/2020                      22-year-old male with a history of CHF, severe COPD, DM, PAD, vocal cord paralysis, and osteoarthritis admitted with COPD and CHF exacerbation. Acute rise in LFTs likely DILI vs ischemic hepatitis. Continue supportive care. Monitor LFTs. Encourage nutrition when encephalopathy resolves. Liver serology workup in progress.  Will follow    Anna Villaseñor MD          [phone]  Vince Lewis

## 2020-06-10 NOTE — PROGRESS NOTES
06/09/20 2327   NIV Type   Equipment Type v60   Mode Bilevel   Mask Type Full face mask   Mask Size Medium   Settings/Measurements   IPAP 16 cmH20   CPAP/EPAP 5 cmH2O   Rate Ordered 12   Resp 20   FiO2  30 %   Vt Exhaled 718 mL   Minute Volume 14.9 Liters   Mask Leak (lpm) 23 lpm   Comfort Level Good   Using Accessory Muscles No   SpO2 99   Breath Sounds   Right Upper Lobe Diminished   Right Middle Lobe Diminished   Right Lower Lobe Diminished   Left Upper Lobe Diminished   Left Lower Lobe Diminished   Alarm Settings   Alarms On Y   Press Low Alarm 14 cmH2O   High Pressure Alarm 25 cmH2O   Delay Alarm 20 sec(s)   Resp Rate Low Alarm 12   High Respiratory Rate 40 br/min   Oxygen Therapy/Pulse Ox   SpO2 100 %

## 2020-06-10 NOTE — SIGNIFICANT EVENT
Code Blue, CPR underway upon my entry to room. PEA mostly  ROSC briefly x1  Spoke w/ wife on phone and at bedside after her arrival.  She asked us to stop ACLS. Pronounced 0717 on 6/10. Dr. Harrison Sinks updated. Please see RN notes for chronological order of events and Rx during this code blue. Total critical care time caring for this patient with life threatening, unstable organ failure, including direct patient contact, management of life support systems, review of data including imaging and labs, discussions with other team members and physicians is 40 minutes so far today, excluding procedures.

## 2020-06-10 NOTE — PROGRESS NOTES
Shift assessment completed, see flow sheets. 250ml NS bolus started for BP 75/62 (69). Rhythm remains a-fib, bipap remains in place: 16/5/30%. Patient constantly fidgeting with mask, has removed it several times. Patient not following commands, gives no indication of understanding or even acknowledgment of others in room. Baxter in place/patent, secured with statlock, urine being obtained now jaqueline. Bed in lowest position, wheels locked, call light in reach. Monitoring closely.

## 2020-06-10 NOTE — DISCHARGE SUMMARY
Death  Summary     Patient ID:  Leisa Mckeon  [id number]  11 y.o.  1950    Admit date: 2020    Discharge date and time:   Patient coded and passed away on 6/10/2020  Time of death at 7:17 AM    Admitting Physician: Jaylon Leija MD     Discharge Physician: Fortino Louis    Admission Diagnoses: Dyspnea [R06.00]    Discharge Diagnoses: Active Problems:    Protein-calorie malnutrition, severe (HCC)    COPD exacerbation (HCC)    Dyspnea    Cardiomyopathy ([address]    Acute systolic congestive heart failure (HCC)    Elevated LFTs    Pseudomonas respiratory infection    Acute combined systolic (congestive) and diastolic (congestive) heart failure (HCC)    Lethargy    Hypotension due to hypovolemia    PAT (paroxysmal atrial tachycardia) (HCC)    Acute respiratory failure, unsp w hypoxia or hypercapnia (HCC)    Acute encephalopathy    Hypotension    Atrial fibrillation with RVR ([address]  Resolved Problems:    * No resolved hospital problems. *      Admission Condition: fair    Discharged Condition: pt     Indication for Admission:   The patient is a 71 y.o. male with COPD, DM type 2, PAD, OA, Chronic back pain, macrocytic anemia, and allergic rhinitis who [facility] with c/o worsening shortness of breath. Ongoing for the last 2-3 days. He is to have vocal cord surgery tomorrow for vocal cord paralysis. He c/o worsening dyspnea and wheezing. He has been using nebulizer treatments at home with mild relief, but still felt like symptoms were worsening. He denies fevers, chills, cough, chest pain, abdominal pain, nausea, vomiting, or diarrhea. Negative COVID on . He is requiring 3-4 L O2. He wears 3 L at home. Labs with hyponatremia, lactic acidosis, elevated BNP, and elevated D-dimer. CXR with patchy left basilar airspace disease consistent with atelectasis or mild pneumonia, persistent biapical cavitary lesions with improved wall thickening and fluid. Patient admitted to med-surg. Pulmonology consulted.         Hospital Course:       Patient presented with dyspnea,  admitted for exacerbation of COPD, COVID-19 ruled out. Work-up revealed new cardiomyopathy with ejection fraction less than 20 percent. cardiology consulted,  patient started on diuresis with Lasix. Became hypotensive, and in rapid A. Fib. Patient became lethargic on 2020,  transferred to ICU, placed on BiPAP overnight. A. fib RVR - placed on amiodarone drip overnight . Currently converted to normal sinus rhythm-> on digoxin. on 2020 his oxygen saturations improved transiently patient was placed back on nasal cannula, heart rate was stable patient in normal sinus rhythm. Patient started declining again in the evening of 2020, overnight he became progressively hypoxic, dyspneic and hypotensive. Required to be placed back on BiPAP and on pressors. Patient coded in the early hours of 6/10/2020, code was unsuccessful, wife requested to stop further CPR. Patient pronounced dead at 7:17 AM on 6/10/2020         Diagnoses:     Acute on chronic hypoxic respiratory failure   - Suspect sec to CHF and COPD exacerbation,  and pseudomonas bronchitis   - pulmonology consulted  - COVID ruled out. Transferred to ICU on 2020 for worsening respiratory distress,  patient was found to be lethargic and obtunded is responded well to BiPAP. Patient declined again on 2020 , with worsening hypoxia , did not improve with BiPAP . He coded in the early hours of 6/10/2020, he was intubated . Code was unsuccessful , patient  .     Acute exacerbation of COPD  -Treated with hand-held nebulizers ,IV Solu-medrol      Acute cardiomyopathy , acute systolic congestive heart failure   - new dx - low EF of 20 % and severe global hypokinesis  - diastolic dysfunction and moderate pulm htn  - started low dose BB, diuresis  - cardiology consulted  - Blood pressures drop  with IV diuretics responded to IV fluid bolus. regurgitation.   Systolic pulmonary artery pressure (SPAP) is elevated and estimated at 58   mmHg (right atrial pressure 8 mmHg) consistent with moderate pulmonary   hypertension.   Eustachian valve a normal variant in right atrium     CBC:   Recent Labs     06/08/20 1856 06/09/20  0640 06/10/20  0535   WBC 21.9* 23.3* 20.5*   RBC 3.55* 3.33* 3.01*   HGB 12.1* 11.4* 10.5*   HCT 38.8* 36.1* 35.1*   .2* 108.5* 116.7*   RDW 15.8* 15.5* 17.0*    186 see below     BMP:   Recent Labs     06/08/20 1856 06/09/20  0640 06/10/20  0535   * 133* 131*   K 4.8 4.6 6.5*  6.5*   CL 93* 93* 97*   CO2 27 29 20*   PHOS  --  3.6 5.7*   BUN 87* 94* 87*   CREATININE 1.1 1.0 1.1     BNP: No results for input(s): BNP in the last 72 hours. PT/INR:   Recent Labs     06/08/20  0554 06/09/20  0640 06/10/20  0535   PROTIME 36.7* 34.0* 48.2*   INR 3.13* 2.90* 4.10*     APTT: No results for input(s): APTT in the last 72 hours.   CARDIAC ENZYMES:   Recent Labs     06/08/20 1856 06/09/20  0031 06/09/20  0641   TROPONINI 0.02* 0.02* 0.02*     FASTING LIPID PANEL:  Lab Results   Component Value Date    CHOL 166 11/29/2017    HDL 87 (H) 11/29/2017    TRIG 49 11/29/2017     LIVER PROFILE:   Recent Labs     06/08/20  0554 06/09/20  0640 06/10/20  0535   AST 2,701* 528* 504*   ALT 3,266* 1,849* 1,179*   BILITOT 1.9* 1.8* 2.1*   ALKPHOS 129 113 102           Signed:  Dianne Lombardo  6/10/2020

## 2020-06-10 NOTE — PROGRESS NOTES
Patient has now taken bipap mask off 17 times in last hour. MD messaged and asked about precedex. bipap now removed from patient and NC placed at 4L. Monitoring.

## 2020-06-10 NOTE — PROGRESS NOTES
New order obtained for precedex, started at this time at 0.2mcg/kg/hr. patient noted to now be interactive, answering questions and following commands with bipap off. bipap to be replaced once patient calm enough to allow replacement. Monitoring.

## 2020-06-10 NOTE — PROGRESS NOTES
Patient now semi-tolerating full face bipap. o2 and BP very hard to obtain. Patient needs constant observation not to pull bipap mask off. Consult now in place for urology to replace melissa.

## 2020-06-10 NOTE — PROGRESS NOTES
46 Wife left with sister and brother in law after staying with Abad Reinoso post code/death. 1010 Post mortem care completed. 1020 Body transported to [facility] via stretcher with transport and security. Belongings were sent home with wife. Wedding ring remained on left ring finger.

## 2020-06-11 LAB — HEMATOLOGY PATH CONSULT: NORMAL

## 2020-06-11 NOTE — PROCEDURES
Pt was emergently intubated secondary code blue/cardiopulmonary arrest.   Pt was intubated in a single attempt using a Glide scope with a size 4 MAC in place. The tip of a size 8 ETT was inserted between the cords and then advanced off of the stylette into the air way to 22cm from the lip. Cuff was inflated and secured. Good CO2 color change and bilateral breath sounds auscultated. Vent settings provided to respiratory staff.

## 2020-06-12 LAB
BLOOD CULTURE, ROUTINE: NORMAL
CULTURE, BLOOD 2: NORMAL

## 2020-11-24 NOTE — FLOWSHEET NOTE
06/14/19 2000   Vital Signs   Temp 98.5 °F (36.9 °C)   Temp Source Oral   Pulse 91   Heart Rate Source Monitor   Resp 25   BP (!) 88/57   MAP (mmHg) 69   Level of Consciousness 0   MEWS Score 3   Oxygen Therapy   SpO2 97 %       Shift assessment completed. Pt alert and oriented X 4. Denies pain. Persistent cough. Lung sound diminished. R IJ TLC but port has blood return. IVF @ 125 ml/hr. Scheduled medication given. No signs of distress noted. Will continue to monitor.
06/15/19 0900   Vital Signs   Pulse 88  (Simultaneous filing. User may not have seen previous data.)   Heart Rate Source Monitor   Resp 17   BP 98/64   MAP (mmHg) 75   Level of Consciousness 0   Oxygen Therapy   SpO2 96 %   O2 Device Nasal cannula   O2 Flow Rate (L/min) 3 L/min   Pt resting in bed, vitals per doc flow. Assessment complete see doc flow. NSR 93 w/ occasional PVC's on ICU bedside monitor. SPO2 96% on 3.5L O2 via NC with CSPO2 monitoring. Pt A&O x 4. PERRL. RIJ CVC WDL dressing reinforced. PIV x 1 WDL. Pt repositioned for comfort. Will continue to closely monitor.
06/15/19 1105   Vital Signs   Temp 97.9 °F (36.6 °C)   Temp Source Oral   Pulse 78   Resp 23   BP 95/61   MAP (mmHg) 72   Level of Consciousness 0   MEWS Score 3   Oxygen Therapy   SpO2 96 %    pt reassessed NSR 83 w/ occasional PVC's and PAC's. SPO2 95% on 3 L O2 via NC. PIV x 2 WDL. Dressing to old [address]. Pt sitting up on side of bed eating lunch, educated not to get oob w/o assistance verbalized understanding. Call light within reach, bed alarm on. ICU monitoring in place.
06/15/19 1605   Vital Signs   Temp 97.4 °F (36.3 °C)   Temp Source Oral   Pulse 85   Resp 27   BP 99/67   MAP (mmHg) 76   Level of Consciousness 0   MEWS Score 3   Oxygen Therapy   SpO2 98 %    pt reassessed NSR 92 w/ occasional pvc's and pac's, SPO2 96% on 3L O2 via NC. PIV x 2 WDL. Pt A&Ox 4. Pt repositioned for comfort, visiting w/ wife @ bedside.
06/16/19 1615   Assessment   Charting Type Reassessment   Neurological   Neuro (WDL) WDL   Level of Consciousness 0   Orientation Level Oriented X4   HEENT   HEENT (WDL) WDL   Respiratory   Respiratory (WDL) X   Respiratory Pattern Regular   Respiratory Depth Shallow   Respiratory Quality/Effort Unlabored   Chest Assessment Chest expansion symmetrical   Breath Sounds   Right Upper Lobe Diminished   Right Middle Lobe Diminished   Right Lower Lobe Fine Crackles;Diminished   Left Upper Lobe Diminished   Left Lower Lobe Fine Crackles;Diminished   Cough/Sputum   Cough Productive   Sputum Amount Small   Sputum Color Yellow   Tenacity Thick   Cardiac   Cardiac (WDL) X   Cardiac Regularity Regular   Heart Sounds S1, S2   Cardiac Rhythm NSR   Ectopy PAC;PVC   Ectopy Frequency Occasional   Cardiac Monitor   Telemetry Monitor On Yes   Telemetry Audible Yes   Telemetry Alarms Set Yes   Telemetry Box Number icu 4   Gastrointestinal   Abdominal (WDL) WDL   Peripheral Vascular   Peripheral Vascular (WDL) WDL   Edema None   Skin Color/Condition   Skin Color/Condition (WDL) WDL   Skin Integrity   Skin Integrity (WDL) WDL   Musculoskeletal   Musculoskeletal (WDL) WDL   Genitourinary   Genitourinary (WDL) WDL   Flank Tenderness No   Suprapubic Tenderness No   Dysuria No   Urine Assessment   Incontinence No   Urine Color Yellow/straw   Urine Appearance Clear   Anus/Rectum   Anus/Rectum (WDL) WDL   Psychosocial   Psychosocial (WDL) WDL   Reassessed, lungsounds diminished, less crackles bibasilar that noon assessment, no exp wheezes noted. States breathing is easier, family at bedside states patient looks \"better today\". Diuresed 1200 ml so far from furosemide 20 mg IVP given earlier at 1435. NP cough noted. All ICU leads intact, call light in reach.
Consider bowel regimen.  Maintain aspiration precautions during all fees.

## 2020-11-27 NOTE — PLAN OF CARE
Phase II:  1. Patient is identified using name and the date of birth. 2.  The patient is free from signs and symptoms of chemical, electrical, laser, radiation, positioning, or transfer/transport injury. 3.  The patient receives appropriate medication(s), safely administered during the Perioperative period. 4.  The patient has wound/tissue perfusion consistent with or improved from baseline levels established preoperatively. 5.  The patient is at or returning to normothermia at the conclusion of the immediate postoperative period. 6.  The patient's fluid, electrolyte, and acid base balances are consistent with or improved from baseline levels established preoperatively. 7.  The patient's pulmonary function is consistent with or improved from baseline levels established preoperatively. 8.  The patient's cardiovascular status is consistent with or improved from baseline levels established preoperatively. 9.  The patient/caregiver demonstrates knowledge of nutritional management related to the operative or other invasive procedure. 10. The patient/caregiver demonstrates knowledge of medication, pain, and wound management. 11. The patient participates in the rehabilitation process as applicable. 12.  The patient/caregiver participates in decisions affection his or her Perioperative plan of care. 13.  The patient's care is consistent with the individualized Perioperative plan of care. 14.  The patient's right to privacy is maintained. 15. The patient is the recipient of competent and ethical care within legal standards of practice. 16.  The patient's value system, lifestyle, ethnicity, and culture are considered, respected, and incorporated in the Perioperative plan of care and understands special services available. 17.  The patient demonstrates and/or reports adequate pain control throughout the the Perioperative period. 18.   The patient's neurological status is consistent with or improved from baseline levels established preoperatively. 23.  The patient/caregiver demonstrates knowledge of the expected responses to the operative or invasive procedure. 20.  Patient/Caregiver has reduced anxiety. Interventions- Familiarize with environment and equipment.   21. Other:  22. Other: [Follow-Up - Clinic] : a clinic follow-up of [Aortic Stenosis] : aortic stenosis [Hypertension] : hypertension [Medication Management] : Medication management

## 2023-03-20 NOTE — CARE COORDINATION
Case Management Assessment  Initial Evaluation    Date/Time of Evaluation: 6/5/2020 4:08 PM  Assessment Completed by: Abdoulaye Paz    Patient Name: Lay Epps  YOB: 1950  Diagnosis: Dyspnea [R06.00]  Date / Time: 6/4/2020  1:17 PM  Admission status/Date:inpt  Chart Reviewed: Yes      Patient Interviewed: No   Family Interviewed:  Yes - spouse,Cata      Hospitalization in the last 30 days:  No    Contacts  :     Relationship to Patient:   Phone Number:    Alternate Contact:     Relationship to Patient:     Phone Number:    Met with:    Current PCP  VALERIE GONZALEZ CNP      Financial  Commercial  Precert required for SNF : Y, N        3 night stay required - Y, N    ADLS  Support Systems: Spouse/Significant Other  Transportation: self    Meal Preparation: self    Housing  Home Environment: home with spouse  Steps: one  Plans to Return to Present Housing: Yes  Other Identified Issues: no    Home Care Information  Currently active with [address] : No     Passport/Waiver : No  :                      Phone Number:    Passport/Waiver Services: no          Durable Medical Equipment   DME Provider: [facility]  Equipment: Walker___Cane___RTS___ BSC___Shower Chair___  02_x_ at __2__Liter(s)---Uses___cont_____HHN_x__ CPAP___ BiPap___ Hospital Bed___W/C____Other________      Has Home O2 in place on admit:  Yes    If above answer is No ---is pt presently on O2 during hospitalization:  Yes  if yes CM to follow for potential DC O2 need  Informed of need to bring portable home O2 tank on day of discharge for nursing to connect prior to leaving:   Yes  Verbalized agreement/Understanding:   Yes    Community Service Affiliation  Dialysis:  No    · Name:  · Location  · Dialysis Schedule:  · Phone:   · Fax:     Outpatient PT/OT: No    Cancer Center: No     CHF Clinic: No     Pulmonary Rehab: No  Pain Clinic: No  Community Mental Health: No    Wound Clinic: No     COVID Topical Retinoid counseling:  Patient advised to apply a pea-sized amount only at bedtime and wait 30 minutes after washing their face before applying.  If too drying, patient may add a non-comedogenic moisturizer. The patient verbalized understanding of the proper use and possible adverse effects of retinoids.  All of the patient's questions and concerns were addressed.

## 2024-02-16 NOTE — ED NOTES
Assumed pt care at this time, remains in negative pressure room, assignment completed. Declines further needs at present. Watching TV but reports \"dozing at time\".       Michelle Esparza RN  01/06/20 4217
Bed: 06  Expected date:   Expected time:   Means of arrival:   Comments:  [address], RN  01/06/20 7668
Perfect serve message to Dr. Solorio Risk @ [address]  01/06/20 30 [facility] returned the call @ [address] [place] [address]  01/06/20 [phone]
Report and primary care given to PCU nurse Irina OLSEN.         Brigid Horton, RAÚL  01/06/20 7412
16-Feb-2024 09:05

## 2025-07-08 NOTE — PLAN OF CARE
Admit to PCU, tele    Acute on Chronic Hypoxic Respiratory Failure (baseline 2L NC)  Recurrent Bilateral upper Lobe Cavitary PNA  COPD AE  Borderline low BP in ED but has since improved    Pulm consult Sudden numbness or weakness of the face, arm, or leg, especially on one side of the body. Confusion, trouble speaking or understanding. Trouble seeing in one or both eyes. Trouble walking, dizziness, loss of balance or coordination. Severe headache.

## (undated) DEVICE — MASK RESP REG WHT POLYPR PCH STYL FLAT FLD N95

## (undated) DEVICE — CONMED SCOPE SAVER BITE BLOCK, 20X27 MM: Brand: SCOPE SAVER

## (undated) DEVICE — SINGLE USE SUCTION VALVE MAJ-209: Brand: SINGLE USE SUCTION VALVE (STERILE)

## (undated) DEVICE — NEBULIZER AEROSOL TBNG 7 FT FOR ACUTE CARE NEBUTECH

## (undated) DEVICE — SINGLE USE BIOPSY VALVE MAJ-210: Brand: SINGLE USE BIOPSY VALVE (STERILE)

## (undated) DEVICE — FRAME EYEWR PROTCT ASST CLR DISP SAFEVIEW

## (undated) DEVICE — SYRINGE MED 50ML LUERSLIP TIP

## (undated) DEVICE — AIRLIFE™ ADULT AEROSOL MASK VINYL, UNDER-THE-CHIN STYLE: Brand: AIRLIFE™

## (undated) DEVICE — SYRINGE MED 10ML SLIP TIP BLNT FILL AND LUERLOCK DISP